# Patient Record
Sex: MALE | Race: OTHER | HISPANIC OR LATINO | Employment: OTHER | URBAN - METROPOLITAN AREA
[De-identification: names, ages, dates, MRNs, and addresses within clinical notes are randomized per-mention and may not be internally consistent; named-entity substitution may affect disease eponyms.]

---

## 2020-05-23 ENCOUNTER — HOSPITAL ENCOUNTER (INPATIENT)
Facility: MEDICAL CENTER | Age: 70
LOS: 6 days | DRG: 189 | End: 2020-05-29
Attending: EMERGENCY MEDICINE | Admitting: HOSPITALIST
Payer: MEDICARE

## 2020-05-23 ENCOUNTER — APPOINTMENT (OUTPATIENT)
Dept: CARDIOLOGY | Facility: MEDICAL CENTER | Age: 70
DRG: 189 | End: 2020-05-23
Attending: HOSPITALIST
Payer: MEDICARE

## 2020-05-23 ENCOUNTER — APPOINTMENT (OUTPATIENT)
Dept: RADIOLOGY | Facility: MEDICAL CENTER | Age: 70
DRG: 189 | End: 2020-05-23
Attending: EMERGENCY MEDICINE
Payer: MEDICARE

## 2020-05-23 DIAGNOSIS — I50.31 ACUTE DIASTOLIC HEART FAILURE (HCC): ICD-10-CM

## 2020-05-23 DIAGNOSIS — J96.01 ACUTE RESPIRATORY FAILURE WITH HYPOXIA (HCC): ICD-10-CM

## 2020-05-23 DIAGNOSIS — R06.00 DYSPNEA, UNSPECIFIED TYPE: ICD-10-CM

## 2020-05-23 DIAGNOSIS — R09.02 HYPOXIA: ICD-10-CM

## 2020-05-23 DIAGNOSIS — I50.9 NEW ONSET OF CONGESTIVE HEART FAILURE (HCC): ICD-10-CM

## 2020-05-23 PROBLEM — R93.89 ABNORMAL CHEST X-RAY: Status: ACTIVE | Noted: 2020-05-23

## 2020-05-23 PROBLEM — R77.8 ELEVATED TOTAL PROTEIN: Status: ACTIVE | Noted: 2020-05-23

## 2020-05-23 PROBLEM — D69.6 THROMBOCYTOPENIA (HCC): Status: ACTIVE | Noted: 2020-05-23

## 2020-05-23 PROBLEM — N17.9 ACUTE RENAL INJURY (HCC): Status: ACTIVE | Noted: 2020-05-23

## 2020-05-23 LAB
ALBUMIN SERPL BCP-MCNC: 3.8 G/DL (ref 3.2–4.9)
ALBUMIN/GLOB SERPL: 0.8 G/DL
ALP SERPL-CCNC: 77 U/L (ref 30–99)
ALT SERPL-CCNC: 29 U/L (ref 2–50)
ANION GAP SERPL CALC-SCNC: 16 MMOL/L (ref 7–16)
AST SERPL-CCNC: 30 U/L (ref 12–45)
BASOPHILS # BLD AUTO: 1.3 % (ref 0–1.8)
BASOPHILS # BLD: 0.12 K/UL (ref 0–0.12)
BILIRUB SERPL-MCNC: 0.7 MG/DL (ref 0.1–1.5)
BUN SERPL-MCNC: 28 MG/DL (ref 8–22)
CALCIUM SERPL-MCNC: 9.1 MG/DL (ref 8.5–10.5)
CHLORIDE SERPL-SCNC: 99 MMOL/L (ref 96–112)
CO2 SERPL-SCNC: 21 MMOL/L (ref 20–33)
COVID ORDER STATUS COVID19: NORMAL
CREAT SERPL-MCNC: 1.43 MG/DL (ref 0.5–1.4)
D DIMER PPP IA.FEU-MCNC: 1.94 UG/ML (FEU) (ref 0–0.5)
EKG IMPRESSION: NORMAL
EOSINOPHIL # BLD AUTO: 0.31 K/UL (ref 0–0.51)
EOSINOPHIL NFR BLD: 3.3 % (ref 0–6.9)
ERYTHROCYTE [DISTWIDTH] IN BLOOD BY AUTOMATED COUNT: 53 FL (ref 35.9–50)
EST. AVERAGE GLUCOSE BLD GHB EST-MCNC: 183 MG/DL
GLOBULIN SER CALC-MCNC: 4.5 G/DL (ref 1.9–3.5)
GLUCOSE BLD-MCNC: 155 MG/DL (ref 65–99)
GLUCOSE BLD-MCNC: 217 MG/DL (ref 65–99)
GLUCOSE SERPL-MCNC: 240 MG/DL (ref 65–99)
HBA1C MFR BLD: 8 % (ref 0–5.6)
HCT VFR BLD AUTO: 45.8 % (ref 42–52)
HGB BLD-MCNC: 14.3 G/DL (ref 14–18)
IMM GRANULOCYTES # BLD AUTO: 0.13 K/UL (ref 0–0.11)
IMM GRANULOCYTES NFR BLD AUTO: 1.4 % (ref 0–0.9)
LIPASE SERPL-CCNC: 33 U/L (ref 11–82)
LV EJECT FRACT MOD 2C 99903: 59.08
LV EJECT FRACT MOD 4C 99902: 60.8
LV EJECT FRACT MOD BP 99901: 60.32
LYMPHOCYTES # BLD AUTO: 2.33 K/UL (ref 1–4.8)
LYMPHOCYTES NFR BLD: 24.5 % (ref 22–41)
MAGNESIUM SERPL-MCNC: 1.9 MG/DL (ref 1.5–2.5)
MCH RBC QN AUTO: 31.6 PG (ref 27–33)
MCHC RBC AUTO-ENTMCNC: 31.2 G/DL (ref 33.7–35.3)
MCV RBC AUTO: 101.1 FL (ref 81.4–97.8)
MONOCYTES # BLD AUTO: 0.82 K/UL (ref 0–0.85)
MONOCYTES NFR BLD AUTO: 8.6 % (ref 0–13.4)
NEUTROPHILS # BLD AUTO: 5.79 K/UL (ref 1.82–7.42)
NEUTROPHILS NFR BLD: 60.9 % (ref 44–72)
NRBC # BLD AUTO: 0 K/UL
NRBC BLD-RTO: 0 /100 WBC
NT-PROBNP SERPL IA-MCNC: 3427 PG/ML (ref 0–125)
PLATELET # BLD AUTO: 141 K/UL (ref 164–446)
PMV BLD AUTO: 13.1 FL (ref 9–12.9)
POTASSIUM SERPL-SCNC: 4.9 MMOL/L (ref 3.6–5.5)
PROT SERPL-MCNC: 8.3 G/DL (ref 6–8.2)
RBC # BLD AUTO: 4.53 M/UL (ref 4.7–6.1)
SARS-COV-2 RNA RESP QL NAA+PROBE: NOTDETECTED
SODIUM SERPL-SCNC: 136 MMOL/L (ref 135–145)
SPECIMEN SOURCE: NORMAL
TROPONIN T SERPL-MCNC: 42 NG/L (ref 6–19)
TROPONIN T SERPL-MCNC: 64 NG/L (ref 6–19)
TSH SERPL DL<=0.005 MIU/L-ACNC: 1.36 UIU/ML (ref 0.38–5.33)
WBC # BLD AUTO: 9.5 K/UL (ref 4.8–10.8)

## 2020-05-23 PROCEDURE — 85379 FIBRIN DEGRADATION QUANT: CPT

## 2020-05-23 PROCEDURE — 83036 HEMOGLOBIN GLYCOSYLATED A1C: CPT

## 2020-05-23 PROCEDURE — 84484 ASSAY OF TROPONIN QUANT: CPT

## 2020-05-23 PROCEDURE — 700102 HCHG RX REV CODE 250 W/ 637 OVERRIDE(OP): Performed by: EMERGENCY MEDICINE

## 2020-05-23 PROCEDURE — 99223 1ST HOSP IP/OBS HIGH 75: CPT | Mod: AI | Performed by: HOSPITALIST

## 2020-05-23 PROCEDURE — 36415 COLL VENOUS BLD VENIPUNCTURE: CPT

## 2020-05-23 PROCEDURE — 93306 TTE W/DOPPLER COMPLETE: CPT | Mod: 26 | Performed by: INTERNAL MEDICINE

## 2020-05-23 PROCEDURE — 93005 ELECTROCARDIOGRAM TRACING: CPT | Performed by: EMERGENCY MEDICINE

## 2020-05-23 PROCEDURE — 700102 HCHG RX REV CODE 250 W/ 637 OVERRIDE(OP): Performed by: HOSPITALIST

## 2020-05-23 PROCEDURE — 84443 ASSAY THYROID STIM HORMONE: CPT

## 2020-05-23 PROCEDURE — 83880 ASSAY OF NATRIURETIC PEPTIDE: CPT

## 2020-05-23 PROCEDURE — 85025 COMPLETE CBC W/AUTO DIFF WBC: CPT

## 2020-05-23 PROCEDURE — A9270 NON-COVERED ITEM OR SERVICE: HCPCS | Performed by: EMERGENCY MEDICINE

## 2020-05-23 PROCEDURE — A9270 NON-COVERED ITEM OR SERVICE: HCPCS | Performed by: HOSPITALIST

## 2020-05-23 PROCEDURE — 83735 ASSAY OF MAGNESIUM: CPT

## 2020-05-23 PROCEDURE — 83690 ASSAY OF LIPASE: CPT

## 2020-05-23 PROCEDURE — 80053 COMPREHEN METABOLIC PANEL: CPT

## 2020-05-23 PROCEDURE — 770020 HCHG ROOM/CARE - TELE (206)

## 2020-05-23 PROCEDURE — 71045 X-RAY EXAM CHEST 1 VIEW: CPT

## 2020-05-23 PROCEDURE — 93005 ELECTROCARDIOGRAM TRACING: CPT

## 2020-05-23 PROCEDURE — 700111 HCHG RX REV CODE 636 W/ 250 OVERRIDE (IP): Performed by: HOSPITALIST

## 2020-05-23 PROCEDURE — 84155 ASSAY OF PROTEIN SERUM: CPT

## 2020-05-23 PROCEDURE — 700111 HCHG RX REV CODE 636 W/ 250 OVERRIDE (IP): Performed by: EMERGENCY MEDICINE

## 2020-05-23 PROCEDURE — 94760 N-INVAS EAR/PLS OXIMETRY 1: CPT

## 2020-05-23 PROCEDURE — 96374 THER/PROPH/DIAG INJ IV PUSH: CPT

## 2020-05-23 PROCEDURE — 84165 PROTEIN E-PHORESIS SERUM: CPT

## 2020-05-23 PROCEDURE — C9803 HOPD COVID-19 SPEC COLLECT: HCPCS | Performed by: EMERGENCY MEDICINE

## 2020-05-23 PROCEDURE — U0004 COV-19 TEST NON-CDC HGH THRU: HCPCS

## 2020-05-23 PROCEDURE — 99285 EMERGENCY DEPT VISIT HI MDM: CPT

## 2020-05-23 PROCEDURE — 93306 TTE W/DOPPLER COMPLETE: CPT

## 2020-05-23 PROCEDURE — 82962 GLUCOSE BLOOD TEST: CPT

## 2020-05-23 RX ORDER — ACETAMINOPHEN 325 MG/1
650 TABLET ORAL EVERY 6 HOURS PRN
Status: DISCONTINUED | OUTPATIENT
Start: 2020-05-23 | End: 2020-05-29 | Stop reason: HOSPADM

## 2020-05-23 RX ORDER — FUROSEMIDE 10 MG/ML
20 INJECTION INTRAMUSCULAR; INTRAVENOUS ONCE
Status: COMPLETED | OUTPATIENT
Start: 2020-05-23 | End: 2020-05-23

## 2020-05-23 RX ORDER — LISINOPRIL 5 MG/1
5 TABLET ORAL
Status: DISCONTINUED | OUTPATIENT
Start: 2020-05-23 | End: 2020-05-24

## 2020-05-23 RX ORDER — FUROSEMIDE 10 MG/ML
40 INJECTION INTRAMUSCULAR; INTRAVENOUS
Status: DISCONTINUED | OUTPATIENT
Start: 2020-05-23 | End: 2020-05-23

## 2020-05-23 RX ORDER — POLYETHYLENE GLYCOL 3350 17 G/17G
1 POWDER, FOR SOLUTION ORAL
Status: DISCONTINUED | OUTPATIENT
Start: 2020-05-23 | End: 2020-05-29 | Stop reason: HOSPADM

## 2020-05-23 RX ORDER — HEPARIN SODIUM 5000 [USP'U]/ML
5000 INJECTION, SOLUTION INTRAVENOUS; SUBCUTANEOUS EVERY 8 HOURS
Status: DISCONTINUED | OUTPATIENT
Start: 2020-05-23 | End: 2020-05-26

## 2020-05-23 RX ORDER — FUROSEMIDE 10 MG/ML
40 INJECTION INTRAMUSCULAR; INTRAVENOUS
Status: DISCONTINUED | OUTPATIENT
Start: 2020-05-23 | End: 2020-05-24

## 2020-05-23 RX ORDER — INSULIN GLARGINE 100 [IU]/ML
20 INJECTION, SOLUTION SUBCUTANEOUS EVERY MORNING
Status: ON HOLD | COMMUNITY
End: 2020-05-29

## 2020-05-23 RX ORDER — AMOXICILLIN 250 MG
2 CAPSULE ORAL 2 TIMES DAILY
Status: DISCONTINUED | OUTPATIENT
Start: 2020-05-23 | End: 2020-05-29 | Stop reason: HOSPADM

## 2020-05-23 RX ORDER — ISOSORBIDE DINITRATE 10 MG/1
10 TABLET ORAL EVERY 8 HOURS
Status: DISCONTINUED | OUTPATIENT
Start: 2020-05-23 | End: 2020-05-24

## 2020-05-23 RX ORDER — CARVEDILOL 6.25 MG/1
6.25 TABLET ORAL 2 TIMES DAILY WITH MEALS
Status: DISCONTINUED | OUTPATIENT
Start: 2020-05-23 | End: 2020-05-24

## 2020-05-23 RX ORDER — POTASSIUM CHLORIDE 20 MEQ/1
20 TABLET, EXTENDED RELEASE ORAL 2 TIMES DAILY
Status: DISCONTINUED | OUTPATIENT
Start: 2020-05-23 | End: 2020-05-24

## 2020-05-23 RX ORDER — ENALAPRILAT 1.25 MG/ML
1.25 INJECTION INTRAVENOUS EVERY 6 HOURS PRN
Status: DISCONTINUED | OUTPATIENT
Start: 2020-05-23 | End: 2020-05-29 | Stop reason: HOSPADM

## 2020-05-23 RX ORDER — ONDANSETRON 2 MG/ML
4 INJECTION INTRAMUSCULAR; INTRAVENOUS EVERY 4 HOURS PRN
Status: DISCONTINUED | OUTPATIENT
Start: 2020-05-23 | End: 2020-05-29 | Stop reason: HOSPADM

## 2020-05-23 RX ORDER — BISACODYL 10 MG
10 SUPPOSITORY, RECTAL RECTAL
Status: DISCONTINUED | OUTPATIENT
Start: 2020-05-23 | End: 2020-05-29 | Stop reason: HOSPADM

## 2020-05-23 RX ORDER — ONDANSETRON 4 MG/1
4 TABLET, ORALLY DISINTEGRATING ORAL EVERY 4 HOURS PRN
Status: DISCONTINUED | OUTPATIENT
Start: 2020-05-23 | End: 2020-05-29 | Stop reason: HOSPADM

## 2020-05-23 RX ADMIN — FUROSEMIDE 20 MG: 10 INJECTION, SOLUTION INTRAMUSCULAR; INTRAVENOUS at 12:12

## 2020-05-23 RX ADMIN — CARVEDILOL 6.25 MG: 6.25 TABLET, FILM COATED ORAL at 17:37

## 2020-05-23 RX ADMIN — INSULIN HUMAN 10 UNITS: 100 INJECTION, SUSPENSION SUBCUTANEOUS at 18:45

## 2020-05-23 RX ADMIN — POTASSIUM CHLORIDE 20 MEQ: 1500 TABLET, EXTENDED RELEASE ORAL at 17:37

## 2020-05-23 RX ADMIN — ISOSORBIDE DINITRATE 10 MG: 10 TABLET ORAL at 20:49

## 2020-05-23 RX ADMIN — HEPARIN SODIUM 5000 UNITS: 5000 INJECTION, SOLUTION INTRAVENOUS; SUBCUTANEOUS at 20:49

## 2020-05-23 RX ADMIN — NITROGLYCERIN 0.5 INCH: 20 OINTMENT TOPICAL at 12:10

## 2020-05-23 RX ADMIN — LISINOPRIL 5 MG: 5 TABLET ORAL at 15:52

## 2020-05-23 RX ADMIN — ISOSORBIDE DINITRATE 10 MG: 10 TABLET ORAL at 14:14

## 2020-05-23 RX ADMIN — FUROSEMIDE 40 MG: 10 INJECTION, SOLUTION INTRAMUSCULAR; INTRAVENOUS at 15:52

## 2020-05-23 RX ADMIN — INSULIN HUMAN 6 UNITS: 100 INJECTION, SOLUTION PARENTERAL at 19:12

## 2020-05-23 RX ADMIN — INSULIN HUMAN 3 UNITS: 100 INJECTION, SOLUTION PARENTERAL at 20:51

## 2020-05-23 RX ADMIN — HEPARIN SODIUM 5000 UNITS: 5000 INJECTION, SOLUTION INTRAVENOUS; SUBCUTANEOUS at 14:14

## 2020-05-23 ASSESSMENT — COGNITIVE AND FUNCTIONAL STATUS - GENERAL
SUGGESTED CMS G CODE MODIFIER MOBILITY: CH
SUGGESTED CMS G CODE MODIFIER DAILY ACTIVITY: CH
MOBILITY SCORE: 24
DAILY ACTIVITIY SCORE: 24

## 2020-05-23 ASSESSMENT — ENCOUNTER SYMPTOMS
MEMORY LOSS: 0
GASTROINTESTINAL NEGATIVE: 1
EYES NEGATIVE: 1
SHORTNESS OF BREATH: 1
NERVOUS/ANXIOUS: 0
WEAKNESS: 1
SORE THROAT: 0
NECK PAIN: 0
CONSTIPATION: 0
HEADACHES: 1
SINUS PAIN: 0
FEVER: 0
MUSCULOSKELETAL NEGATIVE: 1
DEPRESSION: 0
MYALGIAS: 0
BLOOD IN STOOL: 0
HEARTBURN: 0
DIAPHORESIS: 0
BLURRED VISION: 0
COUGH: 0
INSOMNIA: 0
CHILLS: 0
DIARRHEA: 0
PALPITATIONS: 0
WHEEZING: 0
NAUSEA: 0
HEMOPTYSIS: 0
LOSS OF CONSCIOUSNESS: 0
DOUBLE VISION: 0
PSYCHIATRIC NEGATIVE: 1
BRUISES/BLEEDS EASILY: 0
VOMITING: 0
ABDOMINAL PAIN: 0
FOCAL WEAKNESS: 0
DIZZINESS: 1
SEIZURES: 0

## 2020-05-23 ASSESSMENT — LIFESTYLE VARIABLES
HAVE YOU EVER FELT YOU SHOULD CUT DOWN ON YOUR DRINKING: NO
DO YOU DRINK ALCOHOL: NO
EVER HAD A DRINK FIRST THING IN THE MORNING TO STEADY YOUR NERVES TO GET RID OF A HANGOVER: NO
DOES PATIENT WANT TO STOP DRINKING: NO
DOES PATIENT WANT TO STOP DRINKING: NO
TOTAL SCORE: 0
HAVE PEOPLE ANNOYED YOU BY CRITICIZING YOUR DRINKING: NO
ALCOHOL_USE: NO
TOTAL SCORE: 0
TOTAL SCORE: 0
EVER_SMOKED: YES
EVER_SMOKED: NEVER
SUBSTANCE_ABUSE: 0
HOW MANY TIMES IN THE PAST YEAR HAVE YOU HAD 5 OR MORE DRINKS IN A DAY: 0
CONSUMPTION TOTAL: NEGATIVE
EVER FELT BAD OR GUILTY ABOUT YOUR DRINKING: NO
AVERAGE NUMBER OF DAYS PER WEEK YOU HAVE A DRINK CONTAINING ALCOHOL: 0
ON A TYPICAL DAY WHEN YOU DRINK ALCOHOL HOW MANY DRINKS DO YOU HAVE: 0

## 2020-05-23 ASSESSMENT — COPD QUESTIONNAIRES
DO YOU EVER COUGH UP ANY MUCUS OR PHLEGM?: NO/ONLY WITH OCCASIONAL COLDS OR INFECTIONS
COPD SCREENING SCORE: 4
HAVE YOU SMOKED AT LEAST 100 CIGARETTES IN YOUR ENTIRE LIFE: NO/DON'T KNOW
DURING THE PAST 4 WEEKS HOW MUCH DID YOU FEEL SHORT OF BREATH: SOME OF THE TIME

## 2020-05-23 ASSESSMENT — PATIENT HEALTH QUESTIONNAIRE - PHQ9
2. FEELING DOWN, DEPRESSED, IRRITABLE, OR HOPELESS: NOT AT ALL
SUM OF ALL RESPONSES TO PHQ9 QUESTIONS 1 AND 2: 0
1. LITTLE INTEREST OR PLEASURE IN DOING THINGS: NOT AT ALL

## 2020-05-23 ASSESSMENT — FIBROSIS 4 INDEX
FIB4 SCORE: 2.77
FIB4 SCORE: 2.77

## 2020-05-23 NOTE — ASSESSMENT & PLAN NOTE
COVID x1-.  CT chest without contrast obtained which is consistent with interstitial lung disease along with reticulonodular densities

## 2020-05-23 NOTE — ASSESSMENT & PLAN NOTE
Likely secondary to COPD exacerbation versus diastolic heart failure versus interstitial lung disease   ---> We will start the patient on RT protocol, breathing treatment, continue prednisone 40 mg p.o. daily.   ---> We will obtain daily INRs, restriction to 1.5 L, cardiac diet  --> We will continue prednisone for total of 5 days

## 2020-05-23 NOTE — ASSESSMENT & PLAN NOTE
Hemoglobin A1c 8, will continue insulin sliding scale, hypoglycemia protocol mycotoxin AC at bedtime.  ---> Upon discharge patient will continue metformin and Januvia

## 2020-05-23 NOTE — ED TRIAGE NOTES
"..  Chief Complaint   Patient presents with   • Shortness of Breath     c/o of SOB and dizziness x's 8 days. No cough.    pt Saturation on room air 87%      ..BP (!) 175/83   Pulse 98   Resp 16   Ht 1.6 m (5' 3\")   Wt 81.6 kg (180 lb)   SpO2 99%   "

## 2020-05-23 NOTE — ASSESSMENT & PLAN NOTE
Patient is noted to have grade 1 diastolic heart failure.  EF 55%.  Patient is noted to have mildly elevated RSVP.  ---> We will continue cardiac diet, ROXY's, daily weight, fluid restriction to 1.5 L  ----> Provide Lasix 20 mg p.o.  daily

## 2020-05-23 NOTE — ED PROVIDER NOTES
"ED Provider Note    CHIEF COMPLAINT  Chief Complaint   Patient presents with   • Shortness of Breath     c/o of SOB and dizziness x's 8 days. No cough.        HPI  Chase Harris is a 69 y.o. male who presents to the emergency department through triage for shortness of breath.  Patient describes 2 to 3 days of shortness of breath, denies any history of similar symptoms previously.  Patient states he has had mild dizziness, lightheaded but this resolves with rest.  He states shortness of breath is worse when he bends over.  Also with intermittent chest pain, low substernal.  No back pain or abdominal pain.  No syncope.  Denies any cough, congestion, sore throat.  Denies sputum production.  Denies fever or chills.  Denies recent travel, sick contacts or known coronavirus exposure.  Denies peripheral edema.    REVIEW OF SYSTEMS  See HPI for further details. All other systems are negative.    PAST MEDICAL HISTORY   Insulin-dependent diabetes    SOCIAL HISTORY  Social History     Tobacco Use   • Smoking status: Not on file   Substance and Sexual Activity   • Alcohol use: Not on file   • Drug use: Not on file   • Sexual activity: Not on file       SURGICAL HISTORY  patient denies any surgical history    CURRENT MEDICATIONS  Home Medications    **Home medications have not yet been reviewed for this encounter**     Medication list    ALLERGIES  No Known Allergies    PHYSICAL EXAM  VITAL SIGNS: BP (!) 171/74   Pulse 66   Temp 36.1 °C (97 °F) (Oral)   Resp (!) 24   Ht 1.6 m (5' 3\")   Wt 81.6 kg (180 lb)   SpO2 99%   BMI 31.89 kg/m²   Pulse ox interpretation: I interpret this pulse ox as low but improved with supplemental oxygen  Constitutional: Alert in no apparent distress.  HENT: Normocephalic, atraumatic. Bilateral external ears normal, Nose normal. Moist mucous membranes.    Eyes: Pupils are equal and reactive, Conjunctiva normal.   Neck: Normal range of motion, Supple.  No JVD.  Lymphatic: No lymphadenopathy " noted.   Cardiovascular: Regular rate and rhythm, no murmurs. Distal pulses intact.  No peripheral edema.  Thorax & Lungs: Few scattered bibasilar crackles.  No wheezing/rales/ronchi. No increased work of breathing, clipped speech or retractions.   Abdomen: Soft, non-distended, non-tender to palpation. No palpable or pulsatile masses. No peritoneal signs.   Skin: Warm, Dry, No erythema, No rash.   Musculoskeletal: Good range of motion in all major joints.   Neurologic: Alert x4.  Speech clear and cohesive.  Moves 4 extremity spontaneously.  Psychiatric: Affect normal, Judgment normal, Mood normal.       DIAGNOSTIC STUDIES / PROCEDURES    LABS  Results for orders placed or performed during the hospital encounter of 05/23/20   CBC WITH DIFFERENTIAL   Result Value Ref Range    WBC 9.5 4.8 - 10.8 K/uL    RBC 4.53 (L) 4.70 - 6.10 M/uL    Hemoglobin 14.3 14.0 - 18.0 g/dL    Hematocrit 45.8 42.0 - 52.0 %    .1 (H) 81.4 - 97.8 fL    MCH 31.6 27.0 - 33.0 pg    MCHC 31.2 (L) 33.7 - 35.3 g/dL    RDW 53.0 (H) 35.9 - 50.0 fL    Platelet Count 141 (L) 164 - 446 K/uL    MPV 13.1 (H) 9.0 - 12.9 fL    Neutrophils-Polys 60.90 44.00 - 72.00 %    Lymphocytes 24.50 22.00 - 41.00 %    Monocytes 8.60 0.00 - 13.40 %    Eosinophils 3.30 0.00 - 6.90 %    Basophils 1.30 0.00 - 1.80 %    Immature Granulocytes 1.40 (H) 0.00 - 0.90 %    Nucleated RBC 0.00 /100 WBC    Neutrophils (Absolute) 5.79 1.82 - 7.42 K/uL    Lymphs (Absolute) 2.33 1.00 - 4.80 K/uL    Monos (Absolute) 0.82 0.00 - 0.85 K/uL    Eos (Absolute) 0.31 0.00 - 0.51 K/uL    Baso (Absolute) 0.12 0.00 - 0.12 K/uL    Immature Granulocytes (abs) 0.13 (H) 0.00 - 0.11 K/uL    NRBC (Absolute) 0.00 K/uL   COMP METABOLIC PANEL   Result Value Ref Range    Sodium 136 135 - 145 mmol/L    Potassium 4.9 3.6 - 5.5 mmol/L    Chloride 99 96 - 112 mmol/L    Co2 21 20 - 33 mmol/L    Anion Gap 16.0 7.0 - 16.0    Glucose 240 (H) 65 - 99 mg/dL    Bun 28 (H) 8 - 22 mg/dL    Creatinine 1.43 (H) 0.50  - 1.40 mg/dL    Calcium 9.1 8.5 - 10.5 mg/dL    AST(SGOT) 30 12 - 45 U/L    ALT(SGPT) 29 2 - 50 U/L    Alkaline Phosphatase 77 30 - 99 U/L    Total Bilirubin 0.7 0.1 - 1.5 mg/dL    Albumin 3.8 3.2 - 4.9 g/dL    Total Protein 8.3 (H) 6.0 - 8.2 g/dL    Globulin 4.5 (H) 1.9 - 3.5 g/dL    A-G Ratio 0.8 g/dL   proBrain Natriuretic Peptide, NT   Result Value Ref Range    NT-proBNP 3427 (H) 0 - 125 pg/mL   Troponin STAT   Result Value Ref Range    Troponin T 42 (H) 6 - 19 ng/L   D-Dimer (only helpful in low pre-test probability wells critieria. Do not order if patient ruled out by PERC criteria. See Weblinks at top of Labs section)   Result Value Ref Range    D-Dimer Screen 1.94 (H) 0.00 - 0.50 ug/mL (FEU)   Lipase   Result Value Ref Range    Lipase 33 11 - 82 U/L   COVID/SARS CoV-2    Specimen: Nasopharyngeal; Respirate   Result Value Ref Range    COVID Order Status Received    ESTIMATED GFR   Result Value Ref Range    GFR If  59 (A) >60 mL/min/1.73 m 2    GFR If Non African American 49 (A) >60 mL/min/1.73 m 2   SARS-CoV-2, PCR (In-House)   Result Value Ref Range    SARS-CoV-2 Source NP Swab    EKG   Result Value Ref Range    Report       [facility]    Test Date:  2020  Pt Name:    BREANNE BOOTH                Department: ER  MRN:        [id number]                      Room:       [facility]  Gender:     Male                         Technician: [id number]  :        1950                   Requested By:ER TRIAGE PROTOCOL  Order #:    [id number]                    Reading MD: TYLER RANDOLPH, DO    Measurements  Intervals                                Axis  Rate:       69                           P:          69  NM:         244                          QRS:        47  QRSD:       84                           T:          -37  QT:         412  QTc:        442    Interpretive Statements  SINUS RHYTHM  FIRST DEGREE AV BLOCK  ABNORMAL T, CONSIDER ISCHEMIA, INFERIOR  "LEADS  BASELINE WANDER IN LEAD(S) V2  No previous ECG available for comparison  Electronically Signed On 5- 9:41:41 PDT by TYLER RANDOLPH DO       RADIOLOGY  DX-CHEST-PORTABLE (1 VIEW)   Final Result      Bilateral interstitial opacities may represent an atypical viral pneumonia such as Covid 19. Underlying chronic fibrotic changes not excluded.             COURSE & MEDICAL DECISION MAKING  Nursing notes and vital signs were reviewed. (See chart for details)  The patients records were reviewed, history was obtained from the patient;     ED evaluation for dyspnea and hypoxia most suggestive of a new onset CHF.  No leukocytosis, left shift despite mild bandemia.  Chest x-ray does suggest \"bilateral interstitial opacities which may represent an atypical viral pneumonia\" although after my interpretation this could also be interstitial edema.  Patient remains afebrile and without cough or congestion.  COVID testing pending.  No clinical evidence for sepsis.  Nitroglycerin paste has been added to his anterior chest wall, small dose of IV Lasix has been added as well.  He is hemodynamically stable without fever, tachycardia, hypotension.  Patient is somewhat hypertensive.  Patient was hypoxic on arrival and with room air trial while here in the emergency department, but rest comfortably with supplemental oxygen and appropriate saturations.  With that creatinine 1.43, no baseline for comparison.  No other electrolyte derangement.  Troponin is 42, BNP 3427.  EKG with nonspecific T wave inversions although not contiguous, no other ischemia.  He denies chest pain at this time.  He will be hospitalized for further cardiac evaluation, echocardiogram, troponin trending and diuresis.  Is aware the findings and agreeable to the disposition plan.    10:52 AM Dr. Lentz is aware of the patient agreeable to consultation.    FINAL IMPRESSION  (R06.00) Dyspnea, unspecified type  (R09.02) Hypoxia  (I50.9) New onset of " congestive heart failure (HCC)      Electronically signed by: Sharonda Nuñez D.O., 5/23/2020 9:39 AM      This dictation was created using voice recognition software. The accuracy of the dictation is limited to the abilities of the software. I expect there may be some errors of grammar and possibly content. The nursing notes were reviewed and certain aspects of this information were incorporated into this note.

## 2020-05-23 NOTE — PROGRESS NOTES
68yo withhx of DM presenting with  3-4 days of dyspnea. Hypoxia 87% on room air   BNP 3427 Scr 1.4 trop 42   CXR bilat interstitial infiltrates  Covid19 pending     Dr Hernández will admit

## 2020-05-23 NOTE — PROGRESS NOTES
2 RN Skin Check    2 RN skin check complete.   Devices in place: SCDs and Nasal Cannula.  Skin assessed under devices: yes.  Confirmed pressure ulcers found on: left plantar foot diabetic ulcer.  New potential pressure ulcers noted on n/a. Wound consult placed N/A.  The following interventions in place Pillows.

## 2020-05-23 NOTE — ED NOTES
Med Rec Updated and Complete per Pt's family over the phone  Allergies Reviewed  No PO ABX last 14 days.    Family states all of Pt's medications come from [facility].

## 2020-05-23 NOTE — H&P
Hospital Medicine History & Physical Note    Date of Service  5/23/2020    Primary Care Physician  No primary care provider on file.    Code Status  Full Code    Chief Complaint  Chief Complaint   Patient presents with   • Shortness of Breath     c/o of SOB and dizziness x's 8 days. No cough.        History of Presenting Illness  69 y.o. male who presented 5/23/2020 with sudden onset of shortness of breath and dizziness this morning.  The patient also reported some chest pain.  All the information was obtained through the use of an .  He says today it got worse but he has been having shortness of breath and dizziness for the past 8 days.  He does not have a cough.  The patient says that he was established with lung disease in [facility] and he gave him some small white pills that made him feel better but he ran out of those pills about 2 months ago.  Family was not able to really give us an explanation of his lung condition but sounds like the patient was established with pulmonary fibrosis.  The patient's shortness of breath at this point does seem to be exacerbated by congestive heart failure as his BNP level is elevated his troponin is elevated and at this point the patient will be evaluate an echocardiogram and placed on diuretics.  We will also optimize his medication management with beta-blockers, aspirin, ACE inhibitor's.  The patient does have diabetes which has been very poorly controlled.  This certainly could be giving him heart disease and thus at this point the blood diabetes will need to be optimized.  I am going to start him at this point on diabetic management.  He will be placed on insulin, diabetic diet, Accu-Cheks with sliding scale coverage and a diabetic educator will be asked to see him.  Once his heart failure is under control diabetes under control we may be requesting pulmonary input to establish possible etiology of his pulmonary disease.  The patient right now does have acute  renal injury will monitor this especially with the use of Lasix.  He is mildly thrombocytopenic but he is not bleeding and at this point platelet counts will be monitored.  Since he does have an abnormal chest x-ray will evaluate him for the COVID-19 infection.    Review of Systems  Review of Systems   Constitutional: Positive for malaise/fatigue. Negative for chills, diaphoresis and fever.   HENT: Negative.  Negative for ear discharge, hearing loss, nosebleeds, sinus pain and sore throat.    Eyes: Negative.  Negative for blurred vision and double vision.   Respiratory: Positive for shortness of breath. Negative for cough, hemoptysis and wheezing.    Cardiovascular: Positive for chest pain. Negative for palpitations and leg swelling.   Gastrointestinal: Negative.  Negative for abdominal pain, blood in stool, constipation, diarrhea, heartburn, nausea and vomiting.   Genitourinary: Negative.  Negative for dysuria, frequency, hematuria and urgency.   Musculoskeletal: Negative.  Negative for joint pain, myalgias and neck pain.   Skin: Negative.  Negative for itching and rash.   Neurological: Positive for dizziness, weakness and headaches. Negative for focal weakness, seizures and loss of consciousness.   Endo/Heme/Allergies: Negative.  Does not bruise/bleed easily.   Psychiatric/Behavioral: Negative.  Negative for depression, memory loss, substance abuse and suicidal ideas. The patient is not nervous/anxious and does not have insomnia.    All other systems reviewed and are negative.      Past Medical History  History of pulmonary fibrosis possibly    Surgical History  Cholecystectomy    Family History  Both mother and father had diabetes mellitus type 2    Social History  Patient is a lifetime non-smoker.  In the past he used to drink beer but he has not had any beer in many years  Does not use any recreational drugs  He is full CODE STATUS does not have an advanced directive    Allergies  No Known  Allergies    Medications  None       Physical Exam  Temp:  [36.1 °C (97 °F)] 36.1 °C (97 °F)  Pulse:  [66-98] 66  Resp:  [16-24] 21  BP: (171-195)/(74-83) 195/80  SpO2:  [94 %-100 %] 94 %    Physical Exam  Vitals signs and nursing note reviewed. Exam conducted with a chaperone present.   Constitutional:       General: He is not in acute distress.     Appearance: Normal appearance. He is well-developed and normal weight. He is not ill-appearing, toxic-appearing or diaphoretic.   HENT:      Head: Normocephalic and atraumatic.      Right Ear: External ear normal.      Left Ear: External ear normal.      Nose: Nose normal.      Mouth/Throat:      Mouth: Mucous membranes are moist.      Pharynx: Oropharynx is clear. No oropharyngeal exudate or posterior oropharyngeal erythema.   Eyes:      General:         Right eye: No discharge.         Left eye: No discharge.      Extraocular Movements: Extraocular movements intact.      Conjunctiva/sclera: Conjunctivae normal.      Pupils: Pupils are equal, round, and reactive to light.   Neck:      Musculoskeletal: Normal range of motion and neck supple.      Thyroid: No thyromegaly.      Vascular: No JVD.   Cardiovascular:      Rate and Rhythm: Normal rate and regular rhythm.      Pulses: Normal pulses.      Heart sounds: Normal heart sounds.   Pulmonary:      Effort: Accessory muscle usage and prolonged expiration present.      Breath sounds: Decreased air movement present. Examination of the right-upper field reveals decreased breath sounds. Examination of the left-upper field reveals decreased breath sounds. Examination of the right-middle field reveals decreased breath sounds. Examination of the left-middle field reveals decreased breath sounds. Examination of the right-lower field reveals decreased breath sounds and rales. Examination of the left-lower field reveals decreased breath sounds and rales. Decreased breath sounds and rales present.   Chest:      Chest wall: No  tenderness.   Abdominal:      General: Abdomen is flat. Bowel sounds are normal. There is no distension.      Palpations: Abdomen is soft. There is no mass.      Tenderness: There is no abdominal tenderness. There is no guarding or rebound.   Musculoskeletal: Normal range of motion.         General: No tenderness.      Right lower leg: No edema.      Left lower leg: No edema.   Lymphadenopathy:      Cervical: No cervical adenopathy.   Skin:     General: Skin is warm and dry.      Capillary Refill: Capillary refill takes 2 to 3 seconds.      Coloration: Skin is not jaundiced or pale.      Findings: No bruising, lesion or rash.   Neurological:      General: No focal deficit present.      Mental Status: He is alert and oriented to person, place, and time. Mental status is at baseline.      Cranial Nerves: No cranial nerve deficit.      Deep Tendon Reflexes: Reflexes are normal and symmetric.   Psychiatric:         Mood and Affect: Mood normal.         Behavior: Behavior normal.         Thought Content: Thought content normal.         Judgment: Judgment normal.         Laboratory:  Recent Labs     05/23/20  0918   WBC 9.5   RBC 4.53*   HEMOGLOBIN 14.3   HEMATOCRIT 45.8   .1*   MCH 31.6   MCHC 31.2*   RDW 53.0*   PLATELETCT 141*   MPV 13.1*     Recent Labs     05/23/20  0918   SODIUM 136   POTASSIUM 4.9   CHLORIDE 99   CO2 21   GLUCOSE 240*   BUN 28*   CREATININE 1.43*   CALCIUM 9.1     Recent Labs     05/23/20  0918   ALTSGPT 29   ASTSGOT 30   ALKPHOSPHAT 77   TBILIRUBIN 0.7   LIPASE 33   GLUCOSE 240*         Recent Labs     05/23/20  0918   NTPROBNP 3427*         Recent Labs     05/23/20  0918   TROPONINT 42*       Imaging:  DX-CHEST-PORTABLE (1 VIEW)   Final Result      Bilateral interstitial opacities may represent an atypical viral pneumonia such as Covid 19. Underlying chronic fibrotic changes not excluded.         EC-ECHOCARDIOGRAM COMPLETE W/O CONT    (Results Pending)         Assessment/Plan: Teddy Cope is  0 contact    * Acute respiratory failure with hypoxia (HCC)  Assessment & Plan  Patient will be placed on oxygen support  Oxygen will be titrated to keep oxygen saturations above 90%  RT protocol  Nebulizers as needed  Will need eventually pulmonary consultation to establish lung disease that he has underlying everything.    Diabetes mellitus type II, uncontrolled (HCC)  Assessment & Plan  -accus with sliding scale coverage, initiate NPH 10 units twice daily  -diabetic diet to start  -diabetic education  -follow glycohemoglobin levels long term, check hemoglobin A1c level  -monitor for hypoglycemic episodes and adjust control if he should get low    Acute heart failure (HCC)  Assessment & Plan  Patient at this point has elevated BNP and troponin level  Patient will be value with an echocardiogram  Patient at this point does have diffuse pulmonary edema  Start Lasix 40 mg IV every 12 hours  Monitor intake and output carefully  Start aspirin and beta-blocker as well as ACE inhibitor  Cardiac monitoring  Serial troponins      Elevated total protein  Assessment & Plan  At this point since his total protein is elevated I am going to obtain a serum protein electrophoresis.    Acute renal injury ([facility])  Assessment & Plan  Patient has mild renal injury this may be from fluid overload versus dehydration.  We will at this point avoid nephrotoxic medications much as possible.  The patient does need to be diuresed and thus we will monitor renal functions very carefully.  Renal ultrasound will also be requested.  If necessary renal biopsy will be requested    Thrombocytopenia ([facility])- (present on admission)  Assessment & Plan  Thrombocytopenia at this point is around 140.  Continue to monitor platelet counts.  Currently is not bleeding and he has no need for platelet transfusion.    Abnormal chest x-ray  Assessment & Plan  Patient's chest x-ray at this point shows possible fluid overload versus viral pneumonitis.  We will rule him  out for COVID-19.

## 2020-05-23 NOTE — PROGRESS NOTES
Pt admitted into 83-1, transported from ED to T8 with RN via zoll. AOx4 Thai speaking, VSS in SR HR 98, 2L NC, no complaints or needs at this time. Pt brought to 834- with clothes and cell phone. Pt understands little English, used  services for medical information.

## 2020-05-24 LAB
ANION GAP SERPL CALC-SCNC: 13 MMOL/L (ref 7–16)
BASOPHILS # BLD AUTO: 1.2 % (ref 0–1.8)
BASOPHILS # BLD: 0.14 K/UL (ref 0–0.12)
BUN SERPL-MCNC: 35 MG/DL (ref 8–22)
CALCIUM SERPL-MCNC: 9.5 MG/DL (ref 8.5–10.5)
CHLORIDE SERPL-SCNC: 96 MMOL/L (ref 96–112)
CHOLEST SERPL-MCNC: 117 MG/DL (ref 100–199)
CO2 SERPL-SCNC: 28 MMOL/L (ref 20–33)
CREAT SERPL-MCNC: 1.51 MG/DL (ref 0.5–1.4)
EOSINOPHIL # BLD AUTO: 0.44 K/UL (ref 0–0.51)
EOSINOPHIL NFR BLD: 3.8 % (ref 0–6.9)
ERYTHROCYTE [DISTWIDTH] IN BLOOD BY AUTOMATED COUNT: 50.5 FL (ref 35.9–50)
GLUCOSE BLD-MCNC: 203 MG/DL (ref 65–99)
GLUCOSE BLD-MCNC: 219 MG/DL (ref 65–99)
GLUCOSE BLD-MCNC: 322 MG/DL (ref 65–99)
GLUCOSE BLD-MCNC: 93 MG/DL (ref 65–99)
GLUCOSE SERPL-MCNC: 84 MG/DL (ref 65–99)
HCT VFR BLD AUTO: 42.5 % (ref 42–52)
HDLC SERPL-MCNC: 29 MG/DL
HGB BLD-MCNC: 13.7 G/DL (ref 14–18)
IMM GRANULOCYTES # BLD AUTO: 0.16 K/UL (ref 0–0.11)
IMM GRANULOCYTES NFR BLD AUTO: 1.4 % (ref 0–0.9)
LDLC SERPL CALC-MCNC: 68 MG/DL
LYMPHOCYTES # BLD AUTO: 2.61 K/UL (ref 1–4.8)
LYMPHOCYTES NFR BLD: 22.4 % (ref 22–41)
MAGNESIUM SERPL-MCNC: 2.1 MG/DL (ref 1.5–2.5)
MCH RBC QN AUTO: 31.7 PG (ref 27–33)
MCHC RBC AUTO-ENTMCNC: 32.2 G/DL (ref 33.7–35.3)
MCV RBC AUTO: 98.4 FL (ref 81.4–97.8)
MONOCYTES # BLD AUTO: 1.03 K/UL (ref 0–0.85)
MONOCYTES NFR BLD AUTO: 8.8 % (ref 0–13.4)
NEUTROPHILS # BLD AUTO: 7.29 K/UL (ref 1.82–7.42)
NEUTROPHILS NFR BLD: 62.4 % (ref 44–72)
NRBC # BLD AUTO: 0 K/UL
NRBC BLD-RTO: 0 /100 WBC
NT-PROBNP SERPL IA-MCNC: 2191 PG/ML (ref 0–125)
PLATELET # BLD AUTO: 150 K/UL (ref 164–446)
PMV BLD AUTO: 12 FL (ref 9–12.9)
POTASSIUM SERPL-SCNC: 4.4 MMOL/L (ref 3.6–5.5)
RBC # BLD AUTO: 4.32 M/UL (ref 4.7–6.1)
SODIUM SERPL-SCNC: 137 MMOL/L (ref 135–145)
TRIGL SERPL-MCNC: 101 MG/DL (ref 0–149)
TROPONIN T SERPL-MCNC: 60 NG/L (ref 6–19)
WBC # BLD AUTO: 11.7 K/UL (ref 4.8–10.8)

## 2020-05-24 PROCEDURE — 770020 HCHG ROOM/CARE - TELE (206)

## 2020-05-24 PROCEDURE — 99232 SBSQ HOSP IP/OBS MODERATE 35: CPT | Performed by: FAMILY MEDICINE

## 2020-05-24 PROCEDURE — 85025 COMPLETE CBC W/AUTO DIFF WBC: CPT

## 2020-05-24 PROCEDURE — 80061 LIPID PANEL: CPT

## 2020-05-24 PROCEDURE — 82962 GLUCOSE BLOOD TEST: CPT | Mod: 91

## 2020-05-24 PROCEDURE — 84484 ASSAY OF TROPONIN QUANT: CPT

## 2020-05-24 PROCEDURE — 700111 HCHG RX REV CODE 636 W/ 250 OVERRIDE (IP): Performed by: HOSPITALIST

## 2020-05-24 PROCEDURE — 700111 HCHG RX REV CODE 636 W/ 250 OVERRIDE (IP): Performed by: FAMILY MEDICINE

## 2020-05-24 PROCEDURE — 80048 BASIC METABOLIC PNL TOTAL CA: CPT

## 2020-05-24 PROCEDURE — 700102 HCHG RX REV CODE 250 W/ 637 OVERRIDE(OP): Performed by: HOSPITALIST

## 2020-05-24 PROCEDURE — 97161 PT EVAL LOW COMPLEX 20 MIN: CPT

## 2020-05-24 PROCEDURE — 94760 N-INVAS EAR/PLS OXIMETRY 1: CPT

## 2020-05-24 PROCEDURE — 36415 COLL VENOUS BLD VENIPUNCTURE: CPT

## 2020-05-24 PROCEDURE — 97166 OT EVAL MOD COMPLEX 45 MIN: CPT

## 2020-05-24 PROCEDURE — 83880 ASSAY OF NATRIURETIC PEPTIDE: CPT

## 2020-05-24 PROCEDURE — 83735 ASSAY OF MAGNESIUM: CPT

## 2020-05-24 PROCEDURE — A9270 NON-COVERED ITEM OR SERVICE: HCPCS | Performed by: HOSPITALIST

## 2020-05-24 RX ORDER — PREDNISONE 20 MG/1
40 TABLET ORAL DAILY
Status: DISCONTINUED | OUTPATIENT
Start: 2020-05-24 | End: 2020-05-29 | Stop reason: HOSPADM

## 2020-05-24 RX ORDER — DEXTROSE MONOHYDRATE 25 G/50ML
50 INJECTION, SOLUTION INTRAVENOUS
Status: DISCONTINUED | OUTPATIENT
Start: 2020-05-24 | End: 2020-05-29 | Stop reason: HOSPADM

## 2020-05-24 RX ADMIN — HEPARIN SODIUM 5000 UNITS: 5000 INJECTION, SOLUTION INTRAVENOUS; SUBCUTANEOUS at 21:17

## 2020-05-24 RX ADMIN — ISOSORBIDE DINITRATE 10 MG: 10 TABLET ORAL at 04:39

## 2020-05-24 RX ADMIN — INSULIN HUMAN 6 UNITS: 100 INJECTION, SOLUTION PARENTERAL at 11:42

## 2020-05-24 RX ADMIN — INSULIN HUMAN 12 UNITS: 100 INJECTION, SOLUTION PARENTERAL at 21:13

## 2020-05-24 RX ADMIN — INSULIN HUMAN 10 UNITS: 100 INJECTION, SUSPENSION SUBCUTANEOUS at 09:17

## 2020-05-24 RX ADMIN — INSULIN HUMAN 6 UNITS: 100 INJECTION, SOLUTION PARENTERAL at 16:59

## 2020-05-24 RX ADMIN — FUROSEMIDE 40 MG: 10 INJECTION, SOLUTION INTRAMUSCULAR; INTRAVENOUS at 04:39

## 2020-05-24 RX ADMIN — PREDNISONE 40 MG: 20 TABLET ORAL at 10:52

## 2020-05-24 RX ADMIN — HEPARIN SODIUM 5000 UNITS: 5000 INJECTION, SOLUTION INTRAVENOUS; SUBCUTANEOUS at 14:32

## 2020-05-24 RX ADMIN — HEPARIN SODIUM 5000 UNITS: 5000 INJECTION, SOLUTION INTRAVENOUS; SUBCUTANEOUS at 04:39

## 2020-05-24 RX ADMIN — POTASSIUM CHLORIDE 20 MEQ: 1500 TABLET, EXTENDED RELEASE ORAL at 04:39

## 2020-05-24 RX ADMIN — ACETAMINOPHEN 650 MG: 325 TABLET, FILM COATED ORAL at 21:17

## 2020-05-24 RX ADMIN — INSULIN HUMAN 10 UNITS: 100 INJECTION, SUSPENSION SUBCUTANEOUS at 16:59

## 2020-05-24 RX ADMIN — LISINOPRIL 5 MG: 5 TABLET ORAL at 04:39

## 2020-05-24 ASSESSMENT — GAIT ASSESSMENTS
DEVIATION: NO DEVIATION
GAIT LEVEL OF ASSIST: SUPERVISED
DISTANCE (FEET): 500

## 2020-05-24 ASSESSMENT — ENCOUNTER SYMPTOMS
CLAUDICATION: 0
PALPITATIONS: 0
DIAPHORESIS: 0
FEVER: 0
COUGH: 1
TINGLING: 0
CHILLS: 0
NAUSEA: 0
HEARTBURN: 0
BLURRED VISION: 0
NECK PAIN: 0
HEADACHES: 0
VOMITING: 0
PHOTOPHOBIA: 0
BACK PAIN: 0
DOUBLE VISION: 0
MYALGIAS: 0
DIZZINESS: 0
SHORTNESS OF BREATH: 1

## 2020-05-24 ASSESSMENT — COGNITIVE AND FUNCTIONAL STATUS - GENERAL
HELP NEEDED FOR BATHING: A LITTLE
DRESSING REGULAR LOWER BODY CLOTHING: A LITTLE
SUGGESTED CMS G CODE MODIFIER DAILY ACTIVITY: CJ
TOILETING: A LITTLE
CLIMB 3 TO 5 STEPS WITH RAILING: A LITTLE
DAILY ACTIVITIY SCORE: 20
DRESSING REGULAR UPPER BODY CLOTHING: A LITTLE
SUGGESTED CMS G CODE MODIFIER MOBILITY: CI
MOBILITY SCORE: 23

## 2020-05-24 ASSESSMENT — ACTIVITIES OF DAILY LIVING (ADL): TOILETING: INDEPENDENT

## 2020-05-24 ASSESSMENT — FIBROSIS 4 INDEX
FIB4 SCORE: 2.6
FIB4 SCORE: 2.6

## 2020-05-24 NOTE — PROGRESS NOTES
Monitor summary: .24/.12/.40  Rhythm: SB-SR 46-71 w/ first degree block and BBB    Ectopy:  R B-PAC

## 2020-05-24 NOTE — PROGRESS NOTES
[facility] Medicine Daily Progress Note    Date of Service  5/24/2020    Chief Complaint  69 y.o. male admitted 5/23/2020 with shortness of breath    Hospital Course    69 y.o. male who presented 5/23/2020 with sudden onset of shortness of breath and dizziness this morning.  The patient also reported some chest pain.  All the information was obtained through the use of an .  He says today it got worse but he has been having shortness of breath and dizziness for the past 8 days.  He does not have a cough.  The patient says that he was established with lung disease in [facility] and he gave him some small white pills that made him feel better but he ran out of those pills about 2 months ago.  Family was not able to really give us an explanation of his lung condition but sounds like the patient was established with pulmonary fibrosis.  The patient's shortness of breath at this point does seem to be exacerbated by congestive heart failure as his BNP level is elevated his troponin is elevated and at this point the patient will be evaluate an echocardiogram and placed on diuretics.  We will also optimize his medication management with beta-blockers, aspirin, ACE inhibitor's.  The patient does have diabetes which has been very poorly controlled.  This certainly could be giving him heart disease and thus at this point the blood diabetes will need to be optimized.  I am going to start him at this point on diabetic management.  He will be placed on insulin, diabetic diet, Accu-Cheks with sliding scale coverage and a diabetic educator will be asked to see him.  Once his heart failure is under control diabetes under control we may be requesting pulmonary input to establish possible etiology of his pulmonary disease.  The patient right now does have acute renal injury will monitor this especially with the use of Lasix.  He is mildly thrombocytopenic but he is not bleeding and at this point platelet counts will be  monitored.  Since he does have an abnormal chest x-ray will evaluate him for the COVID-19 infection.    Interval Problem Update  none    Consultants/Specialty  none    Code Status  full    Disposition  pending    Review of Systems  Review of Systems   Constitutional: Negative for chills, diaphoresis and fever.   HENT: Negative for ear discharge, ear pain and tinnitus.    Eyes: Negative for blurred vision, double vision and photophobia.   Respiratory: Positive for cough and shortness of breath.    Cardiovascular: Negative for chest pain, palpitations and claudication.   Gastrointestinal: Negative for heartburn, nausea and vomiting.   Genitourinary: Negative for dysuria, frequency and urgency.   Musculoskeletal: Negative for back pain, myalgias and neck pain.   Skin: Negative for itching and rash.   Neurological: Negative for dizziness, tingling and headaches.        Physical Exam  Temp:  [36.6 °C (97.9 °F)-37.2 °C (98.9 °F)] 36.8 °C (98.3 °F)  Pulse:  [53-72] 55  Resp:  [12-24] 16  BP: ()/(47-83) 124/62  SpO2:  [87 %-99 %] 97 %    Physical Exam  Constitutional:       General: He is not in acute distress.     Appearance: He is not toxic-appearing.   HENT:      Head: Normocephalic and atraumatic.      Nose: Nose normal.      Mouth/Throat:      Mouth: Mucous membranes are dry.   Eyes:      Extraocular Movements: Extraocular movements intact.      Pupils: Pupils are equal, round, and reactive to light.   Neck:      Musculoskeletal: Normal range of motion and neck supple.   Cardiovascular:      Rate and Rhythm: Normal rate and regular rhythm.      Pulses: Normal pulses.      Heart sounds: Normal heart sounds.   Pulmonary:      Breath sounds: No stridor. Rhonchi present.   Abdominal:      General: Abdomen is flat.      Palpations: Abdomen is soft.   Musculoskeletal: Normal range of motion.   Skin:     General: Skin is warm and dry.   Neurological:      General: No focal deficit present.      Mental Status: He is  alert and oriented to person, place, and time.         Fluids    Intake/Output Summary (Last 24 hours) at 5/24/2020 0936  Last data filed at 5/24/2020 0020  Gross per 24 hour   Intake 120 ml   Output --   Net 120 ml       Laboratory  Recent Labs     05/23/20 0918 05/24/20 0317   WBC 9.5 11.7*   RBC 4.53* 4.32*   HEMOGLOBIN 14.3 13.7*   HEMATOCRIT 45.8 42.5   .1* 98.4*   MCH 31.6 31.7   MCHC 31.2* 32.2*   RDW 53.0* 50.5*   PLATELETCT 141* 150*   MPV 13.1* 12.0     Recent Labs     05/23/20 0918 05/24/20 0317   SODIUM 136 137   POTASSIUM 4.9 4.4   CHLORIDE 99 96   CO2 21 28   GLUCOSE 240* 84   BUN 28* 35*   CREATININE 1.43* 1.51*   CALCIUM 9.1 9.5             Recent Labs     05/24/20 0317   TRIGLYCERIDE 101   HDL 29*   LDL 68       Imaging       Assessment/Plan  * Acute respiratory failure with hypoxia (HCC)  Assessment & Plan  Patient will be placed on oxygen support  Oxygen will be titrated to keep oxygen saturations above 90%  RT protocol  Nebulizers as needed  Appears to have pulmonary htn  COVID 19  Is negative  probable vital brohnchitis      Diabetes mellitus type II, uncontrolled (HCC)  Assessment & Plan  -accus with sliding scale coverage, initiate NPH 10 units twice daily  -diabetic diet t  -diabetic education  -Hema1c 8  accu check results are noted    Acute heart failure (HCC)  Assessment & Plan  Appears to have pulmonary htn  Possibly has chf and is mild  Not in acute chfexacerbation at this time  Cardiac echo showed:Left ventricular ejection fraction is visually estimated to be 55%.   Grade I diastolic dysfunction.  The right ventricle was normal in size and function.  Estimated right ventricular systolic pressure  is 40 mmHg, consistent   with mild pulmonary hypertension.  Estimated right atrial pressure is 3 mmHg.  Normal pericardium without effusion.     No prior study is available for comparison.       Elevated total protein  Assessment & Plan  At this point since his total protein is  elevated I am going to obtain a serum protein electrophoresis.    Acute renal injury (HCC)  Assessment & Plan  dced lasix iv   Will monitor      Thrombocytopenia (HCC)- (present on admission)  Assessment & Plan  Very mild   Has improved    Abnormal chest x-ray  Assessment & Plan  Patient's chest x-ray at this point shows possible fluid overload versus viral pneumonitis.  We will rule him out for COVID-19.       VTE prophylaxis: heparin

## 2020-05-24 NOTE — PROGRESS NOTES
Monitor Summary: SR 66 - 69, KY -.24, QRS -.08, QT -.38 with (B) PAC and 2nd degree type II AV per strip from the monitor room.

## 2020-05-24 NOTE — THERAPY
Occupational Therapy   Initial Evaluation     Patient Name: Chase Harris  Age:  69 y.o., Sex:  male  Medical Record #: [id number]  Today's Date: 5/24/2020     Precautions  Comments: Malagasy speaking, little english    Subjective    Agreeable to get out of bed, use restroom. Tolerates all activity without pain, [id number]     Objective       05/24/20 1315   Total Time Spent   Total Time Spent (Mins) 42   Charge Group   OT Evaluation OT Evaluation Mod   Initial Contact Note    Initial Contact Note Order Received and Verified, Evaluation Only - Patient Does Not Require Further Acute Occupational Therapy at this Time.  However, May Benefit from Post Acute Therapy for Higher Level Functional Deficits.   Prior Living Situation   Prior Services None   Housing / Facility 1 Story House   Steps Into Home 0   Steps In Home 0   Elevator No   Bathroom Set up Bathtub / Shower Combination   Equipment Owned None   Lives with - Patient's Self Care Capacity Adult Children  (son)   Prior Level of ADL Function   Self Feeding Independent   Grooming / Hygiene Independent   Bathing Independent   Dressing Independent   Toileting Independent   Prior Level of IADL Function   Medication Management Independent   Laundry Requires Assist   Kitchen Mobility Independent   Finances Independent   Home Management Requires Assist   Shopping Requires Assist   Prior Level Of Mobility Independent Without Device in Home   Driving / Transportation Relatives / Others Provide Transportation   Occupation (Pre-Hospital Vocational) Retired Due To Age   Precautions   Comments Malagasy speaking, little english   Vitals   Pulse Oximetry 93 %   O2 (LPM) 1   O2 Delivery Device Silicone Nasal Cannula   Pain 0 - 10 Group   Therapist Pain Assessment Post Activity Pain Same as Prior to Activity;Nurse Notified;0   Cognition    Cognition / Consciousness WDL   Level of Consciousness Alert   Passive ROM Upper Body   Passive ROM Upper Body WDL   Active ROM Upper Body   Active ROM  Upper Body  WDL   Dominant Hand Right   Strength Upper Body   Upper Body Strength  WDL   Sensation Upper Body   Upper Extremity Sensation  WDL   Upper Body Muscle Tone   Upper Body Muscle Tone  WDL   Coordination Upper Body   Coordination WDL   Balance Assessment   Sitting Balance (Static) Good   Sitting Balance (Dynamic) Good   Standing Balance (Static) Fair +   Standing Balance (Dynamic) Fair +   Weight Shift Sitting Good   Weight Shift Standing Fair   Bed Mobility    Supine to Sit Supervised   Sit to Supine Supervised   Scooting Supervised   Rolling Supervised   ADL Assessment   Eating Modified Independent   Grooming Modified Independent;Standing   Bathing   (not tested)   Upper Body Dressing Supervision   Lower Body Dressing Supervision   Toileting Supervision   How much help from another person does the patient currently need...   Putting on and taking off regular lower body clothing? 3   Bathing (including washing, rinsing, and drying)? 3   Toileting, which includes using a toilet, bedpan, or urinal? 3   Putting on and taking off regular upper body clothing? 3   Taking care of personal grooming such as brushing teeth? 4   Eating meals? 4   6 Clicks Daily Activity Score 20   Functional Mobility   Sit to Stand Supervised   Bed, Chair, Wheelchair Transfer Supervised   Toilet Transfers Supervised   Transfer Method Stand Pivot   Visual Perception   Visual Perception  WDL   Education Group   Education Provided Role of Occupational Therapist   Role of Occupational Therapist Patient Response Patient;Acceptance;Explanation;Demonstration;Verbal Demonstration   Anticipated Discharge Equipment   DC Equipment None   Interdisciplinary Plan of Care Collaboration   IDT Collaboration with  Nursing   Patient Position at End of Therapy In Bed;Bed Alarm On;Call Light within Reach;Tray Table within Reach;Phone within Reach   Collaboration Comments RN aware of tolerance, participation, functional level. RN ok'd out of bed activity  for camelia.   Session Information   Date / Session Number  5/24,1/1   Priority 2       Assessment  Patient is 69 y.o. male with a diagnosis of CHF.  Additional factors influencing patient status / progress: family support in the community.      Plan    Recommend Occupational Therapy for Evaluation only for the following treatments:  NA.    Discharge recommendations:  Anticipate that the patient will have no further occupational therapy needs after discharge from the hospital.

## 2020-05-24 NOTE — PROGRESS NOTES
Assumed care of pt, bedside report received from DORA Alonso. Call light within reach. Bed alarm on. Addressed POC with pt, no additional questions at this time.

## 2020-05-24 NOTE — THERAPY
"Missed Therapy     Patient Name: Chase Harris  Age:  69 y.o., Sex:  male  Medical Record #: [id number]  Today's Date: 5/24/2020    PT consult received. Chart reviewed. Pt with active bedrest orders beginning 5/23 at 1130 \"until specified.\" Will complete PT evaluation as appropriate when pt able to participate in OOB activity and bedrest order discontinued.    "

## 2020-05-24 NOTE — RESPIRATORY CARE
Oxygen Rounds      Patient found on    O2 L/m:  __2_______    Oxygen device:  __nc______   Spo2: _____94____%      Respiratory device skin site inspection completed.

## 2020-05-24 NOTE — CARE PLAN
Problem: Communication  Goal: The ability to communicate needs accurately and effectively will improve  Outcome: PROGRESSING AS EXPECTED  Note:  used for communication. Pt primarily Serbian speaking.      Problem: Safety  Goal: Will remain free from injury  Outcome: PROGRESSING AS EXPECTED  Note: Pt alert and oriented. Utilizes call light appropriately. Fall precautions in place.   Goal: Will remain free from falls  Outcome: PROGRESSING AS EXPECTED  Note: Pt alert and oriented. Utilizes call light appropriately. Fall precautions in place.      Problem: Venous Thromboembolism (VTW)/Deep Vein Thrombosis (DVT) Prevention:  Goal: Patient will participate in Venous Thrombosis (VTE)/Deep Vein Thrombosis (DVT)Prevention Measures  Outcome: PROGRESSING AS EXPECTED  Flowsheets (Taken 5/24/2020 0921)  Mechanical Prophylaxis: SCDs, Sequential Compression Device  SCDs, Sequential Compression Device: On     Problem: Bowel/Gastric:  Goal: Normal bowel function is maintained or improved  Outcome: PROGRESSING AS EXPECTED  Flowsheets (Taken 5/24/2020 0938)  Last BM: 05/24/20  Number of Times Stooled: 1     Problem: Respiratory:  Goal: Respiratory status will improve  Outcome: PROGRESSING SLOWER THAN EXPECTED  Note: Pt remains on 2L NC, pt RA baseline

## 2020-05-24 NOTE — PROGRESS NOTES
Notified Dr. Hernández re: 2nd degree AVB shown on monitor strip. Pt is asymptomatic, AOx4, desats occasionally on 3L NC from 70s-96%. No orders given.

## 2020-05-24 NOTE — CARE PLAN
Problem: Communication  Goal: The ability to communicate needs accurately and effectively will improve  Outcome: PROGRESSING AS EXPECTED  Intervention: [place] patient and significant other/support system to call light to alert staff of needs  Flowsheets (Taken 5/23/2020 2117)  Oriented to:: All of the Following : Location of Bathroom, Visiting Policy, Unit Routine, Call Light and Bedside Controls, Bedside Rail Policy, Smoking Policy, Rights and Responsibilities, Bedside Report, and Patient Education Notebook     Problem: Safety  Goal: Will remain free from falls  Outcome: PROGRESSING AS EXPECTED  Intervention: Implement fall precautions  Flowsheets (Taken 5/23/2020 2117)  Environmental Precautions:   Treaded Slipper Socks on Patient   Personal Belongings, Wastebasket, Call Bell etc. in Easy Reach   Report Given to Other Health Care Providers Regarding Fall Risk   Bed in Low Position   Transferred to Stronger Side   Mobility Assessed & Appropriate Sign Placed   Communication Sign for Patients & Families  Chair/Bed Strip Alarm: Yes - Alarm On

## 2020-05-25 LAB
ALBUMIN SERPL BCP-MCNC: 3.7 G/DL (ref 3.2–4.9)
ALBUMIN/GLOB SERPL: 0.8 G/DL
ALP SERPL-CCNC: 74 U/L (ref 30–99)
ALT SERPL-CCNC: 24 U/L (ref 2–50)
ANION GAP SERPL CALC-SCNC: 13 MMOL/L (ref 7–16)
AST SERPL-CCNC: 19 U/L (ref 12–45)
BASOPHILS # BLD AUTO: 0.5 % (ref 0–1.8)
BASOPHILS # BLD: 0.06 K/UL (ref 0–0.12)
BILIRUB SERPL-MCNC: 0.6 MG/DL (ref 0.1–1.5)
BUN SERPL-MCNC: 43 MG/DL (ref 8–22)
CALCIUM SERPL-MCNC: 9.9 MG/DL (ref 8.5–10.5)
CHLORIDE SERPL-SCNC: 97 MMOL/L (ref 96–112)
CO2 SERPL-SCNC: 26 MMOL/L (ref 20–33)
CREAT SERPL-MCNC: 1.38 MG/DL (ref 0.5–1.4)
EOSINOPHIL # BLD AUTO: 0.01 K/UL (ref 0–0.51)
EOSINOPHIL NFR BLD: 0.1 % (ref 0–6.9)
ERYTHROCYTE [DISTWIDTH] IN BLOOD BY AUTOMATED COUNT: 49.1 FL (ref 35.9–50)
GLOBULIN SER CALC-MCNC: 4.7 G/DL (ref 1.9–3.5)
GLUCOSE BLD-MCNC: 122 MG/DL (ref 65–99)
GLUCOSE BLD-MCNC: 280 MG/DL (ref 65–99)
GLUCOSE BLD-MCNC: 300 MG/DL (ref 65–99)
GLUCOSE BLD-MCNC: 311 MG/DL (ref 65–99)
GLUCOSE SERPL-MCNC: 113 MG/DL (ref 65–99)
HCT VFR BLD AUTO: 47.6 % (ref 42–52)
HGB BLD-MCNC: 15.3 G/DL (ref 14–18)
IMM GRANULOCYTES # BLD AUTO: 0.15 K/UL (ref 0–0.11)
IMM GRANULOCYTES NFR BLD AUTO: 1.2 % (ref 0–0.9)
LYMPHOCYTES # BLD AUTO: 2.33 K/UL (ref 1–4.8)
LYMPHOCYTES NFR BLD: 19.2 % (ref 22–41)
MCH RBC QN AUTO: 31.4 PG (ref 27–33)
MCHC RBC AUTO-ENTMCNC: 32.1 G/DL (ref 33.7–35.3)
MCV RBC AUTO: 97.5 FL (ref 81.4–97.8)
MONOCYTES # BLD AUTO: 0.95 K/UL (ref 0–0.85)
MONOCYTES NFR BLD AUTO: 7.8 % (ref 0–13.4)
NEUTROPHILS # BLD AUTO: 8.63 K/UL (ref 1.82–7.42)
NEUTROPHILS NFR BLD: 71.2 % (ref 44–72)
NRBC # BLD AUTO: 0 K/UL
NRBC BLD-RTO: 0 /100 WBC
PLATELET # BLD AUTO: 187 K/UL (ref 164–446)
PMV BLD AUTO: 12.4 FL (ref 9–12.9)
POTASSIUM SERPL-SCNC: 4.9 MMOL/L (ref 3.6–5.5)
PROT SERPL-MCNC: 8.4 G/DL (ref 6–8.2)
RBC # BLD AUTO: 4.88 M/UL (ref 4.7–6.1)
SODIUM SERPL-SCNC: 136 MMOL/L (ref 135–145)
WBC # BLD AUTO: 12.1 K/UL (ref 4.8–10.8)

## 2020-05-25 PROCEDURE — 85025 COMPLETE CBC W/AUTO DIFF WBC: CPT

## 2020-05-25 PROCEDURE — A9270 NON-COVERED ITEM OR SERVICE: HCPCS | Performed by: HOSPITALIST

## 2020-05-25 PROCEDURE — 36415 COLL VENOUS BLD VENIPUNCTURE: CPT

## 2020-05-25 PROCEDURE — 94760 N-INVAS EAR/PLS OXIMETRY 1: CPT

## 2020-05-25 PROCEDURE — A9270 NON-COVERED ITEM OR SERVICE: HCPCS | Performed by: FAMILY MEDICINE

## 2020-05-25 PROCEDURE — 700111 HCHG RX REV CODE 636 W/ 250 OVERRIDE (IP): Performed by: HOSPITALIST

## 2020-05-25 PROCEDURE — 700102 HCHG RX REV CODE 250 W/ 637 OVERRIDE(OP): Performed by: HOSPITALIST

## 2020-05-25 PROCEDURE — 770020 HCHG ROOM/CARE - TELE (206)

## 2020-05-25 PROCEDURE — 700111 HCHG RX REV CODE 636 W/ 250 OVERRIDE (IP): Performed by: FAMILY MEDICINE

## 2020-05-25 PROCEDURE — 82962 GLUCOSE BLOOD TEST: CPT | Mod: 91

## 2020-05-25 PROCEDURE — 80053 COMPREHEN METABOLIC PANEL: CPT

## 2020-05-25 PROCEDURE — 700102 HCHG RX REV CODE 250 W/ 637 OVERRIDE(OP): Performed by: FAMILY MEDICINE

## 2020-05-25 PROCEDURE — 99239 HOSP IP/OBS DSCHRG MGMT >30: CPT | Performed by: FAMILY MEDICINE

## 2020-05-25 RX ORDER — ALBUTEROL SULFATE 90 UG/1
2 AEROSOL, METERED RESPIRATORY (INHALATION) EVERY 6 HOURS PRN
Qty: 8.5 G | Refills: 0 | Status: SHIPPED | OUTPATIENT
Start: 2020-05-25 | End: 2021-01-01

## 2020-05-25 RX ORDER — LISINOPRIL 5 MG/1
7.5 TABLET ORAL
Status: DISCONTINUED | OUTPATIENT
Start: 2020-05-25 | End: 2020-05-26

## 2020-05-25 RX ORDER — PREDNISONE 10 MG/1
10 TABLET ORAL DAILY
Qty: 15 TAB | Refills: 0 | Status: SHIPPED | OUTPATIENT
Start: 2020-05-25 | End: 2020-05-29

## 2020-05-25 RX ORDER — LISINOPRIL 2.5 MG/1
7.5 TABLET ORAL DAILY
Qty: 30 TAB | Refills: 0 | Status: SHIPPED | OUTPATIENT
Start: 2020-05-25 | End: 2020-05-29

## 2020-05-25 RX ADMIN — HEPARIN SODIUM 5000 UNITS: 5000 INJECTION, SOLUTION INTRAVENOUS; SUBCUTANEOUS at 05:59

## 2020-05-25 RX ADMIN — HEPARIN SODIUM 5000 UNITS: 5000 INJECTION, SOLUTION INTRAVENOUS; SUBCUTANEOUS at 21:15

## 2020-05-25 RX ADMIN — INSULIN HUMAN 10 UNITS: 100 INJECTION, SUSPENSION SUBCUTANEOUS at 17:33

## 2020-05-25 RX ADMIN — INSULIN HUMAN 12 UNITS: 100 INJECTION, SOLUTION PARENTERAL at 17:32

## 2020-05-25 RX ADMIN — INSULIN HUMAN 12 UNITS: 100 INJECTION, SOLUTION PARENTERAL at 20:48

## 2020-05-25 RX ADMIN — ENALAPRILAT 1.25 MG: 1.25 INJECTION INTRAVENOUS at 16:03

## 2020-05-25 RX ADMIN — HEPARIN SODIUM 5000 UNITS: 5000 INJECTION, SOLUTION INTRAVENOUS; SUBCUTANEOUS at 14:27

## 2020-05-25 RX ADMIN — INSULIN HUMAN 10 UNITS: 100 INJECTION, SUSPENSION SUBCUTANEOUS at 08:09

## 2020-05-25 RX ADMIN — INSULIN HUMAN 9 UNITS: 100 INJECTION, SOLUTION PARENTERAL at 11:43

## 2020-05-25 RX ADMIN — LISINOPRIL 7.5 MG: 5 TABLET ORAL at 14:28

## 2020-05-25 RX ADMIN — DOCUSATE SODIUM 50 MG AND SENNOSIDES 8.6 MG 2 TABLET: 8.6; 5 TABLET, FILM COATED ORAL at 17:37

## 2020-05-25 RX ADMIN — PREDNISONE 40 MG: 20 TABLET ORAL at 05:59

## 2020-05-25 ASSESSMENT — FIBROSIS 4 INDEX: FIB4 SCORE: 1.45

## 2020-05-25 NOTE — PROGRESS NOTES
Monitor summary: NSR to SB with 1st degree AV block (intermittent). AL .2 to .24, QRS .08, QT .4. Occasional PAC and PVC

## 2020-05-25 NOTE — DISCHARGE SUMMARY
Discharge Summary    CHIEF COMPLAINT ON ADMISSION  Chief Complaint   Patient presents with   • Shortness of Breath     c/o of SOB and dizziness x's 8 days. No cough.        Reason for Admission  SOB     Admission Date  5/23/2020    CODE STATUS  Full Code    HPI & HOSPITAL COURSE  This is a 69 y.o. male here with  sudden onset of shortness of breath and dizziness this morning.  The patient also reported some chest pain.  All the information was obtained through the use of an .  He says today it got worse but he has been having shortness of breath and dizziness for the past 8 days.  He does not have a cough.  The patient says that he was established with lung disease in [facility] and he gave him some small white pills that made him feel better but he ran out of those pills about 2 months ago.  Family was not able to really give us an explanation of his lung condition but sounds like the patient was established with pulmonary fibrosis.  The patient's shortness of breath at this point does seem to be exacerbated by congestive heart failure as his BNP level is elevated his troponin is elevated and at this point the patient will be evaluate an echocardiogram and placed on diuretics.  We will also optimize his medication management with beta-blockers, aspirin, ACE inhibitor's.  The patient does have diabetes which has been very poorly controlled.  This certainly could be giving him heart disease and thus at this point the blood diabetes will need to be optimized.  I am going to start him at this point on diabetic management.  He will be placed on insulin, diabetic diet, Accu-Cheks with sliding scale coverage and a diabetic educator will be asked to see him.  Once his heart failure is under control diabetes under control we may be requesting pulmonary input to establish possible etiology of his pulmonary disease.  The patient right now does have acute renal injury will monitor this especially with the use of  Lasix.  He is mildly thrombocytopenic but he is not bleeding and at this point platelet counts will be monitored.  Since he does have an abnormal chest x-ray will evaluate him for the COVID-19 infection.   he was admitted and his COVID is negative and initially there was a ?of chf .  HOWEVER his cardiac echo showed:Left ventricular ejection fraction is visually estimated to be 55%.   Grade I diastolic dysfunction.  The right ventricle was normal in size and function.  Estimated right ventricular systolic pressure  is 40 mmHg, consistent   with mild pulmonary hypertension.  Estimated right atrial pressure is 3 mmHg.  Normal pericardium without effusion..    He has pulmonary htn and less liklely he has chf.  He feels better and would be discharged home.  He was noted  To  Have reactive airway dz and was started on prednisone.      Acute respiratory failure with hypoxia (HCC)  Assessment & Plan  2nd to reactive airway dz  Has a history of smoking  Prednisone taper for 6 days  Albuterol prn        Diabetes mellitus type II, uncontrolled ([facility])  Assessment & Plan  Continue with home meds     Acute heart failure (formerly [facility])  Assessment & Plan  Appears to have pulmonary htn  les likely he has chf  Cardiac echo showed:Left ventricular ejection fraction is visually estimated to be 55%.   Grade I diastolic dysfunction.  The right ventricle was normal in size and function.  Estimated right ventricular systolic pressure  is 40 mmHg, consistent   with mild pulmonary hypertension.  Estimated right atrial pressure is 3 mmHg.  Normal pericardium without effusion.     No prior study is available for comparison.            Acute renal injury (formerly [facility])  Assessment & Plan  dced lasix iv   Has improved        Thrombocytopenia (formerly [facility])- (present on admission)  Assessment & Plan  Very mild   Has improved     Abnormal chest x-ray  Assessment & Plan  covid is negative  He feels much better  F/u with pcp next week    htn  Not controlled  Started  lisinopril 7.5mg daily      Therefore, he is discharged in good and stable condition to home with close outpatient follow-up.    The patient met 2-midnight criteria for an inpatient stay at the time of discharge.    Discharge Date  5/25/20    FOLLOW UP ITEMS POST DISCHARGE  pcp next week    DISCHARGE DIAGNOSES  Principal Problem:    Acute respiratory failure with hypoxia (HCC) POA: Unknown  Active Problems:    Acute heart failure (HCC) POA: Unknown    Diabetes mellitus type II, uncontrolled (HCC) POA: Unknown    Acute renal injury (HCC) POA: Unknown    Elevated total protein POA: Unknown    Abnormal chest x-ray POA: Unknown    Thrombocytopenia (HCC) POA: Yes  Resolved Problems:    * No resolved hospital problems. *      FOLLOW UP  No future appointments.  No follow-up provider specified.    MEDICATIONS ON DISCHARGE     Medication List      START taking these medications      Instructions   albuterol 108 (90 Base) MCG/ACT Aers inhalation aerosol   Inhale 2 Puffs by mouth every 6 hours as needed for Shortness of Breath.  Dose:  2 Puff     aspirin EC 81 MG Tbec  Commonly known as:  ECOTRIN   Take 1 Tab by mouth every day.  Dose:  81 mg     lisinopril 2.5 MG Tabs  Commonly known as:  PRINIVIL   Take 3 Tabs by mouth every day.  Dose:  7.5 mg     predniSONE 10 MG Tabs  Commonly known as:  DELTASONE   Take 1 Tab by mouth every day for 6 days. 30 mg daily 1st 2days  20mg daily 2nd 2days  10mg daily 3rd 2days  Dose:  10 mg        CONTINUE taking these medications      Instructions   insulin glargine 100 UNIT/ML Soln  Commonly known as:  LANTUS   Inject 20 Units as instructed every morning.  Dose:  20 Units     metFORMIN 500 MG Tabs  Commonly known as:  GLUCOPHAGE   Take 500 mg by mouth every evening.  Dose:  500 mg        STOP taking these medications    NON SPECIFIED            Allergies  No Known Allergies    DIET  Orders Placed This Encounter   Procedures   • Diet Order 2 Gram Sodium, Cardiac, Diabetic     Standing  Status:   Standing     Number of Occurrences:   1     Order Specific Question:   Diet:     Answer:   2 Gram Sodium [7]     Order Specific Question:   Diet:     Answer:   Cardiac [6]     Order Specific Question:   Diet:     Answer:   Diabetic [3]       ACTIVITY  As tolerated.  Weight bearing as tolerated    CONSULTATIONS  none    PROCEDURES  none    LABORATORY  Lab Results   Component Value Date    SODIUM 136 05/25/2020    POTASSIUM 4.9 05/25/2020    CHLORIDE 97 05/25/2020    CO2 26 05/25/2020    GLUCOSE 113 (H) 05/25/2020    BUN 43 (H) 05/25/2020    CREATININE 1.38 05/25/2020        Lab Results   Component Value Date    WBC 12.1 (H) 05/25/2020    HEMOGLOBIN 15.3 05/25/2020    HEMATOCRIT 47.6 05/25/2020    PLATELETCT 187 05/25/2020        Total time of the discharge process exceeds 35 minutes.

## 2020-05-25 NOTE — PROGRESS NOTES
Assumed care at 1900, bedside report received from Opal JOHN. Pt is DINESH, 1st Degree on the monitor. Initial assessment completed, orders reviewed. Call light within reach, bed alarm is in use, and hourly rounding in place. POC addressed with patient, no additional questions at this time.

## 2020-05-25 NOTE — RESPIRATORY CARE
Oxygen Rounds      Patient found on    O2 L/m:  ____2_____    Oxygen device:  ___nc_____   Spo2: ____98_____%      Patient titrated to   O2 L/m: __1______   Oxygen device: __nc_______   Spo2: _____96____%   Respiratory device skin site inspection completed.

## 2020-05-25 NOTE — THERAPY
Physical Therapy   Initial Evaluation     Patient Name: Chase Harris  Age:  69 y.o., Sex:  male  Medical Record #: [id number]  Today's Date: 5/24/2020     iPad  #[id number] used throughout session.     Assessment  Patient is 69 y.o. male admitted with CHF and acute respiratory failure. Pt appears at his functional baseline and demonstrated all mobility without assist. No s/s of fatigue or SOB. Pt reports no concerns regarding return home. Recommend continued ambulation with nursing as able.     Plan  Recommend Physical Therapy for Evaluation only.  Discharge recommendations: Anticipate that the patient will have no further physical therapy needs after discharge from the hospital.       05/24/20 1622   Prior Living Situation   Prior Services None   Housing / Facility 1 Story House   Steps Into Home 0   Steps In Home 0   Bathroom Set up Bathtub / Shower Combination   Equipment Owned None   Lives with - Patient's Self Care Capacity Adult Children  (reports lives with nephew)   Prior Level of Functional Mobility   Bed Mobility Independent   Transfer Status Independent   Ambulation Independent   Distance Ambulation (Feet)   (community distances)   Assistive Devices Used None   Stairs Independent   Gait Analysis   Gait Level Of Assist Supervised   Assistive Device None   Distance (Feet) 500   Deviation No deviation   Weight Bearing Status no restrictions   Comments no LOB. pt pushing O2 tank on his own.    Bed Mobility    Supine to Sit Supervised   Sit to Supine Supervised   Scooting Supervised   Rolling Supervised   Functional Mobility   Sit to Stand Supervised   Anticipated Discharge Equipment   DC Equipment None

## 2020-05-25 NOTE — CARE PLAN
Problem: Communication  Goal: The ability to communicate needs accurately and effectively will improve  Outcome: PROGRESSING AS EXPECTED  Note: Pt is able to communicate needs appropriately. Call light within reach.       Problem: Safety  Goal: Will remain free from injury  Outcome: PROGRESSING AS EXPECTED  Note: Fall precautions in place. Bed in lowest position. Non-skid socks in place. Personal possessions within reach. Mobility sign on door. Call light within reach. Pt educated regarding fall prevention and states understanding.

## 2020-05-25 NOTE — CARE PLAN
Problem: Communication  Goal: The ability to communicate needs accurately and effectively will improve  Outcome: PROGRESSING AS EXPECTED     Problem: Safety  Goal: Will remain free from injury  Outcome: PROGRESSING AS EXPECTED     Problem: Knowledge Deficit  Goal: Knowledge of disease process/condition, treatment plan, diagnostic tests, and medications will improve  Outcome: PROGRESSING AS EXPECTED     Problem: Discharge Barriers/Planning  Goal: Patient's continuum of care needs will be met  Outcome: PROGRESSING AS EXPECTED

## 2020-05-25 NOTE — PROGRESS NOTES
Walked into pt room pt has oxygen off SPO2 was 82%, placed on 3 liters of oxygen, oxygen levels improved.

## 2020-05-25 NOTE — PROGRESS NOTES
Pt sitting up in bed. Alert and oriented x 4. Pt remains on 2L NC. Telemetry monitor in place. Pt denies needs. No distress noted. Will continue to monitor.

## 2020-05-26 ENCOUNTER — PATIENT OUTREACH (OUTPATIENT)
Dept: HEALTH INFORMATION MANAGEMENT | Facility: OTHER | Age: 70
End: 2020-05-26

## 2020-05-26 ENCOUNTER — APPOINTMENT (OUTPATIENT)
Dept: RADIOLOGY | Facility: MEDICAL CENTER | Age: 70
DRG: 189 | End: 2020-05-26
Attending: HOSPITALIST
Payer: MEDICARE

## 2020-05-26 PROBLEM — R79.89 ELEVATED TROPONIN: Status: ACTIVE | Noted: 2020-05-26

## 2020-05-26 LAB
ALBUMIN SERPL ELPH-MCNC: 3.62 G/DL (ref 3.75–5.01)
ALPHA1 GLOB SERPL ELPH-MCNC: 0.26 G/DL (ref 0.19–0.46)
ALPHA2 GLOB SERPL ELPH-MCNC: 0.84 G/DL (ref 0.48–1.05)
B-GLOBULIN SERPL ELPH-MCNC: 1.3 G/DL (ref 0.48–1.1)
GAMMA GLOB SERPL ELPH-MCNC: 1.58 G/DL (ref 0.62–1.51)
GLUCOSE BLD-MCNC: 136 MG/DL (ref 65–99)
GLUCOSE BLD-MCNC: 191 MG/DL (ref 65–99)
GLUCOSE BLD-MCNC: 283 MG/DL (ref 65–99)
GLUCOSE BLD-MCNC: 286 MG/DL (ref 65–99)
INTERPRETATION SERPL IFE-IMP: ABNORMAL
MONOCLON BAND OBS SERPL: ABNORMAL
PATHOLOGY STUDY: ABNORMAL
PROCALCITONIN SERPL-MCNC: <0.05 NG/ML
PROT SERPL-MCNC: 7.6 G/DL (ref 6.3–8.2)
TROPONIN T SERPL-MCNC: 55 NG/L (ref 6–19)

## 2020-05-26 PROCEDURE — 84484 ASSAY OF TROPONIN QUANT: CPT

## 2020-05-26 PROCEDURE — 700111 HCHG RX REV CODE 636 W/ 250 OVERRIDE (IP): Performed by: FAMILY MEDICINE

## 2020-05-26 PROCEDURE — 700111 HCHG RX REV CODE 636 W/ 250 OVERRIDE (IP): Performed by: HOSPITALIST

## 2020-05-26 PROCEDURE — 94760 N-INVAS EAR/PLS OXIMETRY 1: CPT

## 2020-05-26 PROCEDURE — 82962 GLUCOSE BLOOD TEST: CPT | Mod: 91

## 2020-05-26 PROCEDURE — 71250 CT THORAX DX C-: CPT

## 2020-05-26 PROCEDURE — 99232 SBSQ HOSP IP/OBS MODERATE 35: CPT | Performed by: HOSPITALIST

## 2020-05-26 PROCEDURE — 84145 PROCALCITONIN (PCT): CPT

## 2020-05-26 PROCEDURE — A9270 NON-COVERED ITEM OR SERVICE: HCPCS | Performed by: HOSPITALIST

## 2020-05-26 PROCEDURE — 36415 COLL VENOUS BLD VENIPUNCTURE: CPT

## 2020-05-26 PROCEDURE — 770020 HCHG ROOM/CARE - TELE (206)

## 2020-05-26 PROCEDURE — 93005 ELECTROCARDIOGRAM TRACING: CPT | Performed by: HOSPITALIST

## 2020-05-26 PROCEDURE — 700102 HCHG RX REV CODE 250 W/ 637 OVERRIDE(OP): Performed by: HOSPITALIST

## 2020-05-26 RX ADMIN — INSULIN HUMAN 9 UNITS: 100 INJECTION, SOLUTION PARENTERAL at 11:35

## 2020-05-26 RX ADMIN — INSULIN HUMAN 10 UNITS: 100 INJECTION, SUSPENSION SUBCUTANEOUS at 08:07

## 2020-05-26 RX ADMIN — INSULIN HUMAN 10 UNITS: 100 INJECTION, SUSPENSION SUBCUTANEOUS at 17:57

## 2020-05-26 RX ADMIN — DOCUSATE SODIUM 50 MG AND SENNOSIDES 8.6 MG 2 TABLET: 8.6; 5 TABLET, FILM COATED ORAL at 17:57

## 2020-05-26 RX ADMIN — DOCUSATE SODIUM 50 MG AND SENNOSIDES 8.6 MG 2 TABLET: 8.6; 5 TABLET, FILM COATED ORAL at 04:38

## 2020-05-26 RX ADMIN — PREDNISONE 40 MG: 20 TABLET ORAL at 04:38

## 2020-05-26 RX ADMIN — INSULIN HUMAN 3 UNITS: 100 INJECTION, SOLUTION PARENTERAL at 08:06

## 2020-05-26 RX ADMIN — ENOXAPARIN SODIUM 40 MG: 100 INJECTION SUBCUTANEOUS at 13:53

## 2020-05-26 RX ADMIN — INSULIN HUMAN 3 UNITS: 100 INJECTION, SOLUTION PARENTERAL at 20:31

## 2020-05-26 RX ADMIN — HEPARIN SODIUM 5000 UNITS: 5000 INJECTION, SOLUTION INTRAVENOUS; SUBCUTANEOUS at 04:38

## 2020-05-26 RX ADMIN — INSULIN HUMAN 9 UNITS: 100 INJECTION, SOLUTION PARENTERAL at 17:58

## 2020-05-26 ASSESSMENT — ENCOUNTER SYMPTOMS
HEADACHES: 0
PALPITATIONS: 0
HEARTBURN: 0
BLURRED VISION: 0
CHILLS: 0
NECK PAIN: 0
WHEEZING: 1
SHORTNESS OF BREATH: 1
FEVER: 0
DIZZINESS: 0
NAUSEA: 0

## 2020-05-26 ASSESSMENT — FIBROSIS 4 INDEX: FIB4 SCORE: 1.45

## 2020-05-26 NOTE — DISCHARGE PLANNING
Care Transition Team Assessment    The information provided for this assessment was provided by pt and chart review. Pt confirmed the information on facesheet was accurate. LSW able to update address and pt's emergency contacts on facesheet.   Pt lives in a one story house with his son, Marty Harris. Pt's main support system is Marty. Pt receives monthly SSI in the amount of $800. Prior to admit pt independent with ADL's/IADL's. Pt has never had home O2. Pt unsure if his insurance covers medications. Pt's son, Marty reported that pt has received diabetic medical equipment through [facility]. Pt uses the [facility] pharmacy downtown. Pt informed LSW that he has always gone to [facility] to get his medications. Pt reports to be scared of the amount of money his medications will cost here in the [place].     LSW received tc from Bozena with scheduling and was informed that pt was provided information for [facility] to establish appointment for PCP. Bozena also requested LSW to f/u with pt and see if pt is okay with a cardiology appointment being setup. Pt confirmed with LSW that it was okay to schedule an appointment for cardiology. LSW informed Bozena.           Information Source  Orientation : Oriented x 4  Information Given By: Patient  Informant's Name: Chase Harris  Who is responsible for making decisions for patient? : Patient    Elopement Risk  Legal Hold: No  Ambulatory or Self Mobile in Wheelchair: Yes  Disoriented: No  Psychiatric Symptoms: None  History of Wandering: No  Elopement this Admit: No  Vocalizing Wanting to Leave: No  Displays Behaviors, Body Language Wanting to Leave: No-Not at Risk for Elopement  Elopement Risk: Not at Risk for Elopement  Wanderguard On: Unavailable  Personal Belongings: Hospital Clothing Only    Interdisciplinary Discharge Planning  Lives with - Patient's Self Care Capacity: Adult Children(reports lives with nephew)  Patient or legal guardian wants to  designate a caregiver (see row info): No  Housing / Facility: [address]  Prior Services: None    Discharge Preparedness  What is your plan after discharge?: Uncertain - pending medical team collaboration  What are your discharge supports?: Child  Prior Functional Level: Independent with Activities of Daily Living, Independent with Medication Management  Difficulity with ADLs: None  Difficulity with IADLs: None    Functional Assesment  Prior Functional Level: Independent with Activities of Daily Living, Independent with Medication Management    Finances  Financial Barriers to Discharge: No  Prescription Coverage: Yes    Vision / Hearing Impairment  Right Eye Vision: Impaired, Wears Glasses  Left Eye Vision: Impaired, Wears Glasses  Hearing Impairment : No    Domestic Abuse  Have you ever been the victim of abuse or violence?: No  Physical Abuse or Sexual Abuse: No  Verbal Abuse or Emotional Abuse: No  Possible Abuse Reported to:: Not Applicable    Discharge Risks or Barriers  Discharge risks or barriers?: No PCP  Patient risk factors: Vulnerable adult    Anticipated Discharge Information  Anticipated discharge disposition: Discharge needs currently unknown

## 2020-05-26 NOTE — CARE PLAN
Problem: Communication  Goal: The ability to communicate needs accurately and effectively will improve  Outcome: PROGRESSING AS EXPECTED  Patient communicates appropriately     Problem: Safety  Goal: Will remain free from injury  Outcome: PROGRESSING AS EXPECTED  Goal: Will remain free from falls  Outcome: PROGRESSING AS EXPECTED   Patient has remained free from fall/injury. Patient calls for assistance appropriately.

## 2020-05-26 NOTE — PROGRESS NOTES
Assumed care at 1900, bedside report received from Puneet JOHN. Pt is SR on the monitor. Initial assessment completed, orders reviewed. Call light within reach, and hourly rounding in place. Fall precaution education provided with , patient verbalizes understanding. POC addressed with patient, no additional questions at this time.

## 2020-05-26 NOTE — PROGRESS NOTES
CNA notified this RN of BP of 97/45 with MAP of 62 at 0015. This RN retook patient's blood pressure at 0050, which was 96/45 with a MAP of 62. Pt asymptomatic. Dr Harkins paged and notified of drop in BP. MD states that it is most likely due to new BP medications. Orders received to hold blood pressure medications in the morning and to not administer anymore vasotec. Will continue to monitor.

## 2020-05-26 NOTE — PROGRESS NOTES
Notified by monitor room that this patient heart rate was sustaining upper 40s. After ambulation, patient heart rate increased to upper 50s, lower 60s. Patient asymptomatic. Dr Harkins notified. No new orders.

## 2020-05-26 NOTE — RESPIRATORY CARE
COPD EDUCATION by COPD CLINICAL EDUCATOR  5/26/2020 at 7:52 AM by Zonia Catalan, RRT     Patient reviewed by COPD education team. Patient does not have a history or diagnosis of COPD and is a non-smoker, therefore does not qualify for the COPD program.

## 2020-05-26 NOTE — PROGRESS NOTES
Received report from night staff. Assumed pt care. Patient denied any pain. AOX4. POC discussed. Tele monitor in place. Call light within reach, bed in lowest position, and personal items accessible.

## 2020-05-26 NOTE — RESPIRATORY CARE
Oxygen Rounds      Patient found on    O2 L/m:  __1_______    Oxygen device:  _nc_______   Spo2: _____96____%      Respiratory device skin site inspection completed.

## 2020-05-26 NOTE — PROGRESS NOTES
Hospital Medicine Daily Progress Note    Date of Service  5/26/2020    Chief Complaint  69 y.o. male admitted 5/23/2020 with   Chief Complaint   Patient presents with   • Shortness of Breath     c/o of SOB and dizziness x's 8 days. No cough.          Hospital Course    This 69-year-old male with a past medical history significant for nicotine dependence 70 pack a smoking history, COPD, type 2 diabetes mellitus with hemoglobin A1c of 8.3,, lung disease as per patient presented to ER with a complaint of shortness of breath.      Patient reports that he has been shortness of breath and lightheadedness for the last 8 days.  [place]  During the stay in the hospital he continues to monitor in medical telemetry started with him continue to monitor no obvious arrhythmias noted.  He is noted to have grade 1 diastolic heart failure, EF 55%.          Interval Problem Update  No acute events overnight.  Patient is noted to be requiring 1 L of oxygen.  But when he walks/moves around he desaturates and requires 4 L of oxygen.    ----Currently his vital signs otherwise stable      Consultants/Specialty  None    Code Status  full    Disposition  Home once medically cleared    Review of Systems  Review of Systems   Constitutional: Negative for chills and fever.   HENT: Negative for hearing loss.    Eyes: Negative for blurred vision.   Respiratory: Positive for shortness of breath and wheezing.    Cardiovascular: Negative for chest pain and palpitations.   Gastrointestinal: Negative for heartburn and nausea.   Musculoskeletal: Negative for neck pain.   Neurological: Negative for dizziness and headaches.        Physical Exam  Temp:  [36.1 °C (97 °F)-36.9 °C (98.5 °F)] 36.6 °C (97.8 °F)  Pulse:  [53-68] 66  Resp:  [16-18] 16  BP: ()/(45-77) 118/72  SpO2:  [94 %-97 %] 96 %    Physical Exam  Nursing note reviewed.   Constitutional:       Appearance: Normal appearance.   HENT:      Head:  Normocephalic.   Eyes:      Extraocular Movements: Extraocular movements intact.   Cardiovascular:      Rate and Rhythm: Normal rate and regular rhythm.   Pulmonary:      Effort: Pulmonary effort is normal.      Comments: Decreased breath sound at the bases  Abdominal:      General: Abdomen is flat.      Palpations: Abdomen is soft.   Neurological:      General: No focal deficit present.      Mental Status: He is alert.   Psychiatric:         Mood and Affect: Mood normal.         Fluids    Intake/Output Summary (Last 24 hours) at 5/26/2020 1558  Last data filed at 5/26/2020 0900  Gross per 24 hour   Intake 960 ml   Output 1225 ml   Net -265 ml       Laboratory  Recent Labs     05/24/20 0317 05/25/20 0317   WBC 11.7* 12.1*   RBC 4.32* 4.88   HEMOGLOBIN 13.7* 15.3   HEMATOCRIT 42.5 47.6   MCV 98.4* 97.5   MCH 31.7 31.4   MCHC 32.2* 32.1*   RDW 50.5* 49.1   PLATELETCT 150* 187   MPV 12.0 12.4     Recent Labs     05/24/20 0317 05/25/20 0317   SODIUM 137 136   POTASSIUM 4.4 4.9   CHLORIDE 96 97   CO2 28 26   GLUCOSE 84 113*   BUN 35* 43*   CREATININE 1.51* 1.38   CALCIUM 9.5 9.9             Recent Labs     05/24/20 0317   TRIGLYCERIDE 101   HDL 29*   LDL 68       Imaging  EC-ECHOCARDIOGRAM COMPLETE W/O CONT   Final Result      DX-CHEST-PORTABLE (1 VIEW)   Final Result      Bilateral interstitial opacities may represent an atypical viral pneumonia such as Covid 19. Underlying chronic fibrotic changes not excluded.              Assessment/Plan  * Acute respiratory failure with hypoxia (HCC)  Assessment & Plan  Likely secondary to COPD exacerbation versus diastolic heart failure versus lung disease as per patient   ---> We will start the patient on RT protocol, breathing treatment, continue prednisone 40 mg p.o. daily.      Diabetes mellitus type II, uncontrolled (HCC)  Assessment & Plan  -accus with sliding scale coverage, initiate NPH 10 units twice daily  -diabetic diet t  -diabetic education  -Hema1c 8  accu check  results are noted    Acute heart failure (HCC)  Assessment & Plan  Patient is noted to have grade 1 diastolic heart failure.  EF 55%.  Patient is noted to have mildly elevated RSVP.  ---> We will continue cardiac diet, ROXY's, daily weight, fluid restriction to 1.5 L  ----> Provide Lasix 20 mg p.o. twice daily      Elevated total protein  Assessment & Plan  Pending serum electrophoresis    Acute renal injury (HCC)  Assessment & Plan  Stop IV Lasix and switch to oral Lasix      Elevated troponin  Assessment & Plan  Patient presented here with shortness of breath, elevated troponin.  ----We will> trend troponin, n.p.o. at midnight, will consider obtaining Lexiscan    Thrombocytopenia (HCC)- (present on admission)  Assessment & Plan  Very mild   Has improved    Abnormal chest x-ray  Assessment & Plan  Patient's chest x-ray at this point shows possible fluid overload versus viral pneumonitis.  We will rule him out for COVID-19.       VTE prophylaxis: lovenox

## 2020-05-26 NOTE — PROGRESS NOTES
Monitor Summary     SB-SR 57-74 (down to 44 and back up)   1st degree heart block   Rare blocked PAC   .24/.08/.40

## 2020-05-27 ENCOUNTER — PATIENT OUTREACH (OUTPATIENT)
Dept: HEALTH INFORMATION MANAGEMENT | Facility: OTHER | Age: 70
End: 2020-05-27

## 2020-05-27 ENCOUNTER — APPOINTMENT (OUTPATIENT)
Dept: RADIOLOGY | Facility: MEDICAL CENTER | Age: 70
DRG: 189 | End: 2020-05-27
Attending: HOSPITALIST
Payer: MEDICARE

## 2020-05-27 PROBLEM — J84.9 INTERSTITIAL LUNG DISEASE (HCC): Status: ACTIVE | Noted: 2020-05-27

## 2020-05-27 LAB
EKG IMPRESSION: NORMAL
GLUCOSE BLD-MCNC: 172 MG/DL (ref 65–99)
GLUCOSE BLD-MCNC: 276 MG/DL (ref 65–99)
GLUCOSE BLD-MCNC: 281 MG/DL (ref 65–99)
GLUCOSE BLD-MCNC: 299 MG/DL (ref 65–99)
TROPONIN T SERPL-MCNC: 69 NG/L (ref 6–19)

## 2020-05-27 PROCEDURE — A9502 TC99M TETROFOSMIN: HCPCS

## 2020-05-27 PROCEDURE — 700111 HCHG RX REV CODE 636 W/ 250 OVERRIDE (IP): Performed by: HOSPITALIST

## 2020-05-27 PROCEDURE — 99233 SBSQ HOSP IP/OBS HIGH 50: CPT | Performed by: HOSPITALIST

## 2020-05-27 PROCEDURE — 770020 HCHG ROOM/CARE - TELE (206)

## 2020-05-27 PROCEDURE — 700111 HCHG RX REV CODE 636 W/ 250 OVERRIDE (IP): Performed by: FAMILY MEDICINE

## 2020-05-27 PROCEDURE — 700111 HCHG RX REV CODE 636 W/ 250 OVERRIDE (IP)

## 2020-05-27 PROCEDURE — 82962 GLUCOSE BLOOD TEST: CPT

## 2020-05-27 PROCEDURE — 51798 US URINE CAPACITY MEASURE: CPT

## 2020-05-27 PROCEDURE — 84484 ASSAY OF TROPONIN QUANT: CPT

## 2020-05-27 PROCEDURE — 36415 COLL VENOUS BLD VENIPUNCTURE: CPT

## 2020-05-27 PROCEDURE — 93010 ELECTROCARDIOGRAM REPORT: CPT | Performed by: INTERNAL MEDICINE

## 2020-05-27 RX ORDER — REGADENOSON 0.08 MG/ML
INJECTION, SOLUTION INTRAVENOUS
Status: COMPLETED
Start: 2020-05-27 | End: 2020-05-27

## 2020-05-27 RX ADMIN — INSULIN HUMAN 9 UNITS: 100 INJECTION, SOLUTION PARENTERAL at 12:02

## 2020-05-27 RX ADMIN — PREDNISONE 40 MG: 20 TABLET ORAL at 05:23

## 2020-05-27 RX ADMIN — ENOXAPARIN SODIUM 40 MG: 100 INJECTION SUBCUTANEOUS at 05:23

## 2020-05-27 RX ADMIN — INSULIN HUMAN 10 UNITS: 100 INJECTION, SUSPENSION SUBCUTANEOUS at 17:51

## 2020-05-27 RX ADMIN — REGADENOSON 0.4 MG: 0.08 INJECTION, SOLUTION INTRAVENOUS at 10:05

## 2020-05-27 RX ADMIN — INSULIN HUMAN 9 UNITS: 100 INJECTION, SOLUTION PARENTERAL at 21:10

## 2020-05-27 RX ADMIN — INSULIN HUMAN 9 UNITS: 100 INJECTION, SOLUTION PARENTERAL at 17:52

## 2020-05-27 SDOH — ECONOMIC STABILITY: FOOD INSECURITY: WITHIN THE PAST 12 MONTHS, THE FOOD YOU BOUGHT JUST DIDN'T LAST AND YOU DIDN'T HAVE MONEY TO GET MORE.: OFTEN TRUE

## 2020-05-27 SDOH — ECONOMIC STABILITY: FOOD INSECURITY: WITHIN THE PAST 12 MONTHS, YOU WORRIED THAT YOUR FOOD WOULD RUN OUT BEFORE YOU GOT MONEY TO BUY MORE.: OFTEN TRUE

## 2020-05-27 SDOH — ECONOMIC STABILITY: INCOME INSECURITY: HOW HARD IS IT FOR YOU TO PAY FOR THE VERY BASICS LIKE FOOD, HOUSING, MEDICAL CARE, AND HEATING?: HARD

## 2020-05-27 SDOH — ECONOMIC STABILITY: TRANSPORTATION INSECURITY
IN THE PAST 12 MONTHS, HAS LACK OF TRANSPORTATION KEPT YOU FROM MEETINGS, WORK, OR FROM GETTING THINGS NEEDED FOR DAILY LIVING?: NO

## 2020-05-27 SDOH — ECONOMIC STABILITY: TRANSPORTATION INSECURITY
IN THE PAST 12 MONTHS, HAS THE LACK OF TRANSPORTATION KEPT YOU FROM MEDICAL APPOINTMENTS OR FROM GETTING MEDICATIONS?: NO

## 2020-05-27 ASSESSMENT — ENCOUNTER SYMPTOMS
VOMITING: 0
ABDOMINAL PAIN: 0
CHILLS: 0
NECK PAIN: 0
HEADACHES: 0
DOUBLE VISION: 0
HEARTBURN: 0
BLURRED VISION: 0
WHEEZING: 1
DEPRESSION: 0
SHORTNESS OF BREATH: 1
FEVER: 0
DIZZINESS: 0
ORTHOPNEA: 0
NAUSEA: 0

## 2020-05-27 ASSESSMENT — FIBROSIS 4 INDEX: FIB4 SCORE: 1.45

## 2020-05-27 NOTE — PROGRESS NOTES
Assumed care of pt at 0700.  Pt is [place] speaking only.  iPad video  utilized for communication.  This morning's assessment was completed with Perm, ID #[id number].  Pt alert and oriented x4.  Denies any pain or discomfort.  Denies any SOB but states he does get RODRIGUEZ.  He is currently on 2.5L 02, but per night RN, pt de-sats, requiring 4L with ambulation.  Although pt steady on feet, he was educated to call RN when he wants to get oob for this reason.  Pt NPO for NM stress test this morning which I was notified will be completed at 0915.  Bed low and locked, alarm set and call bell within reach.  Hourly rounding continues.

## 2020-05-27 NOTE — PROCEDURES
Patient presents to NM suite for cardiac stress test with MPI. Nursing goals identified: knowledge deficit, potential for anxiety r/t stress test, potential for compromised cardiac output. Care plan includes educating patient, reassurance, access to Aminophylline and access to ACLS cart/team. Labs and ECG reviewed. No caffeine and NPO confirmed. Resting images attained and patient prepped for pharmacological stress study. Lexiscan given while patient ambulated on TM x 2 min. Reported &/or observed symptoms include: pt denied symptoms.  Caffeinated beverage provided. Symptoms resolved.

## 2020-05-27 NOTE — PROGRESS NOTES
Hospital Medicine Daily Progress Note    Date of Service  5/27/2020    Chief Complaint  69 y.o. male admitted 5/23/2020 with   Chief Complaint   Patient presents with   • Shortness of Breath     c/o of SOB and dizziness x's 8 days. No cough.          Hospital Course    This 69-year-old male with a past medical history significant for nicotine dependence 70 pack a smoking history, COPD, type 2 diabetes mellitus with hemoglobin A1c of 8.3,, lung disease as per patient presented to ER with a complaint of shortness of breath.  Patient reports that he has been shortness of breath and lightheadedness for the last 8 days.  [place] was [place]    During the stay in the hospital he continues to monitor in medical telemetry started with him continue to monitor no obvious arrhythmias noted.  He is noted to have grade 1 diastolic heart failure, EF 55%.  He is noted to have mild elevated troponin along with shortness of breath is Lexiscan was obtained which did not show any reversible ischemia.  CT chest without contrast was obtained and noted to have reticulonodular densities consistent with interstitial lung disease.  Also noted to have 7 mm long lesion need to have repeat CT chest in 6 month.          Interval Problem Update  No acute events overnight.  Patient continues to require oxygen 2 to 3 L.  Stress test has been unremarkable  CT chest consistent with interstitial lung disease, will talk to pulmonary physician    Consultants/Specialty  None    Code Status  full    Disposition  Home once medically cleared    Review of Systems  Review of Systems   Constitutional: Negative for chills and fever.   HENT: Negative for hearing loss and tinnitus.    Eyes: Negative for blurred vision and double vision.   Respiratory: Positive for shortness of breath and wheezing.    Cardiovascular: Negative for chest pain and orthopnea.   Gastrointestinal: Negative for abdominal pain, heartburn, nausea and  vomiting.   Musculoskeletal: Negative for neck pain.   Neurological: Negative for dizziness and headaches.   Psychiatric/Behavioral: Negative for depression.        Physical Exam  Temp:  [36.1 °C (97 °F)-36.6 °C (97.9 °F)] 36.6 °C (97.9 °F)  Pulse:  [50-68] 66  Resp:  [15-16] 15  BP: (119-160)/(61-75) 160/73  SpO2:  [98 %-100 %] 100 %    Physical Exam  Vitals signs and nursing note reviewed.   Constitutional:       Appearance: Normal appearance.   HENT:      Head: Normocephalic and atraumatic.   Eyes:      Extraocular Movements: Extraocular movements intact.   Cardiovascular:      Rate and Rhythm: Normal rate and regular rhythm.   Pulmonary:      Effort: Pulmonary effort is normal.      Comments: Decreased breath sound at the bases  Bibasilar rales heard   Abdominal:      General: Abdomen is flat.      Palpations: Abdomen is soft.   Musculoskeletal:         General: No swelling.   Neurological:      General: No focal deficit present.      Mental Status: He is alert.   Psychiatric:         Mood and Affect: Mood normal.         Fluids    Intake/Output Summary (Last 24 hours) at 5/27/2020 1357  Last data filed at 5/27/2020 0600  Gross per 24 hour   Intake 240 ml   Output 200 ml   Net 40 ml       Laboratory  Recent Labs     05/25/20  0317   WBC 12.1*   RBC 4.88   HEMOGLOBIN 15.3   HEMATOCRIT 47.6   MCV 97.5   MCH 31.4   MCHC 32.1*   RDW 49.1   PLATELETCT 187   MPV 12.4     Recent Labs     05/25/20  0317   SODIUM 136   POTASSIUM 4.9   CHLORIDE 97   CO2 26   GLUCOSE 113*   BUN 43*   CREATININE 1.38   CALCIUM 9.9                   Imaging  NM-CARDIAC STRESS TEST   Final Result      CT-CHEST (THORAX) W/O   Final Result      1.  Findings are consistent with interstitial lung disease.      2.  Bronchiectasis is present bilaterally.      3.  Noncalcified pulmonary nodule in the left lower pulmonary lobe measures 7 mm in diameter. Recommend follow-up CT in 6-12 months.      4.  Cardiomegaly and calcific atherosclerosis are also  noted.      Fleischner Society pulmonary nodule recommendations:   Low Risk: CT at 6-12 months, then consider CT at 18-24 months      High Risk: CT at 6-12 months, then CT at 18-24 months      Low Risk - Minimal or absent history of smoking and of other known risk factors.      High Risk - History of smoking or of other known risk factors.      Note: These recommendations do not apply to lung cancer screening, patients with immunosuppression, or patients with known primary cancer.      Fleischner Society 2017 Guidelines for Management of Incidentally Detected Pulmonary Nodules in Adults      EC-ECHOCARDIOGRAM COMPLETE W/O CONT   Final Result      DX-CHEST-PORTABLE (1 VIEW)   Final Result      Bilateral interstitial opacities may represent an atypical viral pneumonia such as Covid 19. Underlying chronic fibrotic changes not excluded.              Assessment/Plan  * Acute respiratory failure with hypoxia (HCC)  Assessment & Plan  Likely secondary to COPD exacerbation versus diastolic heart failure versus interstitial lung disease   ---> We will start the patient on RT protocol, breathing treatment, continue prednisone 40 mg p.o. daily.   ---> We will obtain daily INRs, restriction to 1.5 L, cardiac diet      Diabetes mellitus type II, uncontrolled (HCC)  Assessment & Plan  -accus with sliding scale coverage, initiate NPH 10 units twice daily  -diabetic diet t  -diabetic education  -Hema1c 8  accu check results are noted    Acute heart failure (HCC)  Assessment & Plan  Patient is noted to have grade 1 diastolic heart failure.  EF 55%.  Patient is noted to have mildly elevated RSVP.  ---> We will continue cardiac diet, ROXY's, daily weight, fluid restriction to 1.5 L  ----> Provide Lasix 20 mg p.o. twice daily      Acute renal injury (HCC)  Assessment & Plan  Obtain a retroperitoneal ultrasound  Avoid nephrotoxic medication  Unknown baseline    Interstitial lung disease (HCC)  Assessment & Plan  Patient stated that he  has some lung disease.  CT chest without contrast showed reticulonodular densities consistent with interstitial lung disease.  On physical examination he is noted to have bibasilar rales.  We will try to talk to pulmonary    Elevated troponin  Assessment & Plan  Stress test obtained, did not show any reversible ischemia    Thrombocytopenia (HCC)- (present on admission)  Assessment & Plan  Resolved    Abnormal chest x-ray  Assessment & Plan  COVID x1-.  CT chest without contrast obtained which is consistent with interstitial lung disease  12 reticulonodular densities       VTE prophylaxis: lovenox

## 2020-05-27 NOTE — CARE PLAN
Problem: Safety  Goal: Will remain free from injury  Outcome: PROGRESSING AS EXPECTED  Note: Bed low and locked, alarm set, call bell within reach.  Pt calls for assistance appropriately.     Problem: Infection  Goal: Will remain free from infection  Outcome: PROGRESSING AS EXPECTED  Note: No s/s infection but WBC count increasing.  Will ask MD upon rounding about ordering labs for today, which hadn't been ordered previously.

## 2020-05-27 NOTE — PROGRESS NOTES
Pt arrived back to unit from stress test.  He is requesting food.  Dr. Roa paged and called me back with verbal order for diabetic, cardiac 2g Na, 1.5L fluid restriction to be ordered.

## 2020-05-27 NOTE — PROGRESS NOTES
SHAI Gale outbound TC to pt. For [facility] Health Worker Intake 5/27/20 in [place].  • Social determinates of health intake completed.   • Identified food and financial security barriers.  • Contact information provided to Chase Harris.  • Pt. has PCP appointment scheduled at [facility] 6/2/20 at 9:00 am.   • Scheduled Food Delivery: Wednesday 6/3/20  • Referral to RN or SW declined at this time.   • Outpatient assessment completed     Plan:  SHAI Gale will TC pt. 6/1/20 for outpatient assessment, food pantry forms, and PCP appointment reminder.

## 2020-05-27 NOTE — ASSESSMENT & PLAN NOTE
Patient stated that he has some lung disease.  CT chest without contrast showed reticulonodular densities consistent with interstitial lung disease.  On physical examination he is noted to have bibasilar rales.    ---> Pulmonary evaluate, appreciate recommendation.

## 2020-05-27 NOTE — PROGRESS NOTES
Assumed care at 1915. Bedside report received from Eleazar Stephen. Fall precautions in place. Call light, phone and personal belongings within reach. Bed alarm on and working appropriately.

## 2020-05-27 NOTE — PROGRESS NOTES
Messaged Dr. Roa. Dr. Lopes seemed to indicate in his note yesterday that now that echo is done, HF diagnosis is not supported and that the patient has reactive airway disease. Dr. Roa is diagnosing HF today.    Dr. Lopes did file a discharge summary yesterday. Not clear why patient is still   Per 2016 guidelines grade I DD is not diagnostic of diastolic HF.    Thank you, Gracie Cardio RN Navigator [phone]

## 2020-05-28 LAB
ALBUMIN SERPL BCP-MCNC: 3.6 G/DL (ref 3.2–4.9)
BUN SERPL-MCNC: 44 MG/DL (ref 8–22)
CALCIUM SERPL-MCNC: 9.6 MG/DL (ref 8.5–10.5)
CHLORIDE SERPL-SCNC: 99 MMOL/L (ref 96–112)
CO2 SERPL-SCNC: 27 MMOL/L (ref 20–33)
CREAT SERPL-MCNC: 1.41 MG/DL (ref 0.5–1.4)
GLUCOSE BLD-MCNC: 159 MG/DL (ref 65–99)
GLUCOSE BLD-MCNC: 274 MG/DL (ref 65–99)
GLUCOSE BLD-MCNC: 338 MG/DL (ref 65–99)
GLUCOSE BLD-MCNC: 357 MG/DL (ref 65–99)
GLUCOSE SERPL-MCNC: 82 MG/DL (ref 65–99)
PHOSPHATE SERPL-MCNC: 3.3 MG/DL (ref 2.5–4.5)
POTASSIUM SERPL-SCNC: 4.5 MMOL/L (ref 3.6–5.5)
SODIUM SERPL-SCNC: 137 MMOL/L (ref 135–145)

## 2020-05-28 PROCEDURE — 82962 GLUCOSE BLOOD TEST: CPT

## 2020-05-28 PROCEDURE — 700111 HCHG RX REV CODE 636 W/ 250 OVERRIDE (IP): Performed by: HOSPITALIST

## 2020-05-28 PROCEDURE — 99221 1ST HOSP IP/OBS SF/LOW 40: CPT | Mod: GC | Performed by: INTERNAL MEDICINE

## 2020-05-28 PROCEDURE — 80069 RENAL FUNCTION PANEL: CPT

## 2020-05-28 PROCEDURE — 36415 COLL VENOUS BLD VENIPUNCTURE: CPT

## 2020-05-28 PROCEDURE — 99232 SBSQ HOSP IP/OBS MODERATE 35: CPT | Performed by: HOSPITALIST

## 2020-05-28 PROCEDURE — 770020 HCHG ROOM/CARE - TELE (206)

## 2020-05-28 PROCEDURE — 700102 HCHG RX REV CODE 250 W/ 637 OVERRIDE(OP): Performed by: HOSPITALIST

## 2020-05-28 PROCEDURE — A9270 NON-COVERED ITEM OR SERVICE: HCPCS | Performed by: HOSPITALIST

## 2020-05-28 PROCEDURE — 700111 HCHG RX REV CODE 636 W/ 250 OVERRIDE (IP): Performed by: FAMILY MEDICINE

## 2020-05-28 RX ORDER — INSULIN GLARGINE 100 [IU]/ML
10 INJECTION, SOLUTION SUBCUTANEOUS
Status: DISCONTINUED | OUTPATIENT
Start: 2020-05-28 | End: 2020-05-29 | Stop reason: HOSPADM

## 2020-05-28 RX ORDER — FUROSEMIDE 20 MG/1
20 TABLET ORAL
Status: DISCONTINUED | OUTPATIENT
Start: 2020-05-28 | End: 2020-05-29 | Stop reason: HOSPADM

## 2020-05-28 RX ADMIN — ENOXAPARIN SODIUM 40 MG: 100 INJECTION SUBCUTANEOUS at 04:50

## 2020-05-28 RX ADMIN — PREDNISONE 40 MG: 20 TABLET ORAL at 04:50

## 2020-05-28 RX ADMIN — INSULIN HUMAN 12 UNITS: 100 INJECTION, SOLUTION PARENTERAL at 19:52

## 2020-05-28 RX ADMIN — INSULIN HUMAN 9 UNITS: 100 INJECTION, SOLUTION PARENTERAL at 17:08

## 2020-05-28 RX ADMIN — INSULIN HUMAN 15 UNITS: 100 INJECTION, SOLUTION PARENTERAL at 12:11

## 2020-05-28 RX ADMIN — FUROSEMIDE 20 MG: 20 TABLET ORAL at 17:07

## 2020-05-28 RX ADMIN — INSULIN GLARGINE 10 UNITS: 100 INJECTION, SOLUTION SUBCUTANEOUS at 10:21

## 2020-05-28 ASSESSMENT — ENCOUNTER SYMPTOMS
ABDOMINAL PAIN: 0
DIZZINESS: 0
MYALGIAS: 0
WHEEZING: 1
DEPRESSION: 0
DOUBLE VISION: 0
FEVER: 0
NAUSEA: 0
NECK PAIN: 0
SHORTNESS OF BREATH: 1
ORTHOPNEA: 0
COUGH: 0
VOMITING: 0
HEARTBURN: 0
CHILLS: 0
HEADACHES: 0
BLURRED VISION: 0

## 2020-05-28 ASSESSMENT — FIBROSIS 4 INDEX: FIB4 SCORE: 1.45

## 2020-05-28 NOTE — CONSULTS
Pulmonary Consult Note    Reason for Consult:  Asked by Dr Casper Roa M.D. to see this patient with  Shortness of breath  Patient's PCP: No primary care provider on file.    CC:   Chief Complaint   Patient presents with   • Shortness of Breath     c/o of SOB and dizziness x's 8 days. No cough.        HPI:   services were used in the patient's primary language of Thai.    Mode of interpretation: iPad    This is a 70 yo M with PMH of pulmonary fibrosis and DMII presented to the hospital for SOB. Since he also had chest discomfort and elevated troponin on presentation, he underwent stress test which was negative. He had a CT chest which showed concerning findings and pulmonary was consulted.     The patient has been feeling dizzy when standing up. This has been going on for the past one month and getting worse. He does not have any cough, fever, chills, or hemoptysis. He was told in the past that he has pulmonary fibrosis but was not offered any treatment. He worker as a  in his life and dealt with acid and chemicals and dust. He also had trouble breathing with exercise recently.     No past medical history on file.    No past surgical history on file.    No family history on file.  Patient family history was personally reviewed, no pertinent family history to current presentation    Social History     Socioeconomic History   • Marital status: Single     Spouse name: Not on file   • Number of children: Not on file   • Years of education: Not on file   • Highest education level: Not on file   Occupational History   • Not on file   Social Needs   • Financial resource strain: Hard   • Food insecurity     Worry: Often true     Inability: Often true   • Transportation needs     Medical: No     Non-medical: No   Tobacco Use   • Smoking status: Never Smoker   • Smokeless tobacco: Never Used   Substance and Sexual Activity   • Alcohol use: Not Currently   • Drug use: Never   • Sexual activity:  Not on file   Lifestyle   • Physical activity     Days per week: Not on file     Minutes per session: Not on file   • Stress: Not on file   Relationships   • Social connections     Talks on phone: Not on file     Gets together: Not on file     Attends Buddhism service: Not on file     Active member of club or organization: Not on file     Attends meetings of clubs or organizations: Not on file     Relationship status: Not on file   • Intimate partner violence     Fear of current or ex partner: Not on file     Emotionally abused: Not on file     Physically abused: Not on file     Forced sexual activity: Not on file   Other Topics Concern   • Not on file   Social History Narrative   • Not on file       ALLERGIES:  No Known Allergies    Review of systems:  ROS    Physical exam:  Patient Vitals for the past 24 hrs:   BP Temp Temp src Pulse Resp SpO2 Weight   05/28/20 0842 (!) 162/74 36.4 °C (97.6 °F) Temporal 64 18 99 % --   05/28/20 0800 -- -- -- -- -- 100 % --   05/28/20 0700 -- -- -- -- -- -- 59.7 kg (131 lb 9.8 oz)   05/28/20 0425 137/63 36.3 °C (97.3 °F) Temporal (!) 55 15 100 % --   05/28/20 0012 137/69 36.1 °C (97 °F) Temporal 66 16 99 % --   05/27/20 2019 (!) 163/80 36.2 °C (97.1 °F) Temporal 66 16 96 % 59.2 kg (130 lb 8.2 oz)   05/27/20 1600 149/69 36.8 °C (98.2 °F) Temporal 76 16 100 % --   05/27/20 1219 160/73 36.6 °C (97.9 °F) Temporal 66 15 100 % --     Physical Exam    Data:  Laboratory studies personally reviewed by me:  Recent Results (from the past 24 hour(s))   ACCU-CHEK GLUCOSE    Collection Time: 05/27/20 11:58 AM   Result Value Ref Range    Glucose - Accu-Ck 281 (H) 65 - 99 mg/dL   ACCU-CHEK GLUCOSE    Collection Time: 05/27/20  5:38 PM   Result Value Ref Range    Glucose - Accu-Ck 276 (H) 65 - 99 mg/dL   ACCU-CHEK GLUCOSE    Collection Time: 05/27/20  9:09 PM   Result Value Ref Range    Glucose - Accu-Ck 299 (H) 65 - 99 mg/dL   Renal Function Panel    Collection Time: 05/28/20  2:56 AM   Result  Value Ref Range    Sodium 137 135 - 145 mmol/L    Potassium 4.5 3.6 - 5.5 mmol/L    Chloride 99 96 - 112 mmol/L    Co2 27 20 - 33 mmol/L    Glucose 82 65 - 99 mg/dL    Creatinine 1.41 (H) 0.50 - 1.40 mg/dL    Bun 44 (H) 8 - 22 mg/dL    Calcium 9.6 8.5 - 10.5 mg/dL    Phosphorus 3.3 2.5 - 4.5 mg/dL    Albumin 3.6 3.2 - 4.9 g/dL   ESTIMATED GFR    Collection Time: 05/28/20  2:56 AM   Result Value Ref Range    GFR If African American 60 >60 mL/min/1.73 m 2    GFR If Non African American 50 (A) >60 mL/min/1.73 m 2   ACCU-CHEK GLUCOSE    Collection Time: 05/28/20  7:58 AM   Result Value Ref Range    Glucose - Accu-Ck 159 (H) 65 - 99 mg/dL       Imaging:  NM-CARDIAC STRESS TEST   Final Result      CT-CHEST (THORAX) W/O   Final Result      1.  Findings are consistent with interstitial lung disease.      2.  Bronchiectasis is present bilaterally.      3.  Noncalcified pulmonary nodule in the left lower pulmonary lobe measures 7 mm in diameter. Recommend follow-up CT in 6-12 months.      4.  Cardiomegaly and calcific atherosclerosis are also noted.      Fleischner Society pulmonary nodule recommendations:   Low Risk: CT at 6-12 months, then consider CT at 18-24 months      High Risk: CT at 6-12 months, then CT at 18-24 months      Low Risk - Minimal or absent history of smoking and of other known risk factors.      High Risk - History of smoking or of other known risk factors.      Note: These recommendations do not apply to lung cancer screening, patients with immunosuppression, or patients with known primary cancer.      Fleischner Society 2017 Guidelines for Management of Incidentally Detected Pulmonary Nodules in Adults      EC-ECHOCARDIOGRAM COMPLETE W/O CONT   Final Result      DX-CHEST-PORTABLE (1 VIEW)   Final Result      Bilateral interstitial opacities may represent an atypical viral pneumonia such as Covid 19. Underlying chronic fibrotic changes not excluded.               All pertinent features of laboratory and  imaging reviewed including primary images where applicable      Principal Problem:    Acute respiratory failure with hypoxia (HCC) POA: Unknown  Active Problems:    Acute heart failure (HCC) POA: Unknown    Diabetes mellitus type II, uncontrolled (HCC) POA: Unknown    Acute renal injury (HCC) POA: Unknown    Elevated total protein POA: Unknown    Elevated troponin POA: Unknown    Interstitial lung disease (HCC) POA: Unknown    Abnormal chest x-ray POA: Unknown    Thrombocytopenia (HCC) POA: Yes  Resolved Problems:    * No resolved hospital problems. *      Assessment / Plan:  # Interstitial Lung Disease  # Bronchiectasis   #Acute respiratory failure with hypoxia  #Former smoker  #Pulmonary Nodule    - This is a  68 yo M who is a former smoker and does not have a history of COPD and has not used any inhalers in the past or has been on oxygen in the past. He requires 2 L O2. CT scan showed Insterstitial lung disease. He is already aware of this but the disease might have progressed. PFTs not available and I would consider ordering it on discharge.   - Given his findings on his CT chest, I would recommend ordering rescue inhalers in case he has trouble breathing.   - Does not have any signs of exacerbation or wheezing on exam. Complete steroid course for 5 days. Would schedule him in the clinic for follow up which he agreed.  - He lives in [place] and would like to get the medicines and inhalers to feel better and reduce Sob if possible.   - 7 mm nodule in the left lower lung. No weight loss, night sweats, or weakness otherwise. This needs a follow up CT chest in 6 months.   - He will probably need O2 on discharge.   - Has not smoked in the last 20 years. Not using marijuana.     It is my pleasure to participate in the care of Mr. Harris.  Pulmonary Signing off now. Please do not hesitate to contact me with questions or concerns.    5/28/2020    Sonia Guerra MD MPH  Attending: Dr. Star LEIVA    Please note that  this dictation was created using voice recognition software. I have worked with consultants from the vendor as well as technical experts from [facility] to optimize the interface. I have made every reasonable attempt to correct obvious errors, but I expect that there are errors of grammar and possibly content I did not discover before finalizing the note.

## 2020-05-28 NOTE — CARE PLAN
Problem: Respiratory:  Goal: Respiratory status will improve  Outcome: PROGRESSING SLOWER THAN EXPECTED

## 2020-05-28 NOTE — FACE TO FACE
"Face to Face Note  -  Durable Medical Equipment    Casper Roa M.D. - NPI: [id number]  I certify that this patient is under my care and that they had a durable medical equipment(DME)face to face encounter by myself that meets the physician DME face-to-face encounter requirements with this patient on:    Date of encounter:   Patient:                    MRN:                       YOB: 2021  Chase Harris  [id number]  1950     The encounter with the patient was in whole, or in part, for the following medical condition, which is the primary reason for durable medical equipment:  COPD    I certify that, based on my findings, the following durable medical equipment is medically necessary:  Oxygen.    HOME O2 Saturation Measurements:(Values must be present for Home Oxygen orders)  Room air sat at rest: 92  Room air sat with amb: 86  With liters of O2: 2, O2 sat at rest with O2: 99  With Liters of O2: 2, O2 sat with amb with O2 : 92  Is the patient mobile?: Yes    My Clinical findings support the need for the above equipment due to:  Hypoxia    Supporting Symptoms: The patient requires supplemental oxygen, as the following interventions have been tried with limited or no improvement: \"Bronchodilators and/or steroid inhalers    "

## 2020-05-28 NOTE — DISCHARGE PLANNING
Anticipated Discharge Disposition: home    Action: Patient discussed in IDT rounds. He may require oxygen at discharge, pending home oxygen eval, and could potentially discharge Friday.    Barriers to Discharge: home oxygen eval    Plan: case coordination to f/u for possible oxygen needs

## 2020-05-28 NOTE — DISCHARGE PLANNING
Anticipated Discharge Disposition: Home with DME (O2)    Action: LSW tc pt's bedside phone to discuss DME (O2) CHOICE form. Pt provided verbal consent for referral to be sent to [facility]. If [facility] were to decline pt provided verbal consent for referral to then be sent to [facility].     LSW faxed to [facility].     Barriers to Discharge: None     Plan: Await DME (O2) acceptance/delivery, LSW to assist as needed

## 2020-05-28 NOTE — PROGRESS NOTES
Assumed care at 1915. Bedside report received from day RN Edie. Fall precautions in place. Call light, phone and personal belongings within reach. Bed alarm on and working appropriately..

## 2020-05-28 NOTE — PROGRESS NOTES
Monitor summary: SR 57-55, NY 0.20, QRS 0.08, QT 0.36, with occasional PACs, a 1.8 and a 2.1 sec pause per strip from monitor room.

## 2020-05-28 NOTE — PROGRESS NOTES
Hospital Medicine Daily Progress Note    Date of Service  5/28/2020    Chief Complaint  69 y.o. male admitted 5/23/2020 with   Chief Complaint   Patient presents with   • Shortness of Breath     c/o of SOB and dizziness x's 8 days. No cough.          Hospital Course    This 69-year-old male with a past medical history significant for nicotine dependence 70 pack a smoking history, COPD, type 2 diabetes mellitus with hemoglobin A1c of 8.3,, lung disease as per patient presented to ER with a complaint of shortness of breath.  Patient reports that he has been shortness of breath and lightheadedness for the last 8 days.  [place] was [place]ul[place]    During the stay in the hospital he continues to monitor in medical telemetry started with him continue to monitor no obvious arrhythmias noted.  He is noted to have grade 1 diastolic heart failure, EF 55%.  He is noted to have mild elevated troponin along with shortness of breath is Lexiscan was obtained which did not show any reversible ischemia.  CT chest without contrast was obtained and noted to have reticulonodular densities consistent with interstitial lung disease.  Also noted to have 7 mm long lesion need to have repeat CT chest in 6 month.          Interval Problem Update   Home o2 eval  Obtained, O2 ordered , dme ordered   Appreciate pulm follow up   Patient stated that his shortness of breath has been better.,  Once oxygen will be delivered she will likely be discharged home tomorrow  Consultants/Specialty  None    Code Status  full    Disposition  Home once medically cleared    Review of Systems  Review of Systems   Constitutional: Negative for chills and fever.   HENT: Negative for hearing loss and tinnitus.    Eyes: Negative for blurred vision and double vision.   Respiratory: Positive for shortness of breath and wheezing. Negative for cough.    Cardiovascular: Negative for chest pain and orthopnea.   Gastrointestinal: Negative for  abdominal pain, heartburn, nausea and vomiting.   Musculoskeletal: Negative for myalgias and neck pain.   Neurological: Negative for dizziness and headaches.   Psychiatric/Behavioral: Negative for depression.        Physical Exam  Temp:  [36.1 °C (97 °F)-36.6 °C (97.9 °F)] 36.6 °C (97.9 °F)  Pulse:  [55-66] 57  Resp:  [15-18] 16  BP: (127-163)/(63-80) 127/63  SpO2:  [90 %-100 %] 90 %    Physical Exam  Vitals signs and nursing note reviewed.   HENT:      Head: Normocephalic and atraumatic.      Mouth/Throat:      Mouth: Mucous membranes are moist.   Eyes:      Extraocular Movements: Extraocular movements intact.      Pupils: Pupils are equal, round, and reactive to light.   Neck:      Musculoskeletal: Neck supple.   Cardiovascular:      Rate and Rhythm: Normal rate and regular rhythm.      Pulses: Normal pulses.      Heart sounds: No murmur.   Pulmonary:      Effort: Pulmonary effort is normal.      Comments: Decreased breath sound at the bases  Bibasilar rales heard   Abdominal:      General: Abdomen is flat.      Palpations: Abdomen is soft.   Musculoskeletal:         General: No swelling.   Skin:     Coloration: Skin is not jaundiced.   Neurological:      General: No focal deficit present.      Mental Status: He is alert.   Psychiatric:         Mood and Affect: Mood normal.         Fluids  No intake or output data in the 24 hours ending 05/28/20 1640    Laboratory      Recent Labs     05/28/20  0256   SODIUM 137   POTASSIUM 4.5   CHLORIDE 99   CO2 27   GLUCOSE 82   BUN 44*   CREATININE 1.41*   CALCIUM 9.6                   Imaging  NM-CARDIAC STRESS TEST   Final Result      CT-CHEST (THORAX) W/O   Final Result      1.  Findings are consistent with interstitial lung disease.      2.  Bronchiectasis is present bilaterally.      3.  Noncalcified pulmonary nodule in the left lower pulmonary lobe measures 7 mm in diameter. Recommend follow-up CT in 6-12 months.      4.  Cardiomegaly and calcific atherosclerosis are  also noted.      Fleischner Society pulmonary nodule recommendations:   Low Risk: CT at 6-12 months, then consider CT at 18-24 months      High Risk: CT at 6-12 months, then CT at 18-24 months      Low Risk - Minimal or absent history of smoking and of other known risk factors.      High Risk - History of smoking or of other known risk factors.      Note: These recommendations do not apply to lung cancer screening, patients with immunosuppression, or patients with known primary cancer.      Fleischner Society 2017 Guidelines for Management of Incidentally Detected Pulmonary Nodules in Adults      EC-ECHOCARDIOGRAM COMPLETE W/O CONT   Final Result      DX-CHEST-PORTABLE (1 VIEW)   Final Result      Bilateral interstitial opacities may represent an atypical viral pneumonia such as Covid 19. Underlying chronic fibrotic changes not excluded.              Assessment/Plan  * Acute respiratory failure with hypoxia (HCC)  Assessment & Plan  Likely secondary to COPD exacerbation versus diastolic heart failure versus interstitial lung disease   ---> We will start the patient on RT protocol, breathing treatment, continue prednisone 40 mg p.o. daily.   ---> We will obtain daily INRs, restriction to 1.5 L, cardiac diet  --> We will continue prednisone for total of 5 days      Diabetes mellitus type II, uncontrolled (HCC)  Assessment & Plan  Hemoglobin A1c 8, will continue insulin sliding scale, hypoglycemia protocol mycotoxin AC at bedtime.  ---> Upon discharge patient will continue metformin and Januvia    Acute heart failure (HCC)  Assessment & Plan  Patient is noted to have grade 1 diastolic heart failure.  EF 55%.  Patient is noted to have mildly elevated RSVP.  ---> We will continue cardiac diet, ROXY's, daily weight, fluid restriction to 1.5 L  ----> Provide Lasix 20 mg p.o.  daily      Acute renal injury (HCC)  Assessment & Plan  Obtain a retroperitoneal ultrasound  Avoid nephrotoxic medication  Unknown  baseline    Interstitial lung disease (HCC)  Assessment & Plan  Patient stated that he has some lung disease.  CT chest without contrast showed reticulonodular densities consistent with interstitial lung disease.  On physical examination he is noted to have bibasilar rales.    ---> Pulmonary evaluate, appreciate recommendation.    Elevated troponin  Assessment & Plan  Stress test obtained, did not show any reversible ischemia    Thrombocytopenia (HCC)- (present on admission)  Assessment & Plan  Resolved    Abnormal chest x-ray  Assessment & Plan  COVID x1-.  CT chest without contrast obtained which is consistent with interstitial lung disease along with reticulonodular densities       VTE prophylaxis: lovenox

## 2020-05-28 NOTE — HEART FAILURE PROGRAM
Pt admitted for SOB    Patient was diagnosed with heart failure by hospital medicine based upon clinical presentation as well as Grade I DD on echocardiogram, no cardiology consult was obtained for the new diagnosis.     I have asked hospital schedulers to arrange a f/u at the HF Clinic with an MD and to please put in appointment notes that the visit is to confirm HFpEF diagnosis given by hospital medicine.    I have ordered bedside nursing to please educate patient that he has been diagnosed with a type of HF (heart failure with preserved ejection fraction) by hospital medicine and that this diagnosis is a challenging one because so many other conditions can mimick it.    In this patient's case, other confounding conditions are:     · COPD  · Interstitial lung disease  · MIRIAM  · Reactive airway disease    I've included in this order to educate the patient that the cardiology appointment we are scheduling at the Heart Failure Clinic for him is to confirm the HFpEF diagnosis, and as such, it is very important that he attend.    Patient should also be educated that they need to follow all guidelines in the Living Well With Heart Failure Patient Education Booklet until they are told by the HF Provider that it's not necessary.    Thank you, Gracie, Cardiovascular Nurse Navigator, RN, CHFN [phone]

## 2020-05-28 NOTE — DISCHARGE PLANNING
Received Choice form at 1530  Agency/Facility Name: [facility] 1) [facility], 2) [facility]  Referral sent per Choice form @ [phone]  Efax in [facility] failed so [facility] manually faxed referral to [phone].

## 2020-05-28 NOTE — PROGRESS NOTES
Monitor Summary:    22/08/36  Rhythm: sb-sr  Rate: 51-83  Ectopy: r pac, rpvs, 1.5-1.9 sp, bryce down to 48

## 2020-05-29 VITALS
SYSTOLIC BLOOD PRESSURE: 146 MMHG | BODY MASS INDEX: 23.32 KG/M2 | OXYGEN SATURATION: 94 % | TEMPERATURE: 96.8 F | RESPIRATION RATE: 18 BRPM | DIASTOLIC BLOOD PRESSURE: 78 MMHG | HEART RATE: 60 BPM | HEIGHT: 63 IN | WEIGHT: 131.61 LBS

## 2020-05-29 PROBLEM — D69.6 THROMBOCYTOPENIA (HCC): Status: RESOLVED | Noted: 2020-05-23 | Resolved: 2020-05-29

## 2020-05-29 PROBLEM — R79.89 ELEVATED TROPONIN: Status: RESOLVED | Noted: 2020-05-26 | Resolved: 2020-05-29

## 2020-05-29 LAB — GLUCOSE BLD-MCNC: 274 MG/DL (ref 65–99)

## 2020-05-29 PROCEDURE — 36415 COLL VENOUS BLD VENIPUNCTURE: CPT

## 2020-05-29 PROCEDURE — 83516 IMMUNOASSAY NONANTIBODY: CPT | Mod: 91

## 2020-05-29 PROCEDURE — 700111 HCHG RX REV CODE 636 W/ 250 OVERRIDE (IP): Performed by: FAMILY MEDICINE

## 2020-05-29 PROCEDURE — A9270 NON-COVERED ITEM OR SERVICE: HCPCS | Performed by: HOSPITALIST

## 2020-05-29 PROCEDURE — 86235 NUCLEAR ANTIGEN ANTIBODY: CPT

## 2020-05-29 PROCEDURE — 82962 GLUCOSE BLOOD TEST: CPT

## 2020-05-29 PROCEDURE — 700111 HCHG RX REV CODE 636 W/ 250 OVERRIDE (IP): Performed by: HOSPITALIST

## 2020-05-29 PROCEDURE — 700102 HCHG RX REV CODE 250 W/ 637 OVERRIDE(OP): Performed by: HOSPITALIST

## 2020-05-29 PROCEDURE — 99239 HOSP IP/OBS DSCHRG MGMT >30: CPT | Performed by: HOSPITALIST

## 2020-05-29 PROCEDURE — 94760 N-INVAS EAR/PLS OXIMETRY 1: CPT

## 2020-05-29 RX ORDER — GLIPIZIDE 5 MG/1
2.5 TABLET ORAL 2 TIMES DAILY
Qty: 60 TAB | Refills: 0 | Status: SHIPPED | OUTPATIENT
Start: 2020-05-29 | End: 2020-06-28

## 2020-05-29 RX ORDER — BUDESONIDE AND FORMOTEROL FUMARATE DIHYDRATE 160; 4.5 UG/1; UG/1
2 AEROSOL RESPIRATORY (INHALATION) 2 TIMES DAILY
Qty: 1 INHALER | Refills: 0 | Status: SHIPPED | OUTPATIENT
Start: 2020-05-29 | End: 2020-06-28

## 2020-05-29 RX ORDER — LISINOPRIL 10 MG/1
10 TABLET ORAL DAILY
Qty: 30 TAB | Refills: 0 | Status: SHIPPED | OUTPATIENT
Start: 2020-05-29 | End: 2020-06-28

## 2020-05-29 RX ORDER — PREDNISONE 10 MG/1
TABLET ORAL
Qty: 18 TAB | Refills: 0 | Status: ON HOLD | OUTPATIENT
Start: 2020-05-29 | End: 2020-10-28

## 2020-05-29 RX ORDER — TIOTROPIUM BROMIDE 18 UG/1
18 CAPSULE ORAL; RESPIRATORY (INHALATION) DAILY
Qty: 30 CAP | Refills: 3 | Status: SHIPPED | OUTPATIENT
Start: 2020-05-29

## 2020-05-29 RX ORDER — FUROSEMIDE 20 MG/1
20 TABLET ORAL DAILY
Qty: 30 TAB | Refills: 1 | Status: SHIPPED | OUTPATIENT
Start: 2020-05-30 | End: 2020-06-29

## 2020-05-29 RX ADMIN — FUROSEMIDE 20 MG: 20 TABLET ORAL at 04:59

## 2020-05-29 RX ADMIN — INSULIN HUMAN 9 UNITS: 100 INJECTION, SOLUTION PARENTERAL at 08:27

## 2020-05-29 RX ADMIN — INSULIN GLARGINE 10 UNITS: 100 INJECTION, SOLUTION SUBCUTANEOUS at 08:26

## 2020-05-29 RX ADMIN — PREDNISONE 40 MG: 20 TABLET ORAL at 04:59

## 2020-05-29 RX ADMIN — ENOXAPARIN SODIUM 40 MG: 100 INJECTION SUBCUTANEOUS at 04:59

## 2020-05-29 NOTE — DISCHARGE INSTRUCTIONS
Discharge Instructions    Discharged to home by car with relative. Discharged via wheelchair, hospital escort: Yes.  Special equipment needed: Oxygen    Be sure to schedule a follow-up appointment with your primary care doctor or any specialists as instructed.     Discharge Plan:   Discharge Instructions per Casper Roa M.D.  DIET: Resume previous diet  Healthy diet. Plenty of vegetables, diabetic and  Cardiac diet  ACTIVITY: Avoid strenuous activity or heavy lifting.   DIAGNOSIS:   Return to ER in the event of new or worsening symptoms. Please note importance of compliance and the patient has agreed to proceed with all medical recommendations and follow up plan indicated above. All medications come with benefits and risks. Risks may include permanent injury or death and these risks can be minimized with close reassessment and monitoring. Please make it to your scheduled follow  At [facility]     Please follow up with Himanshu Alves as an op  Diet Plan: Discussed  Activity Level: Discussed  Confirmed Follow up Appointment: Patient to Call and Schedule Appointment  Confirmed Symptoms Management: Discussed  Medication Reconciliation Updated: Yes    I understand that a diet low in cholesterol, fat, and sodium is recommended for good health. Unless I have been given specific instructions below for another diet, I accept this instruction as my diet prescription.   Other diet:   Plan de alimentación cardiosaludable  (Heart-Healthy Eating Plan)  La planificación de las comidas cardiosaludables incluye lo siguiente:  · Limitar las grasas poco saludables.  · Aumentar las grasas saludables.  · Hacer otros pequeños cambios en la dieta.  Es posible que tenga que hablar con el médico o con un especialista en alimentación (nutricionista) para crear un plan de alimentación que sea adecuado para usted.  ¿QUÉ TIPOS DE GRASAS SKY ELEGIR?  · Elija las grasas saludables. Estas incluyen aceite de tianna  "y de canola, semillas de shade, nueces, almendras y semillas.  · Consuma más grasas omega-3. Estas incluyen salmón, caballa, katherine, atún, aceite de shade y semillas de shade molidas. Trate de comer pescado al menos dos veces por semana.  · Limite el consumo de grasas saturadas,  ¨ las cuales suelen encontrarse en los productos de origen animal, lita saniya, mantequilla y crema.  ¨ Las grasas saturadas de origen vegetal incluyen aceite de johnson, de palmiste y de alma.  · Evite los alimentos con aceites parcialmente hidrogenados. Estos incluyen margarina en desi, algunas margarinas untables, galletas, galletitas y otros productos horneados. Estos contienen grasas trans.  ¿QUÉ PAUTAS GENERALES SKY SEGUIR?  · Tanvi atentamente las etiquetas de los alimentos. Identifique los que contienen grasas trans o altas cantidades de grasas saturadas.  · Llene la mitad del plato con verduras y ensaladas de hojas verdes. Coma 4 o 5 porciones de verduras por día. Kandy porción de verduras equivale a lo siguiente:  ¨ 1 taza de verduras de hoja crudas.  ¨ ½ taza de verduras en trozos crudas o cocidas.  ¨ ½ taza de jugo de verduras.  · Llene un cuarto del plato con cereales integrales. Busque la palabra \"integral\" en el primer lugar de la lista de ingredientes.  · Llene un cuarto del plato con alimentos con proteínas magras.  · Coma 4 o 5 porciones de frutas por día. Kandy porción de fruta equivale a lo siguiente:  ¨ Kandy fruta mediana entera.  ¨ ¼ taza de fruta disecada.  ¨ ½ taza de fruta fresca, congelada o enlatada.  ¨ ½ taza de jugo 100 % de fruta.  · Consuma más alimentos con fibra soluble, los cuales incluyen manzanas, brócoli, zanahorias, frijoles, guisantes y cebada. Trate de consumir de 20 a 30 g de fibra por día.  · Coma más comidas caseras. Coma menos comida de restaurantes, bufés y comida rápida.  · Limite o evite el alcohol.  · Limite los alimentos con alto contenido de almidón y azúcar.  · Evite las comidas fritas.  · Evite " freír los alimentos. En cambio, trate de cocinarlos en el horno, en la plancha o en la ashley, o hervirlos. También puede reducir las grasas de la siguiente forma:  ¨ Quite la piel de las aves.  ¨ Quite todas las grasas visibles de las louis.  ¨ Espume la grasa de los guisos, las sopas y las salsas antes de servirlos.  ¨ Cocine al vapor las verduras en agua o caldo.  · Baje de peso si es necesario.  · Coma 4 o 5 porciones de cam secos, legumbres y semillas por semana:  ¨ Shane porción de frijoles o legumbres secos equivale a ½ taza después de parrish cocción.  ¨ Shane porción de cam secos equivale a 1½ onzas.  ¨ Shane porción de semillas equivale a ½ onza o shane cucharada.  · Shar vez deba llevar un registro de la cantidad de sal o sodio que ingiere, especialmente si tiene la presión hipertensión arterial. Hable con el médico o el nutricionista para obtener más información.  ¿QUÉ ALIMENTOS PUEDO COMER?  Cereales   Panes, incluido el pan francés, castro, ritu, de oscar, de pasas de uva, de mendoza, de lissette e lilibeth. Tortillas que no están fritas ni elaboradas con manteca de cerdo ni grasas trans. Panecillos bajos en grasas, incluidos los panes para perros calientes y hamburguesas, y los bollitos tipo inglés. Galletas. Muffins. Waffles. Panqueques. Palomitas de maíz con bajo contenido calórico. Cereales integrales. Pan sin levadura. Tostada [place]. Pretzels. Palitos de pan. Galletas. Colaciones bajas en grasas. Galletitas bajas en grasas, entre ellas, las que tienen forma de ostra, las saladas, el pan ácimo, las galletas Gerald, las que tienen forma de animales y las de mendoza. Arroz y pastas, incluido el arroz integral y las pastas elaboradas con cereales integrales.  Verduras   Todas las verduras.  Frutas   Todas las frutas, paul limite el alma.  Louis y otras greene de proteínas   Carne de res, ternera, cerdo y neil magras sin grasa. Po y pavo sin piel. Todos los pescados y mariscos. Gulshan max, berto,  faisán y vilma. Claras de huevo o sustitutos del huevo bajos en colesterol. Porotos, guisantes, lentejas secos y tofu. Semillas y la mayoría de los cam secos.  Lácteos   Quesos descremados y semidescremados, entre ellos, ricota, queso en hebras y mozzarella. Leche descremada o al 1 % que sea líquida, en polvo o evaporada. Bhavin de leche elaborado con leche semidescremada. Yogur descremado o bajo en grasas.  Bebidas   [place] mineral. Bebidas gaseosas dietéticas.  Dulces y postres   Sorbetes y helados de fruta. Miel, gerson, mermelada, jalea y almíbares. Merengues y gelatinas. Caramelos de azúcar cristin, lita caramelos duros, caramelos de goma, pastillas de goma, mentas, malvaviscos y pequeñas cantidades de chocolate amargo. Torta leyla.  Coma todos los dulces y postres con moderación.  Grasas y aceites   Margarinas no hidrogenadas (sin grasas trans). Aceites vegetales, incluido el de soja, sésamo, girasol, wynn, maní, cártamo, maíz, canola y semillas de algodón. Aliños para ensalada o mayonesa elaborados con aceite vegetal. Limite las grasas y los aceites agregados que usa para cocinar, hornear, preparar ensaladas y las cremas untables.  Otros   Knierim en polvo. Té o café. Todos los aliños y condimentos.  Los artículos mencionados arriba pueden no ser shane lista completa de las bebidas o los alimentos recomendados. Comuníquese con el nutricionista para conocer más opciones.   ¿QUÉ ALIMENTOS NO SE RECOMIENDAN?  Cereales   Panes elaborados con grasas saturadas o trans, aceites o leche entera. Croissants. Panecillos de mantequilla. Panes de queso. Panecillos dulces. Rosquillas. Palomitas de maíz con mantequilla. Fideos chow mein. Galletitas con alto contenido de grasas, lita las que contienen queso o mantequilla.  Louis y otras greene de proteínas   Louis grasas, lita perros calientes, costillas de res, salchichas, puntas de costillar, panceta, asado de costillar o chuletón, y leona. Fiambres altos en grasas, lita  perez y mortadela. Caviar. Gulshan y ganso domésticos. Vísceras, lita riñones, hígado, mollejas, y corazón.  Lácteos   Crema, crema agria, queso crema y queso cottage con crema. Quesos elaborados con leche entera, incluido el queso merced (isaiah), Wendy Sigala, brie, balta, americano, havarti, suizo, cheddar, camembert y muenster. Leche entera o al 2 % que sea líquida, evaporada o condensada. Bhavin de leche entero. Salsa de crema o queso miguelito en grasas. Yogur elaborado con leche entera.  Bebidas   Refrescos regulares y jugos con agregado de azúcar.  Dulces y postres   Glaseados. Pudin. Galletas. Tortas que no eliazar la torta leyla. Caramelos que contengan chocolate con leche o chocolate castro, grasa hidrogenada, mantequilla, alma o ingredientes desconocidos. Almíbares con mantequilla. Helados o bebidas elaboradas con helado con alto contenido de grasas.  Grasas y aceites   Salsas que contengan grasa, grasa de carne o materia grasa. [place] de cacao, aceites hidrogenados, aceite de johnson, aceite de alma, aceite de palmiste. A menudo, estos se encuentran en los productos horneados, los caramelos, las comidas fritas, las cremas no lácteas y las coberturas batidas. Grasas y materias grasas sólidas, incluida la grasa del tocino, el cerdo ugo, la manteca de cerdo y la mantequilla. Sustitutos de crema no láctea, lita cremas para café y sustitutos de crema agria. Aliños para ensaladas elaborados con aceites desconocidos, queso o crema agria.  Los artículos mencionados arriba pueden no ser shane lista completa de las bebidas y los alimentos que se deben evitar. Comuníquese con el nutricionista para obtener más información.   Esta información no tiene lita fin reemplazar el consejo del médico. Asegúrese de hacerle al médico cualquier pregunta que tenga.  Document Released: 06/18/2013 Document Revised: 01/08/2016 Document Reviewed: 06/11/2015  Elsevier Interactive Patient Education © 2017 Elsevier Inc.      Special Instructions:    HF Patient Discharge Instructions  · Monitor your weight daily, and maintain a weight chart, to track your weight changes.   · Activity as tolerated, unless your Doctor has ordered otherwise. Other activity order: Avoid strenuous activity or heavy lifting..  · Follow a low fat, low cholesterol, low salt diet unless instructed otherwise by your Doctor. Read the labels on the back of food products and track your intake of fat, cholesterol and salt.   · Fluid Restriction No. If a Fluid Restriction has been ordered by your Doctor, measure fluids with a measuring cup to ensure that you are not exceeding the restriction.   · No smoking.  · Oxygen Yes. If your Doctor has ordered that you wear Oxygen at home, it is important to wear it as ordered.  · Did you receive an explanation from staff on the importance of taking each of your medications and why it is necessary to keep taking them unless your doctor says to stop? Yes  · Were all of your questions answered about how to manage your heart failure and what to do if you have increased signs and symptoms after you go home? Yes  · Do you feel like your heart failure care team involved you in the care treatment plan and allowed you to make decisions regarding your care while in the hospital and addressed any discharge needs you might have? Yes    See the educational handout provided at discharge for more information on monitoring your daily weight, activity and diet. This also explains more about Heart Failure, symptoms of a flare-up and some of the tests that you have undergone.     Warning Signs of a Flare-Up include:  · Swelling in the ankles or lower legs.  · Shortness of breath, while at rest, or while doing normal activities.   · Shortness of breath at night when in bed, or coughing in bed.   · Requiring more pillows to sleep at night, or needing to sit up at night to sleep.  · Feeling weak, dizzy or fatigued.     When to call your Doctor:  · Call [facility]  Hotline seven days a week from 8:00 a.m. to 8:00 p.m. for medical questions [phone].  · Call your Primary Care Physician or Cardiologist if:   1. You experience any pain radiating to your jaw or neck.  2. You have any difficulty breathing.  3. You experience weight gain of 3 lbs in a day or 5 lbs in a week.   4. You feel any palpitations or irregular heartbeats.  5. You become dizzy or lose consciousness.   If you have had an angiogram or had a pacemaker or AICD placed, and experience:  1. Bleeding, drainage or swelling at the surgical / puncture site.  2. Fever greater than 100.0 F  3. Shock from internal defibrillator.  4. Cool and / or numb extremities.    PLEASE CALL [facility] [place] (FOR OXYGEN) -[phone]      Insuficiencia cardíaca  (Heart Failure)  Insuficiencia cardíaca significa que el corazón tiene problemas para bombear la joaquin. [place] dificulta el buen funcionamiento del organismo. La insuficiencia cardíaca es shane enfermedad de larga duración (crónica). Es importante que se cuide mucho y que siga el plan de tratamiento que le indique el médico.  CUIDADOS EN EL HOGAR  · Sangaree los medicamentos para el corazón terra lita se los prescribió el médico.  ¨ No deje de tristen medicamentos excepto que parrish médico lo indique  ¨ No se saltee ninguna dosis del medicamento.  ¨ Provéase de los medicamentos antes de que se acaben.  ¨ Sangaree medicamentos sólo lita lo indique parrish médico o farmacéutico.  · Permanezca activo si el médico lo indica. Las personas ancianas y los que tengan insuficiencia cardíaca grave deben hablar con el médico acerca de la actividad física.  · Consuma alimentos saludables para el corazón. Elija alimentos que no contengan grasas trans y eliazar bajas en grasas saturadas, colesterol y sal (sodio). [place] incluye frutas frescas o congeladas y vegetales, pescado, saniya magras, productos lácteos sin grasa o bajos en grasa, granos enteros y alimentos ricos en fibra. Las lentejas, arvejas y frijoles  (legumbres) son también buenas opciones.  · Limitar el consumo de sal según lo aconsejado por parrish médico.  · Cocine en forma saludable. Prepare los alimentos asados, a la ashley, al horno, hervidos, al vapor o salteados.  · Limite los líquidos según lo aconsejado por parrish médico.  · Controle parrish peso todas las mañanas. Hágalo después de hacer pis (orinar) y antes de tristen el desayuno. Anote parrish peso para llevarlo a la consulta con el médico.  · Tómese la presión arterial y anótela, si parrish médico se lo indica.  · Pregunte al médico lita controlarse el pulso. Controle parrish pulso según las indicaciones.  · Baje de peso si el médico se lo indica.  · Deje de fumar o mascar tabaco. No use goma de mascar o parches para dejar de fumar sin la aprobación de parrish médico.  · Programe y concurra a las citas con el médico según lo indicado.  · Las mujeres no embarazadas no deben tristen más de 1 bebida al día. Los hombres no deben tristen más de 2 bebidas al día. Hable con parrish médico acerca de parrish consumo de alcohol.  · No consuma drogas.  · Manténgase al día con las vacunas (immunizaciones).  · Controle radha enfermedades según lo indicado por parrish médico.  · Aprenda a manejar el estrés.  · Descanse cuando se sienta cansado.  · Si hace mucho calor en el exterior:  ¨ Evite las actividades intensas.  ¨ Utilice aire acondicionado o ventiladores, o póngase en un lugar más fresco.  ¨ Evite la cafeína y el alcohol.  ¨ Use ropa holgada, ligera y de colores diane.  · Si hace mucho frío en el exterior:  ¨ Evite las actividades intensas.  ¨ Vístase con ropa en capas.  ¨ Use mitones o guantes, un sombrero y shane bufanda cuando salga.  ¨ Evite el alcohol.  · Aprenda todo sobre la insuficiencia cardíaca y obtenga apoyo si lo necesita.  · Obtenga ayuda para mantener o mejorar parrish calidad de ranjit y parrish capacidad para cuidarse a sí mismo, si lo necesita.  SOLICITE AYUDA SI:  · Aumenta de peso rápidamente.  · Le falta el aire más que lo habitual.  · No puede hacer  radha actividades habituales.  · Se cansa con facilidad.  · Tose más de lo normal, especialmente al realizar actividad física.  · Observa que se le hinchan o le aumenta la hinchazón (inflamación) en áreas lita las orestes, los pies, los tobillos o el vientre (abdomen).  · Le lisa dormir debido a que le resulta difícil respirar.  · Siente que el corazón palpita rápido (palpitaciones).  · Siente mareos o vahídos al pararse.  SOLICITE AYUDA DE INMEDIATO SI:  · Tiene dificultad para respirar.  · Hay un cambio en parrish estado mental, lita estar menos alerta o no poder concentrarse.  · Siente dolor u opresión en el pecho.  · Se desmaya.  ASEGÚRESE DE QUE:  · Comprende estas instrucciones.  · Controlará parrish enfermedad.  · Solicitará ayuda de inmediato si no mejora o si empeora.  Esta información no tiene lita fin reemplazar el consejo del médico. Asegúrese de hacerle al médico cualquier pregunta que tenga.  Document Released: 03/16/2010 Document Revised: 01/08/2016 Document Reviewed: 02/03/2014  Elsevier Interactive Patient Education © 2017 [employer]    · Is patient discharged on Warfarin / Coumadin?   No     Depression / Suicide Risk    As you are discharged from this [facility] facility, it is important to learn how to keep safe from harming yourself.    Recognize the warning signs:  · Abrupt changes in personality, positive or negative- including increase in energy   · Giving away possessions  · Change in eating patterns- significant weight changes-  positive or negative  · Change in sleeping patterns- unable to sleep or sleeping all the time   · Unwillingness or inability to communicate  · Depression  · Unusual sadness, discouragement and loneliness  · Talk of wanting to die  · Neglect of personal appearance   · Rebelliousness- reckless behavior  · Withdrawal from people/activities they love  · Confusion- inability to concentrate     If you or a loved one observes any of these behaviors or has concerns about  self-harm, here's what you can do:  · Talk about it- your feelings and reasons for harming yourself  · Remove any means that you might use to hurt yourself (examples: pills, rope, extension cords, firearm)  · Get professional help from the community (Mental Health, Substance Abuse, psychological counseling)  · Do not be alone:Call your Safe Contact- someone whom you trust who will be there for you.  · Call your local CRISIS HOTLINE 891-0913 or 475-402-8658  · Call your local Children's Mobile Crisis Response Team [place] (633) 383-4410 or www.WillCall  · Call the toll free National Suicide Prevention Hotlines   · National Suicide Prevention Lifeline 141-930-DKID (1620)  · National Hope Line Network 800-SUICIDE (019-4269)

## 2020-05-29 NOTE — RESPIRATORY CARE
Oxygen Rounds      Patient found on    O2 L/m:  3  Oxygen device:  Nasal Cannula  Spo2: 98%      Respiratory device skin site inspection completed.

## 2020-05-29 NOTE — PROGRESS NOTES
Discharge orders received.  IV discontinued.  Instructions, prescriptions and education given to patient using  Prem ([id number]).  Follow up appointments discussed.  Patient verbalized understanding of dc instructions and prescriptions.  Patient signed discharge instructions.  Patient verbalized he had all belongings with him.  Patient escorted to [facility] with his oxygen tank.  Discussed discharge plan with Patient's son.  Received verbal understanding.  Assisted patient into vehicle.  Wished patient a pleasant day.

## 2020-05-29 NOTE — DISCHARGE PLANNING
Agency/Facility Name: [facility]  Spoke To: Dimitris   Outcome: Per Dimitris referral is accepted. Patient can get a tank from the hospital. [facility]

## 2020-05-29 NOTE — CARE PLAN
Problem: Communication  Goal: The ability to communicate needs accurately and effectively will improve  Outcome: MET  Intervention: [place] patient and significant other/support system to call light to alert staff of needs  Note:  in [place] provided via iPad.     Problem: Infection  Goal: Will remain free from infection  Outcome: MET  Intervention: Assess signs and symptoms of infection  Note: No s/s of infection at this time.  Afebrile.  No complaints of chills.     Problem: Discharge Barriers/Planning  Goal: Patient's continuum of care needs will be met  Outcome: MET  Intervention: Assess potential discharge barriers on admission and throughout hospital stay  Note: Patient to be discharged home after receiving oxygen tank from [facility].  Patient to call [facility] to obtain additional tanks.  Patient to  his medications from pharmacy..

## 2020-05-29 NOTE — PROGRESS NOTES
Assumed care at 1850. Bedside report received from day RN Verenice. Fall precautions in place. Call light, phone and personal belongings within reach. Bed alarm on and working appropriately.

## 2020-05-29 NOTE — PROGRESS NOTES
Patient saturating at 100% on 3L of continuous oxygen via nasal cannula.  Oxygen decreased to 2L continuous.  Now saturating at 94%.

## 2020-05-29 NOTE — DISCHARGE PLANNING
Anticipated Discharge Disposition: Home with Oxygen provided through [facility]    [facility] to [facility] pt's room, per [facility]. DORA BOUCHER called [facility] Res[facility] tech and verified delivery to pt's room 15-20 min. RN CM requested RT to provide oxygen education to the pt, [facility] verbalized she would order RT education. DORA BOUCHER spoke with pt's son, Marty, to inform him the pt will be discharged after O2 delivery and education. DORA BOUCHER emphasized the importance of calling [facility] when the pt arrives home to order more O2; phone number provided to Marty. DORA BOUCHER spoke with the BS RN and requested she give the pt [facility]'s phone number and inform the pt he needs to call [facility] when he arrives home today. RN CM informed BS RN, RT will provide oxygen education to pt  before discharge.     Barriers to Discharge: O2 delivery and education    Plan: Home with O2 and help from son.

## 2020-05-29 NOTE — DISCHARGE SUMMARY
Discharge Summary    CHIEF COMPLAINT ON ADMISSION  Chief Complaint   Patient presents with   • Shortness of Breath     c/o of SOB and dizziness x's 8 days. No cough.        Reason for Admission  SOB     Admission Date  5/23/2020    CODE STATUS  Full Code    HPI & HOSPITAL COURSE  This 69-year-old male with a past medical history significant for nicotine dependence 70 pack a smoking history, COPD, type 2 diabetes mellitus with hemoglobin A1c of 8.3,, lung disease as per patient presented to ER with a complaint of shortness of breath.  Patient reports that he has been shortness of breath and lightheadedness for the last 8 days.  [place]     During the stay in the hospital he continues to monitor in medical telemetry started with him continue to monitor no obvious arrhythmias noted.  He is noted to have grade 1 diastolic heart failure, EF 55%.    He is noted to have mild elevated troponin along with shortness of breath is Lexiscan was obtained which did not show any reversible ischemia.  CT chest without contrast was obtained and noted to have reticulonodular densities consistent with interstitial lung disease.  Also noted to have 7 mm long lesion need to have repeat CT chest in 6 month.  Follow-up with pulmonary physician as an outpatient.    He does have type 2 diabetes mellitus with hemoglobin A1c of 8, patient will be discharged on glipizide 2.5 mg p.o. twice daily along with Januvia 25 mg p.o. daily.  Patient is advised to follow-up with primary care physician at [facility] in regards to the management of diabetes.    He does have CKD stage III, need to follow-up with primary care physician as an outpatient.  Patient recommended to avoid nephrotoxic agent including NSAID.  He is also noted to have thrombocytopenia which resolved at the time of discharge.    Patient has a long time history of smoking, he is being treated for COPD exacerbation.  Patient will  be discharged on prednisone 30 mg p.o. daily for 3 days followed by 20 mg daily for 3 days followed by 10 mg p.o. daily for 2 days patient will continue Spiriva along with Symbicort along with prn albuterol.      Therefore, he is discharged in fair and stable condition to home with close outpatient follow-up.    The patient met 2-midnight criteria for an inpatient stay at the time of discharge.    Discharge Date  5/29/2020    FOLLOW UP ITEMS POST DISCHARGE  [facility]  Pulmonary physician    DISCHARGE DIAGNOSES  Principal Problem:    Acute respiratory failure with hypoxia (HCC) POA: Unknown  Active Problems:    Acute heart failure (HCC) POA: Unknown    Diabetes mellitus type II, uncontrolled (HCC) POA: Unknown    Acute renal injury (HCC) POA: Unknown    Elevated total protein POA: Unknown    Interstitial lung disease (HCC) POA: Unknown    Abnormal chest x-ray POA: Unknown  Resolved Problems:    Elevated troponin POA: Unknown    Thrombocytopenia (HCC) POA: Yes      FOLLOW UP  Future Appointments   Date Time Provider Department Center   6/3/2020  8:45 AM Celina Hopper M.D. [facility] None   6/24/2020  8:10 AM Chyna Campuzano M.D. [facility] None     [address] [id number] [phone]  Go on 6/2/2020  Please call ASAP to establish with a Primary Care Physician. If you are unable to call please walk in at 9:00 am to be seen as a walk in appointment . Thank you     Roselyn Graves M.D.  [address] [place] [address]  [facility] [id number] [phone]    In 1 month      [facility]  [address] [facility] [address]  [facility] [address]  [phone]    PLEASE CALL FOR OXYGEN      MEDICATIONS ON DISCHARGE     Medication List      START taking these medications      Instructions   albuterol 108 (90 Base) MCG/ACT Aers inhalation aerosol   Inhale 2 Puffs by mouth every 6 hours as needed for Shortness of Breath.  Dose:  2 Puff     budesonide-formoterol 160-4.5 MCG/ACT  Aero  Commonly known as:  Symbicort   Inhale 2 Puffs by mouth 2 Times a Day for 30 days.  Dose:  2 Puff     furosemide 20 MG Tabs  Start taking on:  May 30, 2020  Commonly known as:  LASIX   Take 1 Tab by mouth every day for 30 days.  Dose:  20 mg     glipiZIDE 5 MG Tabs  Commonly known as:  GLUCOTROL   Take 0.5 Tabs by mouth 2 times a day for 30 days.  Dose:  2.5 mg     lisinopril 10 MG Tabs  Commonly known as:  PRINIVIL   Take 1 Tab by mouth every day for 30 days.  Dose:  10 mg     predniSONE 10 MG Tabs  Commonly known as:  DELTASONE   30 mg po QDx 43days, then 20 mg po QD x 3 days , then 10 mg po QD for 3 days     SITagliptin 25 MG Tabs  Commonly known as:  Januvia   Take 1 Tab by mouth every day.  Dose:  25 mg     Spiriva HandiHaler 18 MCG Caps  Generic drug:  tiotropium   Inhale 1 Cap by mouth every day.  Dose:  18 mcg        STOP taking these medications    insulin glargine 100 UNIT/ML Soln  Commonly known as:  LANTUS     metFORMIN 500 MG Tabs  Commonly known as:  GLUCOPHAGE     NON SPECIFIED            Allergies  No Known Allergies    DIET  Orders Placed This Encounter   Procedures   • Diet Order Cardiac, Diabetic, 2 Gram Sodium     Standing Status:   Standing     Number of Occurrences:   1     Order Specific Question:   Diet:     Answer:   Cardiac [6]     Order Specific Question:   Diet:     Answer:   Diabetic [3]     Order Specific Question:   Diet:     Answer:   2 Gram Sodium [7]     Order Specific Question:   Consistency/Fluid modifications:     Answer:   1500 ml Fluid Restriction [9]       ACTIVITY  As tolerated.  Weight bearing as tolerated    CONSULTATIONS  pulm    PROCEDURES  none    LABORATORY  Lab Results   Component Value Date    SODIUM 137 05/28/2020    POTASSIUM 4.5 05/28/2020    CHLORIDE 99 05/28/2020    CO2 27 05/28/2020    GLUCOSE 82 05/28/2020    BUN 44 (H) 05/28/2020    CREATININE 1.41 (H) 05/28/2020        Lab Results   Component Value Date    WBC 12.1 (H) 05/25/2020    HEMOGLOBIN 15.3  05/25/2020    HEMATOCRIT 47.6 05/25/2020    PLATELETCT 187 05/25/2020        Total time of the discharge process exceeds 35 minutes.    I went to the bedside, evaluated the patient, examined patient.  With extensive data.  This time patient will be discharged home.  Home oxygen is provided by case management.  Patient follow-up with Dr. john for pulmonary.  He needs [facility] per primary care

## 2020-05-29 NOTE — RESPIRATORY CARE
OXYGEN EDUCATION by RESPIRATORY THERAPIST  5/29/2020 at 9:46 AM by Zonia Catalan, RRT      Patient seen for oxygen education.  used. Discussed how to turn the tank on and adjust liter flow, safety considerations, and importance of using oxygen as prescribed. Pt verbalized understanding and demonstrated how to properly turn tank on and adjust liter flow. No further questions at this time.

## 2020-05-31 LAB
CENTROMERE IGG TITR SER IF: 0 AU/ML (ref 0–40)
ENA JO1 AB TITR SER: 2 AU/ML (ref 0–40)
ENA SCL70 IGG SER QL: 4 AU/ML (ref 0–40)
ENA SM IGG SER-ACNC: 0 AU/ML (ref 0–40)
ENA SS-B IGG SER IA-ACNC: 0 AU/ML (ref 0–40)
RIBOSOMAL P AB SER-ACNC: 4 AU/ML (ref 0–40)
SSA52 R0ENA AB IGG Q0420: 2 AU/ML (ref 0–40)
SSA60 R0ENA AB IGG Q0419: 0 AU/ML (ref 0–40)
U1 SNRNP IGG SER QL: 3 AU/ML (ref 0–40)

## 2020-06-01 ENCOUNTER — PATIENT OUTREACH (OUTPATIENT)
Dept: HEALTH INFORMATION MANAGEMENT | Facility: OTHER | Age: 70
End: 2020-06-01

## 2020-06-02 ENCOUNTER — TELEPHONE (OUTPATIENT)
Dept: CARDIOLOGY | Facility: MEDICAL CENTER | Age: 70
End: 2020-06-02

## 2020-06-02 NOTE — PROGRESS NOTES
SHAI Gale outbound TC to pt. 6/2/20.  SHAI Gale completed outpatient assessment and food pantry forms.  Pt. States he went to [facility] this morning and that he made his appointment for 6/3/20 at 8:45am.   Pt reports no other needs or concerns at this time. CHW encouraged pt. To TC CCM if things change.    Outcome:  Pt. Will d/c pt. Due to all goals being met.

## 2020-06-03 ENCOUNTER — OFFICE VISIT (OUTPATIENT)
Dept: CARDIOLOGY | Facility: MEDICAL CENTER | Age: 70
End: 2020-06-03
Payer: MEDICARE

## 2020-06-03 VITALS
RESPIRATION RATE: 16 BRPM | HEIGHT: 60 IN | OXYGEN SATURATION: 97 % | HEART RATE: 72 BPM | WEIGHT: 130 LBS | BODY MASS INDEX: 25.52 KG/M2 | DIASTOLIC BLOOD PRESSURE: 70 MMHG | SYSTOLIC BLOOD PRESSURE: 120 MMHG

## 2020-06-03 DIAGNOSIS — E11.65 TYPE 2 DIABETES MELLITUS WITH HYPERGLYCEMIA, WITHOUT LONG-TERM CURRENT USE OF INSULIN (HCC): ICD-10-CM

## 2020-06-03 DIAGNOSIS — R06.09 DYSPNEA ON EXERTION: ICD-10-CM

## 2020-06-03 DIAGNOSIS — J44.9 CHRONIC OBSTRUCTIVE PULMONARY DISEASE, UNSPECIFIED COPD TYPE (HCC): ICD-10-CM

## 2020-06-03 DIAGNOSIS — I10 HTN (HYPERTENSION), MALIGNANT: ICD-10-CM

## 2020-06-03 PROCEDURE — 99204 OFFICE O/P NEW MOD 45 MIN: CPT | Performed by: INTERNAL MEDICINE

## 2020-06-03 ASSESSMENT — MINNESOTA LIVING WITH HEART FAILURE QUESTIONNAIRE (MLHF)
LOSS OF SELF CONTROL IN YOUR LIFE: 0
DIFFICULTY WITH RECREATIONAL PASTIMES, SPORTS, HOBBIES: 3
DIFFICULTY WORKING TO EARN A LIVING: 0
MAKING YOU WORRY: 1
MAKING YOU FEEL DEPRESSED: 0
WORKING AROUND THE HOUSE OR YARD DIFFICULT: 2
EATING LESS FOODS YOU LIKE: 0
DIFFICULTY TO CONCENTRATE OR REMEMBERING THINGS: 2
DIFFICULTY WITH SEXUAL ACTIVITIES: 0
DIFFICULTY SOCIALIZING WITH FAMILY OR FRIENDS: 0
FEELING LIKE A BURDEN TO FAMILY AND FRIENDS: 0
GIVING YOU SIDE EFFECTS FROM TREATMENTS: 0
SWELLING IN ANKLES OR LEGS: 0
WALKING ABOUT OR CLIMBING STAIRS DIFFICULT: 4
HAVING TO SIT OR LIE DOWN DURING THE DAY: 0
COSTING YOU MONEY FOR MEDICAL CARE: 0
TOTAL_SCORE: 22
TIRED, FATIGUED OR LOW ON ENERGY: 4
DIFFICULTY GOING AWAY FROM HOME: 0
MAKING YOU SHORT OF BREATH: 2
MAKING YOU STAY IN A HOSPITAL: 1
DIFFICULTY SLEEPING WELL AT NIGHT: 3

## 2020-06-03 ASSESSMENT — ENCOUNTER SYMPTOMS
BRUISES/BLEEDS EASILY: 0
DOUBLE VISION: 0
DIAPHORESIS: 0
FALLS: 0
BLURRED VISION: 0
ABDOMINAL PAIN: 0
COUGH: 0
SENSORY CHANGE: 0
SHORTNESS OF BREATH: 1
HEADACHES: 0
MEMORY LOSS: 0
DEPRESSION: 0
DIZZINESS: 0
MYALGIAS: 0
FEVER: 0
PALPITATIONS: 0

## 2020-06-03 ASSESSMENT — FIBROSIS 4 INDEX: FIB4 SCORE: 1.45

## 2020-06-03 NOTE — PROGRESS NOTES
Chief Complaint   Patient presents with   • Congestive Heart Failure       Subjective:   Chase Harris is a 69 y.o. male who presents today for cardiac care after recent hospitalization for COPD exacerbation. Of note, there was no clinical evidence of HF exacerbation. He is a smoker. I have independently interpreted and reviewed echocardiogram's actual images with patient which showed normal left ventricular systolic function. No wall motion abnormality. No evidence of severe pulmonary hypertension. No significant valvular disease. Nuclear stress test was negative for coronary ischemia.    Patient is feeling better these days. Does get winded upon walking up inclines or for distance. No symptoms at rest or with daily living activities.    CT showed interitsial lungs disease.    History reviewed. No pertinent past medical history.  History reviewed. No pertinent surgical history.  History reviewed. No pertinent family history.  Social History     Socioeconomic History   • Marital status: Single     Spouse name: Not on file   • Number of children: Not on file   • Years of education: Not on file   • Highest education level: Not on file   Occupational History   • Not on file   Social Needs   • Financial resource strain: Hard   • Food insecurity     Worry: Often true     Inability: Often true   • Transportation needs     Medical: No     Non-medical: No   Tobacco Use   • Smoking status: Never Smoker   • Smokeless tobacco: Never Used   Substance and Sexual Activity   • Alcohol use: Not Currently   • Drug use: Never   • Sexual activity: Not on file   Lifestyle   • Physical activity     Days per week: Not on file     Minutes per session: Not on file   • Stress: Not on file   Relationships   • Social connections     Talks on phone: Not on file     Gets together: Not on file     Attends [employer] service: Not on file     Active member of club or organization: Not on file     Attends meetings of clubs or organizations: Not  on file     Relationship status: Not on file   • Intimate partner violence     Fear of current or ex partner: Not on file     Emotionally abused: Not on file     Physically abused: Not on file     Forced sexual activity: Not on file   Other Topics Concern   • Not on file   Social History Narrative   • Not on file     No Known Allergies  Outpatient Encounter Medications as of 6/3/2020   Medication Sig Dispense Refill   • metFORMIN (GLUCOPHAGE) 500 MG Tab TK 2 TS PO BID WITH FOOD     • furosemide (LASIX) 20 MG Tab Take 1 Tab by mouth every day for 30 days. 30 Tab 1   • SITagliptin (JANUVIA) 25 MG Tab Take 1 Tab by mouth every day. 30 Tab 3   • lisinopril (PRINIVIL) 10 MG Tab Take 1 Tab by mouth every day for 30 days. 30 Tab 0   • budesonide-formoterol (SYMBICORT) 160-4.5 MCG/ACT Aerosol Inhale 2 Puffs by mouth 2 Times a Day for 30 days. 1 Inhaler 0   • tiotropium (SPIRIVA HANDIHALER) 18 MCG Cap Inhale 1 Cap by mouth every day. 30 Cap 3   • predniSONE (DELTASONE) 10 MG Tab 30 mg po QDx 43days, then 20 mg po QD x 3 days , then 10 mg po QD for 3 days 18 Tab 0   • glipiZIDE (GLUCOTROL) 5 MG Tab Take 0.5 Tabs by mouth 2 times a day for 30 days. 60 Tab 0   • albuterol 108 (90 Base) MCG/ACT Aero Soln inhalation aerosol Inhale 2 Puffs by mouth every 6 hours as needed for Shortness of Breath. 8.5 g 0     No facility-administered encounter medications on file as of 6/3/2020.      Review of Systems   Constitutional: Negative for diaphoresis and fever.   HENT: Negative for nosebleeds.    Eyes: Negative for blurred vision and double vision.   Respiratory: Positive for shortness of breath. Negative for cough.    Cardiovascular: Negative for chest pain and palpitations.   Gastrointestinal: Negative for abdominal pain.   Genitourinary: Negative for dysuria and frequency.   Musculoskeletal: Negative for falls and myalgias.   Skin: Negative for rash.   Neurological: Negative for dizziness, sensory change and headaches.    Endo/Heme/Allergies: Does not bruise/bleed easily.   Psychiatric/Behavioral: Negative for depression and memory loss.        Objective:   /70 (BP Location: Right arm, Patient Position: Sitting, BP Cuff Size: Adult)   Pulse 72   Resp 16   Ht 1.524 m (5')   Wt 59 kg (130 lb)   SpO2 97%   BMI 25.39 kg/m²     Physical Exam   Constitutional: He is oriented to person, place, and time. No distress.   HENT:   Head: Normocephalic and atraumatic.   Right Ear: External ear normal.   Left Ear: External ear normal.   Eyes: Right eye exhibits no discharge. Left eye exhibits no discharge.   Neck: No JVD present. No thyromegaly present.   Cardiovascular: Normal rate, regular rhythm, normal heart sounds and intact distal pulses. Exam reveals no gallop and no friction rub.   No murmur heard.  Pulmonary/Chest: Breath sounds normal. No respiratory distress.   Distant breath sounds due to copd     Abdominal: Bowel sounds are normal. He exhibits no distension. There is no abdominal tenderness.   Musculoskeletal:         General: No tenderness or edema.   Neurological: He is alert and oriented to person, place, and time. No cranial nerve deficit.   Skin: Skin is warm and dry. He is not diaphoretic.   Psychiatric: He has a normal mood and affect. His behavior is normal.   Nursing note and vitals reviewed.      Assessment:     1. HTN (hypertension), malignant     2. Chronic obstructive pulmonary disease, unspecified COPD type (HCC)  REFERRAL TO PULMONOLOGY    PULMONARY FUNCTION TESTS -Test requested: Complete Pulmonary Function Test   3. Type 2 diabetes mellitus with hyperglycemia, without long-term current use of insulin (HCC)     4. Dyspnea on exertion  REFERRAL TO PULMONOLOGY    PULMONARY FUNCTION TESTS -Test requested: Complete Pulmonary Function Test       Medical Decision Making:  Today's Assessment / Status / Plan:   Today, based on physical examination findings, patient is euvolemic. No JVD, lungs are clear to  auscultation, no pitting edema in bilateral lower extremities, no ascites.    Dry weight is 130 lbs.    No active cardiac issues at this time. Main issues are pulmonary related based on CT and clinical evidence.    Blood pressure is well controlled. Continue Lisinopril 10 mg po daily.    Optimize HTN and DM with PMD.    No further cardiac work up at this time.    Will refer to pulmonary service with PFT for further evaluation.

## 2020-06-05 ENCOUNTER — HOSPITAL ENCOUNTER (OUTPATIENT)
Dept: RADIOLOGY | Facility: MEDICAL CENTER | Age: 70
End: 2020-06-05
Payer: MEDICARE

## 2020-06-24 ENCOUNTER — OFFICE VISIT (OUTPATIENT)
Dept: PULMONOLOGY | Facility: HOSPICE | Age: 70
End: 2020-06-24
Payer: MEDICARE

## 2020-06-24 VITALS
OXYGEN SATURATION: 98 % | HEART RATE: 70 BPM | WEIGHT: 131.25 LBS | HEIGHT: 62 IN | BODY MASS INDEX: 24.15 KG/M2 | DIASTOLIC BLOOD PRESSURE: 62 MMHG | SYSTOLIC BLOOD PRESSURE: 102 MMHG

## 2020-06-24 DIAGNOSIS — Z23 NEED FOR VACCINATION: ICD-10-CM

## 2020-06-24 DIAGNOSIS — E11.9 TYPE 2 DIABETES MELLITUS WITHOUT COMPLICATION, WITH LONG-TERM CURRENT USE OF INSULIN (HCC): ICD-10-CM

## 2020-06-24 DIAGNOSIS — J96.11 CHRONIC RESPIRATORY FAILURE WITH HYPOXIA (HCC): ICD-10-CM

## 2020-06-24 DIAGNOSIS — J84.9 ILD (INTERSTITIAL LUNG DISEASE) (HCC): ICD-10-CM

## 2020-06-24 DIAGNOSIS — Z79.4 TYPE 2 DIABETES MELLITUS WITHOUT COMPLICATION, WITH LONG-TERM CURRENT USE OF INSULIN (HCC): ICD-10-CM

## 2020-06-24 DIAGNOSIS — R91.8 OTHER NONSPECIFIC ABNORMAL FINDING OF LUNG FIELD: ICD-10-CM

## 2020-06-24 DIAGNOSIS — Z87.891 HISTORY OF TOBACCO USE: ICD-10-CM

## 2020-06-24 DIAGNOSIS — I50.31 ACUTE DIASTOLIC HEART FAILURE (HCC): ICD-10-CM

## 2020-06-24 PROCEDURE — 99204 OFFICE O/P NEW MOD 45 MIN: CPT | Performed by: INTERNAL MEDICINE

## 2020-06-24 PROCEDURE — 94761 N-INVAS EAR/PLS OXIMETRY MLT: CPT | Performed by: INTERNAL MEDICINE

## 2020-06-24 RX ORDER — LANCETS 30 GAUGE
EACH MISCELLANEOUS
Qty: 100 EACH | Refills: 3 | Status: SHIPPED | OUTPATIENT
Start: 2020-06-24 | End: 2020-06-29

## 2020-06-24 ASSESSMENT — ENCOUNTER SYMPTOMS
EYE REDNESS: 0
FEVER: 0
ABDOMINAL PAIN: 0
VOMITING: 0
BLURRED VISION: 0
SHORTNESS OF BREATH: 0
SPUTUM PRODUCTION: 0
BACK PAIN: 0
COUGH: 0
DIZZINESS: 0
MYALGIAS: 0
HEADACHES: 0
WEAKNESS: 0
EYE DISCHARGE: 0
DEPRESSION: 0
SORE THROAT: 0
TREMORS: 0
ORTHOPNEA: 0
NAUSEA: 0
PALPITATIONS: 0
PHOTOPHOBIA: 0
CHILLS: 0
WEIGHT LOSS: 0
DIAPHORESIS: 0
FALLS: 0
SINUS PAIN: 0
WHEEZING: 0
CLAUDICATION: 0
HEARTBURN: 0
SPEECH CHANGE: 0
HEMOPTYSIS: 0
PND: 0
STRIDOR: 0
NECK PAIN: 0
DIARRHEA: 0
DOUBLE VISION: 0
FOCAL WEAKNESS: 0
EYE PAIN: 0
CONSTIPATION: 0

## 2020-06-24 ASSESSMENT — FIBROSIS 4 INDEX: FIB4 SCORE: 1.45

## 2020-06-24 NOTE — PROGRESS NOTES
Chief Complaint   Patient presents with   • Hospital Follow-up     [facility] ED 05/23/20-05/29/20. Pulm. consult 05/28   • Results     CT-Chest 05/26, ECHO 05/23, CXR 05/23, Lab 05/2020       HPI: This patient is a 69 y.o. male presenting for evaluation of interstitial lung disease.  The patient's past medical history significant for type 2 diabetes previously requiring insulin therapy, hypertension currently well controlled, peptic ulcer disease status post severe GI bleed roughly 1 year ago in [place], nephrolithiasis.  The patient is a former tobacco user with roughly 40-pack-year history and quit 25 years ago.  He has no known allergies.  Family history is negative for any heart or lung disease.  He does have family members with type 2 diabetes.  The patient is retired from work as a "[employer],".  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  He did not always wear face protection.  No pets at home.  He is from [place] and has been living in [place] for the past year but does travel back to [place] where he stays for 3 to 4 months at a time.  No exposure to asbestos.  No history of working on a farm.  No pet birds.  More recently the patient presented to [facility] emergency department with dizziness and per notes shortness of breath although patient and son deny that the patient was feeling short of breath.  Patient more complained of not feeling right and son noted that he was pale.  He was hypertensive on arrival with systolic blood pressure 181, pulse of 66.  He had elevated NT proBNP as well as troponin and echocardiogram done showed grade 1 diastolic dysfunction.  He underwent stress test and CT angiogram.  CT angiogram showed bilateral interstitial lung disease with traction bronchiectasis, peripheral fibrosis and no associated infiltrate.  He did have a 7 mm nodule.  No infiltrate or pulmonary embolism.  He was treated for acute COPD  exacerbation has been seen by cardiology in follow-up he does not feel the patient has significant diastolic dysfunction.  He was also given supplemental oxygen with activity and plan to follow-up with us.  Today the patient denies chronic pulmonary symptoms including shortness of breath, cough or wheezing.  No fevers, chills, night sweats, weight loss.  Connective tissue disease screening panel was sent during his hospital stay and is negative.  He does have a CT chest from [place] done in 2019 that shows evidence of fibrotic lung disease involving bilateral upper and lower lobes but favoring the lower lobes again with traction bronchiectasis, peripheral fibrosis and no significant groundglass opacities.  Per son the patient was not given any specific pulmonary diagnosis in [place] but was started on inhaled therapies for COPD.    History reviewed. No pertinent past medical history.    Social History     Socioeconomic History   • Marital status: Single     Spouse name: Not on file   • Number of children: Not on file   • Years of education: Not on file   • Highest education level: Not on file   Occupational History   • Not on file   Social Needs   • Financial resource strain: Hard   • Food insecurity     Worry: Often true     Inability: Often true   • Transportation needs     Medical: No     Non-medical: No   Tobacco Use   • Smoking status: Former Smoker     Packs/day: 2.00     Years: 40.00     Pack years: 80.00     Types: Cigarettes     Last attempt to quit:      Years since quittin.4   • Smokeless tobacco: Never Used   Substance and Sexual Activity   • Alcohol use: Not Currently   • Drug use: Never   • Sexual activity: Not on file   Lifestyle   • Physical activity     Days per week: Not on file     Minutes per session: Not on file   • Stress: Not on file   Relationships   • Social connections     Talks on phone: Not on file     Gets together: Not on file     Attends [employer] service: Not on file      Active member of club or organization: Not on file     Attends meetings of clubs or organizations: Not on file     Relationship status: Not on file   • Intimate partner violence     Fear of current or ex partner: Not on file     Emotionally abused: Not on file     Physically abused: Not on file     Forced sexual activity: Not on file   Other Topics Concern   • Not on file   Social History Narrative   • Not on file       History reviewed. No pertinent family history.    Current Outpatient Medications on File Prior to Visit   Medication Sig Dispense Refill   • metFORMIN (GLUCOPHAGE) 500 MG Tab TK 2 TS PO BID WITH FOOD     • furosemide (LASIX) 20 MG Tab Take 1 Tab by mouth every day for 30 days. 30 Tab 1   • SITagliptin (JANUVIA) 25 MG Tab Take 1 Tab by mouth every day. 30 Tab 3   • budesonide-formoterol (SYMBICORT) 160-4.5 MCG/ACT Aerosol Inhale 2 Puffs by mouth 2 Times a Day for 30 days. 1 Inhaler 0   • tiotropium (SPIRIVA HANDIHALER) 18 MCG Cap Inhale 1 Cap by mouth every day. 30 Cap 3   • glipiZIDE (GLUCOTROL) 5 MG Tab Take 0.5 Tabs by mouth 2 times a day for 30 days. 60 Tab 0   • lisinopril (PRINIVIL) 10 MG Tab Take 1 Tab by mouth every day for 30 days. (Patient not taking: Reported on 6/24/2020) 30 Tab 0   • predniSONE (DELTASONE) 10 MG Tab 30 mg po QDx 43days, then 20 mg po QD x 3 days , then 10 mg po QD for 3 days (Patient not taking: Reported on 6/24/2020) 18 Tab 0   • albuterol 108 (90 Base) MCG/ACT Aero Soln inhalation aerosol Inhale 2 Puffs by mouth every 6 hours as needed for Shortness of Breath. (Patient not taking: Reported on 6/24/2020) 8.5 g 0     No current facility-administered medications on file prior to visit.        Allergies: Patient has no known allergies.    ROS:   Review of Systems   Constitutional: Negative for chills, diaphoresis, fever, malaise/fatigue and weight loss.   HENT: Negative for congestion, ear discharge, ear pain, hearing loss, nosebleeds, sinus pain, sore throat and tinnitus.   "  Eyes: Negative for blurred vision, double vision, photophobia, pain, discharge and redness.   Respiratory: Negative for cough, hemoptysis, sputum production, shortness of breath, wheezing and stridor.    Cardiovascular: Negative for chest pain, palpitations, orthopnea, claudication, leg swelling and PND.   Gastrointestinal: Negative for abdominal pain, constipation, diarrhea, heartburn, nausea and vomiting.   Genitourinary: Negative for dysuria and urgency.   Musculoskeletal: Negative for back pain, falls, joint pain, myalgias and neck pain.   Skin: Negative for itching and rash.   Neurological: Negative for dizziness, tremors, speech change, focal weakness, weakness and headaches.   Endo/Heme/Allergies: Negative for environmental allergies.   Psychiatric/Behavioral: Negative for depression.       /62 (BP Location: Left arm, Patient Position: Sitting, BP Cuff Size: Adult)   Pulse 70   Ht 1.575 m (5' 2\")   Wt 59.5 kg (131 lb 4 oz)   SpO2 98%     Physical Exam:  Physical Exam  Constitutional:       General: He is not in acute distress.     Appearance: Normal appearance. He is normal weight.   HENT:      Head: Normocephalic and atraumatic.      Right Ear: External ear normal.      Left Ear: External ear normal.      Nose: Nose normal. No congestion.      Mouth/Throat:      Mouth: Mucous membranes are moist.      Pharynx: Oropharynx is clear. No oropharyngeal exudate.   Eyes:      General: No scleral icterus.     Extraocular Movements: Extraocular movements intact.      Conjunctiva/sclera: Conjunctivae normal.      Pupils: Pupils are equal, round, and reactive to light.   Neck:      Musculoskeletal: Normal range of motion and neck supple.   Cardiovascular:      Rate and Rhythm: Normal rate and regular rhythm.      Heart sounds: Normal heart sounds. No murmur. No gallop.    Pulmonary:      Effort: Pulmonary effort is normal. No respiratory distress.      Breath sounds: No wheezing.      Comments: Dry " inspiratory crackles b/l up 2/3 posterior lung fields  Abdominal:      General: There is no distension.      Palpations: Abdomen is soft.   Musculoskeletal: Normal range of motion.      Right lower leg: No edema.      Left lower leg: No edema.   Skin:     General: Skin is warm and dry.      Findings: No rash.   Neurological:      Mental Status: He is alert and oriented to person, place, and time.      Cranial Nerves: No cranial nerve deficit.   Psychiatric:         Mood and Affect: Mood normal.         Behavior: Behavior normal.         PFTs as reviewed by me personally:  none    Imaging as reviewed by me personally: as per hPI    Assessment:  1. ILD (interstitial lung disease) ([facility])  Multiple Oximetry    PULMONARY FUNCTION TESTS -Test requested: Complete Pulmonary Function Test    Exercise Test for Bronchospasm / 6-Minute Walk    CT-CHEST, HIGH RESOLUTION LUNG   2. Other nonspecific abnormal finding of lung field   PULMONARY FUNCTION TESTS -Test requested: Complete Pulmonary Function Test    Exercise Test for Bronchospasm / 6-Minute Walk   3. Need for vaccination  Pneumococcal Conjugate Vaccine 13-Valent   4. Acute diastolic heart failure ([facility])     5. Type 2 diabetes mellitus without complication, with long-term current use of insulin ([facility])  REFERRAL TO FAMILY PRACTICE   6. History of tobacco use     7. Chronic respiratory failure with hypoxia ([facility])         Plan:  1.  This is likely chronic based on fibrosis.  Findings are concerning for IPF although I cannot rule out exposure related lung disease.  Given his age autoimmune or connective tissue disease is less likely.  I have no idea if his disease is progressive or stable.  We will obtain 6-minute walk test as well as pulmonary function testing and if a candidate for pulmonary rehab we can refer.  Depending on stability versus progression we will consider anti-fibrotic therapy.  2.  Dual diagnosis to obtain pulmonary function test and 6-minute walk for  baseline.  3.  We did not have Prevnar 13 in clinic today but patient does need to be updated on vaccines.  I have also referred him for primary care.  4.  Patient did have clinical acute diastolic heart failure likely due to hypertension that was not well controlled.  I cannot rule out some contribution from chronic hypoxic respiratory failure.  No evidence for decompensation on exam today.  Recommend blood pressure control and we will treat hypoxia.  5.  Patient was taken off insulin during his hospital stay and is unable to monitor his CBGs at home.  I did refill supplies and refer him to PCP.  6.  Patient is tobacco free.  He is being treated for presumed COPD and may have an element of this.  We will continue Symbicort, Spiriva and short acting bronchodilators and obtain PFTs.  Pending those results and clinical course, we can adjust medications as necessary.  7. Pt did desaturate on multi-ox in clinic today.  Start supplemental oxygen at 2 L/min 24 hours/day.  Encouraged compliance with this.  Pending PFTs will refer to pulmonary rehab.  Return in about 10 weeks (around 9/2/2020) for pfts, 6 mwt, hrct.

## 2020-06-28 DIAGNOSIS — E11.9 TYPE 2 DIABETES MELLITUS WITHOUT COMPLICATION, WITH LONG-TERM CURRENT USE OF INSULIN (HCC): ICD-10-CM

## 2020-06-28 DIAGNOSIS — Z79.4 TYPE 2 DIABETES MELLITUS WITHOUT COMPLICATION, WITH LONG-TERM CURRENT USE OF INSULIN (HCC): ICD-10-CM

## 2020-06-29 RX ORDER — LANCETS 33 GAUGE
EACH MISCELLANEOUS
Qty: 180 EACH | Refills: 3 | Status: SHIPPED | OUTPATIENT
Start: 2020-06-29

## 2020-06-29 NOTE — TELEPHONE ENCOUNTER
Name from pharmacy: ONE TOUCH DELICA PLUS 33G LANCETS           Will file in chart as: Lancets (ONETOUCH DELICA PLUS [id number]) Misc          Sig: USE TO TEST TWICE DAILY     Disp:  180 Each    Refills:  Not specified     Start: 6/28/2020     Class: Normal     Non-formulary     To pharmacy: **Patient requests 90 days supply**     Last ordered: 5 days ago by Chyna Campuzano M.D.  Last refill: 6/28/2020     Caller Name: Chase Esquivel                 Call Back Number: [phone] (home)         Patient approves a detailed voicemail message: N\A    Have we ever prescribed this med? Yes.  If yes, what date? 6/24/220    Last OV: 6/24/20 Dr. Campuzano    Next OV: 9/2/2020 Dr. Campuzano     DX:     Medications:  Requested Prescriptions     Pending Prescriptions Disp Refills   • Lancets (ONETOUCH DELICA PLUS [id number]) Misc [Pharmacy Med Name: ONE TOUCH DELICA PLUS 33G LANCETS] 180 Each      Sig: USE TO TEST TWICE DAILY

## 2020-10-26 ENCOUNTER — HOSPITAL ENCOUNTER (OUTPATIENT)
Facility: MEDICAL CENTER | Age: 70
End: 2020-10-28
Attending: EMERGENCY MEDICINE | Admitting: HOSPITALIST
Payer: MEDICARE

## 2020-10-26 ENCOUNTER — APPOINTMENT (OUTPATIENT)
Dept: RADIOLOGY | Facility: MEDICAL CENTER | Age: 70
End: 2020-10-26
Attending: EMERGENCY MEDICINE
Payer: MEDICARE

## 2020-10-26 DIAGNOSIS — R51.9 NONINTRACTABLE HEADACHE, UNSPECIFIED CHRONICITY PATTERN, UNSPECIFIED HEADACHE TYPE: ICD-10-CM

## 2020-10-26 DIAGNOSIS — E11.65 UNCONTROLLED TYPE 2 DIABETES MELLITUS WITH HYPERGLYCEMIA (HCC): ICD-10-CM

## 2020-10-26 DIAGNOSIS — R06.02 SHORTNESS OF BREATH: ICD-10-CM

## 2020-10-26 DIAGNOSIS — R42 DIZZINESS: ICD-10-CM

## 2020-10-26 DIAGNOSIS — R09.02 HYPOXIA: ICD-10-CM

## 2020-10-26 PROBLEM — J96.11 CHRONIC RESPIRATORY FAILURE WITH HYPOXIA (HCC): Status: ACTIVE | Noted: 2020-10-26

## 2020-10-26 PROBLEM — N18.30 STAGE 3 CHRONIC KIDNEY DISEASE: Status: ACTIVE | Noted: 2020-10-26

## 2020-10-26 LAB
ALBUMIN SERPL BCP-MCNC: 3.9 G/DL (ref 3.2–4.9)
ALBUMIN/GLOB SERPL: 1 G/DL
ALP SERPL-CCNC: 65 U/L (ref 30–99)
ALT SERPL-CCNC: 36 U/L (ref 2–50)
ANION GAP SERPL CALC-SCNC: 10 MMOL/L (ref 7–16)
AST SERPL-CCNC: 20 U/L (ref 12–45)
BASOPHILS # BLD AUTO: 0.8 % (ref 0–1.8)
BASOPHILS # BLD: 0.09 K/UL (ref 0–0.12)
BILIRUB SERPL-MCNC: 0.4 MG/DL (ref 0.1–1.5)
BUN SERPL-MCNC: 25 MG/DL (ref 8–22)
CALCIUM SERPL-MCNC: 9 MG/DL (ref 8.5–10.5)
CHLORIDE SERPL-SCNC: 102 MMOL/L (ref 96–112)
CO2 SERPL-SCNC: 25 MMOL/L (ref 20–33)
CORTIS SERPL-MCNC: 14 UG/DL (ref 0–23)
CREAT SERPL-MCNC: 1.23 MG/DL (ref 0.5–1.4)
EKG IMPRESSION: NORMAL
EOSINOPHIL # BLD AUTO: 0.2 K/UL (ref 0–0.51)
EOSINOPHIL NFR BLD: 1.7 % (ref 0–6.9)
ERYTHROCYTE [DISTWIDTH] IN BLOOD BY AUTOMATED COUNT: 49 FL (ref 35.9–50)
ERYTHROCYTE [SEDIMENTATION RATE] IN BLOOD BY WESTERGREN METHOD: 52 MM/HOUR (ref 0–20)
GLOBULIN SER CALC-MCNC: 4 G/DL (ref 1.9–3.5)
GLUCOSE BLD-MCNC: 124 MG/DL (ref 65–99)
GLUCOSE BLD-MCNC: 163 MG/DL (ref 65–99)
GLUCOSE SERPL-MCNC: 130 MG/DL (ref 65–99)
HCT VFR BLD AUTO: 38 % (ref 42–52)
HGB BLD-MCNC: 12.1 G/DL (ref 14–18)
IMM GRANULOCYTES # BLD AUTO: 0.11 K/UL (ref 0–0.11)
IMM GRANULOCYTES NFR BLD AUTO: 0.9 % (ref 0–0.9)
LYMPHOCYTES # BLD AUTO: 2.38 K/UL (ref 1–4.8)
LYMPHOCYTES NFR BLD: 19.9 % (ref 22–41)
MCH RBC QN AUTO: 31.2 PG (ref 27–33)
MCHC RBC AUTO-ENTMCNC: 31.8 G/DL (ref 33.7–35.3)
MCV RBC AUTO: 97.9 FL (ref 81.4–97.8)
MONOCYTES # BLD AUTO: 1 K/UL (ref 0–0.85)
MONOCYTES NFR BLD AUTO: 8.3 % (ref 0–13.4)
NEUTROPHILS # BLD AUTO: 8.2 K/UL (ref 1.82–7.42)
NEUTROPHILS NFR BLD: 68.4 % (ref 44–72)
NRBC # BLD AUTO: 0 K/UL
NRBC BLD-RTO: 0 /100 WBC
PLATELET # BLD AUTO: 161 K/UL (ref 164–446)
PMV BLD AUTO: 12.7 FL (ref 9–12.9)
POTASSIUM SERPL-SCNC: 4.8 MMOL/L (ref 3.6–5.5)
PROT SERPL-MCNC: 7.9 G/DL (ref 6–8.2)
RBC # BLD AUTO: 3.88 M/UL (ref 4.7–6.1)
SODIUM SERPL-SCNC: 137 MMOL/L (ref 135–145)
TROPONIN T SERPL-MCNC: 39 NG/L (ref 6–19)
WBC # BLD AUTO: 12 K/UL (ref 4.8–10.8)

## 2020-10-26 PROCEDURE — 96372 THER/PROPH/DIAG INJ SC/IM: CPT

## 2020-10-26 PROCEDURE — G0378 HOSPITAL OBSERVATION PER HR: HCPCS

## 2020-10-26 PROCEDURE — 99220 PR INITIAL OBSERVATION CARE,LEVL III: CPT | Performed by: HOSPITALIST

## 2020-10-26 PROCEDURE — 71045 X-RAY EXAM CHEST 1 VIEW: CPT

## 2020-10-26 PROCEDURE — 93005 ELECTROCARDIOGRAM TRACING: CPT | Performed by: EMERGENCY MEDICINE

## 2020-10-26 PROCEDURE — 99285 EMERGENCY DEPT VISIT HI MDM: CPT

## 2020-10-26 PROCEDURE — 85025 COMPLETE CBC W/AUTO DIFF WBC: CPT

## 2020-10-26 PROCEDURE — A9270 NON-COVERED ITEM OR SERVICE: HCPCS | Performed by: HOSPITALIST

## 2020-10-26 PROCEDURE — 80053 COMPREHEN METABOLIC PANEL: CPT

## 2020-10-26 PROCEDURE — 700111 HCHG RX REV CODE 636 W/ 250 OVERRIDE (IP): Performed by: HOSPITALIST

## 2020-10-26 PROCEDURE — 70450 CT HEAD/BRAIN W/O DYE: CPT

## 2020-10-26 PROCEDURE — 82533 TOTAL CORTISOL: CPT

## 2020-10-26 PROCEDURE — 700102 HCHG RX REV CODE 250 W/ 637 OVERRIDE(OP): Performed by: HOSPITALIST

## 2020-10-26 PROCEDURE — 82962 GLUCOSE BLOOD TEST: CPT

## 2020-10-26 PROCEDURE — 84484 ASSAY OF TROPONIN QUANT: CPT

## 2020-10-26 PROCEDURE — C9803 HOPD COVID-19 SPEC COLLECT: HCPCS | Performed by: HOSPITALIST

## 2020-10-26 PROCEDURE — 85652 RBC SED RATE AUTOMATED: CPT

## 2020-10-26 PROCEDURE — 93005 ELECTROCARDIOGRAM TRACING: CPT

## 2020-10-26 RX ORDER — ONDANSETRON 4 MG/1
4 TABLET, ORALLY DISINTEGRATING ORAL EVERY 4 HOURS PRN
Status: DISCONTINUED | OUTPATIENT
Start: 2020-10-26 | End: 2020-10-28 | Stop reason: HOSPADM

## 2020-10-26 RX ORDER — DEXTROSE MONOHYDRATE 25 G/50ML
50 INJECTION, SOLUTION INTRAVENOUS
Status: DISCONTINUED | OUTPATIENT
Start: 2020-10-26 | End: 2020-10-28 | Stop reason: HOSPADM

## 2020-10-26 RX ORDER — ONDANSETRON 2 MG/ML
4 INJECTION INTRAMUSCULAR; INTRAVENOUS EVERY 4 HOURS PRN
Status: DISCONTINUED | OUTPATIENT
Start: 2020-10-26 | End: 2020-10-28 | Stop reason: HOSPADM

## 2020-10-26 RX ORDER — GLIPIZIDE 5 MG/1
2.5 TABLET ORAL 2 TIMES DAILY
Status: DISCONTINUED | OUTPATIENT
Start: 2020-10-27 | End: 2020-10-28 | Stop reason: HOSPADM

## 2020-10-26 RX ORDER — POLYETHYLENE GLYCOL 3350 17 G/17G
1 POWDER, FOR SOLUTION ORAL
Status: DISCONTINUED | OUTPATIENT
Start: 2020-10-26 | End: 2020-10-28 | Stop reason: HOSPADM

## 2020-10-26 RX ORDER — GLIPIZIDE 5 MG/1
2.5 TABLET ORAL EVERY MORNING
COMMUNITY
End: 2021-01-01

## 2020-10-26 RX ORDER — ACETAMINOPHEN 325 MG/1
650 TABLET ORAL EVERY 6 HOURS PRN
Status: DISCONTINUED | OUTPATIENT
Start: 2020-10-26 | End: 2020-10-28 | Stop reason: HOSPADM

## 2020-10-26 RX ORDER — BISACODYL 10 MG
10 SUPPOSITORY, RECTAL RECTAL
Status: DISCONTINUED | OUTPATIENT
Start: 2020-10-26 | End: 2020-10-28 | Stop reason: HOSPADM

## 2020-10-26 RX ORDER — AMOXICILLIN 250 MG
2 CAPSULE ORAL 2 TIMES DAILY
Status: DISCONTINUED | OUTPATIENT
Start: 2020-10-26 | End: 2020-10-28 | Stop reason: HOSPADM

## 2020-10-26 RX ADMIN — ENOXAPARIN SODIUM 40 MG: 40 INJECTION SUBCUTANEOUS at 13:41

## 2020-10-26 RX ADMIN — INSULIN HUMAN 2 UNITS: 100 INJECTION, SOLUTION PARENTERAL at 20:32

## 2020-10-26 RX ADMIN — DOCUSATE SODIUM 50 MG AND SENNOSIDES 8.6 MG 2 TABLET: 8.6; 5 TABLET, FILM COATED ORAL at 18:07

## 2020-10-26 SDOH — HEALTH STABILITY: MENTAL HEALTH: HOW OFTEN DO YOU HAVE A DRINK CONTAINING ALCOHOL?: MONTHLY OR LESS

## 2020-10-26 ASSESSMENT — LIFESTYLE VARIABLES
TOTAL SCORE: 0
AVERAGE NUMBER OF DAYS PER WEEK YOU HAVE A DRINK CONTAINING ALCOHOL: 0
HAVE PEOPLE ANNOYED YOU BY CRITICIZING YOUR DRINKING: NO
ON A TYPICAL DAY WHEN YOU DRINK ALCOHOL HOW MANY DRINKS DO YOU HAVE: 0
TOTAL SCORE: 0
HOW MANY TIMES IN THE PAST YEAR HAVE YOU HAD 5 OR MORE DRINKS IN A DAY: 0
HAVE YOU EVER FELT YOU SHOULD CUT DOWN ON YOUR DRINKING: NO
EVER FELT BAD OR GUILTY ABOUT YOUR DRINKING: NO
CONSUMPTION TOTAL: NEGATIVE
ALCOHOL_USE: NO
TOTAL SCORE: 0
EVER HAD A DRINK FIRST THING IN THE MORNING TO STEADY YOUR NERVES TO GET RID OF A HANGOVER: NO

## 2020-10-26 ASSESSMENT — COGNITIVE AND FUNCTIONAL STATUS - GENERAL
SUGGESTED CMS G CODE MODIFIER DAILY ACTIVITY: CH
MOBILITY SCORE: 24
SUGGESTED CMS G CODE MODIFIER MOBILITY: CH
DAILY ACTIVITIY SCORE: 24

## 2020-10-26 ASSESSMENT — PATIENT HEALTH QUESTIONNAIRE - PHQ9
2. FEELING DOWN, DEPRESSED, IRRITABLE, OR HOPELESS: NOT AT ALL
2. FEELING DOWN, DEPRESSED, IRRITABLE, OR HOPELESS: NOT AT ALL
1. LITTLE INTEREST OR PLEASURE IN DOING THINGS: NOT AT ALL
1. LITTLE INTEREST OR PLEASURE IN DOING THINGS: NOT AT ALL
SUM OF ALL RESPONSES TO PHQ9 QUESTIONS 1 AND 2: 0
SUM OF ALL RESPONSES TO PHQ9 QUESTIONS 1 AND 2: 0

## 2020-10-26 ASSESSMENT — ENCOUNTER SYMPTOMS
DOUBLE VISION: 0
SHORTNESS OF BREATH: 1
BLURRED VISION: 0
LOSS OF CONSCIOUSNESS: 0
CHILLS: 0
VOMITING: 0
FEVER: 0
COUGH: 1
NAUSEA: 0
ABDOMINAL PAIN: 0
BACK PAIN: 0
DIARRHEA: 0
DIZZINESS: 1
HEADACHES: 1
SORE THROAT: 0
PALPITATIONS: 0

## 2020-10-26 ASSESSMENT — FIBROSIS 4 INDEX
FIB4 SCORE: 1.47
FIB4 SCORE: 1.47

## 2020-10-26 NOTE — PROGRESS NOTES
Assumed day shift care at 1330  Patient a+o x 4, Jamaican speaking  Denies pain  Monitored on telemetry, vss, hr in the 70's  No needs at this time, wctm    Fall precautions/hourly rounding maintained, call light within reach and functioning, all items within reach.  Patient encouraged to call for assistance, poc reviewed with patient, ?'s/concerns answered.

## 2020-10-26 NOTE — ASSESSMENT & PLAN NOTE
Patient is chronically dependent on 2 L of home oxygen therapy.  He would appear to be at his baseline, he does not complain of any increasing shortness of breath.

## 2020-10-26 NOTE — ED NOTES
Med Rec complete per Pt and Pt's family at bedside  Allergies Reviewed  No ABX in the last 14 days

## 2020-10-26 NOTE — ED TRIAGE NOTES
Chief Complaint   Patient presents with   • Shortness of Breath     x3 days   • Dizziness     x3 days   • Headache     x3 days     Pt ambulatory to triage with son for above complaint. Pt's son states that pt wears 2.5L O2 via Nasal Cannula at home 24/7 d/t pulmonary fibrosis.     Pt is alert/oriented and follows commands. Pt speaking in full sentences and responds appropriately to questions. No acute distress noted in triage and respirations are even and unlabored.     Pt placed in lobby and educated on triage process. Pt and son encouraged to alert staff for any changes in condition.

## 2020-10-26 NOTE — ASSESSMENT & PLAN NOTE
The patient is maintained on glipizide 2.5, Metformin 1000 twice daily, and Januvia 25 mg.  His last A1c in our system was 8, this was 5 months ago.  He was outpatient regimen and placed on sliding scale

## 2020-10-26 NOTE — PROGRESS NOTES
[facility] TRI[facility]ICER ER REPORT  Consult/Admission requested by: Dr Mistry  Chief complaint: dizziness  Pertinent history/ER Course: Mr. Harris has a hx of ILD on oxygen followed by pulmonology. He presents with dizziness and in the ER his oxygen level on his home oxygen is in the mid 80's.   Code Status: full until discussed with Dr. Jaffe  Patient meets admission criterion: Yes..  Recommendations given or work up & consultations requested per triage officer: none  Consultants involved and pertinent input from consultants: none  Admission status: To be reviewed by case management and admitting physician.   Admission order placed: To be placed by admitting physician.   Floor requested: med  [facility] hospitalist: Alannah

## 2020-10-26 NOTE — ED PROVIDER NOTES
CHIEF COMPLAINT  Chief Complaint   Patient presents with            • Dizziness     x3 days   • Headache     x3 days       HPI  Chase Esquivel is a 70 y.o. male  who presents with a complaints dizziness and headache. Patient states dizziness and headache started 3 days ago ( Saturday).  They came on gradually.  No thunderclap headache but not the worst headache of his life.  He describes dizziness as spinning sensation, intermittent and lasted for 10 minutes associated with gait instability ,but  no associated nausea, vomiting, blurring of vision.  Nuys any focal numbness tingling or weakness.  Denies any lack of coordination patient also reports frontal headache. Patient denies any fever, chills, palpitation, shortness of breath, change in bowel or urinary habits.     History was obtained with a .    REVIEW OF SYSTEMS  See HPI for further details. All other systems are negative.    PAST MEDICAL HISTORY  Past Medical History:   Diagnosis Date   • Diabetes (HCC)    • Pulmonary fibrosis (HCC)     wears 2.5L O2 at home        FAMILY HISTORY  History reviewed. No pertinent family history.    SOCIAL HISTORY  Social History     Socioeconomic History   • Marital status: Single     Spouse name: Not on file   • Number of children: Not on file   • Years of education: Not on file   • Highest education level: Not on file   Occupational History   • Not on file   Social Needs   • Financial resource strain: Hard   • Food insecurity     Worry: Often true     Inability: Often true   • Transportation needs     Medical: No     Non-medical: No   Tobacco Use   • Smoking status: Former Smoker     Packs/day: 2.00     Years: 40.00     Pack years: 80.00     Types: Cigarettes     Quit date:      Years since quittin.8   • Smokeless tobacco: Never Used   Substance and Sexual Activity   • Alcohol use: Yes     Frequency: Monthly or less   • Drug use: Never   • Sexual activity: Not on file   Lifestyle   •  "Physical activity     Days per week: Not on file     Minutes per session: Not on file   • Stress: Not on file   Relationships   • Social connections     Talks on phone: Not on file     Gets together: Not on file     Attends Zoroastrian service: Not on file     Active member of club or organization: Not on file     Attends meetings of clubs or organizations: Not on file     Relationship status: Not on file   • Intimate partner violence     Fear of current or ex partner: Not on file     Emotionally abused: Not on file     Physically abused: Not on file     Forced sexual activity: Not on file   Other Topics Concern   • Not on file   Social History Narrative   • Not on file       SURGICAL HISTORY  Past Surgical History:   Procedure Laterality Date   • OTHER      Kidney stones   • OTHER ABDOMINAL SURGERY      \"from an ulcer that burst\"       CURRENT MEDICATIONS  Home Medications     Reviewed by Marii Amezquita (Pharmacy Tech) on 10/26/20 at 1125  Med List Status: Complete   Medication Last Dose Status   albuterol 108 (90 Base) MCG/ACT Aero Soln inhalation aerosol 10/25/2020 Active   Blood Glucose Meter Kit  Active   Blood Glucose Test Strips  Active   glipiZIDE (GLUCOTROL) 5 MG Tab 10/26/2020 Active   Lancets (ONETOUCH DELICA PLUS [id number]) Misc  Active   metFORMIN (GLUCOPHAGE) 500 MG Tab 10/26/2020 Active   predniSONE (DELTASONE) 10 MG Tab Not Taking Active   SITagliptin (JANUVIA) 25 MG Tab 10/26/2020 Active   tiotropium (SPIRIVA HANDIHALER) 18 MCG Cap 10/26/2020 Active                ALLERGIES  No Known Allergies    PHYSICAL EXAM  VITAL SIGNS: /58   Pulse 71   Temp 35.8 °C (96.5 °F) (Temporal)   Resp 18   Ht 1.575 m (5' 2\")   Wt 59.9 kg (132 lb 0.9 oz)   SpO2 95%   BMI 24.15 kg/m²    Constitutional: Well developed, Well nourished, No acute distress, Non-toxic appearance.   HENT:left temporal area tenderness, Normocephalic, Atraumatic, Bilateral external ears normal, Oropharynx moist, No oral exudates, " Nose normal.   Eyes: PERRL, EOMI, Conjunctiva normal, No discharge.   Neck: Normal range of motion, No tenderness,   Cardiovascular: Normal heart rate, Normal rhythm, No murmurs, No rubs, No gallops.   Thorax & Lungs: Normal breath sounds, No respiratory distress  Abdomen: Bowel sounds normal, Soft, No tenderness,   Skin: Warm, Dry, No erythema, No rash.   Back: No tenderness, No CVA tenderness.   Extremities: calus formation with central corn in the sole of left foot with no ulcers.Intact distal pulses, No edema, No tenderness, No cyanosis, No clubbing.   Musculoskeletal: Good range of motion in all major joints. No tenderness to palpation or major deformities noted.   Neurologic: Alert, Normal motor function, Normal sensory function, No focal deficits noted. Normal coordination and gait        Results for orders placed or performed during the hospital encounter of 10/26/20   CBC WITH DIFFERENTIAL   Result Value Ref Range    WBC 12.0 (H) 4.8 - 10.8 K/uL    RBC 3.88 (L) 4.70 - 6.10 M/uL    Hemoglobin 12.1 (L) 14.0 - 18.0 g/dL    Hematocrit 38.0 (L) 42.0 - 52.0 %    MCV 97.9 (H) 81.4 - 97.8 fL    MCH 31.2 27.0 - 33.0 pg    MCHC 31.8 (L) 33.7 - 35.3 g/dL    RDW 49.0 35.9 - 50.0 fL    Platelet Count 161 (L) 164 - 446 K/uL    MPV 12.7 9.0 - 12.9 fL    Neutrophils-Polys 68.40 44.00 - 72.00 %    Lymphocytes 19.90 (L) 22.00 - 41.00 %    Monocytes 8.30 0.00 - 13.40 %    Eosinophils 1.70 0.00 - 6.90 %    Basophils 0.80 0.00 - 1.80 %    Immature Granulocytes 0.90 0.00 - 0.90 %    Nucleated RBC 0.00 /100 WBC    Neutrophils (Absolute) 8.20 (H) 1.82 - 7.42 K/uL    Lymphs (Absolute) 2.38 1.00 - 4.80 K/uL    Monos (Absolute) 1.00 (H) 0.00 - 0.85 K/uL    Eos (Absolute) 0.20 0.00 - 0.51 K/uL    Baso (Absolute) 0.09 0.00 - 0.12 K/uL    Immature Granulocytes (abs) 0.11 0.00 - 0.11 K/uL    NRBC (Absolute) 0.00 K/uL   COMP METABOLIC PANEL   Result Value Ref Range    Sodium 137 135 - 145 mmol/L    Potassium 4.8 3.6 - 5.5 mmol/L     Chloride 102 96 - 112 mmol/L    Co2 25 20 - 33 mmol/L    Anion Gap 10.0 7.0 - 16.0    Glucose 130 (H) 65 - 99 mg/dL    Bun 25 (H) 8 - 22 mg/dL    Creatinine 1.23 0.50 - 1.40 mg/dL    Calcium 9.0 8.5 - 10.5 mg/dL    AST(SGOT) 20 12 - 45 U/L    ALT(SGPT) 36 2 - 50 U/L    Alkaline Phosphatase 65 30 - 99 U/L    Total Bilirubin 0.4 0.1 - 1.5 mg/dL    Albumin 3.9 3.2 - 4.9 g/dL    Total Protein 7.9 6.0 - 8.2 g/dL    Globulin 4.0 (H) 1.9 - 3.5 g/dL    A-G Ratio 1.0 g/dL   TROPONIN   Result Value Ref Range    Troponin T 39 (H) 6 - 19 ng/L   Sed Rate   Result Value Ref Range    Sed Rate Westergren 52 (H) 0 - 20 mm/hour   ESTIMATED GFR   Result Value Ref Range    GFR If African American >60 >60 mL/min/1.73 m 2    GFR If Non African American 58 (A) >60 mL/min/1.73 m 2   CORTISOL   Result Value Ref Range    Cortisol 14.0 0.0 - 23.0 ug/dL   EKG (NOW)   Result Value Ref Range    Report       [facility] Emergency Dept.    Test Date:  2020-10-26  Pt Name:    BREANNE MCKINNEY            Department: ER  MRN:        [id number]                      Room:       [facility]  Gender:     Male                         Technician: [id number]  :        1950                   Requested By:ER TRIAGE PROTOCOL  Order #:    [id number]                    Reading MD: YANELIS WHEAT AMD    Measurements  Intervals                                Axis  Rate:       66                           P:          51  CT:         226                          QRS:        18  QRSD:       86                           T:          -20  QT:         410  QTc:        430    Interpretive Statements  Sinus rhythm  Prolonged CT interval  Consider left ventricular hypertrophy  Nonspecific T abnormalities, inferior leads  Compared to ECG 2020 16:51:14  Sinus bradycardia no longer present  Possible ischemia no longer present  T-wave abnormality still present  Electronically Signed On 10- 15 :33:07 PDT by YANELIS WHEAT AMD           RADIOLOGY/PROCEDURES  CT-HEAD W/O   Final Result      1.  Cerebral atrophy.      2.  White matter lucencies most consistent with small vessel ischemic change versus demyelination or gliosis.      3.  Otherwise, Head CT without contrast with no acute findings. No evidence of acute cerebral infarction, hemorrhage or mass lesion.      DX-CHEST-PORTABLE (1 VIEW)   Final Result      Diffuse interstitial prominence consistent with patient's history of interstitial lung disease.      MR-BRAIN-W/O    (Results Pending)         COURSE & MEDICAL DECISION MAKING  Pertinent Labs & Imaging studies reviewed. (See chart for details)    Patient presents with multiple complaints.  His first complaint is headache and dizziness.  Is been present for some time.  He has dizziness which is described as lightheadedness versus exertional.  Differential diagnosis includes temporal arteritis, brain mass, chronic cephalgia, migraine headache, brain hemorrhage, or possibly hypoxia associated with exertion and chronic lung disease.  He is worked up with a head CT which is unremarkable.  His ESR is elevated but not great which I think he has temporal arteritis.  He does have some temporal tenderness.    The patient is mildly short of breath.  He is chronically on oxygen this is not a significant component of his complaint.  His dyspnea on exertion is worse than baseline however.    At this point I do not think he is having ACS or cardiac etiology.  He has a recent hospitalization of the fairly extensive cardiac work-up including an echocardiogram and stress test which have just minimal disease.  He does have an elevated troponin.    At this point the etiology of symptoms are unclear.  He is markedly hypoxemic and dyspneic with ambulation while using his home oxygen.    This desaturates to the mid 80s.  He is quite symptomatic.  Because of his hypoxemia with exertion I think he requires maximization and improvement of his outpatient therapy  for his chronic lung disease.  I discussed case with the hospitalist and he will be hospitalized for further work-up and treatment.  Care is transferred at that time.          FINAL IMPRESSION  1. Dizziness     2. Shortness of breath     3. Hypoxia     4. Nonintractable headache, unspecified chronicity pattern, unspecified headache type         2.   3.         Electronically signed by: Diamond Rogers M.D., 10/26/2020 9:45 AM

## 2020-10-26 NOTE — H&P
Hospital Medicine History & Physical Note    Date of Service  10/26/2020    Primary Care Physician  Pcp Pt States None    Consultants      Code Status  Full Code    Chief Complaint  Chief Complaint   Patient presents with   • Shortness of Breath     x3 days   • Dizziness     x3 days   • Headache     x3 days       History of Presenting Illness  70 y.o. male who presented 10/26/2020 with history of interstitial lung disease, type 2 diabetes.  Patient has chronic respiratory failure as well and is dependent on 2 L of home oxygen.  He presents today for evaluation of dizziness.  He reports that beginning 3 days ago, he has had episodes where he feels like his head is spinning.  The symptoms tend to come on when he is standing, or if he is straining trying to lift something up.  He has not had any episodes that he recalls sitting down, or lying down.  He does note however that when he turns his head vigorously side to side that seems to make it feel dizzy again.  He has not passed out, nor does he have any complaint of chest pain unusual back neck jaw or shoulder pain.  There is been no fever or chills.  Patient has a little bit of a chronic cough and is chronically short of breath however he states these things are unchanged from his baseline.  There is been a mild headache which also began Saturday.  This is intermittent and has been present when the dizziness was not.  He denies any changes in his hearing, no ear pain no double vision or blurry vision.  No other neurologic complaints.  The son who lives with his dad is at the bedside.  He confirms that his dad has trouble with his balance and having to hold onto the wall or to his son to be able to walk safely on occasion, is on other occasions has been fine.  Here in the ED the patient has had a basic lab work-up and CT of the head these have been unenlightening.    Review of Systems  Review of Systems   Constitutional: Negative for chills and fever.   HENT: Negative  for nosebleeds and sore throat.    Eyes: Negative for blurred vision and double vision.   Respiratory: Positive for cough and shortness of breath.    Cardiovascular: Negative for chest pain, palpitations and leg swelling.   Gastrointestinal: Negative for abdominal pain, diarrhea, nausea and vomiting.   Genitourinary: Negative for dysuria and urgency.   Musculoskeletal: Negative for back pain.   Skin: Negative for rash.   Neurological: Positive for dizziness and headaches. Negative for loss of consciousness.       Past Medical History   has a past medical history of Diabetes ([facility]) and Pulmonary fibrosis (HCC).    Surgical History   has a past surgical history that includes other abdominal surgery and other.     Family History  Coronary artery disease    Social History   reports that he quit smoking about 25 years ago. His smoking use included cigarettes. He has a 80.00 pack-year smoking history. He has never used smokeless tobacco. He reports current alcohol use. He reports that he does not use drugs.    Allergies  No Known Allergies    Medications  Prior to Admission Medications   Prescriptions Last Dose Informant Patient Reported? Taking?   Blood Glucose Meter Kit  Family Member No No   Sig: Test blood sugar as recommended by provider.blood glucose monitoring kit. BID   Blood Glucose Test Strips  Family Member No No   Sig: One test strip BID   Lancets (ONETOUCH DELICA PLUS [id number]) Misc  Family Member No No   Sig: USE TO TEST TWICE DAILY   SITagliptin (JANUVIA) 25 MG Tab 10/26/2020 at AM Family Member No No   Sig: Take 1 Tab by mouth every day.   albuterol 108 (90 Base) MCG/ACT Aero Soln inhalation aerosol 10/25/2020 at PM Family Member No No   Sig: Inhale 2 Puffs by mouth every 6 hours as needed for Shortness of Breath.   glipiZIDE (GLUCOTROL) 5 MG Tab 10/26/2020 at AM Family Member Yes Yes   Sig: Take 2.5 mg by mouth 2 times a day. 0.5 tablets = 2.5 mg   metFORMIN (GLUCOPHAGE) 500 MG Tab 10/26/2020 at AM Family  Member Yes No   Sig: Take 1,000 mg by mouth 2 Times a Day.   predniSONE (DELTASONE) 10 MG Tab Not Taking at Unknown time Family Member No No   Si mg po QDx 43days, then 20 mg po QD x 3 days , then 10 mg po QD for 3 days   Patient not taking: Reported on 10/26/2020   tiotropium (SPIRIVA HANDIHALER) 18 MCG Cap 10/26/2020 at AM Family Member No No   Sig: Inhale 1 Cap by mouth every day.      Facility-Administered Medications: None       Physical Exam  Temp:  [35.8 °C (96.5 °F)] 35.8 °C (96.5 °F)  Pulse:  [71] 71  Resp:  [18] 18  BP: (115)/(58) 115/58  SpO2:  [95 %] 95 %    Physical Exam  Vitals signs reviewed.   Constitutional:       General: He is not in acute distress.     Appearance: He is well-developed. He is not diaphoretic.   HENT:      Head: Normocephalic and atraumatic.      Right Ear: Tympanic membrane, ear canal and external ear normal. There is no impacted cerumen.      Left Ear: Tympanic membrane, ear canal and external ear normal. There is no impacted cerumen.   Eyes:      Conjunctiva/sclera: Conjunctivae normal.   Neck:      Vascular: No JVD.   Cardiovascular:      Rate and Rhythm: Normal rate.      Heart sounds: Murmur present. No gallop.    Pulmonary:      Effort: Pulmonary effort is normal. No respiratory distress.      Breath sounds: No stridor. Rales present. No wheezing.   Abdominal:      Palpations: Abdomen is soft.      Tenderness: There is no abdominal tenderness. There is no guarding or rebound.   Musculoskeletal:         General: No tenderness.      Right lower leg: No edema.      Left lower leg: No edema.   Skin:     General: Skin is warm and dry.      Findings: No rash.   Neurological:      General: No focal deficit present.      Mental Status: He is oriented to person, place, and time. Mental status is at baseline.      Comments: Cranial nerves II through XII are intact speech is clear content logical.  No evidence of expressive or receptive deficits.  Strength is 4+ to 5 -/5 but  symmetric throughout.  Patient denies any strength deficits.  His sensation is intact.   Psychiatric:         Mood and Affect: Mood normal.         Thought Content: Thought content normal.         Laboratory:  Recent Labs     10/26/20  0916   WBC 12.0*   RBC 3.88*   HEMOGLOBIN 12.1*   HEMATOCRIT 38.0*   MCV 97.9*   MCH 31.2   MCHC 31.8*   RDW 49.0   PLATELETCT 161*   MPV 12.7     Recent Labs     10/26/20  0916   SODIUM 137   POTASSIUM 4.8   CHLORIDE 102   CO2 25   GLUCOSE 130*   BUN 25*   CREATININE 1.23   CALCIUM 9.0     Recent Labs     10/26/20  0916   ALTSGPT 36   ASTSGOT 20   ALKPHOSPHAT 65   TBILIRUBIN 0.4   GLUCOSE 130*         No results for input(s): NTPROBNP in the last 72 hours.      Recent Labs     10/26/20  0916   TROPONINT 39*       Imaging:  CT-HEAD W/O   Final Result      1.  Cerebral atrophy.      2.  White matter lucencies most consistent with small vessel ischemic change versus demyelination or gliosis.      3.  Otherwise, Head CT without contrast with no acute findings. No evidence of acute cerebral infarction, hemorrhage or mass lesion.      DX-CHEST-PORTABLE (1 VIEW)   Final Result      Diffuse interstitial prominence consistent with patient's history of interstitial lung disease.      MR-BRAIN-W/O    (Results Pending)         Assessment/Plan:  I anticipate this patient is appropriate for observation status at this time.    Diabetes mellitus type II, uncontrolled (HCC)- (present on admission)  Assessment & Plan  The patient is maintained on glipizide 2.5, Metformin 1000 twice daily, and Januvia 25 mg.  His last A1c in our system was 8, this was 5 months ago.  He was outpatient regimen and placed on sliding scale    Vertigo  Assessment & Plan  Patient appears to describe vertiginous symptoms rather than near syncope with a sensation of his head spinning.  These episodes are intermittent and for the most part unpredictable although they do appear to occur more often when he is standing, and also  when he is straining to pick something up.    We will admit to monitored bed and complete syncopal work-up as well as MRI of the brain to rule out central source of his vertigo  Given the intermittent nature of his symptoms, I would hold off at this time on things like meclizine although that certainly could be a consideration  We will also check orthostatic BPs as well as a cortisol.  Patient had an echo in May, I do not think we need to repeat it.    Chronic respiratory failure with hypoxia (HCC)  Assessment & Plan  Patient is chronically dependent on 2 L of home oxygen therapy.  He would appear to be at his baseline, he does not complain of any increasing shortness of breath.    Stage 3 chronic kidney disease  Assessment & Plan  Patient's GFR chronically low is around 50, he appears to be at his baseline.  Renally dose medications as appropriate    Interstitial lung disease (HCC)- (present on admission)  Assessment & Plan  Followed by Dr. Campuzano as an outpatient.  Placed on O2 and RT protocols  Continue outpatient regimen

## 2020-10-26 NOTE — ASSESSMENT & PLAN NOTE
Elisabet related to peripheral vertigo  MRI brain ordered to rule out central vertigo  Started on meclizine  For precaution  PT OT for Epley's maneuver

## 2020-10-26 NOTE — ASSESSMENT & PLAN NOTE
Followed by Dr. Campuzano as an outpatient.  Placed on O2 and RT protocols  Continue outpatient regimen

## 2020-10-26 NOTE — ASSESSMENT & PLAN NOTE
Patient's GFR chronically low is around 50, he appears to be at his baseline.  Renally dose medications as appropriate

## 2020-10-27 ENCOUNTER — PATIENT OUTREACH (OUTPATIENT)
Dept: HEALTH INFORMATION MANAGEMENT | Facility: OTHER | Age: 70
End: 2020-10-27

## 2020-10-27 ENCOUNTER — APPOINTMENT (OUTPATIENT)
Dept: RADIOLOGY | Facility: MEDICAL CENTER | Age: 70
End: 2020-10-27
Attending: HOSPITALIST
Payer: MEDICARE

## 2020-10-27 LAB
GLUCOSE BLD-MCNC: 114 MG/DL (ref 65–99)
GLUCOSE BLD-MCNC: 129 MG/DL (ref 65–99)
GLUCOSE BLD-MCNC: 93 MG/DL (ref 65–99)
GLUCOSE BLD-MCNC: 95 MG/DL (ref 65–99)

## 2020-10-27 PROCEDURE — 70551 MRI BRAIN STEM W/O DYE: CPT

## 2020-10-27 PROCEDURE — 96372 THER/PROPH/DIAG INJ SC/IM: CPT

## 2020-10-27 PROCEDURE — 700111 HCHG RX REV CODE 636 W/ 250 OVERRIDE (IP): Performed by: HOSPITALIST

## 2020-10-27 PROCEDURE — G0378 HOSPITAL OBSERVATION PER HR: HCPCS

## 2020-10-27 PROCEDURE — A9270 NON-COVERED ITEM OR SERVICE: HCPCS | Performed by: HOSPITALIST

## 2020-10-27 PROCEDURE — 700102 HCHG RX REV CODE 250 W/ 637 OVERRIDE(OP): Performed by: HOSPITALIST

## 2020-10-27 PROCEDURE — 82962 GLUCOSE BLOOD TEST: CPT | Mod: 91

## 2020-10-27 PROCEDURE — 99226 PR SUBSEQUENT OBSERVATION CARE,LEVEL III: CPT | Performed by: INTERNAL MEDICINE

## 2020-10-27 RX ORDER — MECLIZINE HYDROCHLORIDE 25 MG/1
25 TABLET ORAL 3 TIMES DAILY PRN
Status: DISCONTINUED | OUTPATIENT
Start: 2020-10-27 | End: 2020-10-28 | Stop reason: HOSPADM

## 2020-10-27 RX ADMIN — ACETAMINOPHEN 650 MG: 325 TABLET, FILM COATED ORAL at 00:25

## 2020-10-27 RX ADMIN — GLIPIZIDE 2.5 MG: 5 TABLET ORAL at 17:09

## 2020-10-27 RX ADMIN — SITAGLIPTIN 25 MG: 25 TABLET, FILM COATED ORAL at 05:50

## 2020-10-27 RX ADMIN — GLIPIZIDE 2.5 MG: 5 TABLET ORAL at 05:48

## 2020-10-27 RX ADMIN — METFORMIN HYDROCHLORIDE 1000 MG: 500 TABLET ORAL at 17:08

## 2020-10-27 RX ADMIN — METFORMIN HYDROCHLORIDE 1000 MG: 500 TABLET ORAL at 05:47

## 2020-10-27 RX ADMIN — ENOXAPARIN SODIUM 40 MG: 40 INJECTION SUBCUTANEOUS at 06:00

## 2020-10-27 ASSESSMENT — ENCOUNTER SYMPTOMS
DIARRHEA: 0
HEADACHES: 0
SHORTNESS OF BREATH: 0
BLURRED VISION: 0
CHILLS: 0
NECK PAIN: 0
EYE PAIN: 0
SEIZURES: 0
EYE REDNESS: 0
VOMITING: 0
WEIGHT LOSS: 0
BACK PAIN: 0
NAUSEA: 0
ORTHOPNEA: 0
MYALGIAS: 0
HEARTBURN: 0
FOCAL WEAKNESS: 0
SPUTUM PRODUCTION: 0
EYE DISCHARGE: 0
DIZZINESS: 1
COUGH: 0
FEVER: 0
ABDOMINAL PAIN: 0
PALPITATIONS: 0
STRIDOR: 0

## 2020-10-27 NOTE — RESPIRATORY CARE
COPD EDUCATION by COPD CLINICAL EDUCATOR  10/27/2020 at 6:54 AM by Selam Holt, SHAQUILLE     Patient reviewed by COPD education team. Patient does not have a history or diagnosis of COPD and is a non-smoker.  Therefore does not qualify for the COPD program.

## 2020-10-27 NOTE — PROGRESS NOTES
2 RN Skin Check:     Completed with DORA Clemente  2 RN skin check complete.   Devices in place: Nasal Cannula.  Skin assessed under devices: yes.  Confirmed pressure ulcers found on: NA.  New potential pressure ulcers noted on NA. Wound consult placed N/A.  The following interventions in place: Pillows for support    Blanchable redness behind BL ears.

## 2020-10-27 NOTE — PROGRESS NOTES
Assumed care at 0700. Bedside report received from Opal. Patient's chart and MAR reviewed. Pt denies pain at this time. Pt is A & O 4. Patient was updated on plan of care for the day. Questions answered and concerns addressed.  Pt denies any additional needs at this time. White board updated. Call light, phone and personal belongings within reach.

## 2020-10-27 NOTE — PROGRESS NOTES
[facility] Medicine Daily Progress Note    Date of Service  10/27/2020    Chief Complaint  70 y.o. male admitted 10/26/2020 with shortness of breath, dizziness and headache    Hospital Course    70 y.o. male who presented 10/26/2020 with history of interstitial lung disease, type 2 diabetes.  Patient has chronic respiratory failure as well and is dependent on 2 L of home oxygen.  Admitted for above      Interval Problem Update  No acute issue overnight.  Is currently pending    Consultants/Specialty  None    Code Status  Full Code    Disposition  Remain on the floor    Review of Systems  Review of Systems   Constitutional: Negative for chills, fever and weight loss.   HENT: Negative for congestion and nosebleeds.    Eyes: Negative for blurred vision, pain, discharge and redness.   Respiratory: Negative for cough, sputum production, shortness of breath and stridor.    Cardiovascular: Negative for chest pain, palpitations and orthopnea.   Gastrointestinal: Negative for abdominal pain, diarrhea, heartburn, nausea and vomiting.   Genitourinary: Negative for dysuria, frequency and urgency.   Musculoskeletal: Negative for back pain, myalgias and neck pain.   Skin: Negative for itching and rash.   Neurological: Positive for dizziness. Negative for focal weakness, seizures and headaches.        Physical Exam  Temp:  [36.1 °C (97 °F)-37.8 °C (100.1 °F)] 36.2 °C (97.2 °F)  Pulse:  [61-86] 86  Resp:  [13-33] 20  BP: ()/(54-80) 98/54  SpO2:  [92 %-100 %] 93 %    Physical Exam  Vitals signs reviewed.   Constitutional:       General: He is not in acute distress.     Appearance: Normal appearance.   HENT:      Head: Normocephalic and atraumatic.      Nose: No congestion or rhinorrhea.   Eyes:      Extraocular Movements: Extraocular movements intact.      Pupils: Pupils are equal, round, and reactive to light.   Neck:      Musculoskeletal: Normal range of motion and neck supple.   Cardiovascular:      Rate and Rhythm: Normal  rate and regular rhythm.      Pulses: Normal pulses.   Pulmonary:      Effort: Pulmonary effort is normal. No respiratory distress.      Breath sounds: Normal breath sounds.   Abdominal:      General: Bowel sounds are normal. There is no distension.      Palpations: Abdomen is soft.      Tenderness: There is no abdominal tenderness.   Musculoskeletal:         General: No swelling or tenderness.   Skin:     General: Skin is warm.      Findings: No erythema.   Neurological:      General: No focal deficit present.      Mental Status: He is alert.         Fluids    Intake/Output Summary (Last 24 hours) at 10/27/2020 0933  Last data filed at 10/27/2020 0800  Gross per 24 hour   Intake 360 ml   Output 0 ml   Net 360 ml       Laboratory  Recent Labs     10/26/20  0916   WBC 12.0*   RBC 3.88*   HEMOGLOBIN 12.1*   HEMATOCRIT 38.0*   MCV 97.9*   MCH 31.2   MCHC 31.8*   RDW 49.0   PLATELETCT 161*   MPV 12.7     Recent Labs     10/26/20  0916   SODIUM 137   POTASSIUM 4.8   CHLORIDE 102   CO2 25   GLUCOSE 130*   BUN 25*   CREATININE 1.23   CALCIUM 9.0                   Imaging  CT-HEAD W/O   Final Result      1.  Cerebral atrophy.      2.  White matter lucencies most consistent with small vessel ischemic change versus demyelination or gliosis.      3.  Otherwise, Head CT without contrast with no acute findings. No evidence of acute cerebral infarction, hemorrhage or mass lesion.      DX-CHEST-PORTABLE (1 VIEW)   Final Result      Diffuse interstitial prominence consistent with patient's history of interstitial lung disease.      MR-BRAIN-W/O    (Results Pending)        Assessment/Plan  Diabetes mellitus type II, uncontrolled (HCC)- (present on admission)  Assessment & Plan  The patient is maintained on glipizide 2.5, Metformin 1000 twice daily, and Januvia 25 mg.  His last A1c in our system was 8, this was 5 months ago.  He was outpatient regimen and placed on sliding scale    Vertigo  Assessment & Plan  [place] related to  peripheral vertigo  MRI brain ordered to rule out central vertigo  Started on meclizine  For precaution  PT OT for Epley's maneuver      Chronic respiratory failure with hypoxia (HCC)  Assessment & Plan  Patient is chronically dependent on 2 L of home oxygen therapy.  He would appear to be at his baseline, he does not complain of any increasing shortness of breath.    Stage 3 chronic kidney disease  Assessment & Plan  Patient's GFR chronically low is around 50, he appears to be at his baseline.  Renally dose medications as appropriate    Interstitial lung disease (HCC)- (present on admission)  Assessment & Plan  Followed by Dr. Campuzano as an outpatient.  Placed on O2 and RT protocols  Continue outpatient regimen       VTE prophylaxis: lovenox

## 2020-10-27 NOTE — PROGRESS NOTES
Community Health Worker Intake    • Social determinates of health intake completed.   • Identified barriers to: financial insecurity.  • Contact information provided to [facility] Esquivel   • Has PCP appointment scheduled for: Tuesday, November 10 at 7:30 am   • Scheduled Food Delivery or pick-up: Once discharged.  • Accepted Meds-To-Beds.   • Outpatient assessment completed.    CHW Darwin spoke with pt at bedside to reintroduce CCM services. Pt accepted. He indicated not having a PCP but per chart, he has been to [facility] earlier this year. He did accept that this CHW schedule hospital f/u. CHW reached out to [facility] and apparently pt's PCP is Rashmi Cedeno, chart updated. Per [facility] , pt has an appt on 11/10 already for a physical that will be change to a hospital f/u. For transportation to medical appts, pt's son takes him. He mentioned having some financial insecurity and may need food assistance after d/c. Pt lives with his son and other family members who are also a part of his support system.     Plan: Give appt reminder, f/u with pt after d/c to identify any additional needs and make food bag if necessary.

## 2020-10-28 ENCOUNTER — HOSPITAL ENCOUNTER (OUTPATIENT)
Dept: RADIOLOGY | Facility: MEDICAL CENTER | Age: 70
End: 2020-10-28
Attending: NURSE PRACTITIONER | Admitting: HOSPITALIST
Payer: MEDICARE

## 2020-10-28 VITALS
HEART RATE: 82 BPM | HEIGHT: 62 IN | RESPIRATION RATE: 16 BRPM | OXYGEN SATURATION: 94 % | BODY MASS INDEX: 23.85 KG/M2 | SYSTOLIC BLOOD PRESSURE: 116 MMHG | TEMPERATURE: 98.3 F | WEIGHT: 129.63 LBS | DIASTOLIC BLOOD PRESSURE: 52 MMHG

## 2020-10-28 LAB
ALBUMIN SERPL BCP-MCNC: 3.6 G/DL (ref 3.2–4.9)
ALBUMIN/GLOB SERPL: 0.9 G/DL
ALP SERPL-CCNC: 62 U/L (ref 30–99)
ALT SERPL-CCNC: 33 U/L (ref 2–50)
ANION GAP SERPL CALC-SCNC: 10 MMOL/L (ref 7–16)
AST SERPL-CCNC: 20 U/L (ref 12–45)
BASOPHILS # BLD AUTO: 1.2 % (ref 0–1.8)
BASOPHILS # BLD: 0.11 K/UL (ref 0–0.12)
BILIRUB SERPL-MCNC: 0.4 MG/DL (ref 0.1–1.5)
BUN SERPL-MCNC: 23 MG/DL (ref 8–22)
CALCIUM SERPL-MCNC: 9 MG/DL (ref 8.5–10.5)
CHLORIDE SERPL-SCNC: 101 MMOL/L (ref 96–112)
CO2 SERPL-SCNC: 25 MMOL/L (ref 20–33)
CREAT SERPL-MCNC: 1.19 MG/DL (ref 0.5–1.4)
EOSINOPHIL # BLD AUTO: 0.45 K/UL (ref 0–0.51)
EOSINOPHIL NFR BLD: 4.7 % (ref 0–6.9)
ERYTHROCYTE [DISTWIDTH] IN BLOOD BY AUTOMATED COUNT: 47.4 FL (ref 35.9–50)
GLOBULIN SER CALC-MCNC: 4.1 G/DL (ref 1.9–3.5)
GLUCOSE BLD-MCNC: 75 MG/DL (ref 65–99)
GLUCOSE SERPL-MCNC: 122 MG/DL (ref 65–99)
HCT VFR BLD AUTO: 41.5 % (ref 42–52)
HGB BLD-MCNC: 13.2 G/DL (ref 14–18)
IMM GRANULOCYTES # BLD AUTO: 0.11 K/UL (ref 0–0.11)
IMM GRANULOCYTES NFR BLD AUTO: 1.2 % (ref 0–0.9)
LYMPHOCYTES # BLD AUTO: 2.76 K/UL (ref 1–4.8)
LYMPHOCYTES NFR BLD: 29 % (ref 22–41)
MCH RBC QN AUTO: 31.1 PG (ref 27–33)
MCHC RBC AUTO-ENTMCNC: 31.8 G/DL (ref 33.7–35.3)
MCV RBC AUTO: 97.6 FL (ref 81.4–97.8)
MONOCYTES # BLD AUTO: 0.87 K/UL (ref 0–0.85)
MONOCYTES NFR BLD AUTO: 9.1 % (ref 0–13.4)
NEUTROPHILS # BLD AUTO: 5.23 K/UL (ref 1.82–7.42)
NEUTROPHILS NFR BLD: 54.8 % (ref 44–72)
NRBC # BLD AUTO: 0 K/UL
NRBC BLD-RTO: 0 /100 WBC
PLATELET # BLD AUTO: 167 K/UL (ref 164–446)
PMV BLD AUTO: 12.2 FL (ref 9–12.9)
POTASSIUM SERPL-SCNC: 4.3 MMOL/L (ref 3.6–5.5)
PROT SERPL-MCNC: 7.7 G/DL (ref 6–8.2)
RBC # BLD AUTO: 4.25 M/UL (ref 4.7–6.1)
SODIUM SERPL-SCNC: 136 MMOL/L (ref 135–145)
WBC # BLD AUTO: 9.5 K/UL (ref 4.8–10.8)

## 2020-10-28 PROCEDURE — 99203 OFFICE O/P NEW LOW 30 MIN: CPT | Performed by: NURSE PRACTITIONER

## 2020-10-28 PROCEDURE — 99217 PR OBSERVATION CARE DISCHARGE: CPT | Performed by: INTERNAL MEDICINE

## 2020-10-28 PROCEDURE — 97597 DBRDMT OPN WND 1ST 20 CM/<: CPT

## 2020-10-28 PROCEDURE — 700111 HCHG RX REV CODE 636 W/ 250 OVERRIDE (IP): Performed by: HOSPITALIST

## 2020-10-28 PROCEDURE — 700102 HCHG RX REV CODE 250 W/ 637 OVERRIDE(OP): Performed by: HOSPITALIST

## 2020-10-28 PROCEDURE — 80053 COMPREHEN METABOLIC PANEL: CPT

## 2020-10-28 PROCEDURE — G0378 HOSPITAL OBSERVATION PER HR: HCPCS

## 2020-10-28 PROCEDURE — 85025 COMPLETE CBC W/AUTO DIFF WBC: CPT

## 2020-10-28 PROCEDURE — 82962 GLUCOSE BLOOD TEST: CPT

## 2020-10-28 PROCEDURE — A9270 NON-COVERED ITEM OR SERVICE: HCPCS | Performed by: HOSPITALIST

## 2020-10-28 PROCEDURE — 94640 AIRWAY INHALATION TREATMENT: CPT

## 2020-10-28 PROCEDURE — 96372 THER/PROPH/DIAG INJ SC/IM: CPT | Mod: XU

## 2020-10-28 PROCEDURE — 36415 COLL VENOUS BLD VENIPUNCTURE: CPT

## 2020-10-28 RX ORDER — MECLIZINE HYDROCHLORIDE 25 MG/1
25 TABLET ORAL 3 TIMES DAILY PRN
Qty: 30 TAB | Refills: 0 | Status: ON HOLD | OUTPATIENT
Start: 2020-10-28 | End: 2020-11-07

## 2020-10-28 RX ADMIN — GLIPIZIDE 2.5 MG: 5 TABLET ORAL at 05:12

## 2020-10-28 RX ADMIN — DOCUSATE SODIUM 50 MG AND SENNOSIDES 8.6 MG 2 TABLET: 8.6; 5 TABLET, FILM COATED ORAL at 05:04

## 2020-10-28 RX ADMIN — ENOXAPARIN SODIUM 40 MG: 40 INJECTION SUBCUTANEOUS at 05:04

## 2020-10-28 RX ADMIN — METFORMIN HYDROCHLORIDE 1000 MG: 500 TABLET ORAL at 05:04

## 2020-10-28 NOTE — PROGRESS NOTES
Pt back to room from MRI with transport, pt care assumed, assessment completed. Pt is A&O4, pain 0. Updated on POC, questions answered. Bed in lowest, locked position, treaded socks on, call light and belongings within reach. Fall precautions in place.

## 2020-10-28 NOTE — DISCHARGE INSTRUCTIONS
Discharge Instructions    Discharged to home by car with relative. Discharged via wheelchair, hospital escort: Yes.  Special equipment needed: Not Applicable    Be sure to schedule a follow-up appointment with your primary care doctor or any specialists as instructed.     Discharge Plan:   Diet Plan: Discussed  Activity Level: Discussed  Confirmed Follow up Appointment: Appointment Scheduled(pcp visit and hf md visit scheduled)  Confirmed Symptoms Management: Discussed  Medication Reconciliation Updated: Yes  Influenza Vaccine Indication: Patient Refuses    I understand that a diet low in cholesterol, fat, and sodium is recommended for good health. Unless I have been given specific instructions below for another diet, I accept this instruction as my diet prescription.   Other diet: cardiac, diabetic diet    Special Instructions: None    · Is patient discharged on Warfarin / Coumadin?   No     Depression / Suicide Risk    As you are discharged from this [facility] facility, it is important to learn how to keep safe from harming yourself.    Recognize the warning signs:  · Abrupt changes in personality, positive or negative- including increase in energy   · Giving away possessions  · Change in eating patterns- significant weight changes-  positive or negative  · Change in sleeping patterns- unable to sleep or sleeping all the time   · Unwillingness or inability to communicate  · Depression  · Unusual sadness, discouragement and loneliness  · Talk of wanting to die  · Neglect of personal appearance   · Rebelliousness- reckless behavior  · Withdrawal from people/activities they love  · Confusion- inability to concentrate     If you or a loved one observes any of these behaviors or has concerns about self-harm, here's what you can do:  · Talk about it- your feelings and reasons for harming yourself  · Remove any means that you might use to hurt yourself (examples: pills, rope, extension cords, firearm)  · Get  professional help from the community (Mental Health, Substance Abuse, psychological counseling)  · Do not be alone:Call your Safe Contact- someone whom you trust who will be there for you.  · Call your local CRISIS HOTLINE 962-6343 or 592-910-1231  · Call your local Children's Mobile Crisis Response Team [place] (960) 220-1739 or www.[employer]  · Call the toll free National Suicide Prevention Hotlines   · National Suicide Prevention Lifeline 984-082-BSEV (1987)  · tritrue Hope Line Network 800-SUICIDE (182-2291)      Vertigo  Vertigo is the feeling that you or the things around you are moving when they are not. This feeling can come and go at any time. Vertigo often goes away on its own. This condition can be dangerous if it happens when you are doing activities like driving or working with machines.  Your doctor will do tests to find the cause of your vertigo. These tests will also help your doctor decide on the best treatment for you.  Follow these instructions at home:  Eating and drinking         · Drink enough fluid to keep your pee (urine) pale yellow.  · Do not drink alcohol.  Activity  · Return to your normal activities as told by your doctor. Ask your doctor what activities are safe for you.  · In the morning, first sit up on the side of the bed. When you feel okay, stand slowly while you hold onto something until you know that your balance is fine.  · Move slowly. Avoid sudden body or head movements or certain positions, as told by your doctor.  · Use a cane if you have trouble standing or walking.  · Sit down right away if you feel dizzy.  · Avoid doing any tasks or activities that can cause danger to you or others if you get dizzy.  · Avoid bending down if you feel dizzy. Place items in your home so that they are easy for you to reach without leaning over.  · Do not drive or use heavy machinery if you feel dizzy.  General instructions  · Take over-the-counter and prescription medicines only  as told by your doctor.  · Keep all follow-up visits as told by your doctor. This is important.  Contact a doctor if:  · Your medicine does not help your vertigo.  · You have a fever.  · Your problems get worse or you have new symptoms.  · Your family or friends see changes in your behavior.  · The feeling of being sick to your stomach gets worse.  · Your vomiting gets worse.  · You lose feeling (have numbness) in part of your body.  · You feel prickling and tingling in a part of your body.  Get help right away if:  · You have trouble moving or talking.  · You are always dizzy.  · You pass out (faint).  · You get very bad headaches.  · You feel weak in your hands, arms, or legs.  · You have changes in your hearing.  · You have changes in how you see (vision).  · You get a stiff neck.  · Bright light starts to bother you.  Summary  · Vertigo is the feeling that you or the things around you are moving when they are not.  · Your doctor will do tests to find the cause of your vertigo.  · You may be told to avoid some tasks, positions, or movements.  · Contact a doctor if your medicine is not helping, or if you have a fever, new symptoms, or a change in behavior.  · Get help right away if you get very bad headaches, or if you have changes in how you speak, hear, or see.  This information is not intended to replace advice given to you by your health care provider. Make sure you discuss any questions you have with your health care provider.  Document Released: 09/26/2009 Document Revised: 11/11/2019 Document Reviewed: 11/11/2019  Elsevier Patient Education © 2020 Elsevier Inc.  Vértigo  Vertigo  El vértigo es la sensación de que usted o las cosas que lo rodean se mueven cuando en realidad eso no sucede. Esta sensación puede aparecer y desaparecer en cualquier momento. El vértigo suele desaparecer solo. Esta afección puede ser peligrosa si ocurre cuando está realizando actividades lita conducir o trabajar con máquinas.  El  médico le hará pruebas para encontrar la causa del vértigo. Estas pruebas también ayudarán al médico a decidir cuál es el mejor tratamiento para usted.  Siga estas indicaciones en parrish casa:  Comida y bebida         · Gricelda suficiente líquido para mantener el pis (la orina) de color amarillo pálido.  · No gricelda alcohol.  Actividad  · Retome heide actividades habituales lita se lo haya indicado el médico. Pregúntele al médico qué actividades son seguras para usted.  · Por la mañana, siéntese devaughn a un lado de la cama. Cuando se sienta dominique, póngase lentamente de pie mientras se sostiene de algo, hasta que sepa que ha logrado el equilibrio.  · Muévase lentamente. Evite determinadas posiciones o hacer movimientos repentinos del cuerpo o de la cuauhtemoc, lita se lo haya indicado el médico.  · Use un bastón si tiene dificultad para ponerse de pie o caminar.  · Si se siente mareado, siéntese de inmediato.  · Evite realizar tareas o actividades que puedan causarle peligro a usted o a otras personas si se marea.  · Evite agacharse si se siente mareado. En parrish casa, coloque los objetos de modo que le resulte fácil alcanzarlos sin agacharse.  · No conduzca vehículos ni opere maquinaria pesada si se siente mareado.  Indicaciones generales  · Fowler los medicamentos de venta diane y los recetados solamente lita se lo haya indicado el médico.  · Concurra a todas las visitas de control lita se lo haya indicado el médico. Cook es importante.  Comuníquese con un médico si:  · Los medicamentos no le alivian el vértigo.  · Tiene fiebre.  · Los problemas empeoran o le aparecen síntomas nuevos.  · Heide familiares o amigos observan cambios en parrish comportamiento.  · La sensación de malestar estomacal empeora.  · Los vómitos empeoran.  · Pierde la sensibilidad (tiene entumecimiento) en alguna parte del cuerpo.  · Siente pinchazos u hormigueo en alguna parte del cuerpo.  Solicite ayuda inmediatamente si:  · Tiene dificultad para moverse o para  caminar.  · Esta mareado todo el tiempo.  · Pierde el conocimiento (se desmaya).  · Tiene claudia de cuauhtemoc muy intensos.  · Siente debilidad en las orestes, los brazos o las piernas.  · Tiene cambios en la audición.  · Tiene cambios en la forma de troy (visión).  · Tiene rigidez en el nick.  · La zuhair brillante empieza a molestarlo.  Resumen  · El vértigo es la sensación de que usted o las cosas que lo rodean se mueven cuando en realidad eso no sucede.  · El médico le hará pruebas para encontrar la causa del vértigo.  · Shar vez le indiquen que evite algunas tareas, posiciones o movimientos.  · Comuníquese con un médico si los medicamentos no lo ayudan o si tiene fiebre, síntomas nuevos o un cambio en el comportamiento.  · Pida ayuda de inmediato si tiene claudia de cuauhtemoc muy intensos o si tiene cambios en la manera de hablar, oír o troy.  Esta información no tiene lita fin reemplazar el consejo del médico. Asegúrese de hacerle al médico cualquier pregunta que tenga.  Document Released: 01/20/2012 Document Revised: 01/06/2020 Document Reviewed: 01/06/2020  Elsevier Patient Education © 2020 [employer]  Wound Check  If you have a wound, it may take some time to heal. Eventually, a scar will form. The scar will also fade with time. It is important to take care of your wound while it is healing. This helps to protect your wound from infection.  How should I take care of my wound at home?  · Some wounds are allowed to close on their own or are repaired at a later date. There are many different ways to close and cover a wound, including stitches (sutures), skin glue, and adhesive strips. Follow your health care provider's instructions about:  ¨ Wound care.  ¨ Bandage (dressing) changes and removal.  ¨ Wound closure removal.  · Take medicines only as directed by your health care provider.  · Keep all follow-up visits as directed by your health care provider. This is important.  · Do not take baths, swim, or use a hot tub  until your health care provider approves. You may shower as directed by your health care provider.  · Keep your wound clean and dry.  What affects scar formation?  Scars affect each person differently. How your body scars depends on:  · The location and size of your wound.  · Traits that you inherited from your parents (genetic predisposition).  · How you take care of your wound. Irritation and inflammation increase the amount of scar formation.  · Sun exposure. This can darken a scar.  When should I call or see my health care provider?  Call or see your health care provider if:  · You have redness, swelling, or pain at your wound site.  · You have fluid, blood, or pus coming from your wound.  · You have muscle aches, chills, or a general ill feeling.  · You notice a bad smell coming from the wound.  · Your wound separates after the sutures, staples, or skin adhesive strips have been removed.  · You have persistent nausea or vomiting.  · You have a fever.  · You are dizzy.  When should I call 911 or go to the emergency room?  Call 911 or go to the emergency room if:  · You faint.  · You have difficulty breathing.  This information is not intended to replace advice given to you by your health care provider. Make sure you discuss any questions you have with your health care provider.  Document Released: 09/23/2005 Document Revised: 05/31/2017 Document Reviewed: 09/29/2015  [employer] Interactive Patient Education © 2017 [employer]  Cuidado de las heridas, en adultos  Wound Care, Adult  El cuidado correcto de las heridas puede ayudar a evitar el dolor y a prevenir las infecciones y formación de cicatrices. Además, puede ayudar a que la cicatrización sea más rápida.  Cómo cuidar la herida  Cuidados de la herida         · Siga las instrucciones del médico acerca del cuidado de la herida. Asegúrese de hacer lo siguiente:  ? Lávese las orestes con agua y jabón antes de cambiar la venda (vendaje). Use desinfectante para  orestes si no dispone de agua y jabón.  ? Cambie el vendaje lita se lo haya indicado el médico.  ? No retire los puntos (suturas), la goma para cerrar la piel o las tiras adhesivas. Es posible que estos cierres cutáneos deban quedar puestos en la piel olga 2 semanas o más tiempo. Si los bordes de las tiras adhesivas empiezan a despegarse y enroscarse, puede recortar los que estén sueltos. No retire las tiras adhesivas por completo a menos que el médico se lo indique.  · Controle la lashon de la herida todos los días para detectar signos de infección. Esté atento a los siguientes signos:  ? Enrojecimiento, hinchazón o dolor.  ? Líquido o joaquin.  ? Calor.  ? Pus o mal olor.  · Pregúntele al médico si debe limpiar la herida con agua y jabón suave. Hacer esto puede incluir lo siguiente:  ? Usar shane toalla limpia para secar la herida dando palmaditas después de limpiarla. No se frote ni restriegue la herida.  ? Aplicar shane crema o un ungüento. Hágalo únicamente lita se lo haya indicado el médico.  ? Cubrir la incisión con un vendaje limpio.  · Pregúntele al médico cuándo puede dejar la herida al descubierto.  · Mantenga el vendaje seco hasta que el médico le diga que se lo puede quitar. No tome omar de inmersión, nade, use el jacuzzi ni atilio ninguna actividad en la que sumerja la herida en agua hasta que el médico lo autorice. Pregúntele al médico si puede ducharse. Shar vez solo le permitan darse omar de esponja.  Medicamentos    · Si le recetaron un antibiótico, shane crema o un ungüento con antibiótico, tómelo o aplíqueselo lita se lo haya indicado el médico. No deje de tristen o de usar el antibiótico, aunque la afección mejore.  · [place] los medicamentos de venta diane y los recetados solamente lita se lo haya indicado el médico. Si le recetaron un analgésico, tómelo al menos 30 minutos antes de realizar el cuidado de la herida, lita se lo haya indicado el médico.  Instrucciones generales  · Reanude radha actividades  normales según lo indicado por el médico. Pregúntele al médico qué actividades son seguras.  · No se rasque ni se toque la herida.  · No consuma ningún producto que contenga nicotina o tabaco, lita cigarrillos y cigarrillos electrónicos. El consumo de esos productos puede demorar la cicatrización de la herida. Si necesita ayuda para dejar de fumar, consulte al médico.  · Concurra a todas las visitas de seguimiento lita se lo haya indicado el médico. Declo es importante.  · Siga shane dieta que incluya proteínas, vitaminas A y C y otros alimentos ricos en nutrientes que ayudarán a que la herida cicatrice.  ? Los alimentos ricos en proteínas incluyen carne, productos lácteos, frijoles y nueces, entre otras greene de proteína.  ? Los alimentos ricos en vitamina A incluyen zanahorias y verduras de hoja consuelo oscuro.  ? Los alimentos ricos en vitamina C incluyen cítricos, tomates y otras frutas y verduras.  ? Los alimentos ricos en nutrientes tienen proteínas, carbohidratos, grasas, vitaminas o minerales. Consuma shane variedad de alimentos saludables, que incluya frutas, verduras y cereales integrales.  Comuníquese con un médico si:  · Le aplicaron la antitetánica y tiene hinchazón, dolor intenso, enrojecimiento o hemorragia en el sitio de la inyección.  · El dolor no se juan con los medicamentos.  · Tiene enrojecimiento, hinchazón o dolor alrededor de la herida.  · Observa líquido o joaquin que sale de la herida.  · La herida se siente caliente al tacto.  · Tiene pus o percibe mal olor que emana de la herida.  · Tiene fiebre o siente escalofríos.  · Siente náuseas o vomita.  · Tiene mareos.  Solicite ayuda de inmediato si:  · Tiene shane línea haleigh que sale de la herida.  · Los bordes de la herida se abren y se separan.  · La herida sangra y el sangrado no se detiene con shane presión suave.  · Tiene shane erupción cutánea.  · Se desmaya.  · Tiene dificultad para respirar.  Resumen  · Siempre lávese las orestes con agua y jabón  antes de cambiar la venda (vendaje).  · Para ayudar con la cicatrización, coma alimentos ricos en proteínas, vitaminas A y C y demás nutrientes.  · Controle la herida todos los días para detectar signos de infección. Comuníquese con el médico si sospecha que la herida está infectada.  Esta información no tiene lita fin reemplazar el consejo del médico. Asegúrese de hacerle al médico cualquier pregunta que tenga.  Document Released: 08/16/2012 Document Revised: 04/01/2019 Document Reviewed: 07/04/2017  [employer] Patient Education © 2020 [employer]  Discharge Instructions    Discharged to home by car with relative. Discharged via wheelchair, hospital escort: Yes.  Special equipment needed: Oxygen    Be sure to schedule a follow-up appointment with your primary care doctor or any specialists as instructed.     Discharge Plan:   Diet Plan: Discussed  Activity Level: Discussed  Confirmed Follow up Appointment: Appointment Scheduled(pcp visit and hf md visit scheduled)  Confirmed Symptoms Management: Discussed  Medication Reconciliation Updated: Yes  Influenza Vaccine Indication: Patient Refuses    I understand that a diet low in cholesterol, fat, and sodium is recommended for good health. Unless I have been given specific instructions below for another diet, I accept this instruction as my diet prescription.   Other diet: diabetic diet      Special Instructions: None    · Is patient discharged on Warfarin / Coumadin?   No     Depression / Suicide Risk    [facility], it is important to learn how to keep safe from harming yourself.    Recognize the warning signs:  · Abrupt changes in personality, positive or negative- including increase in energy   · Giving away possessions  · Change in eating patterns- significant weight changes-  positive or negative  · Change in sleeping patterns- unable to sleep or sleeping all the time   · Unwillingness or inability to  communicate  · Depression  · Unusual sadness, discouragement and loneliness  · Talk of wanting to die  · Neglect of personal appearance   · Rebelliousness- reckless behavior  · Withdrawal from people/activities they love  · Confusion- inability to concentrate     If you or a loved one observes any of these behaviors or has concerns about self-harm, here's what you can do:  · Talk about it- your feelings and reasons for harming yourself  · Remove any means that you might use to hurt yourself (examples: pills, rope, extension cords, firearm)  · Get professional help from the community (Mental Health, Substance Abuse, psychological counseling)  · Do not be alone:Call your Safe Contact- someone whom you trust who will be there for you.  · Call your local CRISIS HOTLINE 668-8832 or 699-474-3308  · Call your local Children's Mobile Crisis Response Team [place] (329) 756-6666 or www.[employer]  · Call the toll free National Suicide Prevention Hotlines   · National Suicide Prevention Lifeline 364-819-BBWX (7840)  · National Hope Line Network 800-SUICIDE (499-7752)

## 2020-10-28 NOTE — WOUND TEAM
[facility]  Inpatient Services  Initial Wound and Skin Care Evaluation    Admission Date: 10/26/2020     Last order of IP CONSULT TO WOUND CARE was found on 10/26/2020 from Hospital Encounter on 10/26/2020       HPI, PMH, SH: Reviewed    Unit where seen by Wound Team: [id number]/01     WOUND CONSULT/FOLLOW UP RELATED TO:  L plantar foot    Self Report / Pain Level: denies    OBJECTIVE:  Pt on pressure redistribution mattress with waffle overlay. Pt able to turn self. O2 cannula in place with protective gray foam. This RN is a certified .    WOUND TYPE, LOCATION, CHARACTERISTICS (Pressure Injuries: location, stage, POA or date identified)       Wound 10/26/20 Diabetic Ulcer Foot Plantar Left (Active)       10/28/20 0900   Site Assessment Yellow;Intact;Dry    Periwound Assessment Callused    Margins Attached edges    Closure None    Drainage Amount None    Treatments CSWD - Conservative Sharp Wound Debridement    Wound Cleansing Approved Wound Cleanser    Periwound Protectant Skin Protectant Wipes to Periwound    Dressing Cleansing/Solutions Not Applicable    Dressing Options Hydrocolloid Thin    Dressing Changed New    Dressing Status Intact    Dressing Change/Treatment Frequency Every 72 hrs, and As Needed    NEXT Dressing Change/Treatment Date 10/31/20    NEXT Weekly Photo (Inpatient Only) 11/04/20    Non-staged Wound Description Not applicable    Wound Length (cm) 1 cm    Wound Width (cm) 2 cm    Wound Depth (cm) 0 cm    Wound Surface Area (cm^2) 2 cm^2    Wound Volume (cm^3) 0 cm^3    Post-Procedure Length (cm) 1.5 cm    Post-Procedure Width (cm) 2.5 cm    Post-Procedure Depth (cm) 0 cm    Post-Procedure Surface Area (cm^2) 3.75 cm^2    Post-Procedure Volume (cm^3) 0 cm^3    Tunneling (cm) 0 cm    Shape irregular    Wound Odor None    Pulses 2+;DP;PT;Doppler    Exposed Structures None       Vascular:    MARY:   No results found.      Lab Values:    Lab Results   Component Value Date/Time     WBC 9.5 10/28/2020 05:47 AM    RBC 4.25 (L) 10/28/2020 05:47 AM    HEMOGLOBIN 13.2 (L) 10/28/2020 05:47 AM    HEMATOCRIT 41.5 (L) 10/28/2020 05:47 AM    SEDRATEWES 52 (H) 10/26/2020 09:16 AM    HBA1C 8.0 (H) 05/23/2020 03:06 PM          Culture:   NA,   Culture Results show:  No results found for this or any previous visit (from the past 720 hour(s)).      INTERVENTIONS BY WOUND TEAM:  Callus and non viable tissue debrided away using scalpel forceps and scissors.  <20 cm2 debrided. No bleeding noted. Wound cleansed with with cleanser and gauze, patted dry then hydrocolloid thin applied.    Interdisciplinary consultation: Patient, Bedside RN, Ron WHITE APN    EVALUATION: Pt presenting with left Charcot's foot and dry callus on plantar aspect. Pt reports previous wound, however no wound revealed upon debridement. Pt reports neuropathy and pain with ambulation. Currently has no diabetic footwear. LPS contacted an will write rx for diabetic shoes and inserts. Pt educated on daily assessment of feet. Pt expressed understanding of delayed wound healing related to DM. Educated on following with a podiatrist for nail trimming and/or debridement of callus. Wound team signing off. Nursing to reconsult as needed for new or worsening wounds.    Goals: Steady decrease in wound area and depth weekly.    NURSING PLAN OF CARE ORDERS (X):    Dressing changes: See Dressing Care orders: x  Skin care: See Skin Care orders:   Rectal tube care: See Rectal Tube Care orders:   Other orders:    RSKIN:   CURRENTLY IN PLACE (X), APPLIED THIS VISIT (A), ORDERED (O):   Q shift Darius:  x  Q shift pressure point assessments:  x  Pressure redistribution mattress     x       Low Airloss          Bariatric RAINA         Bariatric foam           Heel float boots     Heel Silicone dressing        Float Heels off Bed with Pillows               Barrier wipes         Barrier Cream         Barrier paste          Sacral silicone dressing         Silicone O2  tubing x       Anchorfast         Cannula fixation Device (Tender )          Gray Foam Ear protectors       x    Trach with Optifoam split foam                 Waffle cushion        Waffle Overlay         Rectal tube or BMS    Purwick/Condom Cath          Antifungal tx      Interdry          Reposition q 2 hours    X turns self    Up to chair        Ambulate      PT/OT        Dietician        Diabetes Education      PO  x   TF     TPN     NPO   # days   Other        WOUND TEAM PLAN OF CARE   Dressing changes by wound team:          Follow up 1-2 times weekly:               Follow up 3 times weekly:                NPWT change 3 times weekly:     Follow up as needed:     x  Other (explain):     Anticipated discharge plans:  LTACH:        SNF/Rehab:                  Home Care:           Outpatient Wound Center:            Self Care:   x

## 2020-10-28 NOTE — CONSULTS
LIMB PRESERVATION SERVICE CONSULT      REFERRED BY: Dr. Amato    DATE OF CONSULTATION: 10/28/2020    REASON FOR CONSULT: Left medial plantar midfoot callus     HISTORY OF PRESENT ILLNESS: Chase Esquivel is a 70 y.o.  with a past medical history that includes type 2 diabetes, peripheral neuropathy, interstitial lung disease wears 2 L of home oxygen admitted 10/26/2020 for Vertigo.   LPS has been consulted for left medial plantar midfoot callus.       utilized via iPad.  Name and Number: [id number].  Patient reports he had a callus to the left plantar midfoot for approximately 2 years after his arch of left foot collapsed.  He had tried a cream and oral medication that were both not effective over a year ago.  No longer using the cream or taking the unknown oral medication.  He reports that he did have an ulcer to the area at one point but callus formed and covered the wound.  Since then patient has not applied any ointments, creams, soaks, or dressings to the callus.  Patient presented to hospital with complaints of dizziness and was diagnosed with vertigo.  MRI was completed and was negative.  He was seen by wound care team and callus was debrided.  No ulceration was noted underneath.  Patient is not on antibiotics.  Has not had x-ray of foot.  Patient denies any fevers, chills, nausea, vomiting, dizziness, or cramping with ambulation or at rest to BLE.    Diagnosed with diabetes 20 years ago, and is currently managing with insulin and oral diabetic agents.  Checks blood sugars every third or fourth day and averages between 120-160.  Has had previous diabetes education.  Does have numbness to feet.  Checks feet routinely. Usually wears slippers inside the house and tennis shoes when outside the house.  Did have diabetic shoes and inserts from [employer] that were several years old.  Patient has since modified his own insert to take the pressure off of the left midfoot.  Has not had  "previous foot surgery.      Smoking:   reports that he quit smoking about 25 years ago. His smoking use included cigarettes. He has a 80.00 pack-year smoking history. He has never used smokeless tobacco.    Alcohol:   reports current alcohol use.  1 beer every 3 months.    Drug:   reports no history of drug use.      PAST MEDICAL HISTORY:   Past Medical History:   Diagnosis Date   • Diabetes (HCC)    • Pulmonary fibrosis (HCC)     wears 2.5L O2 at home 24/7        PAST SURGICAL HISTORY:   Past Surgical History:   Procedure Laterality Date   • OTHER      Kidney stones   • OTHER ABDOMINAL SURGERY      \"from an ulcer that burst\"       MEDICATIONS:   Current Facility-Administered Medications   Medication Dose   • meclizine (ANTIVERT) tablet 25 mg  25 mg   • glycopyrrolate (Seebri) 15.6 mcg inhalation capsule 1 Cap  1 Cap   • metFORMIN (GLUCOPHAGE) tablet 1,000 mg  1,000 mg   • glipiZIDE (GLUCOTROL) tablet 2.5 mg  2.5 mg   • senna-docusate (PERICOLACE or SENOKOT S) 8.6-50 MG per tablet 2 Tab  2 Tab    And   • polyethylene glycol/lytes (MIRALAX) PACKET 1 Packet  1 Packet    And   • magnesium hydroxide (MILK OF MAGNESIA) suspension 30 mL  30 mL    And   • bisacodyl (DULCOLAX) suppository 10 mg  10 mg   • Respiratory Therapy Consult     • enoxaparin (LOVENOX) inj 40 mg  40 mg   • acetaminophen (TYLENOL) tablet 650 mg  650 mg   • ondansetron (ZOFRAN) syringe/vial injection 4 mg  4 mg   • ondansetron (ZOFRAN ODT) dispertab 4 mg  4 mg   • insulin regular (HumuLIN R,NovoLIN R) injection  2-9 Units    And   • glucose 4 g chewable tablet 16 g  16 g    And   • dextrose 50% (D50W) injection 50 mL  50 mL       ALLERGIES:  No Known Allergies     FAMILY HISTORY: History reviewed. No pertinent family history.      REVIEW OF SYSTEMS:   Constitutional: Negative for chills, fever   Respiratory: Negative for cough and shortness of breath.    Cardiovascular:Negative for chest pain, and claudication.   Gastrointestinal: Negative for " "constipation, diarrhea, nausea and vomiting.   Lower extremities: Negative for swelling and redness  Neurological: positive for numbness to feet and lower legs  All other systems reviewed and are negative     RESULTS:     Recent Labs     10/26/20  0916 10/28/20  0547   WBC 12.0* 9.5   RBC 3.88* 4.25*   HEMOGLOBIN 12.1* 13.2*   HEMATOCRIT 38.0* 41.5*   MCV 97.9* 97.6   MCH 31.2 31.1   MCHC 31.8* 31.8*   RDW 49.0 47.4   PLATELETCT 161* 167   MPV 12.7 12.2     Recent Labs     10/26/20  0916 10/28/20  0547   SODIUM 137 136   POTASSIUM 4.8 4.3   CHLORIDE 102 101   CO2 25 25   GLUCOSE 130* 122*   BUN 25* 23*         ESR:     Results from last 7 days   Lab Units 10/26/20  0916   SED RATE WESTERGREN 1526 mm/hour 52*       CRP:       None this admission      COVID-19: None performed this admission    X-ray: None of foot in chart    MRI: N/A    Arterial studies: None in chart    A1c:  Lab Results   Component Value Date/Time    HBA1C 8.0 (H) 05/23/2020 03:06 PM            Microbiology:  Results     Procedure Component Value Units Date/Time    Routine (COVID/SARS COV-2 In-House PCR up to 24 hours) [[id number]]     Order Status: Completed Specimen: Respirate from Nasopharyngeal            PHYSICAL EXAMINATION:     VITAL SIGNS: /52   Pulse 82   Temp 36.8 °C (98.3 °F) (Temporal)   Resp 16   Ht 1.575 m (5' 2\")   Wt 58.8 kg (129 lb 10.1 oz)   SpO2 94%   BMI 23.71 kg/m²       General Appearance:  Well developed, well nourished, in no acute distress    Lower Extremity Assessment:    Edema:   No edema noted to feet or lower legs bilaterally    Pulses:  R foot: Faintly palpable DP.  Unable to palpate PT.  With Doppler brisk monophasic tones heard to PT and DP  L foot: Faintly palpable DP.  Unable to palpate PT.  With Doppler monophasic tone heard to DP, weak monophasic tone heard to PT    ROM dorsi/plantarflexion  Intact    Structural /mechanical changes:  Charcot midfoot collapse to left foot  Flexible hammertoes left foot " 2 through 5    Sensory Assessment  Toes insensate to light touch bilaterally however remainder foot patient is able to sense with light touch  Right foot sensed 7/10  Left foot sensed 7/10      Wound Assessment:      Left plantar medial midfoot:  Callus previously removed.  Skin intact underneath, flaky, dry.  Hydrocolloid was applied by wound care RN.  No erythema, no edema, no drainage from site.    Toenails are not overgrown, not mycotic  Apply dimethicone lotion to both feet avoiding dressing.              ASSESSMENT AND PLAN:   70-year-old male with diabetes, left Charcot foot, peripheral neuropathy.  Admitted for vertigo which has since resolved.  Callus to left plantar medial midfoot has been debrided by wound care.  Discussed with patient foot health and maintenance, and dressing changes.  Recommend patient protect feet in diabetic shoes and inserts to offload the Charcot foot to prevent further callus and/or ulceration.  Discussed with patient if Charcot foot worsens or patient develops an ulceration to seek medical care.  Patient with diminished pulses without ulcerations to feet or ischemic changes..  Patient denies any claudication pain to BLE.  Follow-up with primary care for arterial studies as patient is planning discharge today.      Wound care:   -Wound care orders placed for nursing by wound team  -Hydrocolloid to be changed every 72 hours.  Reinforced with Hypafix tape as needed.  Continue with hydrocolloid dressing until skin is soft and supple.    Imaging/Labs:  -COVID-19: Not completed this admission  -Arterial studies.  If not completed before patient discharges, patient to follow-up with obtaining arterial studies on outpatient basis.    Vascular status:   -Diminished pedal pulses.  With Doppler brisk monophasic on right foot, weak monophasic on the foot.    Surgery:   -No indications for surgery for foot at this time.    Antibiotics:   -No signs and symptoms of infection.  Not on  antibiotics.    Weight Bearing Status:   -Weight bearing as tolerated    Offloading:   None indicated at this time.  -Orthotic company: none. Rx given to patient for diabetic shoes and inserts through [employer]    PT/OT:   -Not indicated at this time      Diabetes Education:   Previously seen diabetes educator.  Not involved this admission, patient to discharge today.  Last A1c 8% on 5/23/2020    - Implications of loss of protective sensation (LOPS) discussed with patient- including increased risk for amputation.  Advised to check feet at least daily, moisturize feet, and to always wear protective foot wear.   -avoid trimming own nails. See podiatrist or certified foot and nail RN  -keep blood sugars <150 for improved wound healing        D/W: pt, RN, Dr. Amato      Discharge Plan:  Home    Follow up: PCP for follow-up for diabetes, diminished pulses, podiatrist for foot nail care, [employer] for shoes and inserts        Please note that this dictation was created using voice recognition software. I have  worked with technical experts from [facility]  [employer] to optimize the interface.  I have made every reasonable attempt to correct obvious errors, but there may be errors of grammar and possibly content that I did not discover before finalizing the note.

## 2020-10-28 NOTE — DISCHARGE SUMMARY
Discharge Summary    CHIEF COMPLAINT ON ADMISSION  Chief Complaint   Patient presents with   • Shortness of Breath     x3 days   • Dizziness     x3 days   • Headache     x3 days       Reason for Admission  SOB     Admission Date  10/26/2020    CODE STATUS  Full Code    HPI & HOSPITAL COURSE    70 y.o. male who presented 10/26/2020 with history of interstitial lung disease, type 2 diabetes dizziness.   Please refer to H&P for detail.  He described it as room spinning sensation and he was diagnosed with vertigo. Patient has chronic respiratory failure as well and is dependent on 2 L of home oxygen.    CT scan of the head was done and negative.  MRI was done to rule out central vertigo and it was negative.  His symptom has been improving.  Patient was started on meclizine as needed.  PT OT saw him before discharge.  I saw and examined the patient upon discharge.   Please call [phone] to schedule PCP appointment for patient.    Required specialty appointments include:         Therefore, he is discharged in fair and stable condition to home with close outpatient follow-up.        Discharge Date  10/28/20      FOLLOW UP ITEMS POST DISCHARGE      DISCHARGE DIAGNOSES  Active Problems:    Diabetes mellitus type II, uncontrolled (HCC) POA: Yes    Interstitial lung disease (HCC) POA: Yes    Stage 3 chronic kidney disease POA: Unknown    Chronic respiratory failure with hypoxia (HCC) POA: Unknown    Vertigo POA: Unknown  Resolved Problems:    * No resolved hospital problems. *      FOLLOW UP  Future Appointments   Date Time Provider Department Center   12/4/2020 10:00 AM [facility] None     HAZEL Kimble  [address]  [place] [id number] [phone]  Go on 11/4/2020  Please go to PCP appt on Wednesday, November 4 at 11:30 am with 11:15 am check-in time. Please wear a face covering.     Rashmi Cedeno P.A.-C.  [address] [place] [id number] [phone]    In 1  week        MEDICATIONS ON DISCHARGE     Medication List      START taking these medications      Instructions   meclizine 25 MG Tabs  Commonly known as: ANTIVERT   Take 1 Tab by mouth 3 times a day as needed for Dizziness or Vertigo.  Dose: 25 mg        CONTINUE taking these medications      Instructions   albuterol 108 (90 Base) MCG/ACT Aers inhalation aerosol   Inhale 2 Puffs by mouth every 6 hours as needed for Shortness of Breath.  Dose: 2 Puff     * Blood Glucose Meter Kit   Doctor's comments: Or per formulary preference.DMII  Test blood sugar as recommended by provider.blood glucose monitoring kit. BID     * Blood Glucose Test Strips   Doctor's comments: Or per formulary preference. DM II  One test strip BID     glipiZIDE 5 MG Tabs  Commonly known as: GLUCOTROL   Take 2.5 mg by mouth 2 times a day. 0.5 tablets = 2.5 mg  Dose: 2.5 mg     metFORMIN 500 MG Tabs  Commonly known as: GLUCOPHAGE   Take 1,000 mg by mouth 2 Times a Day.  Dose: 1,000 mg     OneTouch Delica Plus [id number] Misc   Doctor's comments: **Patient requests 90 days supply**  USE TO TEST TWICE DAILY     SITagliptin 25 MG Tabs  Commonly known as: Januvia   Take 1 Tab by mouth every day.  Dose: 25 mg     Spiriva HandiHaler 18 MCG Caps  Generic drug: tiotropium   Inhale 1 Cap by mouth every day.  Dose: 18 mcg         * This list has 2 medication(s) that are the same as other medications prescribed for you. Read the directions carefully, and ask your doctor or other care provider to review them with you.            STOP taking these medications    predniSONE 10 MG Tabs  Commonly known as: DELTASONE            Allergies  No Known Allergies    DIET  Orders Placed This Encounter   Procedures   • Diet Order Diabetic     Standing Status:   Standing     Number of Occurrences:   1     Order Specific Question:   Diet:     Answer:   Diabetic [3]       ACTIVITY  As tolerated.  Weight bearing as tolerated    CONSULTATIONS      PROCEDURES      LABORATORY  Lab  Results   Component Value Date    SODIUM 136 10/28/2020    POTASSIUM 4.3 10/28/2020    CHLORIDE 101 10/28/2020    CO2 25 10/28/2020    GLUCOSE 122 (H) 10/28/2020    BUN 23 (H) 10/28/2020    CREATININE 1.19 10/28/2020        Lab Results   Component Value Date    WBC 9.5 10/28/2020    HEMOGLOBIN 13.2 (L) 10/28/2020    HEMATOCRIT 41.5 (L) 10/28/2020    PLATELETCT 167 10/28/2020        Total time of the discharge process exceeds 39 minutes.

## 2020-10-28 NOTE — PROGRESS NOTES
Report received from DORA Alas. Pt out of room to MRI with transport. Pt off box. Monitor room notified.

## 2020-10-28 NOTE — PROGRESS NOTES
Talked with son, pt to discharge today. Waiting for son to come to do discharge teaching as requested.

## 2020-10-28 NOTE — PROGRESS NOTES
Received report from DORA Weaver. First assessment completed, fall precautions in place, cardiac monitor on. Patient resting comfortably in bed. Will continue to monitor.

## 2020-10-28 NOTE — CARE PLAN
Problem: Communication  Goal: The ability to communicate needs accurately and effectively will improve  Outcome: PROGRESSING SLOWER THAN EXPECTED  Note: Call light within reach. Educated pt to use call light as needed. Language Line for English-Faroese translations as needed. Reorient and re-educate as needed. Continue to monitor.       Problem: Knowledge Deficit  Goal: Knowledge of disease process/condition, treatment plan, diagnostic tests, and medications will improve  Outcome: PROGRESSING SLOWER THAN EXPECTED  Note: Discuss and review POC with patient/family. Re-educate as needed.         Problem: Knowledge Deficit  Goal: Knowledge of disease process/condition, treatment plan, diagnostic tests, and medications will improve  Outcome: PROGRESSING SLOWER THAN EXPECTED  Note: Discuss and review POC with patient/family. Re-educate as needed.

## 2020-10-28 NOTE — THERAPY
Physical Therapy Contact Note    PT consult for Epley's Maneuver received and acknowledged. Discussed with RN, patient is no longer experiencing dizziness. Will hold PT eval and monitor for symptoms and need for PT intervention.    Mame Mar, PT, DPT  [phone]

## 2020-11-03 ENCOUNTER — HOSPITAL ENCOUNTER (INPATIENT)
Facility: MEDICAL CENTER | Age: 70
LOS: 4 days | DRG: 286 | End: 2020-11-07
Attending: EMERGENCY MEDICINE | Admitting: HOSPITALIST
Payer: MEDICARE

## 2020-11-03 ENCOUNTER — APPOINTMENT (OUTPATIENT)
Dept: RADIOLOGY | Facility: MEDICAL CENTER | Age: 70
DRG: 286 | End: 2020-11-03
Attending: EMERGENCY MEDICINE
Payer: MEDICARE

## 2020-11-03 DIAGNOSIS — I25.10 CORONARY ARTERY DISEASE INVOLVING NATIVE CORONARY ARTERY OF NATIVE HEART WITHOUT ANGINA PECTORIS: ICD-10-CM

## 2020-11-03 DIAGNOSIS — R42 POSTURAL DIZZINESS WITH PRESYNCOPE: ICD-10-CM

## 2020-11-03 DIAGNOSIS — R79.89 ELEVATED TROPONIN: ICD-10-CM

## 2020-11-03 DIAGNOSIS — R00.1 BRADYCARDIA: ICD-10-CM

## 2020-11-03 DIAGNOSIS — R42 DIZZINESS: ICD-10-CM

## 2020-11-03 DIAGNOSIS — R55 POSTURAL DIZZINESS WITH PRESYNCOPE: ICD-10-CM

## 2020-11-03 DIAGNOSIS — J96.01 ACUTE RESPIRATORY FAILURE WITH HYPOXIA (HCC): ICD-10-CM

## 2020-11-03 DIAGNOSIS — J96.11 CHRONIC RESPIRATORY FAILURE WITH HYPOXIA (HCC): ICD-10-CM

## 2020-11-03 LAB
ALBUMIN SERPL BCP-MCNC: 3.6 G/DL (ref 3.2–4.9)
ALBUMIN/GLOB SERPL: 0.9 G/DL
ALP SERPL-CCNC: 62 U/L (ref 30–99)
ALT SERPL-CCNC: 41 U/L (ref 2–50)
ANION GAP SERPL CALC-SCNC: 9 MMOL/L (ref 7–16)
AST SERPL-CCNC: 20 U/L (ref 12–45)
BASOPHILS # BLD AUTO: 0.8 % (ref 0–1.8)
BASOPHILS # BLD: 0.07 K/UL (ref 0–0.12)
BILIRUB SERPL-MCNC: 0.5 MG/DL (ref 0.1–1.5)
BUN SERPL-MCNC: 31 MG/DL (ref 8–22)
CALCIUM SERPL-MCNC: 8.7 MG/DL (ref 8.5–10.5)
CHLORIDE SERPL-SCNC: 102 MMOL/L (ref 96–112)
CO2 SERPL-SCNC: 23 MMOL/L (ref 20–33)
COVID ORDER STATUS COVID19: NORMAL
CREAT SERPL-MCNC: 1.51 MG/DL (ref 0.5–1.4)
EKG IMPRESSION: NORMAL
EKG IMPRESSION: NORMAL
EOSINOPHIL # BLD AUTO: 0.11 K/UL (ref 0–0.51)
EOSINOPHIL NFR BLD: 1.2 % (ref 0–6.9)
ERYTHROCYTE [DISTWIDTH] IN BLOOD BY AUTOMATED COUNT: 51.2 FL (ref 35.9–50)
GLOBULIN SER CALC-MCNC: 3.9 G/DL (ref 1.9–3.5)
GLUCOSE BLD-MCNC: 151 MG/DL (ref 65–99)
GLUCOSE BLD-MCNC: 156 MG/DL (ref 65–99)
GLUCOSE BLD-MCNC: 225 MG/DL (ref 65–99)
GLUCOSE SERPL-MCNC: 150 MG/DL (ref 65–99)
HCT VFR BLD AUTO: 37.7 % (ref 42–52)
HGB BLD-MCNC: 11.9 G/DL (ref 14–18)
IMM GRANULOCYTES # BLD AUTO: 0.09 K/UL (ref 0–0.11)
IMM GRANULOCYTES NFR BLD AUTO: 1 % (ref 0–0.9)
LYMPHOCYTES # BLD AUTO: 1.56 K/UL (ref 1–4.8)
LYMPHOCYTES NFR BLD: 17.7 % (ref 22–41)
MAGNESIUM SERPL-MCNC: 2.2 MG/DL (ref 1.5–2.5)
MCH RBC QN AUTO: 31.4 PG (ref 27–33)
MCHC RBC AUTO-ENTMCNC: 31.6 G/DL (ref 33.7–35.3)
MCV RBC AUTO: 99.5 FL (ref 81.4–97.8)
MONOCYTES # BLD AUTO: 0.75 K/UL (ref 0–0.85)
MONOCYTES NFR BLD AUTO: 8.5 % (ref 0–13.4)
NEUTROPHILS # BLD AUTO: 6.24 K/UL (ref 1.82–7.42)
NEUTROPHILS NFR BLD: 70.8 % (ref 44–72)
NRBC # BLD AUTO: 0 K/UL
NRBC BLD-RTO: 0 /100 WBC
PLATELET # BLD AUTO: 127 K/UL (ref 164–446)
PMV BLD AUTO: 12.8 FL (ref 9–12.9)
POTASSIUM SERPL-SCNC: 4.6 MMOL/L (ref 3.6–5.5)
PROT SERPL-MCNC: 7.5 G/DL (ref 6–8.2)
RBC # BLD AUTO: 3.79 M/UL (ref 4.7–6.1)
SARS-COV-2 RNA RESP QL NAA+PROBE: NOTDETECTED
SODIUM SERPL-SCNC: 134 MMOL/L (ref 135–145)
SPECIMEN SOURCE: NORMAL
TROPONIN T SERPL-MCNC: 115 NG/L (ref 6–19)
TROPONIN T SERPL-MCNC: 118 NG/L (ref 6–19)
WBC # BLD AUTO: 8.8 K/UL (ref 4.8–10.8)

## 2020-11-03 PROCEDURE — 99285 EMERGENCY DEPT VISIT HI MDM: CPT

## 2020-11-03 PROCEDURE — 700102 HCHG RX REV CODE 250 W/ 637 OVERRIDE(OP): Performed by: STUDENT IN AN ORGANIZED HEALTH CARE EDUCATION/TRAINING PROGRAM

## 2020-11-03 PROCEDURE — 83735 ASSAY OF MAGNESIUM: CPT

## 2020-11-03 PROCEDURE — 82962 GLUCOSE BLOOD TEST: CPT | Mod: 91

## 2020-11-03 PROCEDURE — 700102 HCHG RX REV CODE 250 W/ 637 OVERRIDE(OP): Performed by: INTERNAL MEDICINE

## 2020-11-03 PROCEDURE — 700111 HCHG RX REV CODE 636 W/ 250 OVERRIDE (IP): Performed by: STUDENT IN AN ORGANIZED HEALTH CARE EDUCATION/TRAINING PROGRAM

## 2020-11-03 PROCEDURE — 700105 HCHG RX REV CODE 258: Performed by: STUDENT IN AN ORGANIZED HEALTH CARE EDUCATION/TRAINING PROGRAM

## 2020-11-03 PROCEDURE — 93005 ELECTROCARDIOGRAM TRACING: CPT

## 2020-11-03 PROCEDURE — 80053 COMPREHEN METABOLIC PANEL: CPT

## 2020-11-03 PROCEDURE — 85025 COMPLETE CBC W/AUTO DIFF WBC: CPT

## 2020-11-03 PROCEDURE — A9270 NON-COVERED ITEM OR SERVICE: HCPCS | Performed by: STUDENT IN AN ORGANIZED HEALTH CARE EDUCATION/TRAINING PROGRAM

## 2020-11-03 PROCEDURE — U0003 INFECTIOUS AGENT DETECTION BY NUCLEIC ACID (DNA OR RNA); SEVERE ACUTE RESPIRATORY SYNDROME CORONAVIRUS 2 (SARS-COV-2) (CORONAVIRUS DISEASE [COVID-19]), AMPLIFIED PROBE TECHNIQUE, MAKING USE OF HIGH THROUGHPUT TECHNOLOGIES AS DESCRIBED BY CMS-2020-01-R: HCPCS

## 2020-11-03 PROCEDURE — 71045 X-RAY EXAM CHEST 1 VIEW: CPT

## 2020-11-03 PROCEDURE — 36415 COLL VENOUS BLD VENIPUNCTURE: CPT

## 2020-11-03 PROCEDURE — 99223 1ST HOSP IP/OBS HIGH 75: CPT | Performed by: INTERNAL MEDICINE

## 2020-11-03 PROCEDURE — 84484 ASSAY OF TROPONIN QUANT: CPT | Mod: 91

## 2020-11-03 PROCEDURE — A9270 NON-COVERED ITEM OR SERVICE: HCPCS | Performed by: INTERNAL MEDICINE

## 2020-11-03 PROCEDURE — 93005 ELECTROCARDIOGRAM TRACING: CPT | Performed by: EMERGENCY MEDICINE

## 2020-11-03 PROCEDURE — 770020 HCHG ROOM/CARE - TELE (206)

## 2020-11-03 PROCEDURE — 700105 HCHG RX REV CODE 258: Performed by: HOSPITALIST

## 2020-11-03 PROCEDURE — C9803 HOPD COVID-19 SPEC COLLECT: HCPCS | Performed by: HOSPITALIST

## 2020-11-03 RX ORDER — ALBUTEROL SULFATE 90 UG/1
2 AEROSOL, METERED RESPIRATORY (INHALATION) EVERY 6 HOURS PRN
Status: DISCONTINUED | OUTPATIENT
Start: 2020-11-03 | End: 2020-11-07 | Stop reason: HOSPADM

## 2020-11-03 RX ORDER — ACETAMINOPHEN 325 MG/1
650 TABLET ORAL EVERY 6 HOURS PRN
Status: DISCONTINUED | OUTPATIENT
Start: 2020-11-03 | End: 2020-11-07 | Stop reason: HOSPADM

## 2020-11-03 RX ORDER — BISACODYL 10 MG
10 SUPPOSITORY, RECTAL RECTAL
Status: DISCONTINUED | OUTPATIENT
Start: 2020-11-03 | End: 2020-11-07 | Stop reason: HOSPADM

## 2020-11-03 RX ORDER — AMOXICILLIN 250 MG
2 CAPSULE ORAL 2 TIMES DAILY
Status: DISCONTINUED | OUTPATIENT
Start: 2020-11-04 | End: 2020-11-07 | Stop reason: HOSPADM

## 2020-11-03 RX ORDER — LABETALOL HYDROCHLORIDE 5 MG/ML
10 INJECTION, SOLUTION INTRAVENOUS EVERY 4 HOURS PRN
Status: DISCONTINUED | OUTPATIENT
Start: 2020-11-03 | End: 2020-11-07 | Stop reason: HOSPADM

## 2020-11-03 RX ORDER — DEXTROSE AND SODIUM CHLORIDE 5; .9 G/100ML; G/100ML
INJECTION, SOLUTION INTRAVENOUS CONTINUOUS
Status: DISCONTINUED | OUTPATIENT
Start: 2020-11-03 | End: 2020-11-04

## 2020-11-03 RX ORDER — SODIUM CHLORIDE 9 MG/ML
INJECTION, SOLUTION INTRAVENOUS CONTINUOUS
Status: DISCONTINUED | OUTPATIENT
Start: 2020-11-03 | End: 2020-11-03

## 2020-11-03 RX ORDER — POLYETHYLENE GLYCOL 3350 17 G/17G
1 POWDER, FOR SOLUTION ORAL
Status: DISCONTINUED | OUTPATIENT
Start: 2020-11-03 | End: 2020-11-07 | Stop reason: HOSPADM

## 2020-11-03 RX ORDER — DEXTROSE MONOHYDRATE 25 G/50ML
50 INJECTION, SOLUTION INTRAVENOUS
Status: DISCONTINUED | OUTPATIENT
Start: 2020-11-03 | End: 2020-11-07 | Stop reason: HOSPADM

## 2020-11-03 RX ORDER — HEPARIN SODIUM 5000 [USP'U]/ML
5000 INJECTION, SOLUTION INTRAVENOUS; SUBCUTANEOUS EVERY 8 HOURS
Status: DISCONTINUED | OUTPATIENT
Start: 2020-11-03 | End: 2020-11-07 | Stop reason: HOSPADM

## 2020-11-03 RX ORDER — INSULIN GLARGINE 100 [IU]/ML
10 INJECTION, SOLUTION SUBCUTANEOUS EVERY EVENING
Status: DISCONTINUED | OUTPATIENT
Start: 2020-11-03 | End: 2020-11-07 | Stop reason: HOSPADM

## 2020-11-03 RX ADMIN — SODIUM CHLORIDE: 9 INJECTION, SOLUTION INTRAVENOUS at 18:02

## 2020-11-03 RX ADMIN — ASPIRIN 81 MG: 81 TABLET, COATED ORAL at 18:03

## 2020-11-03 RX ADMIN — INSULIN HUMAN 1 UNITS: 100 INJECTION, SOLUTION PARENTERAL at 20:48

## 2020-11-03 RX ADMIN — INSULIN HUMAN 1 UNITS: 100 INJECTION, SOLUTION PARENTERAL at 17:00

## 2020-11-03 RX ADMIN — INSULIN GLARGINE 10 UNITS: 100 INJECTION, SOLUTION SUBCUTANEOUS at 20:47

## 2020-11-03 RX ADMIN — HEPARIN SODIUM 5000 UNITS: 5000 INJECTION, SOLUTION INTRAVENOUS; SUBCUTANEOUS at 20:05

## 2020-11-03 RX ADMIN — DEXTROSE AND SODIUM CHLORIDE: 5; 900 INJECTION, SOLUTION INTRAVENOUS at 19:12

## 2020-11-03 RX ADMIN — GLYCOPYRROLATE 1 CAPSULE: 15.6 CAPSULE RESPIRATORY (INHALATION) at 18:12

## 2020-11-03 ASSESSMENT — ENCOUNTER SYMPTOMS
BLOOD IN STOOL: 0
LOSS OF CONSCIOUSNESS: 1
NERVOUS/ANXIOUS: 0
HALLUCINATIONS: 0
SORE THROAT: 0
DIAPHORESIS: 0
WEAKNESS: 0
BLURRED VISION: 0
BACK PAIN: 0
BRUISES/BLEEDS EASILY: 0
WEIGHT LOSS: 0
DOUBLE VISION: 0
NECK PAIN: 0
SPUTUM PRODUCTION: 0
FEVER: 0
TINGLING: 0
EYE PAIN: 0
HEADACHES: 0
HEARTBURN: 0
FLANK PAIN: 0
HEMOPTYSIS: 0
FOCAL WEAKNESS: 0
CHILLS: 0
COUGH: 0
NAUSEA: 0
FALLS: 0
SENSORY CHANGE: 0
ABDOMINAL PAIN: 0
DIARRHEA: 0
VOMITING: 0
ORTHOPNEA: 0
POLYDIPSIA: 0
TREMORS: 0
PHOTOPHOBIA: 0
DEPRESSION: 0
DIZZINESS: 1
CONSTIPATION: 0
PALPITATIONS: 1
SINUS PAIN: 0
INSOMNIA: 0
MEMORY LOSS: 0
MYALGIAS: 0
CLAUDICATION: 0
SEIZURES: 0

## 2020-11-03 ASSESSMENT — COGNITIVE AND FUNCTIONAL STATUS - GENERAL
WALKING IN HOSPITAL ROOM: A LITTLE
MOBILITY SCORE: 20
CLIMB 3 TO 5 STEPS WITH RAILING: A LITTLE
DAILY ACTIVITIY SCORE: 23
SUGGESTED CMS G CODE MODIFIER MOBILITY: CJ
DRESSING REGULAR UPPER BODY CLOTHING: A LITTLE
STANDING UP FROM CHAIR USING ARMS: A LITTLE
MOVING TO AND FROM BED TO CHAIR: A LITTLE
SUGGESTED CMS G CODE MODIFIER DAILY ACTIVITY: CI

## 2020-11-03 ASSESSMENT — FIBROSIS 4 INDEX
FIB4 SCORE: 1.48
FIB4 SCORE: 1.75

## 2020-11-03 ASSESSMENT — PATIENT HEALTH QUESTIONNAIRE - PHQ9
SUM OF ALL RESPONSES TO PHQ9 QUESTIONS 1 AND 2: 0
2. FEELING DOWN, DEPRESSED, IRRITABLE, OR HOPELESS: NOT AT ALL
1. LITTLE INTEREST OR PLEASURE IN DOING THINGS: NOT AT ALL

## 2020-11-03 ASSESSMENT — LIFESTYLE VARIABLES
CONSUMPTION TOTAL: NEGATIVE
AVERAGE NUMBER OF DAYS PER WEEK YOU HAVE A DRINK CONTAINING ALCOHOL: 0
EVER FELT BAD OR GUILTY ABOUT YOUR DRINKING: NO
ON A TYPICAL DAY WHEN YOU DRINK ALCOHOL HOW MANY DRINKS DO YOU HAVE: 0
HOW MANY TIMES IN THE PAST YEAR HAVE YOU HAD 5 OR MORE DRINKS IN A DAY: 0
EVER HAD A DRINK FIRST THING IN THE MORNING TO STEADY YOUR NERVES TO GET RID OF A HANGOVER: NO
TOTAL SCORE: 0
TOTAL SCORE: 0
ALCOHOL_USE: NO
TOTAL SCORE: 0
HAVE YOU EVER FELT YOU SHOULD CUT DOWN ON YOUR DRINKING: NO
HAVE PEOPLE ANNOYED YOU BY CRITICIZING YOUR DRINKING: NO
DOES PATIENT WANT TO STOP DRINKING: NO
SUBSTANCE_ABUSE: 0

## 2020-11-03 NOTE — NON-PROVIDER
"History & Physical Note    Date of Admission: 11/3/2020  Admission Status: Inpatient  [facility] Team: [facility]  Attending: Dr. Harvey M.D  Senior Resident: Dr. Anjali M.D  Intern: Dr. Mj M.D  Contact Number: [phone]    History obtained from son. He was the .     Chief Complaint: Syncope    History of Present Illness (HPI): Mr. Harris is a 70 y.o. male who presented on 11/3/2020 to the ED after \"passing out\". Patient has a PMH of interstitial lung disease (uses 2L of home oxygen) and non insulin diabetes type 2. Patient glucose level ranges from 150-180. Patient was recently admitted on 10/26/2020 for dizziness. Cardiac imaging work up showed no change from previous imaging/ekg. Pt was discharged on 10/28 with a diagnosis of vertigo. Per son, patient current symptoms started on Friday. Everytime patient gets up, he becomes lightheaded and sometimes loses consciousness. He reports these episodes last for a few minutes then it resolves. He denied any vision changes or headache during this episode. No fever, chills or cough.    Review of Systems:   Review of Systems   Constitutional: Negative for chills, diaphoresis and fever.   HENT: Negative for ear pain, nosebleeds, sinus pain and sore throat.    Eyes: Negative for blurred vision, double vision, photophobia and pain.   Respiratory: Negative for cough and hemoptysis.    Cardiovascular: Positive for palpitations. Negative for chest pain.   Gastrointestinal: Negative for abdominal pain, constipation, diarrhea, heartburn, nausea and vomiting.   Musculoskeletal: Negative for back pain and joint pain.   Skin: Negative for rash.   Neurological: Positive for dizziness and loss of consciousness. Negative for sensory change, focal weakness, seizures and headaches.        Lightheaded before losing consciousness    Psychiatric/Behavioral: Negative for substance abuse.   All other systems reviewed and are negative.      Past Medical History:   Past " Medical History was reviewed with patient.  Patient has a past medical history of Diabetes and interstitial lung disease.    Past Surgical History: Past Surgical History was reviewed with patient.   has a past surgical history that includes other abdominal surgery and other.    Medications: Medications have been reviewed with patient.  Prior to Admission Medications   Prescriptions Last Dose Informant Patient Reported? Taking?   Blood Glucose Meter Kit unknown at Unknown time Family Member No No   Sig: Test blood sugar as recommended by provider.blood glucose monitoring kit. BID   Blood Glucose Test Strips unknown at Unknown time Family Member No No   Sig: One test strip BID   Lancets (ONETOUCH DELICA PLUS [id number]) Misc unknown at Unknown time Family Member No No   Sig: USE TO TEST TWICE DAILY   SITagliptin (JANUVIA) 25 MG Tab 11/3/2020 at 0800 Family Member No No   Sig: Take 1 Tab by mouth every day.   albuterol 108 (90 Base) MCG/ACT Aero Soln inhalation aerosol unknown at Unknown time Family Member No No   Sig: Inhale 2 Puffs by mouth every 6 hours as needed for Shortness of Breath.   glipiZIDE (GLUCOTROL) 5 MG Tab 11/3/2020 at 0800 Family Member Yes No   Sig: Take 2.5 mg by mouth every morning.   meclizine (ANTIVERT) 25 MG Tab Not Taking at Unknown time  No No   Sig: Take 1 Tab by mouth 3 times a day as needed for Dizziness or Vertigo.   Patient not taking: Reported on 11/3/2020   metFORMIN (GLUCOPHAGE) 500 MG Tab 11/3/2020 at 0800 Family Member Yes No   Sig: Take 500 mg by mouth 2 Times a Day.   tiotropium (SPIRIVA HANDIHALER) 18 MCG Cap 11/3/2020 at 0800 Family Member No No   Sig: Inhale 1 Cap by mouth every day.      Facility-Administered Medications: None   Lisinopril 10mg   PO daily started a month ago     Allergies: Allergies have been reviewed with patient.  No Known Allergies    Family History:   Patient report his dad has a hx of heart disorder, had to get a pacemaker.    Social History:   Tobacco:  Former smoker, smoked 102 pack-years. Started smoking at 5 and stopped at age 39.  Alcohol: Denied alcohol use   Recreational drugs (illegal and prescription):  Denied recreational drugs  Employment: Retired  Activity Level: Sedentary  Living situation:  Patient lives at home with his children  Recent travel:  N/A  Primary Care Provider: cleve Cedeno P.A.-C.  Other (stressors, spirituality, exposures):  N/A  Physical Exam:   Vitals:  Temp:  [36.2 °C (97.2 °F)] 36.2 °C (97.2 °F)  Pulse:  [51-70] 56  Resp:  [12-19] 19  BP: (128-181)/(50-84) 181/80  SpO2:  [91 %-100 %] 100 %    Physical Exam  Constitutional:       General: He is not in acute distress.     Appearance: Normal appearance. He is not ill-appearing.   HENT:      Head: Normocephalic.      Right Ear: External ear normal.      Left Ear: External ear normal.      Nose: Nose normal.      Mouth/Throat:      Mouth: Mucous membranes are dry.      Pharynx: No oropharyngeal exudate.   Eyes:      Pupils: Pupils are equal, round, and reactive to light.   Neck:      Musculoskeletal: Normal range of motion.   Cardiovascular:      Rate and Rhythm: Regular rhythm. Bradycardia present.      Pulses: Normal pulses.   Pulmonary:      Effort: Pulmonary effort is normal.      Comments: Mild bilateral crackles  Abdominal:      Tenderness: There is no abdominal tenderness.      Comments: R upper quadrant surgery scar from a GB surgery    Musculoskeletal: Normal range of motion.         General: No swelling or tenderness.      Comments: Healed left foot dorsal ulcer, non tender and not purulent   Skin:     General: Skin is warm and dry.      Coloration: Skin is not jaundiced.   Neurological:      Mental Status: He is alert and oriented to person, place, and time.      Cranial Nerves: No cranial nerve deficit.   Psychiatric:         Mood and Affect: Mood normal.         Behavior: Behavior normal.         Labs:   Results for EMMY BREANNE FAYE (MRN [id number]) as of 11/3/2020  15:40   Ref. Range 11/3/2020 11:37   WBC Latest Ref Range: 4.8 - 10.8 K/uL 8.8   RBC Latest Ref Range: 4.70 - 6.10 M/uL 3.79 (L)   Hemoglobin Latest Ref Range: 14.0 - 18.0 g/dL 11.9 (L)   Hematocrit Latest Ref Range: 42.0 - 52.0 % 37.7 (L)   MCV Latest Ref Range: 81.4 - 97.8 fL 99.5 (H)   MCH Latest Ref Range: 27.0 - 33.0 pg 31.4   MCHC Latest Ref Range: 33.7 - 35.3 g/dL 31.6 (L)   RDW Latest Ref Range: 35.9 - 50.0 fL 51.2 (H)   Platelet Count Latest Ref Range: 164 - 446 K/uL 127 (L)   MPV Latest Ref Range: 9.0 - 12.9 fL 12.8   Neutrophils-Polys Latest Ref Range: 44.00 - 72.00 % 70.80   Neutrophils (Absolute) Latest Ref Range: 1.82 - 7.42 K/uL 6.24   Lymphocytes Latest Ref Range: 22.00 - 41.00 % 17.70 (L)   Lymphs (Absolute) Latest Ref Range: 1.00 - 4.80 K/uL 1.56   Monocytes Latest Ref Range: 0.00 - 13.40 % 8.50   Monos (Absolute) Latest Ref Range: 0.00 - 0.85 K/uL 0.75   Eosinophils Latest Ref Range: 0.00 - 6.90 % 1.20   Eos (Absolute) Latest Ref Range: 0.00 - 0.51 K/uL 0.11   Basophils Latest Ref Range: 0.00 - 1.80 % 0.80   Baso (Absolute) Latest Ref Range: 0.00 - 0.12 K/uL 0.07   Immature Granulocytes Latest Ref Range: 0.00 - 0.90 % 1.00 (H)   Immature Granulocytes (abs) Latest Ref Range: 0.00 - 0.11 K/uL 0.09   Nucleated RBC Latest Units: /100 WBC 0.00   NRBC (Absolute) Latest Units: K/uL 0.00   Sodium Latest Ref Range: 135 - 145 mmol/L 134 (L)   Potassium Latest Ref Range: 3.6 - 5.5 mmol/L 4.6   Chloride Latest Ref Range: 96 - 112 mmol/L 102   Co2 Latest Ref Range: 20 - 33 mmol/L 23   Anion Gap Latest Ref Range: 7.0 - 16.0  9.0   Glucose Latest Ref Range: 65 - 99 mg/dL 150 (H)   Bun Latest Ref Range: 8 - 22 mg/dL 31 (H)   Creatinine Latest Ref Range: 0.50 - 1.40 mg/dL 1.51 (H)   GFR If  Latest Ref Range: >60 mL/min/1.73 m 2 55 (A)   GFR If Non  Latest Ref Range: >60 mL/min/1.73 m 2 46 (A)   Calcium Latest Ref Range: 8.5 - 10.5 mg/dL 8.7   AST(SGOT) Latest Ref Range: 12 - 45 U/L  20   ALT(SGPT) Latest Ref Range: 2 - 50 U/L 41   Alkaline Phosphatase Latest Ref Range: 30 - 99 U/L 62   Total Bilirubin Latest Ref Range: 0.1 - 1.5 mg/dL 0.5   Albumin Latest Ref Range: 3.2 - 4.9 g/dL 3.6   Total Protein Latest Ref Range: 6.0 - 8.2 g/dL 7.5   Globulin Latest Ref Range: 1.9 - 3.5 g/dL 3.9 (H)   A-G Ratio Latest Units: g/dL 0.9   Magnesium Latest Ref Range: 1.5 - 2.5 mg/dL 2.2   Troponin T Latest Ref Range: 6 - 19 ng/L 118 (H)       EKG:    Results for orders placed or performed during the hospital encounter of 20   EKG (NOW)   Result Value Ref Range    Report       [facility] Emergency Dept.    Test Date:  2020  Pt Name:    [facility]                Department: ER  MRN:        [id number]                      Room:  Gender:     Male                         Technician: [id number]  :        1950                   Requested By:ER TRIAGE PROTOCOL  Order #:    [id number]                    Melisa MD: JAMIA MONZON MD    Measurements  Intervals                                Axis  Rate:       60                           P:          50  NE:         243                          QRS:        9  QRSD:       103                          T:          -30  QT:         432  QTc:        432    Interpretive Statements  Sinus rhythm  Prolonged NE interval  Nonspecific T abnormalities, inferior leads  Compared to ECG 10/26/2020 08:45:50  No significant changes  Electronically Signed On 11-3-2020 11:06:18 PST by JAMIA MONZON MD     EKG (NOW)   Result Value Ref Range    Report       [facility] Emergency Dept.    Test Date:  2020  Pt Name:    [facility]                Department: ER  MRN:        [id number]                      Room:        15  Gender:     Male                         Technician: [id number]  :        1950                   Requested By:JAMIA MONZON  Order #:    [id number]                    Melisa MD: JAMIA MONZON  MD    Measurements  Intervals                                Axis  Rate:       54                           P:          44  NJ:         244                          QRS:        6  QRSD:       84                           T:          -37  QT:         452  QTc:        429    Interpretive Statements  SINUS BRADYCARDIA  FIRST DEGREE AV BLOCK  ABNORMAL T, CONSIDER ISCHEMIA, INFERIOR LEADS  Compared to ECG 11/03/2020 09:23:51  Possible ischemia now present  Sinus rhythm no longer present  T-wave abnormality still present  Electronically Signed On 11-3-2020 11:28:06 PST by JAMIA MONZON MD           Imaging:   DX-CHEST-PORTABLE (1 VIEW)   Final Result      Diffuse interstitial prominence compatible with patient's history of interstitial lung disease.      Hazy bibasilar opacities may represent atelectasis or pneumonitis.      Blunting of the costophrenic angles may be related to pleural thickening or trace pleural fluid.      Mild cardiomegaly.          Previous Data Review: reviewed     Assessment and Plan:  Mr. Stone is an 70 old with a PMH of diabetes, interstitial lung disease presented for a recent syncope episode. Patient EKG remarkable for sinus bradycardia which hasn't changed from his last EKG. Patient's last echo on 05/2020 showed an ejection fraction of 55% with grade I diastolic dysfunction. On lab, patient troponin is elevated from his baseline at 118. He denied any chest pain and his chest Xray is also similar to the previous one. Patient symptoms are not suggestive of a cardiogenic syncope. Per son, patient recently started Lisinopril 10 mg daily. Patient symptoms could be due to orthostatic hypotension. Patient symptoms could also be due to hypoglycemia. History of DM medication is unknown, patient is a poor historian.      Syncope  -Patient syncope episode etiology at this time are not clear. It could be due to hypotension or hypoglycemia.  -Patient EKG noted for sinus bradycardia, no indication of MI.  Pt troponin level of 118 could be due to acute renal injury.  Plan  -Continue telemetry  -Hold Lisinopril  - Consult cardio  -Continue Dextrose 50% 50mL IV 15 minutes PRN    Interstitial lung disease  -Hx of chronic respiratory disease  -Continue 2 L of home oxygen    Diabetes  -History of non insulin dependent DM  Plan  -Hold DM medication due to possible cause of hypoglycemia    Acute Kidney injury most likely due to prerenal   -Patient BUN has increased from his baseline of 22, currently at 31. Creatinine 1.51  -Patient GFR is 46  Plan:  - Monitor I's and out's  -Ordered urine sodium/creatine to calculate FENa    Sade Bernal PA-S2

## 2020-11-03 NOTE — ED PROVIDER NOTES
ED Provider Note    ER PROVIDER NOTE        CHIEF COMPLAINT  Chief Complaint   Patient presents with   • Syncope     pt on phone and passed out       Family  used throughout patient visit per patient request    HPI  Chase Harris is a 70 y.o. male who presents to the emergency department complaining of episodes of minimal responsiveness per family.  Over the last 24 hours, family notes 4 episodes where he seems to be dazed, not responsive although does not appear to pass out, these last for a few minutes and then resolved although does seem confused for a few minutes after this.  There has been no observable seizure activity, patient does not really remember the events.  Patient reports some mild headache, otherwise no other complaints.  No chest pain or palpitations pain or shortness of breath.  No fevers chills cough or congestion.  No focal weakness numbness or tingling    REVIEW OF SYSTEMS  Pertinent positives include episodic unresponsiveness. Pertinent negatives include no chest pain. See HPI for details. All other systems reviewed and are negative.    PAST MEDICAL HISTORY   has a past medical history of Diabetes (HCC) and Pulmonary fibrosis (HCC).    SURGICAL HISTORY   has a past surgical history that includes other abdominal surgery and other.    FAMILY HISTORY  History reviewed. No pertinent family history.    SOCIAL HISTORY  Social History     Socioeconomic History   • Marital status: Single     Spouse name: Not on file   • Number of children: Not on file   • Years of education: Not on file   • Highest education level: Not on file   Occupational History   • Not on file   Social Needs   • Financial resource strain: Hard   • Food insecurity     Worry: Often true     Inability: Often true   • Transportation needs     Medical: No     Non-medical: No   Tobacco Use   • Smoking status: Former Smoker     Packs/day: 2.00     Years: 40.00     Pack years: 80.00     Types: Cigarettes     Quit date: 1995  "    Years since quittin.8   • Smokeless tobacco: Never Used   Substance and Sexual Activity   • Alcohol use: Yes     Frequency: Monthly or less   • Drug use: Never   • Sexual activity: Not on file   Lifestyle   • Physical activity     Days per week: Not on file     Minutes per session: Not on file   • Stress: Not on file   Relationships   • Social connections     Talks on phone: Not on file     Gets together: Not on file     Attends [employer] service: Not on file     Active member of club or organization: Not on file     Attends meetings of clubs or organizations: Not on file     Relationship status: Not on file   • Intimate partner violence     Fear of current or ex partner: Not on file     Emotionally abused: Not on file     Physically abused: Not on file     Forced sexual activity: Not on file   Other Topics Concern   • Not on file   Social History Narrative   • Not on file      Social History     Substance and Sexual Activity   Drug Use Never       CURRENT MEDICATIONS  Home Medications     Reviewed by Marii Clark (Pharmacy Tech) on 20 at 1244  Med List Status: Complete   Medication Last Dose Status   albuterol 108 (90 Base) MCG/ACT Aero Soln inhalation aerosol unknown Active   Blood Glucose Meter Kit unknown Active   Blood Glucose Test Strips unknown Active   glipiZIDE (GLUCOTROL) 5 MG Tab 11/3/2020 Active   Lancets (ONETOUCH DELICA PLUS [id number]) Misc unknown Active   meclizine (ANTIVERT) 25 MG Tab Not Taking Active   metFORMIN (GLUCOPHAGE) 500 MG Tab 11/3/2020 Active   SITagliptin (JANUVIA) 25 MG Tab 11/3/2020 Active   tiotropium (SPIRIVA HANDIHALER) 18 MCG Cap 11/3/2020 Active                ALLERGIES  No Known Allergies    PHYSICAL EXAM  VITAL SIGNS: /67   Pulse (!) 55   Temp 36.2 °C (97.2 °F) (Temporal)   Resp 16   Ht 1.6 m (5' 3\")   Wt 61.2 kg (135 lb)   SpO2 97%   BMI 23.91 kg/m²   Pulse ox interpretation:  interpret this pulse ox as normal  on his home " oxygen    Constitutional: Alert in no apparent distress.  HENT: No signs of trauma, Bilateral external ears normal, Nose normal.   Eyes: Pupils are equal and reactive, Conjunctiva normal, Non-icteric.   Neck: Normal range of motion, No tenderness, Supple, No stridor.   Lymphatic: No lymphadenopathy noted.   Cardiovascular: Bradycardic rate, regular rhythm, no murmurs.   Thorax & Lungs: Normal breath sounds, No respiratory distress, No wheezing, No chest tenderness.   Abdomen: Bowel sounds normal, Soft, No tenderness, No masses, No pulsatile masses. No peritoneal signs.  Skin: Warm, Dry, No erythema, No rash.   Back: No bony tenderness, No CVA tenderness.   Extremities: Intact distal pulses, No edema, No tenderness, No cyanosis, Negative Bill's sign.  Musculoskeletal: Good range of motion in all major joints. No tenderness to palpation or major deformities noted.   Neurologic: Alert, cranial nerves intact, speech is appropriate or not slurred, upper extremities bilaterally exhibit no drift, no dysmetria, 5 out of 5 strength with bilateral bicep/tricep/, sensation intact to light touch throughout upper extremities. Lower extremities strength 5 out of 5 thigh extension/flexion/abduction/adduction, knee extension/flexion, dorsiflexion plantar flexion. No clonus.  2+ patella reflexes.  sensation intact to light touch.  No focal deficits noted.   Psychiatric: Affect normal, Judgment normal, Mood normal.       DIAGNOSTIC STUDIES / PROCEDURES    Results for orders placed or performed during the hospital encounter of 11/03/20   CBC WITH DIFFERENTIAL   Result Value Ref Range    WBC 8.8 4.8 - 10.8 K/uL    RBC 3.79 (L) 4.70 - 6.10 M/uL    Hemoglobin 11.9 (L) 14.0 - 18.0 g/dL    Hematocrit 37.7 (L) 42.0 - 52.0 %    MCV 99.5 (H) 81.4 - 97.8 fL    MCH 31.4 27.0 - 33.0 pg    MCHC 31.6 (L) 33.7 - 35.3 g/dL    RDW 51.2 (H) 35.9 - 50.0 fL    Platelet Count 127 (L) 164 - 446 K/uL    MPV 12.8 9.0 - 12.9 fL    Neutrophils-Polys  70.80 44.00 - 72.00 %    Lymphocytes 17.70 (L) 22.00 - 41.00 %    Monocytes 8.50 0.00 - 13.40 %    Eosinophils 1.20 0.00 - 6.90 %    Basophils 0.80 0.00 - 1.80 %    Immature Granulocytes 1.00 (H) 0.00 - 0.90 %    Nucleated RBC 0.00 /100 WBC    Neutrophils (Absolute) 6.24 1.82 - 7.42 K/uL    Lymphs (Absolute) 1.56 1.00 - 4.80 K/uL    Monos (Absolute) 0.75 0.00 - 0.85 K/uL    Eos (Absolute) 0.11 0.00 - 0.51 K/uL    Baso (Absolute) 0.07 0.00 - 0.12 K/uL    Immature Granulocytes (abs) 0.09 0.00 - 0.11 K/uL    NRBC (Absolute) 0.00 K/uL   COMP METABOLIC PANEL   Result Value Ref Range    Sodium 134 (L) 135 - 145 mmol/L    Potassium 4.6 3.6 - 5.5 mmol/L    Chloride 102 96 - 112 mmol/L    Co2 23 20 - 33 mmol/L    Anion Gap 9.0 7.0 - 16.0    Glucose 150 (H) 65 - 99 mg/dL    Bun 31 (H) 8 - 22 mg/dL    Creatinine 1.51 (H) 0.50 - 1.40 mg/dL    Calcium 8.7 8.5 - 10.5 mg/dL    AST(SGOT) 20 12 - 45 U/L    ALT(SGPT) 41 2 - 50 U/L    Alkaline Phosphatase 62 30 - 99 U/L    Total Bilirubin 0.5 0.1 - 1.5 mg/dL    Albumin 3.6 3.2 - 4.9 g/dL    Total Protein 7.5 6.0 - 8.2 g/dL    Globulin 3.9 (H) 1.9 - 3.5 g/dL    A-G Ratio 0.9 g/dL   TROPONIN   Result Value Ref Range    Troponin T 118 (H) 6 - 19 ng/L   ESTIMATED GFR   Result Value Ref Range    GFR If  55 (A) >60 mL/min/1.73 m 2    GFR If Non  46 (A) >60 mL/min/1.73 m 2   Routine (COVID/SARS COV-2 In-House PCR up to 24 hours)    Specimen: Nasopharyngeal; Respirate   Result Value Ref Range    COVID Order Status Received    ACCU-CHEK GLUCOSE   Result Value Ref Range    Glucose - Accu-Ck 225 (H) 65 - 99 mg/dL   EKG (NOW)   Result Value Ref Range    Report       [facility] Emergency Dept.    Test Date:  2020  Pt Name:    BREANNE BOOTH                Department: ER  MRN:        [id number]                      Room:  Gender:     Male                         Technician: [id number]  :        1950                   Requested By:ER TRIAGE  PROTOCOL  Order #:    [id number]                    Reading MD: JAMIA MONZON MD    Measurements  Intervals                                Axis  Rate:       60                           P:          50  TN:         243                          QRS:        9  QRSD:       103                          T:          -30  QT:         432  QTc:        432    Interpretive Statements  Sinus rhythm  Prolonged TN interval  Nonspecific T abnormalities, inferior leads  Compared to ECG 10/26/2020 08:45:50  No significant changes  Electronically Signed On 11-3-2020 11:06:18 PST by JAMIA MONZON MD     EKG (NOW)   Result Value Ref Range    Report       [facility] Emergency Dept.    Test Date:  2020  Pt Name:    BREANNE BOOTH                Department: ER  MRN:        [id number]                      Room:       BL 15  Gender:     Male                         Technician: [id number]  :        1950                   Requested By:JAMIA MONZON  Order #:    [id number]                    Reading MD: JAMIA MONZON MD    Measurements  Intervals                                Axis  Rate:       54                           P:          44  TN:         244                          QRS:        6  QRSD:       84                           T:          -37  QT:         452  QTc:        429    Interpretive Statements  SINUS BRADYCARDIA  FIRST DEGREE AV BLOCK  ABNORMAL T, CONSIDER ISCHEMIA, INFERIOR LEADS  Compared to ECG 2020 09:23:51  Possible ischemia now present  Sinus rhythm no longer present  T-wave abnormality still present  Electronically Signed On 11-3-2020 11:28:06 PST by JAMIA MONZON MD           RADIOLOGY  DX-CHEST-PORTABLE (1 VIEW)   Final Result      Diffuse interstitial prominence compatible with patient's history of interstitial lung disease.      Hazy bibasilar opacities may represent atelectasis or pneumonitis.      Blunting of the costophrenic angles may be related to pleural thickening or  trace pleural fluid.      Mild cardiomegaly.        The radiologist's interpretation of all radiological studies have been reviewed and images independently viewed by me.    COURSE & MEDICAL DECISION MAKING  Nursing notes, VS, PMSFHx reviewed in chart.    10:34 AM Patient seen and examined at bedside. Ordered for CBC, CMP, magnesium, troponin, ECG, x-ray to evaluate his symptoms.     In review of patient records he was recently admitted for vertigo with unremarkable neurologic work-up    Had a stress test and echocardiogram in May of this year, trace tricuspid regurg, grade 1 diastolic dysfunction    With patient's bradycardia noted on exam, repeat ECG was ordered, demonstrated sinus bradycardia, additionally tech noted that patient's heart rate had been in the low 40s    12:48 PM  Patient reevaluated, updated on all results, reports has had a few episodes of feeling dizzy but son reports no more episodes of minimal responsiveness    Discussed the case with hospitalist for admission.    Discussed case with cardiology, Dr. Steen who will evaluate the patient      This patient was cared for during the COVID-19 pandemic.  History and physical exam may be limited/truncated by the inherent challenges of PPE and the need to decrease staff exposure to novel coronavirus.  Some aspects of disease management may be different to protect staff and help slow the spread of disease. I verified that, if possible, the patient was wearing a mask and I was wearing appropriate PPE every time I encountered the patient.     Current [facility] COVID19 protocol followed      Decision Making:  This is a 70 y.o. male presented with dizziness and episodes of minimal responsiveness.  Patient has had fairly persistent sinus bradycardia as well as an episode of heart rate in the 40s.  Concern for symptomatic bradycardia driving his symptoms.  Additionally his troponin is 118 which is substantially elevated from where it was last week.  He has  no chest pain or findings of ischemia on his ECG.  Patient is admitted for further care with cardiology consultation as above.  Of note he did have admission for dizziness recently and had a neurologic work-up that was unrevealing    Patient is admitted in guarded condition    FINAL IMPRESSION  1. Bradycardia    2. Dizziness    3. Elevated troponin          The note accurately reflects work and decisions made by me.  Reinaldo Allen M.D.  11/3/2020  1:36 PM

## 2020-11-03 NOTE — ED TRIAGE NOTES
Chase Harris  70 y.o.  Chief Complaint   Patient presents with   • Syncope     pt on phone and passed out     Patient to triage from [facility] with above complaint.  Pt reports being off insulin for past couple of week as PCP took him off.  Pt  in triage.     Vitals:    11/03/20 0914   BP: 128/50   Pulse: 63   Resp: 12   Temp: 36.2 °C (97.2 °F)   SpO2: 91%       Triage process explained to patient, apologized for wait time, and returned to [facility].  Pt informed to notify staff of any change in condition. NAD at this time.

## 2020-11-03 NOTE — H&P
"History & Physical Note    Date of Admission: 11/3/2020  Admission Status: Inpatient  [facility] Team: [facility]  Attending: Donis Gabriel MD  Senior Resident: Dr. Alba  Intern: Dr. Barker  Contact Number: [phone]    Chief Complaint: Passing out     History of Present Illness (HPI):   Chase is a 70 y.o. male with a PMH of interstitial lung disease, type 2 diabetes (May 2020 A1C 8.0), grade 1 diastolic dysfunction May 2020 EF 55%), CKD stage III, and recent discharge 10/28 for vertigo who presented 11/3/2020 with passing out episodes. Patient only speaks Syriac, so his oldest son was there to interpret history. Patient recalls that since Friday whenever he stands up from a seated or laying position that he becomes light-headed and then feels like he is going to pass out. Patient then is able to sit down before losing consciousness. Patient states that his blood pressure is a \"little low\" at home, but is unable to quantity. Patient reports that he is compliant with all his medications and this blood sugars at home range from 150-160s, with the max at 180s. Patient does recall starting Lisinopril 10mg Daily one month ago, but current records do not indicate patient was taking this medication even from recent 10/28 d/c summary. Patient reports some palpitations. Patient stated he had a bm this morning and has only eaten a banana this morning as well. Patient denies vertigo, head trauma, fevers/chills, worsening SOB, chest pain, bleeding, changes in vision, changes in urine or stools, weakness, and new pain.     In the ED:   Glucose 225  Troponin 118, baselike 50-65s  Cr elevated at 1.51, baseline roughly 1.2  BP elevated up to 181/80 during interview, but was 128/50 on admission  Patient was tachycardic from 51-56 during ED course with EKG revealing Sinus bradycardia, but otherwise minimal change from previous.    Review of Systems:   Review of Systems   Constitutional: Negative for chills, diaphoresis, fever " and weight loss.   HENT: Negative for ear discharge, ear pain, hearing loss, nosebleeds and sore throat.    Eyes: Negative for blurred vision, double vision, photophobia and pain.   Respiratory: Negative for cough, hemoptysis and sputum production.    Cardiovascular: Positive for palpitations. Negative for chest pain, orthopnea, claudication and leg swelling.   Gastrointestinal: Negative for abdominal pain, blood in stool, constipation, diarrhea, heartburn, melena, nausea and vomiting.   Genitourinary: Negative for dysuria, flank pain, frequency, hematuria and urgency.   Musculoskeletal: Negative for back pain, falls, joint pain, myalgias and neck pain.   Skin: Negative for itching and rash.   Neurological: Positive for dizziness (Light-headedness and presyncope) and loss of consciousness (Shortly after light-headedness as a warning sign). Negative for tingling, tremors, sensory change, focal weakness, seizures, weakness and headaches.        No vertigo   Endo/Heme/Allergies: Negative for polydipsia. Does not bruise/bleed easily.   Psychiatric/Behavioral: Negative for depression, hallucinations, memory loss, substance abuse and suicidal ideas. The patient is not nervous/anxious and does not have insomnia.          Past Medical History:   Past Medical History was reviewed with patient.   has a past medical history of Diabetes (HCC) and Pulmonary fibrosis (HCC). He also has no past medical history of Cough, Painful breathing, Shortness of breath, Sputum production, or Wheezing.    Past Surgical History: Past Surgical History was reviewed with patient.   has a past surgical history that includes other abdominal surgery and other.    Medications: Medications have been reviewed with patient.  Prior to Admission Medications   Prescriptions Last Dose Informant Patient Reported? Taking?   Blood Glucose Meter Kit unknown at Unknown time Family Member No No   Sig: Test blood sugar as recommended by provider.blood glucose  monitoring kit. BID   Blood Glucose Test Strips unknown at Unknown time Family Member No No   Sig: One test strip BID   Lancets (ONETOUCH DELICA PLUS [id number]) Misc unknown at Unknown time Family Member No No   Sig: USE TO TEST TWICE DAILY   SITagliptin (JANUVIA) 25 MG Tab 11/3/2020 at 0800 Family Member No No   Sig: Take 1 Tab by mouth every day.   albuterol 108 (90 Base) MCG/ACT Aero Soln inhalation aerosol unknown at Unknown time Family Member No No   Sig: Inhale 2 Puffs by mouth every 6 hours as needed for Shortness of Breath.   glipiZIDE (GLUCOTROL) 5 MG Tab 11/3/2020 at 0800 Family Member Yes No   Sig: Take 2.5 mg by mouth every morning.   meclizine (ANTIVERT) 25 MG Tab Not Taking at Unknown time  No No   Sig: Take 1 Tab by mouth 3 times a day as needed for Dizziness or Vertigo.   Patient not taking: Reported on 11/3/2020   metFORMIN (GLUCOPHAGE) 500 MG Tab 11/3/2020 at 0800 Family Member Yes No   Sig: Take 500 mg by mouth 2 Times a Day.   tiotropium (SPIRIVA HANDIHALER) 18 MCG Cap 11/3/2020 at 0800 Family Member No No   Sig: Inhale 1 Cap by mouth every day.      Facility-Administered Medications: None        Allergies: Allergies have been reviewed with patient.  No Known Allergies    Family History: Dad had a pacemaker.  family history is not on file.     Social History:   Tobacco: 33 years of smoking 3ppd (4 y/o to 39), 120 pack years (likely less as patient did not remember when he started smoking 3 ppd)  Alcohol: None   Recreational drugs (illegal and prescription):  None  Employment: Retired  Activity Level: Independent IADL's and ADLs, walks without equipment or assistance  Living situation:  Lives with younger son  Recent travel:  None  Primary Care Provider: reviewed Rashmi Cedeno P.A.-C.  Other (stressors, spirituality, exposures):  None  Physical Exam:   Vitals:  Temp:  [36.2 °C (97.2 °F)] 36.2 °C (97.2 °F)  Pulse:  [51-70] 56  Resp:  [12-19] 19  BP: (128-181)/(50-84) 181/80  SpO2:  [91 %-100 %]  100 %    Physical Exam  Constitutional:       General: He is not in acute distress.     Appearance: Normal appearance. He is normal weight.   HENT:      Head: Normocephalic and atraumatic.      Nose: Nose normal. No congestion.      Mouth/Throat:      Mouth: Mucous membranes are dry.      Pharynx: Oropharynx is clear. No oropharyngeal exudate.   Eyes:      Extraocular Movements: Extraocular movements intact.      Conjunctiva/sclera: Conjunctivae normal.      Pupils: Pupils are equal, round, and reactive to light.   Neck:      Musculoskeletal: Normal range of motion and neck supple. No muscular tenderness.   Cardiovascular:      Rate and Rhythm: Regular rhythm. Bradycardia present.      Pulses: Normal pulses.      Heart sounds: Normal heart sounds. No murmur.   Pulmonary:      Effort: Pulmonary effort is normal. No respiratory distress.      Breath sounds: Wheezing (Minor expiratory wheezing in all lung fields) present. No rales.      Comments: B/L crackles in all lung fields.  Chest:      Chest wall: No tenderness.   Abdominal:      General: Abdomen is flat. Bowel sounds are normal. There is no distension.      Palpations: Abdomen is soft. There is no mass.      Tenderness: There is no abdominal tenderness. There is no guarding or rebound.      Hernia: No hernia is present.      Comments: RUQ scar from cholelithiasis removal   Musculoskeletal: Normal range of motion.         General: Signs of injury present. No swelling, tenderness or deformity.      Right lower leg: No edema.      Left lower leg: No edema.      Comments: Left foot healed ulcer (chronic for years, non tender and non purulent)   Skin:     General: Skin is warm and dry.      Capillary Refill: Capillary refill takes less than 2 seconds.      Coloration: Skin is not jaundiced or pale.      Findings: No bruising or erythema.   Neurological:      General: No focal deficit present.      Mental Status: He is alert and oriented to person, place, and time.  Mental status is at baseline.      Sensory: No sensory deficit.      Motor: No weakness.      Deep Tendon Reflexes: Reflexes normal.   Psychiatric:         Mood and Affect: Mood normal.         Behavior: Behavior normal.         Thought Content: Thought content normal.         Judgment: Judgment normal.         Labs:   Results for orders placed or performed during the hospital encounter of 11/03/20   CBC WITH DIFFERENTIAL   Result Value Ref Range    WBC 8.8 4.8 - 10.8 K/uL    RBC 3.79 (L) 4.70 - 6.10 M/uL    Hemoglobin 11.9 (L) 14.0 - 18.0 g/dL    Hematocrit 37.7 (L) 42.0 - 52.0 %    MCV 99.5 (H) 81.4 - 97.8 fL    MCH 31.4 27.0 - 33.0 pg    MCHC 31.6 (L) 33.7 - 35.3 g/dL    RDW 51.2 (H) 35.9 - 50.0 fL    Platelet Count 127 (L) 164 - 446 K/uL    MPV 12.8 9.0 - 12.9 fL    Neutrophils-Polys 70.80 44.00 - 72.00 %    Lymphocytes 17.70 (L) 22.00 - 41.00 %    Monocytes 8.50 0.00 - 13.40 %    Eosinophils 1.20 0.00 - 6.90 %    Basophils 0.80 0.00 - 1.80 %    Immature Granulocytes 1.00 (H) 0.00 - 0.90 %    Nucleated RBC 0.00 /100 WBC    Neutrophils (Absolute) 6.24 1.82 - 7.42 K/uL    Lymphs (Absolute) 1.56 1.00 - 4.80 K/uL    Monos (Absolute) 0.75 0.00 - 0.85 K/uL    Eos (Absolute) 0.11 0.00 - 0.51 K/uL    Baso (Absolute) 0.07 0.00 - 0.12 K/uL    Immature Granulocytes (abs) 0.09 0.00 - 0.11 K/uL    NRBC (Absolute) 0.00 K/uL   COMP METABOLIC PANEL   Result Value Ref Range    Sodium 134 (L) 135 - 145 mmol/L    Potassium 4.6 3.6 - 5.5 mmol/L    Chloride 102 96 - 112 mmol/L    Co2 23 20 - 33 mmol/L    Anion Gap 9.0 7.0 - 16.0    Glucose 150 (H) 65 - 99 mg/dL    Bun 31 (H) 8 - 22 mg/dL    Creatinine 1.51 (H) 0.50 - 1.40 mg/dL    Calcium 8.7 8.5 - 10.5 mg/dL    AST(SGOT) 20 12 - 45 U/L    ALT(SGPT) 41 2 - 50 U/L    Alkaline Phosphatase 62 30 - 99 U/L    Total Bilirubin 0.5 0.1 - 1.5 mg/dL    Albumin 3.6 3.2 - 4.9 g/dL    Total Protein 7.5 6.0 - 8.2 g/dL    Globulin 3.9 (H) 1.9 - 3.5 g/dL    A-G Ratio 0.9 g/dL   TROPONIN   Result  Value Ref Range    Troponin T 118 (H) 6 - 19 ng/L   ESTIMATED GFR   Result Value Ref Range    GFR If  55 (A) >60 mL/min/1.73 m 2    GFR If Non  46 (A) >60 mL/min/1.73 m 2   Routine (COVID/SARS COV-2 In-House PCR up to 24 hours)    Specimen: Nasopharyngeal; Respirate   Result Value Ref Range    COVID Order Status Received    Magnesium   Result Value Ref Range    Magnesium 2.2 1.5 - 2.5 mg/dL   ACCU-CHEK GLUCOSE   Result Value Ref Range    Glucose - Accu-Ck 225 (H) 65 - 99 mg/dL   EKG (NOW)   Result Value Ref Range    Report       [facility]    Test Date:  2020  Pt Name:    [facility]                Department: ER  MRN:        [id number]                      Room:  Gender:     Male                         Technician: [id number]  :        1950                   Requested By:ER TRIAGE PROTOCOL  Order #:    [id number]                    Reading MD: JAMIA MONZON MD    Measurements  Intervals                                Axis  Rate:       60                           P:          50  IN:         243                          QRS:        9  QRSD:       103                          T:          -30  QT:         432  QTc:        432    Interpretive Statements  Sinus rhythm  Prolonged IN interval  Nonspecific T abnormalities, inferior leads  Compared to ECG 10/26/2020 08:45:50  No significant changes  Electronically Signed On 11-3-2020 11:06:18 PST by JAMIA MONZON MD     EKG (NOW)   Result Value Ref Range    Report       [facility]    Test Date:  2020  Pt Name:    [facility]                Department: ER  MRN:        [id number]                      Room:        15  Gender:     Male                         Technician: [id number]  :        1950                   Requested By:JAMIA MONZON  Order #:    [id number]                    Reading MD: JAMIA MONZON MD    Measurements  Intervals            "                     Axis  Rate:       54                           P:          44  AK:         244                          QRS:        6  QRSD:       84                           T:          -37  QT:         452  QTc:        429    Interpretive Statements  SINUS BRADYCARDIA  FIRST DEGREE AV BLOCK  ABNORMAL T, CONSIDER ISCHEMIA, INFERIOR LEADS  Compared to ECG 11/03/2020 09:23:51  Possible ischemia now present  Sinus rhythm no longer present  T-wave abnormality still present  Electronically Signed On 11-3-2020 11:28:06 PST by JAMIA MONZON MD            EKG: Per my read, Sinus bradycardia with no CLEAR evidence of new infarction or MI from previous EKG's.    Imaging:   DX-CHEST-PORTABLE (1 VIEW)   Final Result      Diffuse interstitial prominence compatible with patient's history of interstitial lung disease.      Hazy bibasilar opacities may represent atelectasis or pneumonitis.      Blunting of the costophrenic angles may be related to pleural thickening or trace pleural fluid.      Mild cardiomegaly.          Previous Data Review: reviewed    Problem Representation: 70 y.o. male with a PMH of interstitial lung disease, type 2 diabetes (May 2020 A1C 8.0), grade 1 diastolic dysfunction (May 2020 EF 55%), CKD stage III, and recent discharge 10/28 for vertigo who presented 11/3/2020 with passing out episodes.    * Postural dizziness with presyncope- (present on admission)  Assessment & Plan  Patient stated he feels light-headed and then about to pass out whenever he stands from a seated or laying position since 10/30/2020. Patient states that his blood pressure is a \"little low\" at home, but is unable to quantity. Patient reports that he is compliant with all his medications and this blood sugars at home range from 150-160s, with the max at 180s. Patient does recall starting Lisinopril 10mg Daily one month ago, but current records do not indicate patient was taking this medication even from recent 10/28 d/c " summary. Etiology likely orthostatic, but patient did have elevated troponin of 118 from baseline of 50-65s and had elevated BP up to 181/80 once during interview but was 128/50 on admission. Last ECHO May 2020 EF was 55% with Grade I diastolic dysfunction.    - Admit to Telemetry  - Orthostatic vitals  - 125mL/hr NS IVF, can d/c once patient has proper PO intake  - Diabetic Diet  - Held home diabetic meds and lisinopril, started Labetalol PRN  - Restarted home albuterol and tiotropium  - Cardiology consulted, greatly appreciate recommendations    Sinus bradycardia- (present on admission)  Assessment & Plan  Patient reports palpitations but denies chest pain, and EKG on admission revealed Sinus bradycardia with no CLEAR evidence of new infarction or MI from previous EKG's. Etiology unclear.    - admitted to telemetry  - con't to monitor and reassess if patient experiences new symptoms  - cardiology following, greatly appreciate recommendations      Elevated troponin- (present on admission)  Assessment & Plan  Patient's troponin elevated at 118 from baseline 50-65s. Patient reports palpitations but denies chest pain, and EKG on admission revealed Sinus bradycardia with no CLEAR evidence of new infarction or MI from previous EKG's. Etiology unclear likely demand ischemia or from MIRIAM.    - Trending troponin q6h for 2 more occurrences  - con't to monitor and reassess if troponin does not trend down or if patient experiences new symptoms  - cardiology following, greatly appreciate recommendations  - admitted to telemetry    MIRIAM (acute kidney injury) (HCC)  Assessment & Plan  Patient's Cr is elevated by .3 at 1.51 from baseline 1.2. Patient stated he has not drink or eaten anything except a banana this morning. Patient has a PMH of CKD stage III. Etiology unclear possible Prerenal or intrinsic.      - Urine Sodium and Creatinine ordered, will calculate FeNA tomorrow.  - 125mL/hr NS IVF  - BP control with PRN Labetalol and  holding lisinopril  - Avoid nephrotoxins and renally dose medications.       Quality Measures:  Code: Full  VTE:  Heparin & SCDs  Diet: Diabetic Diet  Disposition: Remain inpatient for workup, possible d/c in a couple of days    Brian Barker M.D.  [facility], PGY-1    Please note that this dictation was created using voice recognition software. I have worked with technical experts from [facility] to optimize the interface.  I have made every reasonable attempt to correct obvious errors, but there may be errors of grammar and possibly content that I did not discover before finalizing the note.

## 2020-11-03 NOTE — CONSULTS
Cardiology Consultation Note      Date of service: 11/3/2020      Requesting Physician: Recurrent syncope      Reason for consultation: Dr. Reinaldo Allen      History of present illness    This is a 70 year old  male with long standing DM and HTN who presented for evaluation of loss of consciousness. Family stated that he turned pale with his lip turned purple. There was no warning such as chest pain, palpitations, seizure like activity, urinary or fecal incontinence.  He has had 5-6 of them for the last few months. They usually occur when tries to carry something or get up.  In fact he was admitted the end of last month for this. Work ups were unrevealing.  There was a mention of brief second degree AV block type II in early AM hour by RN on 10/27.    In addition, he has been noticing dyspnea on mild exertion.   This is quite unusual for him. He denies typical angina.  He was admitted here in May for shortness of breath as well.  Stress MPI in May was reportedly negative.  ECHO in May EF 55% , estimated RVSP 40 mmHg  The symptom resolved until more recently.    He was started on lisinopril probably a month or two ago.  He was switched from insulin to oral hypoglycemic drug 6 months ago.  Denies N/V/D  Or poor oral intake.    Father had PPM at 76    No FH of CAD    No Known Allergies    @HOMEMEDS    No current facility-administered medications for this encounter.     Current Outpatient Medications:   •  meclizine (ANTIVERT) 25 MG Tab, Take 1 Tab by mouth 3 times a day as needed for Dizziness or Vertigo. (Patient not taking: Reported on 11/3/2020), Disp: 30 Tab, Rfl: 0  •  glipiZIDE (GLUCOTROL) 5 MG Tab, Take 2.5 mg by mouth every morning., Disp: , Rfl:   •  Lancets (ONETOUCH DELICA PLUS [id number]) Misc, USE TO TEST TWICE DAILY, Disp: 180 Each, Rfl: 3  •  Blood Glucose Meter Kit, Test blood sugar as recommended by provider.blood glucose monitoring kit. BID, Disp: 1 Kit, Rfl: 0  •  Blood Glucose Test Strips,  "One test strip BID, Disp: 100 Strip, Rfl: 3  •  metFORMIN (GLUCOPHAGE) 500 MG Tab, Take 500 mg by mouth 2 Times a Day., Disp: , Rfl:   •  SITagliptin (JANUVIA) 25 MG Tab, Take 1 Tab by mouth every day., Disp: 30 Tab, Rfl: 3  •  tiotropium (SPIRIVA HANDIHALER) 18 MCG Cap, Inhale 1 Cap by mouth every day., Disp: 30 Cap, Rfl: 3  •  albuterol 108 (90 Base) MCG/ACT Aero Soln inhalation aerosol, Inhale 2 Puffs by mouth every 6 hours as needed for Shortness of Breath., Disp: 8.5 g, Rfl: 0    Past Medical History:   Diagnosis Date   • Diabetes (HCC)    • Pulmonary fibrosis (HCC)     wears 2.5L O2 at home        Past Surgical History:   Procedure Laterality Date   • OTHER      Kidney stones   • OTHER ABDOMINAL SURGERY      \"from an ulcer that burst\"       History reviewed. No pertinent family history.    Social History     Socioeconomic History   • Marital status: Single     Spouse name: Not on file   • Number of children: Not on file   • Years of education: Not on file   • Highest education level: Not on file   Occupational History   • Not on file   Social Needs   • Financial resource strain: Hard   • Food insecurity     Worry: Often true     Inability: Often true   • Transportation needs     Medical: No     Non-medical: No   Tobacco Use   • Smoking status: Former Smoker     Packs/day: 2.00     Years: 40.00     Pack years: 80.00     Types: Cigarettes     Quit date:      Years since quittin.8   • Smokeless tobacco: Never Used   Substance and Sexual Activity   • Alcohol use: Yes     Frequency: Monthly or less   • Drug use: Never   • Sexual activity: Not on file   Lifestyle   • Physical activity     Days per week: Not on file     Minutes per session: Not on file   • Stress: Not on file   Relationships   • Social connections     Talks on phone: Not on file     Gets together: Not on file     Attends [employer] service: Not on file     Active member of club or organization: Not on file     Attends meetings of clubs or " "organizations: Not on file     Relationship status: Not on file   • Intimate partner violence     Fear of current or ex partner: Not on file     Emotionally abused: Not on file     Physically abused: Not on file     Forced sexual activity: Not on file   Other Topics Concern   • Not on file   Social History Narrative   • Not on file       Review of systems;    General: No fever, chills, no recent weight change, no weakness or fatigue  HENT: No discharge, no ringing in the ears, no toothache or sore throat, no neck pain  Eyes: No redness, no blurred vision or double vision  Heart: No palpitation, no PND or orthopnea, no claudication, no leg swelling  Lung: No productive cough, no hemoptysis  Abdomen: No abdominal pain, no nausea vomiting or diarrhea, no blood in stool  : No dysuria, no frequency or hematuria  Musculoskeletal: No myalgia, no back pain, some joint pain  Hematology: No easy bruising  Skin: No rash or itching  Neurological: No headache, no new focal weakness or numbness  Psychological: Denies depression, anxiety or insomnia  All other review of systems are negative    Vitals:    11/03/20 0925 11/03/20 1059 11/03/20 1221 11/03/20 1312   BP:  150/67  (!) 178/84   Pulse:  (!) 51 (!) 55 70   Resp:  19 16 19   Temp:       TempSrc:       SpO2:  100% 97% 94%   Weight: 61.2 kg (135 lb)      Height: 1.6 m (5' 3\")        GENERAL not in acute distress, not dyspnic at rest  Head atraumatic, normocephalic  Eyes EOMI  ENT neck supple, no JVD, no carotid bruits or thyromegaly  Lung good expansion, distant sound, no rales or wheezing  Heart RRR, bradycardic, no murmur, gallop or rub  Decreased pedal pulses.  Abd soft, no tenderness, mass or bruits  Ext no edema  Skin no ecchymosis or petechiae  Musculoskeletal no deformity  Neuro grossly intact  Psych normal mood, normal affect    EKG today by my review showed sinus bradycardia, first degree AV block, T wave inversion in III and aVF    Tn T 118    CMP  BUN 31 Cr 1.5 "     Hb 11.9    Assessment and plans    1. Recurrent syncope with recent dyspnea on exertion  He is smoker. Although his dyspnea could be in part from COPD. He reported rather sudden change.   He is diabetic with a few other risk factors. Tn is mildly elevated. EKG also showed changes in inferior leads which may reflect ischemia. These facts along with reported type II second degree AV block during last admission, I believe that we need to exclude CAD. I advised him to undergo cardiac catheterization tomorrow.  Risks and benefits of the procedure were discussed at length.   The patient and his available family member (son) understood, accepted the risks and wishes to proceed.   BUN/Cr are mildly elevated. Will hydrate overnight.    2. Sinus bradycardia, probably age related  Should not be the cause his syncope  Will monitor for other form of more serious bradyarrhythmia    3. HTN  Resume home christal and adjust accordingly    4. Azotemia, prerenal    5. 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  Thank you consultation.    Please note that this dictation was created using voice recognition software. I have worked with consultants from the vendor as well as technical experts from [facility] to optimize the interface. I have made every reasonable attempt to correct obvious errors, but I expect that there are errors of grammar and possibly content I did not discover before finalizing the note

## 2020-11-03 NOTE — PROGRESS NOTES
Senior Admit Note    Patient:   Chase Harris.  MRN: [id number].                               Chief complaint:   Syncope      HPI:    70 years old Colombian-speaking male with a past medical history of interstitial lung disease on 2 L home oxygen and type 2 diabetes mellitus who presented to the ER with a complaint of multiple syncopal episode over the past few days.  The patient's son reported that he gets dizzy whenever he gets up from sitting to standing position and sometimes associated with syncope.  He also reported that the syncope was preceded heart palpitation and headache.  Followed by confusion when he regained his consciousness.  Upon presentation to the hospital: The patient was hemodynamically stable with bradycardia in 50s, chest x-ray was done showed mild cardiomegaly with diffuse interstitial prominence compatible with interstitial lung disease.  Initial blood work-up was significant for mild anemia and mild kidney function impairment (CKD versus MIRIAM) and elevated troponin at 118.  EKG was done and showed first-degree heart block with inverted T waves in inferior leads (has not changed from the previous EKGs).  Cardiology has been consulted by the ER physician, who feels this is could be related to cardiac ischemia in the setting of elevated troponin and EKG changes in inferior leads.  The patient will be having coronary angiogram tomorrow.    Of note, the patient was recently admitted to the hospital (10/26/2020-10/28/2020) for evaluation of dizziness and headache, head CT scan and brain MRI were done which were negative for any acute abnormalities.      General:  Well developed, dry mucous membranes, nontoxic appearance, not in distress.  HEENT:  Normocephalic, atraumatic, extraocular muscles are intact  Neck:  No tenderness, supple no carotid bruits.  Cardiovascular:  Normal S1/S2, no murmurs gallops or rubs.  Lungs:  Clear lungs bilaterally no added sounds.  Neuro:  Grossly  normal         Assessment and Plan:    # Syncope:  # Elevated troponin  # MIRIAM on top of CKD  #Sinus bradycardia  #First-degree heart block    Admit the patient to telemetry  Continuous Cardiac Monitoring  Orthostatic vitals  IV fluid resuscitation  Continue trending troponin  Cardiology has been consulted,  DVT prophylaxis: Heparin  Code status: Full    For complete details, please refer to H&P by Dr. Brian Alba M.D.

## 2020-11-03 NOTE — ED TRIAGE NOTES
[facility] TRIAGE [facility]ICER ER REPORT  Consult/Admission requested by: Dr Allen  Chief complaint: dizziness/syncope  Pertinent history/ER Course: HR sinus low 50s.  Mild bump Trop  Code Status: full per ERP, I personally verified with the ERP patient's code status and the ERP has confirmed this with the patient.   Patient meets admission criterion: Yes..  Recommendations given or work up & consultations requested per triage officer: Tele  Consultants involved and pertinent input from consultants: Cards  Admission status: Inpatient.   Admission order placed: Yes.   Floor requested: Tele  Assigned hospitalist: MARIANNE AREVALO to see

## 2020-11-04 ENCOUNTER — APPOINTMENT (OUTPATIENT)
Dept: RADIOLOGY | Facility: MEDICAL CENTER | Age: 70
DRG: 286 | End: 2020-11-04
Attending: STUDENT IN AN ORGANIZED HEALTH CARE EDUCATION/TRAINING PROGRAM
Payer: MEDICARE

## 2020-11-04 ENCOUNTER — APPOINTMENT (OUTPATIENT)
Dept: CARDIOLOGY | Facility: MEDICAL CENTER | Age: 70
DRG: 286 | End: 2020-11-04
Attending: INTERNAL MEDICINE
Payer: MEDICARE

## 2020-11-04 PROBLEM — I49.8 JUNCTIONAL RHYTHM: Status: ACTIVE | Noted: 2020-11-04

## 2020-11-04 LAB
ALBUMIN SERPL BCP-MCNC: 3.2 G/DL (ref 3.2–4.9)
ALBUMIN SERPL BCP-MCNC: 3.6 G/DL (ref 3.2–4.9)
ALBUMIN/GLOB SERPL: 0.9 G/DL
ALBUMIN/GLOB SERPL: 0.9 G/DL
ALP SERPL-CCNC: 58 U/L (ref 30–99)
ALP SERPL-CCNC: 62 U/L (ref 30–99)
ALT SERPL-CCNC: 36 U/L (ref 2–50)
ALT SERPL-CCNC: 38 U/L (ref 2–50)
ANION GAP SERPL CALC-SCNC: 10 MMOL/L (ref 7–16)
ANION GAP SERPL CALC-SCNC: 11 MMOL/L (ref 7–16)
AST SERPL-CCNC: 20 U/L (ref 12–45)
AST SERPL-CCNC: 24 U/L (ref 12–45)
BASOPHILS # BLD AUTO: 0.6 % (ref 0–1.8)
BASOPHILS # BLD AUTO: 0.7 % (ref 0–1.8)
BASOPHILS # BLD: 0.05 K/UL (ref 0–0.12)
BASOPHILS # BLD: 0.06 K/UL (ref 0–0.12)
BILIRUB SERPL-MCNC: 0.6 MG/DL (ref 0.1–1.5)
BILIRUB SERPL-MCNC: 0.7 MG/DL (ref 0.1–1.5)
BUN SERPL-MCNC: 23 MG/DL (ref 8–22)
BUN SERPL-MCNC: 24 MG/DL (ref 8–22)
CALCIUM SERPL-MCNC: 8 MG/DL (ref 8.5–10.5)
CALCIUM SERPL-MCNC: 8.6 MG/DL (ref 8.5–10.5)
CHLORIDE SERPL-SCNC: 104 MMOL/L (ref 96–112)
CHLORIDE SERPL-SCNC: 104 MMOL/L (ref 96–112)
CO2 SERPL-SCNC: 19 MMOL/L (ref 20–33)
CO2 SERPL-SCNC: 23 MMOL/L (ref 20–33)
CREAT SERPL-MCNC: 1.22 MG/DL (ref 0.5–1.4)
CREAT SERPL-MCNC: 1.26 MG/DL (ref 0.5–1.4)
EKG IMPRESSION: NORMAL
EOSINOPHIL # BLD AUTO: 0.16 K/UL (ref 0–0.51)
EOSINOPHIL # BLD AUTO: 0.17 K/UL (ref 0–0.51)
EOSINOPHIL NFR BLD: 1.8 % (ref 0–6.9)
EOSINOPHIL NFR BLD: 2.1 % (ref 0–6.9)
ERYTHROCYTE [DISTWIDTH] IN BLOOD BY AUTOMATED COUNT: 50.2 FL (ref 35.9–50)
ERYTHROCYTE [DISTWIDTH] IN BLOOD BY AUTOMATED COUNT: 50.7 FL (ref 35.9–50)
EST. AVERAGE GLUCOSE BLD GHB EST-MCNC: 148 MG/DL
EST. AVERAGE GLUCOSE BLD GHB EST-MCNC: 151 MG/DL
GLOBULIN SER CALC-MCNC: 3.6 G/DL (ref 1.9–3.5)
GLOBULIN SER CALC-MCNC: 3.8 G/DL (ref 1.9–3.5)
GLUCOSE BLD-MCNC: 116 MG/DL (ref 65–99)
GLUCOSE BLD-MCNC: 135 MG/DL (ref 65–99)
GLUCOSE BLD-MCNC: 142 MG/DL (ref 65–99)
GLUCOSE BLD-MCNC: 153 MG/DL (ref 65–99)
GLUCOSE BLD-MCNC: 163 MG/DL (ref 65–99)
GLUCOSE SERPL-MCNC: 202 MG/DL (ref 65–99)
GLUCOSE SERPL-MCNC: 213 MG/DL (ref 65–99)
HBA1C MFR BLD: 6.8 % (ref 0–5.6)
HBA1C MFR BLD: 6.9 % (ref 0–5.6)
HCT VFR BLD AUTO: 35.8 % (ref 42–52)
HCT VFR BLD AUTO: 36.5 % (ref 42–52)
HGB BLD-MCNC: 11.1 G/DL (ref 14–18)
HGB BLD-MCNC: 11.3 G/DL (ref 14–18)
IMM GRANULOCYTES # BLD AUTO: 0.09 K/UL (ref 0–0.11)
IMM GRANULOCYTES # BLD AUTO: 0.1 K/UL (ref 0–0.11)
IMM GRANULOCYTES NFR BLD AUTO: 1 % (ref 0–0.9)
IMM GRANULOCYTES NFR BLD AUTO: 1.2 % (ref 0–0.9)
LYMPHOCYTES # BLD AUTO: 1.11 K/UL (ref 1–4.8)
LYMPHOCYTES # BLD AUTO: 1.22 K/UL (ref 1–4.8)
LYMPHOCYTES NFR BLD: 12.4 % (ref 22–41)
LYMPHOCYTES NFR BLD: 14.7 % (ref 22–41)
MCH RBC QN AUTO: 30.8 PG (ref 27–33)
MCH RBC QN AUTO: 30.9 PG (ref 27–33)
MCHC RBC AUTO-ENTMCNC: 31 G/DL (ref 33.7–35.3)
MCHC RBC AUTO-ENTMCNC: 31 G/DL (ref 33.7–35.3)
MCV RBC AUTO: 99.5 FL (ref 81.4–97.8)
MCV RBC AUTO: 99.7 FL (ref 81.4–97.8)
MONOCYTES # BLD AUTO: 0.78 K/UL (ref 0–0.85)
MONOCYTES # BLD AUTO: 0.86 K/UL (ref 0–0.85)
MONOCYTES NFR BLD AUTO: 9.4 % (ref 0–13.4)
MONOCYTES NFR BLD AUTO: 9.6 % (ref 0–13.4)
NEUTROPHILS # BLD AUTO: 5.96 K/UL (ref 1.82–7.42)
NEUTROPHILS # BLD AUTO: 6.66 K/UL (ref 1.82–7.42)
NEUTROPHILS NFR BLD: 72 % (ref 44–72)
NEUTROPHILS NFR BLD: 74.5 % (ref 44–72)
NRBC # BLD AUTO: 0 K/UL
NRBC # BLD AUTO: 0 K/UL
NRBC BLD-RTO: 0 /100 WBC
NRBC BLD-RTO: 0 /100 WBC
NT-PROBNP SERPL IA-MCNC: 3961 PG/ML (ref 0–125)
PLATELET # BLD AUTO: 125 K/UL (ref 164–446)
PLATELET # BLD AUTO: 158 K/UL (ref 164–446)
PMV BLD AUTO: 13.1 FL (ref 9–12.9)
PMV BLD AUTO: 13.2 FL (ref 9–12.9)
POTASSIUM SERPL-SCNC: 4.1 MMOL/L (ref 3.6–5.5)
POTASSIUM SERPL-SCNC: 4.4 MMOL/L (ref 3.6–5.5)
PROT SERPL-MCNC: 6.8 G/DL (ref 6–8.2)
PROT SERPL-MCNC: 7.4 G/DL (ref 6–8.2)
RBC # BLD AUTO: 3.59 M/UL (ref 4.7–6.1)
RBC # BLD AUTO: 3.67 M/UL (ref 4.7–6.1)
SODIUM SERPL-SCNC: 134 MMOL/L (ref 135–145)
SODIUM SERPL-SCNC: 137 MMOL/L (ref 135–145)
TROPONIN T SERPL-MCNC: 113 NG/L (ref 6–19)
TSH SERPL DL<=0.005 MIU/L-ACNC: 1.57 UIU/ML (ref 0.38–5.33)
WBC # BLD AUTO: 8.3 K/UL (ref 4.8–10.8)
WBC # BLD AUTO: 8.9 K/UL (ref 4.8–10.8)

## 2020-11-04 PROCEDURE — 99152 MOD SED SAME PHYS/QHP 5/>YRS: CPT | Performed by: INTERNAL MEDICINE

## 2020-11-04 PROCEDURE — 94640 AIRWAY INHALATION TREATMENT: CPT

## 2020-11-04 PROCEDURE — 85025 COMPLETE CBC W/AUTO DIFF WBC: CPT

## 2020-11-04 PROCEDURE — B2151ZZ FLUOROSCOPY OF LEFT HEART USING LOW OSMOLAR CONTRAST: ICD-10-PCS | Performed by: INTERNAL MEDICINE

## 2020-11-04 PROCEDURE — B2111ZZ FLUOROSCOPY OF MULTIPLE CORONARY ARTERIES USING LOW OSMOLAR CONTRAST: ICD-10-PCS | Performed by: INTERNAL MEDICINE

## 2020-11-04 PROCEDURE — 700111 HCHG RX REV CODE 636 W/ 250 OVERRIDE (IP)

## 2020-11-04 PROCEDURE — 36415 COLL VENOUS BLD VENIPUNCTURE: CPT

## 2020-11-04 PROCEDURE — 4A023N7 MEASUREMENT OF CARDIAC SAMPLING AND PRESSURE, LEFT HEART, PERCUTANEOUS APPROACH: ICD-10-PCS | Performed by: INTERNAL MEDICINE

## 2020-11-04 PROCEDURE — 99233 SBSQ HOSP IP/OBS HIGH 50: CPT | Performed by: INTERNAL MEDICINE

## 2020-11-04 PROCEDURE — 94760 N-INVAS EAR/PLS OXIMETRY 1: CPT

## 2020-11-04 PROCEDURE — 700111 HCHG RX REV CODE 636 W/ 250 OVERRIDE (IP): Performed by: STUDENT IN AN ORGANIZED HEALTH CARE EDUCATION/TRAINING PROGRAM

## 2020-11-04 PROCEDURE — 770020 HCHG ROOM/CARE - TELE (206)

## 2020-11-04 PROCEDURE — 700105 HCHG RX REV CODE 258: Performed by: HOSPITALIST

## 2020-11-04 PROCEDURE — 700101 HCHG RX REV CODE 250

## 2020-11-04 PROCEDURE — A9270 NON-COVERED ITEM OR SERVICE: HCPCS | Performed by: INTERNAL MEDICINE

## 2020-11-04 PROCEDURE — 99223 1ST HOSP IP/OBS HIGH 75: CPT | Mod: AI,GC | Performed by: HOSPITALIST

## 2020-11-04 PROCEDURE — 700117 HCHG RX CONTRAST REV CODE 255: Performed by: INTERNAL MEDICINE

## 2020-11-04 PROCEDURE — 83036 HEMOGLOBIN GLYCOSYLATED A1C: CPT

## 2020-11-04 PROCEDURE — 99153 MOD SED SAME PHYS/QHP EA: CPT

## 2020-11-04 PROCEDURE — 80053 COMPREHEN METABOLIC PANEL: CPT

## 2020-11-04 PROCEDURE — 700102 HCHG RX REV CODE 250 W/ 637 OVERRIDE(OP): Performed by: STUDENT IN AN ORGANIZED HEALTH CARE EDUCATION/TRAINING PROGRAM

## 2020-11-04 PROCEDURE — 85610 PROTHROMBIN TIME: CPT

## 2020-11-04 PROCEDURE — 700102 HCHG RX REV CODE 250 W/ 637 OVERRIDE(OP)

## 2020-11-04 PROCEDURE — 11055 PARING/CUTG B9 HYPRKER LES 1: CPT

## 2020-11-04 PROCEDURE — 94664 DEMO&/EVAL PT USE INHALER: CPT

## 2020-11-04 PROCEDURE — 93010 ELECTROCARDIOGRAM REPORT: CPT | Performed by: INTERNAL MEDICINE

## 2020-11-04 PROCEDURE — 700101 HCHG RX REV CODE 250: Performed by: STUDENT IN AN ORGANIZED HEALTH CARE EDUCATION/TRAINING PROGRAM

## 2020-11-04 PROCEDURE — 82962 GLUCOSE BLOOD TEST: CPT | Mod: 91

## 2020-11-04 PROCEDURE — 83880 ASSAY OF NATRIURETIC PEPTIDE: CPT

## 2020-11-04 PROCEDURE — 93458 L HRT ARTERY/VENTRICLE ANGIO: CPT | Mod: 26 | Performed by: INTERNAL MEDICINE

## 2020-11-04 PROCEDURE — 700102 HCHG RX REV CODE 250 W/ 637 OVERRIDE(OP): Performed by: INTERNAL MEDICINE

## 2020-11-04 PROCEDURE — 71045 X-RAY EXAM CHEST 1 VIEW: CPT

## 2020-11-04 PROCEDURE — A9270 NON-COVERED ITEM OR SERVICE: HCPCS

## 2020-11-04 PROCEDURE — 93005 ELECTROCARDIOGRAM TRACING: CPT | Performed by: STUDENT IN AN ORGANIZED HEALTH CARE EDUCATION/TRAINING PROGRAM

## 2020-11-04 PROCEDURE — A9270 NON-COVERED ITEM OR SERVICE: HCPCS | Performed by: STUDENT IN AN ORGANIZED HEALTH CARE EDUCATION/TRAINING PROGRAM

## 2020-11-04 PROCEDURE — 84443 ASSAY THYROID STIM HORMONE: CPT

## 2020-11-04 RX ORDER — FUROSEMIDE 10 MG/ML
40 INJECTION INTRAMUSCULAR; INTRAVENOUS ONCE
Status: COMPLETED | OUTPATIENT
Start: 2020-11-04 | End: 2020-11-04

## 2020-11-04 RX ORDER — LIDOCAINE HYDROCHLORIDE 20 MG/ML
INJECTION, SOLUTION INFILTRATION; PERINEURAL
Status: COMPLETED
Start: 2020-11-04 | End: 2020-11-04

## 2020-11-04 RX ORDER — HEPARIN SODIUM,PORCINE 1000/ML
VIAL (ML) INJECTION
Status: COMPLETED
Start: 2020-11-04 | End: 2020-11-04

## 2020-11-04 RX ORDER — IPRATROPIUM BROMIDE AND ALBUTEROL SULFATE 2.5; .5 MG/3ML; MG/3ML
SOLUTION RESPIRATORY (INHALATION)
Status: ACTIVE
Start: 2020-11-04 | End: 2020-11-04

## 2020-11-04 RX ORDER — ASPIRIN 81 MG/1
TABLET, CHEWABLE ORAL
Status: COMPLETED
Start: 2020-11-04 | End: 2020-11-04

## 2020-11-04 RX ORDER — HEPARIN SODIUM 200 [USP'U]/100ML
INJECTION, SOLUTION INTRAVENOUS
Status: COMPLETED
Start: 2020-11-04 | End: 2020-11-04

## 2020-11-04 RX ORDER — MIDAZOLAM HYDROCHLORIDE 1 MG/ML
INJECTION INTRAMUSCULAR; INTRAVENOUS
Status: COMPLETED
Start: 2020-11-04 | End: 2020-11-04

## 2020-11-04 RX ORDER — IPRATROPIUM BROMIDE AND ALBUTEROL SULFATE 2.5; .5 MG/3ML; MG/3ML
3 SOLUTION RESPIRATORY (INHALATION)
Status: DISCONTINUED | OUTPATIENT
Start: 2020-11-04 | End: 2020-11-07 | Stop reason: HOSPADM

## 2020-11-04 RX ORDER — VERAPAMIL HYDROCHLORIDE 2.5 MG/ML
INJECTION, SOLUTION INTRAVENOUS
Status: COMPLETED
Start: 2020-11-04 | End: 2020-11-04

## 2020-11-04 RX ADMIN — GLYCOPYRROLATE 1 CAPSULE: 15.6 CAPSULE RESPIRATORY (INHALATION) at 22:28

## 2020-11-04 RX ADMIN — VERAPAMIL HYDROCHLORIDE 2.5 MG: 2.5 INJECTION, SOLUTION INTRAVENOUS at 15:58

## 2020-11-04 RX ADMIN — ASPIRIN 81 MG: 81 TABLET, COATED ORAL at 06:12

## 2020-11-04 RX ADMIN — IPRATROPIUM BROMIDE AND ALBUTEROL SULFATE 3 ML: .5; 3 SOLUTION RESPIRATORY (INHALATION) at 04:05

## 2020-11-04 RX ADMIN — MIDAZOLAM HYDROCHLORIDE 0.5 MG: 1 INJECTION, SOLUTION INTRAMUSCULAR; INTRAVENOUS at 16:14

## 2020-11-04 RX ADMIN — ASPIRIN 243 MG: 81 TABLET, CHEWABLE ORAL at 15:55

## 2020-11-04 RX ADMIN — HEPARIN SODIUM 2000 UNITS: 200 INJECTION, SOLUTION INTRAVENOUS at 15:58

## 2020-11-04 RX ADMIN — IOHEXOL 58 ML: 350 INJECTION, SOLUTION INTRAVENOUS at 16:18

## 2020-11-04 RX ADMIN — LIDOCAINE HYDROCHLORIDE: 20 INJECTION, SOLUTION INFILTRATION; PERINEURAL at 15:58

## 2020-11-04 RX ADMIN — HEPARIN SODIUM: 1000 INJECTION, SOLUTION INTRAVENOUS; SUBCUTANEOUS at 15:58

## 2020-11-04 RX ADMIN — HEPARIN SODIUM 5000 UNITS: 5000 INJECTION, SOLUTION INTRAVENOUS; SUBCUTANEOUS at 06:13

## 2020-11-04 RX ADMIN — DOCUSATE SODIUM 50 MG AND SENNOSIDES 8.6 MG 2 TABLET: 8.6; 5 TABLET, FILM COATED ORAL at 18:17

## 2020-11-04 RX ADMIN — GLYCOPYRROLATE 1 CAPSULE: 15.6 CAPSULE RESPIRATORY (INHALATION) at 08:17

## 2020-11-04 RX ADMIN — DEXTROSE AND SODIUM CHLORIDE: 5; 900 INJECTION, SOLUTION INTRAVENOUS at 03:07

## 2020-11-04 RX ADMIN — INSULIN GLARGINE 10 UNITS: 100 INJECTION, SOLUTION SUBCUTANEOUS at 18:16

## 2020-11-04 RX ADMIN — INSULIN HUMAN 1 UNITS: 100 INJECTION, SOLUTION PARENTERAL at 06:19

## 2020-11-04 RX ADMIN — FUROSEMIDE 40 MG: 10 INJECTION, SOLUTION INTRAMUSCULAR; INTRAVENOUS at 10:46

## 2020-11-04 RX ADMIN — NITROGLYCERIN 10 ML: 20 INJECTION INTRAVENOUS at 15:58

## 2020-11-04 ASSESSMENT — ENCOUNTER SYMPTOMS
SENSORY CHANGE: 0
SORE THROAT: 0
PHOTOPHOBIA: 0
INSOMNIA: 0
DEPRESSION: 0
HEADACHES: 0
CLAUDICATION: 0
WHEEZING: 0
MEMORY LOSS: 0
NECK PAIN: 0
DIARRHEA: 0
ORTHOPNEA: 0
WEIGHT LOSS: 0
POLYDIPSIA: 0
BACK PAIN: 0
SPEECH CHANGE: 0
VOMITING: 0
NERVOUS/ANXIOUS: 0
NAUSEA: 0
CONSTIPATION: 0
BRUISES/BLEEDS EASILY: 0
DOUBLE VISION: 0
SPUTUM PRODUCTION: 0
TREMORS: 0
MYALGIAS: 0
CHILLS: 0
TINGLING: 0
LOSS OF CONSCIOUSNESS: 0
PALPITATIONS: 0
HEMOPTYSIS: 0
FOCAL WEAKNESS: 0
ABDOMINAL PAIN: 0
STRIDOR: 0
FEVER: 0
FALLS: 0
DIAPHORESIS: 0
DIZZINESS: 1
BLURRED VISION: 0
SHORTNESS OF BREATH: 1
HALLUCINATIONS: 0
COUGH: 0
HEARTBURN: 0

## 2020-11-04 ASSESSMENT — LIFESTYLE VARIABLES: SUBSTANCE_ABUSE: 0

## 2020-11-04 NOTE — WOUND TEAM
[facility]  Inpatient Services  Initial Wound and Skin Care Evaluation    Admission Date: 11/3/2020     Last order of IP CONSULT TO WOUND CARE was found on 11/3/2020 from Hospital Encounter on 11/3/2020     HPI, PMH, SH: Reviewed    Unit where seen by Wound Team: [id number]     WOUND CONSULT/FOLLOW UP RELATED TO:  Plantar foot     Self Report / Pain Level:  Denies pain       OBJECTIVE:  Pt lying in bed with waffle overlay in place. No heel mepilex in use. Son at bedside to assist with translation.    WOUND TYPE, LOCATION, CHARACTERISTICS (Pressure Injuries: location, stage, POA or date identified)          Vascular:    MARY:   No results found.    Lab Values:    Lab Results   Component Value Date/Time    WBC 8.9 11/04/2020 04:01 AM    RBC 3.59 (L) 11/04/2020 04:01 AM    HEMOGLOBIN 11.1 (L) 11/04/2020 04:01 AM    HEMATOCRIT 35.8 (L) 11/04/2020 04:01 AM    SEDRATEWES 52 (H) 10/26/2020 09:16 AM    HBA1C 6.9 (H) 11/04/2020 04:01 AM      Culture Results show:  No results found for this or any previous visit (from the past 720 hour(s)).    INTERVENTIONS BY WOUND TEAM:  Chart and images reviewed. This RN in to assess patient. Pt pleasant and agreeable. Sock removed. Pt has a thickly callused area to his left medial plantar mid foot. Area was pared down using curette and scalpel. No bleeding noted. No wound noted. New image obtained. Sween 24hr moisturizing cream applied. Sock reapplied. Right foot assessed and intact. Pt down to cath lab.    Interdisciplinary consultation: Patient, Bedside RN (Arden),     EVALUATION / RATIONALE FOR TREATMENT: Pt is an older diabetic gentleman who was recently seen by wound team just last week during a previous admission. During last admission callus was pared down and offloading shoe prescription was provided to pt per pts son. Unfortunately pt was unable to get fitted for shoes as he was readmitted related to dizziness, pt going for left heart catheterization this afternoon.  Wound team was consulted regarding callus' to plantar foot which was again pared down. No wounds noted. Moisturizer applied to soften the area. Pt son will have pt get shoes once he his DC'd from the hospital.      Goals: Steady decrease in wound area and depth weekly.    NURSING PLAN OF CARE ORDERS (X):    Dressing changes: See Dressing Care orders:   Skin care: See Skin Care orders: X  RN Prevention Protocol:   Rectal tube care: See Rectal Tube Care orders:   Other orders:    RSKIN:   CURRENTLY IN PLACE (X), APPLIED THIS VISIT (A), ORDERED (O):   Q shift Darius:  X  Q shift pressure point assessments:  X  Pressure redistribution mattress   X         Low Airloss          Bariatric RAINA         Bariatric foam           Heel float boots     Heel Silicone dressing   O     Float Heels off Bed with Pillows               Barrier wipes         Barrier Cream         Barrier paste          Sacral silicone dressing DOLORES        Silicone O2 tubing  X       Anchorfast         Cannula fixation Device (Tender )          Gray Foam Ear protectors     High flow offloading Clip    Elastic head band offloading device                                                      Trach with Optifoam split foam       Z Kaveh Pillow                             Waffle cushion        Waffle Overlay    X     Rectal tube or BMS    Purwick/Condom Cath          Antifungal tx      Interdry          Reposition q 2 hours  Self mobile in bed    TAPs Turning system                 Up to chair  X      Ambulate X     PT/OT        Dietician        Diabetes Education      PO     TF     TPN     NPO  X # days 0  Other        WOUND TEAM PLAN OF CARE:   Dressing changes by wound team:                   Follow up 3 times weekly:                NPWT change 3 times weekly:     Follow up 1-2 times weekly:      Follow up Bi-Monthly:                   Follow up as needed: X      Other (explain):     Anticipated discharge plans:   LTACH:        SNF/Rehab:                   Home Health Care:           Outpatient Wound Center:            Self Care:   X, pt lives with son and grandson's locally and has other son's and grandson's locally as well.

## 2020-11-04 NOTE — PROGRESS NOTES
CROSS COVER NOTE:  - Paged because the patient had been standing up and every time he stands up he goes into a junctional rhythm with HR in the 40s. EKG was ordered at midnight when this happened and unfortunately this rhythm was not captured on EKG.   - The patient's oxygen requirements have also been increasing slowly throughout the night and when he does bryce down he is requiring 15L non-rebreather mask. It improves slightly when he gets back in bed but he was still using 7L oxymask when he was evaluated by me. On exam he had crackles bilaterally. (hard to say if it was pulmonary edema vs ILD as the patient might have had both).  - Ordered a STAT CXR and by my read, it seems that there are more pulmonary infiltrates bilaterally likely pulmonary edema on top of his known ILD. CXR looked a little worse than it did on admission.    Plan:  - Stopped IV Fluids (was on D5 /hr). The patient was admitted with an MIRIAM. Therefore, can diurese the patient depending on AM lab results.

## 2020-11-04 NOTE — ASSESSMENT & PLAN NOTE
Patient reports palpitations but denies chest pain, and EKG on admission revealed Sinus bradycardia with no CLEAR evidence of new infarction or MI from previous EKG's. 11/04 patient had junctional rhythms and 1st degree AV block whenever he stood up. Patient experienced nocturnal bradycardia with telemetry noted to have episodes of sinus bradycardia  overnight, HR low 41s at lowest. Patient's heart rate is 70 when at rest. Etiology unclear.    - Per EP, Dr. Ramos, patient does not need Pacemaker, see his note for more details  - Per Dr. Hopper, patient does not need cardiac surgery, see his note for more details  - Holding 5mg Lisinopril under suspicion of Mineralocorticoid Insufficiency.  - Aldosterone and Renin levels   - F/U with EP 2 weeks after d/c for outpatient assessment for pacemaker

## 2020-11-04 NOTE — PROGRESS NOTES
Received report from Sierra RN (ER). Pt is A&O x 4 Angolan speaking only. Pt transported via [facility] with ACLS RN and zoll monitor. Pt arrived to unit, tele box on patient, confirmed SB with monitor room. Pt ambulatedwith standby assist from rney to hospital bed, tolerated well. Pt oriented to unit and call light, verbalized understanding. Fall precautions in place. Call light and bedside table within reach, bed locked in lowest position with controls on. Assessment completed. Will continue to monitor.

## 2020-11-04 NOTE — PROGRESS NOTES
[facility] Follow-up Note    Date of Service:    11/4/2020      Name:   Chase Harris   YOB: 1950  Age:   70 y.o.  male   MRN:   [id number]      Chief Complaint: syncope.    HPI:  Mr Harris is a 69 y/o fellow with PMH including interstitial lung disease, HTN, CKD, DM2 and recurrent syncope.  He presented to [facility] on 11/3/20 with c/o orthostatic dizziness.  He has been having intermittent bradycardia and his troponin was found to be elevated, thus cardiology was consulted.       Interim Events:  Patient had episode overnight where he was in junctional rhythm, poorly responsive, hypoxic. Placed on high flow O2, then weaned to 7 lpm non rebreather - which continues.  Baseline O2 at home is 3 Lpm.   He is feeling better this morning.     Plans for cardiac angiogram this afternoon    ROS  Constitutional:  denies fatigue  Respiratory:  Denies shortness of breath, no cough.  Cardiovascular:  No chest pain.  no lower extremity edema.  Denies orthopnea or PND.  : denies polyuria, no dysuria.  GI:  Denies nausea/vomiting.  No abdominal distention.  Neuro:  Denies dizziness, syncope.  Hem/lymph: Denies easy bleeding/bruising.      All other review of systems reviewed and negative.    Past medical, surgical, social, and family history reviewed and unchanged from admission except as noted in HPI.    Medications: Reviewed in MAR  Current Facility-Administered Medications   Medication Dose Frequency Provider Last Rate Last Admin   • ipratropium-albuterol (DUONEB) nebulizer solution  3 mL Q4H PRN (RT) Brian Barker M.D.   3 mL at 11/04/20 0405   • IPRATROPIUM-ALBUTEROL 0.5-2.5 (3) MG/3ML INH SOLN       Stopped at 11/04/20 0415   • senna-docusate (PERICOLACE or SENOKOT S) 8.6-50 MG per tablet 2 Tab  2 Tab BID Brian Barker M.D.        And   • polyethylene glycol/lytes (MIRALAX) PACKET 1 Packet  1 Packet QDAY PRN Brian Barker M.D.        And   • magnesium hydroxide (MILK OF  "MAGNESIA) suspension 30 mL  30 mL QDAY PRN Brian Barker M.D.        And   • bisacodyl (DULCOLAX) suppository 10 mg  10 mg QDAY PRN Brian Barker M.D.       • heparin injection 5,000 Units  5,000 Units Q8HRS Brian Barker M.D.   5,000 Units at 11/04/20 0613   • acetaminophen (TYLENOL) tablet 650 mg  650 mg Q6HRS PRN Brian Barker M.D.       • labetalol (NORMODYNE/TRANDATE) injection 10 mg  10 mg Q4HRS PRN Brian Barker M.D.       • insulin regular (HumuLIN R,NovoLIN R) injection  1-6 Units 4X/DAY ACHS Brian Barker M.D.   1 Units at 11/04/20 0619    And   • glucose 4 g chewable tablet 16 g  16 g Q15 MIN PRN Brian Barker M.D.        And   • dextrose 50% (D50W) injection 50 mL  50 mL Q15 MIN PRN Brian Barker M.D.       • insulin glargine (Lantus) injection  10 Units Q EVENING Brian Barker M.D.   10 Units at 11/03/20 2047   • albuterol inhaler 2 Puff  2 Puff Q6HRS PRN Brian Barker M.D.       • glycopyrrolate (Seebri) 15.6 mcg inhalation capsule 1 Cap  1 Cap BID (RT) Brian Barker M.D.   1 Cap at 11/04/20 0817   • aspirin EC (ECOTRIN) tablet 81 mg  81 mg DAILY Erica Gaytan M.D.   81 mg at 11/04/20 0612   • Respiratory Therapy Consult   Continuous RT Brian Barker M.D.       Last reviewed on 11/3/2020 12:44 PM by Marii Clark    No Known Allergies    Physical Exam  Body mass index is 23.08 kg/m². /93   Pulse 85   Temp 36.7 °C (98.1 °F) (Temporal)   Resp 20   Ht 1.6 m (5' 3\")   Wt 59.1 kg (130 lb 4.7 oz)   SpO2 95%    Vitals:    11/04/20 0254 11/04/20 0300 11/04/20 0407 11/04/20 0819   BP: 145/90 152/93     Pulse: 96  72 85   Resp: 20 20 20   Temp: 36.7 °C (98.1 °F)      TempSrc: Temporal      SpO2: 96%  92% 95%   Weight:       Height:        Oxygen Therapy:  Pulse Oximetry: 95 %, O2 (LPM): 10, O2 Delivery Device: Oxymask    General: no apparent distress  Neck: no JVD   Lungs: normal effort,  Diffuse crackles  Heart: normal rate, regular rhythm, no murmur, no rub  EXT: no lower extremity " edema  Abdomen: soft, non tender, non distended  Neurological: No focal deficits, no facial asymmetry.  Normal speech  Psychiatric: Appropriate affect, alert and oriented x 3  Skin: Warm extremities, no rash    Labs (personally reviewed):     Lab Results   Component Value Date/Time    SODIUM 134 (L) 2020 04:01 AM    POTASSIUM 4.4 2020 04:01 AM    CHLORIDE 104 2020 04:01 AM    CO2 19 (L) 2020 04:01 AM    GLUCOSE 202 (H) 2020 04:01 AM    BUN 23 (H) 2020 04:01 AM    CREATININE 1.22 2020 04:01 AM     Lab Results   Component Value Date/Time    ALKPHOSPHAT 58 2020 04:01 AM    ASTSGOT 24 2020 04:01 AM    ALTSGPT 36 2020 04:01 AM    TBILIRUBIN 0.6 2020 04:01 AM      Lab Results   Component Value Date/Time    CHOLSTRLTOT 117 2020 03:17 AM    LDL 68 2020 03:17 AM    HDL 29 (A) 2020 03:17 AM    TRIGLYCERIDE 101 2020 03:17 AM         Cardiac Imaging and Procedures Review:      Personal Telemetry Review:  Most SR in the 60s.  20 at 0026 5 seconds of junction escape rhythm - slowest HR 35.  20 at 0249 patient had 10 minutes of junctional escape - HR in the 40s    Echo 20:  CONCLUSIONS  Left ventricular ejection fraction is visually estimated to be 55%.   Grade I diastolic dysfunction.  The right ventricle was normal in size and function.  Estimated right ventricular systolic pressure  is 40 mmHg, consistent   with mild pulmonary hypertension.  Estimated right atrial pressure is 3 mmHg.  Normal pericardium without effusion.    Stress Test 20:  NUCLEAR IMAGING INTERPRETATION   No evidence of significant jeopardized viable myocardium or prior myocardial    infarction.   Normal left ventricular size, ejection fraction, and wall motion.   ECG INTERPRETATION   Negative stress ECG for ischemia.      Assessment and Medical Decision Makin   Elevated troponin   - flat in the 115 range, likely demand ischemia  - though he  does have risk factors for CAD including HTN, DM2, smoker.  - has some TWI in the inferior leads on EKG - could be consistent with ischemia.   - proceed with [facility] today.     2  Acute on chronic hypoxia.   -  Hx of interstitial lung disease  -  cxr looks worse overnight with pulmonary edema - agree with dose of IV lasix.   -  EF preserved.  -  Has at least mild pulmonary hypertension.    3  CKD stage III  -  Had some mild MIRIAM on arrival, was given fluids.     4  Intermittent junctional bradycardia  -  Certainly seems to have some conduction abnormalities.   -  Occasional isolated non conducted PAC.  -  1st degree AVB on EKG.    Will follow.      Chyna Pascual PA-C  [facility]

## 2020-11-04 NOTE — PROGRESS NOTES
2 RN skin check complete with Sushila JOHN.   Devices in place o2.  Skin assessed under devices red and blanching.  Confirmed pressure ulcers found on L foot.  New potential pressure ulcers noted on n/a. Wound consult placed yes.  The following interventions in place pillows in use for support/position.      Bilateral ears pink and blanching. Red and blanching. Foam pads in place.  Sacrum red and blanching.  Dryness noted to elbows.  Bilteral heels pink and blanching.  L diabetic ulcer noted to L bottom of foot. Wound consult placed.

## 2020-11-04 NOTE — RESPIRATORY CARE
COPD EDUCATION by COPD CLINICAL EDUCATOR  11/4/2020 at 10:17 AM by Emily Reese, RRT     Patient interviewed by COPD education team.  Patient unable to participate in full program.  Short intervention has been conducted.  A comprehensive packet including information about COPD, and management has been provided in French and discussed at bedside with . He has seen  [facility] earlier in the year. He has ILD picture with Fibrosis that is requiring PFT's and follow up evaluation with Pulmonary. This has yet to be completed. He was instructed to stay on daily medications until testing completed. Per review it was noted that he was scheduled 9/2/2020 for testing but not completed  <<Son will assist Chase to his Pulmonary Appointment>>    Action Plan Completed/Updated? Completed with current medications     Pulmonary Function Testing (PFT)? Pending -he states had a test in [place] in the past -0- results available    Peak Inspiratory flow (PIF)  Patients resistance: 50 L/min    Does patient currently use inhalers/nebulizer at home? Spiriva Handihaler, Symbicort, Albuterol    Additional medication/equipment recommendations  Needs PFT    Is all Respiratory DME in place? Oxygen                   If yes, has patient been educated on use of DME?   Oxygen safety    Have all necessary follow up appointments been scheduled?   PCP or D/C clinic BARB 11/10 Rashmi MENDOZA to establish (per pt)   [facility]ty [facility].11/17/2020 this was given to son at bedside.   No, please explain asking COPD Navigator for assistance in PFT rescheduling    Palliative Care Team involved in care, been suggested or consulted? no    Has the patient been referred to Pulmonary Rehab? Pending PFT results per Dr Campuzano    Has the patient been referred to the [facility] COPD Program? n/a    Home Health referral placed? no  Recommended? Not at this time

## 2020-11-04 NOTE — ASSESSMENT & PLAN NOTE
"Patient stated he feels light-headed and then about to pass out whenever he stands from a seated or laying position since 10/30/2020. Patient states that his blood pressure is a \"little low\" at home, but is unable to quantity. Patient reports that he is compliant with all his medications and this blood sugars at home range from 150-160s, with the max at 180s. Patient does recall starting Lisinopril 10mg Daily one month ago, but current records do not indicate patient was taking this medication even from recent 10/28 d/c summary. Etiology likely orthostatic, but patient did have elevated troponin of 118 from baseline of 50-65s and had elevated BP up to 181/80 once during interview but was 128/50 on admission. Last ECHO May 2020 EF was 55% with Grade I diastolic dysfunction. Orthostatic Vitals Positive: 158/69 laying down vs 131/63 while standing up. ECHO showed normal LVEF of 70% and LVgram which also showed normal LVEF and Diffuse Coronary Artery Disease, after being reread by Dr. Hopper after original read had 30%.    - Repeat orthostatic vitals tomorrow, 11/06 Orthostatic Vitals Positive: 153/66 laying down vs 88/50 while standing up and repeat 92/52 standing up. Patient asymptomatic.  - Midodrine 5mg TID started  - Diabetic Diet  - Con't Labetalol PRN  - Con't albuterol and tiotropium  - ASCVD score > 40%, Con't 40mg Atorvastatin  - Aldosterone (sitting up) and Renin ordered  - Holding 5mg Lisinopril under suspicion of Mineralocorticoid Insufficiency   - F/U with EP 2 weeks after d/c for outpatient assessment for pacemaker  "

## 2020-11-04 NOTE — PROGRESS NOTES
Pt stood up on his own. He got light headed. He was not responding to questions for 3-4 minutes. During this time, he was in junctional rhythm for 10 minutes and dropped down to low 40s HR. Pt lung sounds are crackles in lower lobes and his oxygen demand went up to 10L on a mask. Rapid nurses evaluated Pt. MD notified. MD came to bedside. Chest X-ray, d/c fluids, dry powder inhaler, and labs ordered.    Pt recovered from both episodes slowly but is now AxOx4. He is still on 10L mask.

## 2020-11-04 NOTE — ASSESSMENT & PLAN NOTE
Patient's Cr is elevated by .3 at 1.51 from baseline 1.2. Patient stated he has not drink or eaten anything except a banana this morning. Patient has a PMH of CKD stage III. Etiology unclear likely Prerenal as Cr improved with IVF.    - Resolved  - BP control with PRN Labetalol and holding lisinopril  - Avoid nephrotoxins and renally dose medications.

## 2020-11-04 NOTE — PROGRESS NOTES
Assumed care of Pt, He is sitting up in bed. Discussed POC with day shift RN at bedside. Bed is locked in lowest position and call light/ belongings are within reach.

## 2020-11-04 NOTE — PROGRESS NOTES
Daily Progress Note:     Date of Service: 11/4/2020  Primary Team: [facility]   Attending: ALEKSANDAR Gabriel M.D.   Senior Resident: Dr. Alba  Intern: Dr. Barker  Contact:  [phone]    Chief Complaint:   Passing out    Subjective:   Overnight patient rhythm with HR in the 40s whenever he stood up while also being hypoxic, with increasing oxygen requirements. High flow O2 was weaned to 7 L with the patient's baseline O2 is 3 L. Cardiac Angiogram planned for this afternoon. Chest X-ray showed pulmonary edema likely secondary to fluid overload and IVF were stopped.    Interval Events:  Cardiac Angiogram  D/C IVF  Lasix 40mg once  TSH and T4 ordered  AM Cortisol ordered    Consultants/Specialty:  Cardiology    Review of Systems:   Review of Systems   Constitutional: Negative for chills, diaphoresis, fever, malaise/fatigue and weight loss.   HENT: Negative for hearing loss, nosebleeds, sore throat and tinnitus.    Eyes: Negative for blurred vision, double vision and photophobia.   Respiratory: Positive for shortness of breath. Negative for cough, hemoptysis, sputum production, wheezing and stridor.    Cardiovascular: Negative for chest pain, palpitations, orthopnea and claudication.   Gastrointestinal: Negative for abdominal pain, constipation, diarrhea, heartburn, nausea and vomiting.   Genitourinary: Negative for dysuria, frequency, hematuria and urgency.   Musculoskeletal: Negative for back pain, falls, joint pain, myalgias and neck pain.   Skin: Negative for itching and rash.   Neurological: Positive for dizziness. Negative for tingling, tremors, sensory change, speech change, focal weakness, loss of consciousness and headaches.   Endo/Heme/Allergies: Negative for polydipsia. Does not bruise/bleed easily.   Psychiatric/Behavioral: Negative for depression, hallucinations, memory loss and substance abuse. The patient is not nervous/anxious and does not have insomnia.        Objective Data:   Physical Exam:    Vitals:   Temp:  [36 °C (96.8 °F)-36.9 °C (98.4 °F)] 36.8 °C (98.2 °F)  Pulse:  [61-96] 66  Resp:  [14-20] 18  BP: (121-196)/(51-93) 138/63  SpO2:  [91 %-96 %] 96 %     Physical Exam  Constitutional:       General: He is not in acute distress.     Appearance: Normal appearance. He is normal weight. He is not ill-appearing.      Comments: Pleasant and cooperative mood.   HENT:      Head: Normocephalic and atraumatic.      Nose: Nose normal. No congestion.      Mouth/Throat:      Mouth: Mucous membranes are moist.      Pharynx: Oropharynx is clear. No oropharyngeal exudate.   Eyes:      Extraocular Movements: Extraocular movements intact.      Conjunctiva/sclera: Conjunctivae normal.      Pupils: Pupils are equal, round, and reactive to light.   Neck:      Musculoskeletal: Normal range of motion and neck supple.   Cardiovascular:      Rate and Rhythm: Normal rate and regular rhythm.      Pulses: Normal pulses.      Heart sounds: Normal heart sounds. No murmur.   Pulmonary:      Effort: Pulmonary effort is normal. No respiratory distress.      Breath sounds: Wheezing (Minor, soft expiratory wheezes) present.      Comments: Crackles heard in b/l lower lung fields  Chest:      Chest wall: No tenderness.   Abdominal:      General: Abdomen is flat. Bowel sounds are normal.      Palpations: Abdomen is soft.      Tenderness: There is no abdominal tenderness.   Musculoskeletal: Normal range of motion.         General: No swelling or tenderness.   Skin:     General: Skin is warm and dry.      Capillary Refill: Capillary refill takes less than 2 seconds.      Coloration: Skin is not jaundiced.   Neurological:      General: No focal deficit present.      Mental Status: He is alert and oriented to person, place, and time. Mental status is at baseline.   Psychiatric:         Mood and Affect: Mood normal.         Behavior: Behavior normal.         Thought Content: Thought content normal.         Judgment: Judgment normal.            Labs:   Recent Labs     11/03/20  1137 11/04/20  0324 11/04/20  0401   WBC 8.8 8.3 8.9   RBC 3.79* 3.67* 3.59*   HEMOGLOBIN 11.9* 11.3* 11.1*   HEMATOCRIT 37.7* 36.5* 35.8*   MCV 99.5* 99.5* 99.7*   MCH 31.4 30.8 30.9   RDW 51.2* 50.2* 50.7*   PLATELETCT 127* 158* 125*   MPV 12.8 13.2* 13.1*   NEUTSPOLYS 70.80 72.00 74.50*   LYMPHOCYTES 17.70* 14.70* 12.40*   MONOCYTES 8.50 9.40 9.60   EOSINOPHILS 1.20 2.10 1.80   BASOPHILS 0.80 0.60 0.70     Recent Labs     11/03/20  1137 11/04/20  0324 11/04/20  0401   SODIUM 134* 137 134*   POTASSIUM 4.6 4.1 4.4   CHLORIDE 102 104 104   CO2 23 23 19*   GLUCOSE 150* 213* 202*   BUN 31* 24* 23*        Imaging:   DX-CHEST-PORTABLE (1 VIEW)   Final Result      Stable reticular and hazy opacification compatible with pulmonary edema and possible underlying interstitial lung disease      Stable small effusions      DX-CHEST-PORTABLE (1 VIEW)   Final Result      Diffuse interstitial prominence compatible with patient's history of interstitial lung disease.      Hazy bibasilar opacities may represent atelectasis or pneumonitis.      Blunting of the costophrenic angles may be related to pleural thickening or trace pleural fluid.      Mild cardiomegaly.      CL-LEFT HEART CATHETERIZATION WITH POSSIBLE INTERVENTION    (Results Pending)       Problem Representation:     70 y.o. male with a PMH of interstitial lung disease, type 2 diabetes (May 2020 A1C 8.0), grade 1 diastolic dysfunction (May 2020 EF 55%), CKD stage III, and recent discharge 10/28 for vertigo who presented 11/3/2020 with passing out episodes.    * Acute respiratory failure with hypoxia (HCC)- (present on admission)  Assessment & Plan  Patient has history of interstitial lung disease and mild pulmonary hypertension with preserved EF of 55% from May 2020 ECHO. Chest X-ray looks worse overnight with pulmonary edema after patient received IVF on admission.    -One time dose of 40mg IV lasix.   -Con't Oxygen support and  "RT following, greatly appreciate recommendations  -Con't to monitor and observe if patient oxygen requirements increase or he acutely worsens      Postural dizziness with presyncope- (present on admission)  Assessment & Plan  Patient stated he feels light-headed and then about to pass out whenever he stands from a seated or laying position since 10/30/2020. Patient states that his blood pressure is a \"little low\" at home, but is unable to quantity. Patient reports that he is compliant with all his medications and this blood sugars at home range from 150-160s, with the max at 180s. Patient does recall starting Lisinopril 10mg Daily one month ago, but current records do not indicate patient was taking this medication even from recent 10/28 d/c summary. Etiology likely orthostatic, but patient did have elevated troponin of 118 from baseline of 50-65s and had elevated BP up to 181/80 once during interview but was 128/50 on admission. Last ECHO May 2020 EF was 55% with Grade I diastolic dysfunction.    - Orthostatic vitals  - D/C 125mL/hr NS IVF, patient is NPO for Cardiac Angiogram  - Con't Labetalol PRN  - Con't albuterol and tiotropium  - Cardiology consulted, greatly appreciate recommendations  - TSH and T4 ordered  - AM Cortisol ordered    Sinus bradycardia- (present on admission)  Assessment & Plan  Patient reports palpitations but denies chest pain, and EKG on admission revealed Sinus bradycardia with no CLEAR evidence of new infarction or MI from previous EKG's. 11/04 patient had junctional rhythms and 1st degree AV block whenever he stood up. Etiology unclear.    - Cardiac Angiogram scheduled this afternoon and patient is currently NPO with no IVF  - cardiology following, greatly appreciate recommendations  - EP consult possibly for PPM after the cath especially if no RCA interventon needed      Elevated troponin- (present on admission)  Assessment & Plan  Patient's troponin elevated at 118 from baseline 50-65s. " Patient reports palpitations but denies chest pain, and EKG on admission revealed Sinus bradycardia with no CLEAR evidence of new infarction or MI from previous EKG's. Etiology unclear likely demand ischemia or from MIRIAM. Troponin 115 and 113 on repeat labs.    - Cardiac Angiogram  - Cardiology following, greatly appreciate recommendations  - EP consult possibly for PPM after the cath especially if no RCA interventon needed      MIRIAM (acute kidney injury) (HCC)  Assessment & Plan  Patient's Cr is elevated by .3 at 1.51 from baseline 1.2. Patient stated he has not drink or eaten anything except a banana this morning. Patient has a PMH of CKD stage III. Etiology unclear likely Prerenal as Cr improved with IVF.    - D/C IVF as patient is fluid overloaded  - one time dose of 40mg Lasix  - BP control with PRN Labetalol and holding lisinopril  - Avoid nephrotoxins and renally dose medications.      Please note that this dictation was created using voice recognition software. I have worked with technical experts from [facility] to optimize the interface.  I have made every reasonable attempt to correct obvious errors, but there may be errors of grammar and possibly content that I did not discover before finalizing the note.

## 2020-11-04 NOTE — PROGRESS NOTES
Monitor Summary     SR 68-83  Rare B-PAC  6 minute junctional rhythm  (dropped to the low 40s)  10 min junctional rhythm (dropped to the low 40s)  .22/.08/.36

## 2020-11-04 NOTE — PROGRESS NOTES
Pt got light headed on his way back from the bathroom. He was not responding to questions for 4-5 minutes. During this time Pt was in junctional rhythm for 6 minutes and dropped into the low 40s HR. Pt's oxygen demand increased to 15L on a nonrebreather for 10 mins. Slowly got weaned down to 7L on mask. EKG ordered. MD notified. No new orders at this time.

## 2020-11-04 NOTE — NON-PROVIDER
"Daily Progress Note:     Date of Service: 11/4/2020  Primary Team: [facility]   Attending: ALEKSANDAR Gabriel M.D.   Senior Resident: Dr. Anjali M.D  Intern: Dr. Mj M.D.  Contact:  [phone]    Chief Complaint:   Syncope     ID: Mr. Harris is a 70 y.o. male who presented on 11/3/2020 to the ED after \"passing out\". Patient has a PMH of interstitial lung disease (uses 2L of home oxygen) and non insulin diabetes type 2.      Subjective: Patient reports he's feeling better, however he still has episodes of dizziness upon standing. He was on 10L of oxygen. He denied any lightheadedness, fever and chills at the time.    Interval Events:  -Overnight, patient EKG showed junctional rhythm with a HR at 40. He was on 15L nonbreather mask then 7L oxygen mask. STAT Chest Xray noted \"Stable reticular and hazy opacification compatible with pulmonary edema and possible underlying interstitial lung disease. Stable small effusions\"  -Plan for cardiac angiogram today    Consultants/Specialty:  Cardiology  Review of Systems:    Review of Systems   Constitutional: Negative for chills and fever.   HENT: Negative for ear pain.    Eyes: Negative for blurred vision and double vision.   Respiratory: Negative for cough.    Cardiovascular: Negative for chest pain and palpitations.   Gastrointestinal: Negative for abdominal pain, nausea and vomiting.   Skin: Negative for itching and rash.   Neurological: Positive for dizziness. Negative for headaches.        Reports dizziness when he stands up    Psychiatric/Behavioral: Negative for substance abuse.       Objective Data:   Physical Exam:   Vitals:   /63   Pulse 66   Temp 36.8 °C (98.2 °F) (Temporal)   Resp 18   Ht 1.6 m (5' 3\")   Wt 59.1 kg (130 lb 4.7 oz)   SpO2 96%     Physical Exam  Constitutional:       Appearance: Normal appearance. He is not ill-appearing or diaphoretic.      Comments: Patient was alert and not in acute distress when I arrive in the room. He was " laying down watching TV. Not diaphoretic   HENT:      Head: Normocephalic.      Right Ear: External ear normal.      Left Ear: External ear normal.      Nose: Nose normal.      Mouth/Throat:      Mouth: Mucous membranes are moist.      Pharynx: Oropharynx is clear.   Eyes:      Pupils: Pupils are equal, round, and reactive to light.   Neck:      Musculoskeletal: Normal range of motion.   Cardiovascular:      Rate and Rhythm: Regular rhythm. Bradycardia present.      Heart sounds: Normal heart sounds.   Pulmonary:      Comments: Diminished breath sounds  Abdominal:      Tenderness: There is no abdominal tenderness.   Skin:     General: Skin is warm and dry.      Coloration: Skin is not jaundiced.   Neurological:      Mental Status: He is oriented to person, place, and time.   Psychiatric:         Mood and Affect: Mood normal.         Behavior: Behavior normal.       Labs:   Results for BREANNE BOOTH (MRN [id number]) as of 11/4/2020 14:07   Ref. Range 11/4/2020 04:01 11/4/2020 08:01   Sodium Latest Ref Range: 135 - 145 mmol/L 134 (L)    Potassium Latest Ref Range: 3.6 - 5.5 mmol/L 4.4    Chloride Latest Ref Range: 96 - 112 mmol/L 104    Co2 Latest Ref Range: 20 - 33 mmol/L 19 (L)    Anion Gap Latest Ref Range: 7.0 - 16.0  11.0    Glucose Latest Ref Range: 65 - 99 mg/dL 202 (H)    Bun Latest Ref Range: 8 - 22 mg/dL 23 (H)    Creatinine Latest Ref Range: 0.50 - 1.40 mg/dL 1.22    GFR If  Latest Ref Range: >60 mL/min/1.73 m 2 >60    GFR If Non  Latest Ref Range: >60 mL/min/1.73 m 2 59 (A)    Calcium Latest Ref Range: 8.5 - 10.5 mg/dL 8.0 (L)    AST(SGOT) Latest Ref Range: 12 - 45 U/L 24    ALT(SGPT) Latest Ref Range: 2 - 50 U/L 36    Alkaline Phosphatase Latest Ref Range: 30 - 99 U/L 58    Total Bilirubin Latest Ref Range: 0.1 - 1.5 mg/dL 0.6    Albumin Latest Ref Range: 3.2 - 4.9 g/dL 3.2    Total Protein Latest Ref Range: 6.0 - 8.2 g/dL 6.8    Globulin Latest Ref Range: 1.9 - 3.5 g/dL  3.6 (H)    A-G Ratio Latest Units: g/dL 0.9    Glycohemoglobin Latest Ref Range: 0.0 - 5.6 % 6.9 (H)    Estim. Avg Glu Latest Units: mg/dL 151    NT-proBNP Latest Ref Range: 0 - 125 pg/mL  3961 (H)     Imaging:   See Interval events    Problem Presentation  1. Pulmonary edema   Chest Xray noted for pulmonary edema possibly due to fluid overload.   Plan:  -Continue Lasix 40mg IV  -Patient will have an echo today     2. Syncope  Etiology is unclear. Patient EKG noted for sinus bradycardia this morning. Syncope could be due to his bradycardia heart rate or hypotension as pt recently started Lisinopril.  -Elevated Troponin at 113 could be due to his MIRIAM  Plan  -Continue telemetry  -Hold Lisinopril  - Echo per Cardio  -Ordered TSH/T4 and Cortisol AM level  -D/CDextrose 50% 50mL IV 15 minutes PRN    3. Acute Kidney injury most likely due to prerenal   -Patient BUN has improved from 31 on day of admission to 23. Creatinine is 1.22  -Patient GFR has improved from 46 to 56  Patient MIRIAM is improving  Plan:  - Monitor I's and out's       4. Interstitial lung disease  -Hx of chronic respiratory disease  -Uses 2 L of home oxygen  -Today on 10L of oxygen  Plan:  -Reassess after echo     Diabetes  -History of non insulin dependent DM  Plan  -Hold DM medication due to possible cause of hypoglycemia     Sade MENDOZA-S2

## 2020-11-04 NOTE — ASSESSMENT & PLAN NOTE
Patient's troponin elevated at 118 from baseline 50-65s. Patient reports palpitations but denies chest pain, and EKG on admission revealed Sinus bradycardia with no CLEAR evidence of new infarction or MI from previous EKG's. Etiology unclear likely demand ischemia or from MIRIAM. Troponin 115 and 113 on repeat labs. Patient still denies chest pain or palpitations.    - Per EP Dr. Ramos, patient does not need Pacemaker, see his note for more details  - Continue to monitor if patient develops new onset chest pain.

## 2020-11-04 NOTE — ASSESSMENT & PLAN NOTE
Patient has history of interstitial lung disease and mild pulmonary hypertension with preserved EF of 55% from May 2020 ECHO. Chest X-ray looks worse overnight with pulmonary edema after patient received IVF on admission. Patient back to baseline at 2-3L NC, patient walked with PT with 3L NC.    -Improving, patient back to baseline 2L NC at rest and 3L when ambulating  -Con't Oxygen support and RT following, greatly appreciate recommendations  -Con't to monitor and observe if patient oxygen requirements increase or he acutely worsens  -One time trial of Albuterol Nebulizer therapy with pre and post flow to see if patient's breathing significantly improves

## 2020-11-05 ENCOUNTER — PATIENT OUTREACH (OUTPATIENT)
Dept: HEALTH INFORMATION MANAGEMENT | Facility: OTHER | Age: 70
End: 2020-11-05

## 2020-11-05 ENCOUNTER — APPOINTMENT (OUTPATIENT)
Dept: CARDIOLOGY | Facility: MEDICAL CENTER | Age: 70
DRG: 286 | End: 2020-11-05
Attending: INTERNAL MEDICINE
Payer: MEDICARE

## 2020-11-05 LAB
ANION GAP SERPL CALC-SCNC: 8 MMOL/L (ref 7–16)
BUN SERPL-MCNC: 24 MG/DL (ref 8–22)
CALCIUM SERPL-MCNC: 9.2 MG/DL (ref 8.5–10.5)
CHLORIDE SERPL-SCNC: 103 MMOL/L (ref 96–112)
CHOLEST SERPL-MCNC: 129 MG/DL (ref 100–199)
CO2 SERPL-SCNC: 29 MMOL/L (ref 20–33)
CORTIS SERPL-MCNC: 7.6 UG/DL (ref 0–23)
CREAT SERPL-MCNC: 1.16 MG/DL (ref 0.5–1.4)
GLUCOSE BLD-MCNC: 131 MG/DL (ref 65–99)
GLUCOSE BLD-MCNC: 156 MG/DL (ref 65–99)
GLUCOSE BLD-MCNC: 204 MG/DL (ref 65–99)
GLUCOSE BLD-MCNC: 224 MG/DL (ref 65–99)
GLUCOSE SERPL-MCNC: 137 MG/DL (ref 65–99)
HDLC SERPL-MCNC: 32 MG/DL
INR BLD: 1.1
LDLC SERPL CALC-MCNC: 74 MG/DL
POTASSIUM SERPL-SCNC: 3.9 MMOL/L (ref 3.6–5.5)
SODIUM SERPL-SCNC: 140 MMOL/L (ref 135–145)
TRIGL SERPL-MCNC: 113 MG/DL (ref 0–149)

## 2020-11-05 PROCEDURE — 94640 AIRWAY INHALATION TREATMENT: CPT

## 2020-11-05 PROCEDURE — A9270 NON-COVERED ITEM OR SERVICE: HCPCS | Performed by: INTERNAL MEDICINE

## 2020-11-05 PROCEDURE — 93321 DOPPLER ECHO F-UP/LMTD STD: CPT | Mod: 26 | Performed by: INTERNAL MEDICINE

## 2020-11-05 PROCEDURE — 93308 TTE F-UP OR LMTD: CPT

## 2020-11-05 PROCEDURE — 93308 TTE F-UP OR LMTD: CPT | Mod: 26 | Performed by: INTERNAL MEDICINE

## 2020-11-05 PROCEDURE — 700102 HCHG RX REV CODE 250 W/ 637 OVERRIDE(OP): Performed by: STUDENT IN AN ORGANIZED HEALTH CARE EDUCATION/TRAINING PROGRAM

## 2020-11-05 PROCEDURE — 36415 COLL VENOUS BLD VENIPUNCTURE: CPT

## 2020-11-05 PROCEDURE — 700102 HCHG RX REV CODE 250 W/ 637 OVERRIDE(OP): Performed by: INTERNAL MEDICINE

## 2020-11-05 PROCEDURE — 82533 TOTAL CORTISOL: CPT

## 2020-11-05 PROCEDURE — 99232 SBSQ HOSP IP/OBS MODERATE 35: CPT | Performed by: INTERNAL MEDICINE

## 2020-11-05 PROCEDURE — 770020 HCHG ROOM/CARE - TELE (206)

## 2020-11-05 PROCEDURE — 99223 1ST HOSP IP/OBS HIGH 75: CPT | Performed by: INTERNAL MEDICINE

## 2020-11-05 PROCEDURE — 80048 BASIC METABOLIC PNL TOTAL CA: CPT

## 2020-11-05 PROCEDURE — A9270 NON-COVERED ITEM OR SERVICE: HCPCS | Performed by: STUDENT IN AN ORGANIZED HEALTH CARE EDUCATION/TRAINING PROGRAM

## 2020-11-05 PROCEDURE — 80061 LIPID PANEL: CPT

## 2020-11-05 PROCEDURE — 700111 HCHG RX REV CODE 636 W/ 250 OVERRIDE (IP): Performed by: STUDENT IN AN ORGANIZED HEALTH CARE EDUCATION/TRAINING PROGRAM

## 2020-11-05 PROCEDURE — 99233 SBSQ HOSP IP/OBS HIGH 50: CPT | Mod: GC | Performed by: HOSPITALIST

## 2020-11-05 PROCEDURE — 82962 GLUCOSE BLOOD TEST: CPT | Mod: 91

## 2020-11-05 RX ORDER — LISINOPRIL 5 MG/1
5 TABLET ORAL
Status: DISCONTINUED | OUTPATIENT
Start: 2020-11-05 | End: 2020-11-05

## 2020-11-05 RX ORDER — ATORVASTATIN CALCIUM 40 MG/1
40 TABLET, FILM COATED ORAL EVERY EVENING
Status: DISCONTINUED | OUTPATIENT
Start: 2020-11-05 | End: 2020-11-07 | Stop reason: HOSPADM

## 2020-11-05 RX ORDER — FUROSEMIDE 10 MG/ML
40 INJECTION INTRAMUSCULAR; INTRAVENOUS ONCE
Status: COMPLETED | OUTPATIENT
Start: 2020-11-05 | End: 2020-11-05

## 2020-11-05 RX ADMIN — HEPARIN SODIUM 5000 UNITS: 5000 INJECTION, SOLUTION INTRAVENOUS; SUBCUTANEOUS at 14:24

## 2020-11-05 RX ADMIN — FUROSEMIDE 40 MG: 10 INJECTION, SOLUTION INTRAMUSCULAR; INTRAVENOUS at 14:25

## 2020-11-05 RX ADMIN — LISINOPRIL 5 MG: 5 TABLET ORAL at 11:23

## 2020-11-05 RX ADMIN — ASPIRIN 81 MG: 81 TABLET, COATED ORAL at 05:52

## 2020-11-05 RX ADMIN — INSULIN HUMAN 2 UNITS: 100 INJECTION, SOLUTION PARENTERAL at 11:26

## 2020-11-05 RX ADMIN — INSULIN HUMAN 2 UNITS: 100 INJECTION, SOLUTION PARENTERAL at 20:44

## 2020-11-05 RX ADMIN — HEPARIN SODIUM 5000 UNITS: 5000 INJECTION, SOLUTION INTRAVENOUS; SUBCUTANEOUS at 05:52

## 2020-11-05 RX ADMIN — INSULIN GLARGINE 10 UNITS: 100 INJECTION, SOLUTION SUBCUTANEOUS at 16:51

## 2020-11-05 RX ADMIN — HEPARIN SODIUM 5000 UNITS: 5000 INJECTION, SOLUTION INTRAVENOUS; SUBCUTANEOUS at 20:44

## 2020-11-05 RX ADMIN — INSULIN HUMAN 1 UNITS: 100 INJECTION, SOLUTION PARENTERAL at 16:50

## 2020-11-05 RX ADMIN — ATORVASTATIN CALCIUM 40 MG: 40 TABLET, FILM COATED ORAL at 16:48

## 2020-11-05 RX ADMIN — DOCUSATE SODIUM 50 MG AND SENNOSIDES 8.6 MG 2 TABLET: 8.6; 5 TABLET, FILM COATED ORAL at 16:47

## 2020-11-05 RX ADMIN — GLYCOPYRROLATE 1 CAPSULE: 15.6 CAPSULE RESPIRATORY (INHALATION) at 20:16

## 2020-11-05 ASSESSMENT — ENCOUNTER SYMPTOMS
HEMOPTYSIS: 0
WHEEZING: 0
TINGLING: 0
POLYDIPSIA: 0
STRIDOR: 0
DIARRHEA: 0
FEVER: 0
DOUBLE VISION: 0
HEARTBURN: 0
DIAPHORESIS: 0
WEIGHT LOSS: 0
PHOTOPHOBIA: 0
COUGH: 0
SPUTUM PRODUCTION: 0
PALPITATIONS: 0
FALLS: 0
DEPRESSION: 0
MEMORY LOSS: 0
HEADACHES: 0
SORE THROAT: 0
MYALGIAS: 0
SENSORY CHANGE: 0
HALLUCINATIONS: 0
CLAUDICATION: 0
FOCAL WEAKNESS: 0
CONSTIPATION: 0
BACK PAIN: 0
ABDOMINAL PAIN: 0
VOMITING: 0
BLURRED VISION: 0
NERVOUS/ANXIOUS: 0
LOSS OF CONSCIOUSNESS: 0
INSOMNIA: 0
NECK PAIN: 0
SHORTNESS OF BREATH: 1
NAUSEA: 0
SPEECH CHANGE: 0
CHILLS: 0
TREMORS: 0
DIZZINESS: 1
BRUISES/BLEEDS EASILY: 0
ORTHOPNEA: 0

## 2020-11-05 ASSESSMENT — FIBROSIS 4 INDEX
FIB4 SCORE: 2.272
FIB4 SCORE: 2.272

## 2020-11-05 ASSESSMENT — LIFESTYLE VARIABLES: SUBSTANCE_ABUSE: 0

## 2020-11-05 NOTE — PROCEDURES
DATE OF SERVICE:  11/04/2020    REFERRING PHYSICIAN:  Erica Gaytan MD    PROCEDURES:  1. Left heart catheterization.  2. Coronary angiography.  3. Left ventriculogram.  4. Monitored conscious sedation.    PREPROCEDURE DIAGNOSIS:  Syncope.    POSTPROCEDURE DIAGNOSES:  1. Diffuse coronary artery disease.  2. Reduced left ventricular systolic function with ejection fraction of 30%,   diaphragmatic hypokinesis.  3. Elevated left ventricular end-diastolic pressure.    INDICATION:  The patient is a 70-year-old male with past medical history   significant for hypertension and interstitial lung disease.  The patient has   been admitted for recurrent syncope and was scheduled for cardiac   catheterization.    DESCRIPTION OF PROCEDURE:  After informed consent was signed by the   patient, the patient was brought to the cardiac catheterization laboratory.  He   was prepped and draped in the usual sterile manner.  The right wrist area was   anesthetized with 2% Xylocaine.  Multiple attempts were performed to access   the right radial artery; however, this was unsuccessful under ultrasound due   to heavy calcified atherosclerosis.  The right inguinal area was anesthetized   with 2% Xylocaine.  A 4-Hebrew sheath was inserted into the right femoral   artery using the modified Seldinger technique under ultrasound guidance.    A 4-Hebrew pigtail catheter was positioned into the left ventricle.  Left angiography   was performed.  This was exchanged for a 4-Hebrew JL4 catheter, which   was positioned into the left main coronary artery.  Coronary angiography  was performed.  This catheter was exchanged for a 3DRC catheter, which was   positioned into the right coronary artery.  Coronary angiography was performed.    The patient tolerated the procedure well.  At the end of procedure, all catheters   and sheaths were removed.  The patient was transferred back to telemetry in   stable condition.    HEMODYNAMIC DATA:  Hemodynamic  data shows aortic pressures of 180/80 mmHg  with mean of 120 mmHg and /0 with LVEDP of 20 mmHg.    AORTIC VALVE:  There was no significant gradient noted.    LEFT VENTRICULOGRAM:  A 10 mL of contrast were delivered for 2 seconds.    Ejection fraction was estimated to be 30%.  There was diaphragmatic   hypokinesis noted.    ANGIOGRAM:  1. Left main coronary artery:  Left main coronary artery is a moderate-length   small-caliber vessel with luminal irregularities of 10-20%.  2. Left anterior descending artery:  Left anterior descending artery is a long   small-caliber vessel, which is diffusely stenosed, proximal portion being   60-70%.  There are small-caliber bifurcating first diagonal and small-caliber   second diagonal branch noted with diffuse stenosis of 50-60%.  3. Left circumflex artery:  Left circumflex artery is a nondominant   moderate-caliber vessel with concentric 70-80% in the midportion.  There is a   moderate-caliber first obtuse marginal branch originating at the area of the   stenosis, the ostial proximal concentric 70% stenosis and a small distal   obtuse marginal branch noted free of disease.  4. Right coronary artery:  Right coronary artery is a dominant small-caliber   vessel with proximal concentric 90%, mid diffuse 80%, distal concentric 90%   and small-caliber posterior descending artery noted free of disease.    Small-caliber posterolateral branch with mid concentric 90%.    IMPRESSION:  1. Diffuse coronary artery disease.  2. Reduced left ventricular systolic function with ejection fraction of 30%,   diaphragmatic hypokinesis.  3. Elevated left ventricular end-diastolic pressure.    RECOMMENDATIONS:  Recommend medical therapy for coronary artery disease,   aggressive hypertensive management and consider alternative causes for   recurrent syncope.    SEDATION TIME: The patient's sedation was managed by myself with continuous   face to face time with the patient for 15 minutes from 15:50  to 16:05.             ____________________________________     MD JARON GEIGER / TEOFILO    DD:  11/04/2020 16:46:56  DT:  11/04/2020 17:11:07    D#:  [id number]  Job#:  [id number]

## 2020-11-05 NOTE — PROGRESS NOTES
Assumed care of patient, bedside report received from Wendy JOHN. Updated on POC, call light within reach and fall precautions in place. Bed locked and in lowest position. Patient instructed to call for assistance before getting out of bed. All questions answered, no other needs at this time.

## 2020-11-05 NOTE — CONSULTS
"Electrophysiology Initial Consult Note    DOS: 11/5/2020    Referring physician: Dr Gaytan    Chief complaint/Reason for consult: Bradycardia    HPI: 71 y/o M with h/o ILD with chronic hypoxia and ILD, on home oxygen. Admitted initially for 2 days of dizziness. His son noted that he had slurred speech in the AM and overall didn't feel well. He feels most dizzy when standing up and when ambulating. No syncope. No prior episodes of dizziness like this. He denied chest pain. Troponin was noted to be elevated on admission. Taken for C which showed diffuse CAD. On telemetry he is noted to have episodes of sinus bradycardia and junctional rhythm overnight, HR low 30s at lowest. He is also noted to have blocked PACs. EP was consulted to evaluate if PPM is necessary.    I spoke with pt and pt's son who assisted with interpretation over the phone.    ROS (+ highlighted in bold):  Constitutional: Fevers/chills/fatigue/weightloss  HEENT: Blurry vision/eye pain/sore throat/hearing loss  Respiratory: Shortness of breath/cough  Cardiovascular: Chest pain/palpitations/edema/orthopnea/syncope  GI: Nausea/vomitting/diarrhea  MSK: Arthralgias/myagias/muscle weakness  Skin: Rash/sores  Neurological: Numbness/tremors/vertigo  Endocrine: Excessive thirst/polyuria/cold intolerance/heat intolerance  Psych: Depression/anxiety    Past Medical History:   Diagnosis Date   • Diabetes (HCC)    • Pulmonary fibrosis (HCC)     wears 2.5L O2 at home 24/7       Past Surgical History:   Procedure Laterality Date   • OTHER      Kidney stones   • OTHER ABDOMINAL SURGERY      \"from an ulcer that burst\"       Social History     Socioeconomic History   • Marital status: Single     Spouse name: Not on file   • Number of children: Not on file   • Years of education: Not on file   • Highest education level: Not on file   Occupational History   • Not on file   Social Needs   • Financial resource strain: Hard   • Food insecurity     Worry: Often true "     Inability: Often true   • Transportation needs     Medical: No     Non-medical: No   Tobacco Use   • Smoking status: Former Smoker     Packs/day: 2.00     Years: 40.00     Pack years: 80.00     Types: Cigarettes     Quit date:      Years since quittin.8   • Smokeless tobacco: Never Used   Substance and Sexual Activity   • Alcohol use: Yes     Frequency: Monthly or less   • Drug use: Never   • Sexual activity: Not on file   Lifestyle   • Physical activity     Days per week: Not on file     Minutes per session: Not on file   • Stress: Not on file   Relationships   • Social connections     Talks on phone: Not on file     Gets together: Not on file     Attends Voodoo service: Not on file     Active member of club or organization: Not on file     Attends meetings of clubs or organizations: Not on file     Relationship status: Not on file   • Intimate partner violence     Fear of current or ex partner: Not on file     Emotionally abused: Not on file     Physically abused: Not on file     Forced sexual activity: Not on file   Other Topics Concern   • Not on file   Social History Narrative   • Not on file       History reviewed. No pertinent family history.    No Known Allergies    Current Facility-Administered Medications   Medication Dose Route Frequency Provider Last Rate Last Admin   • atorvastatin (LIPITOR) tablet 40 mg  40 mg Oral Q EVENING Brian Barker M.D.       • lisinopril (PRINIVIL) tablet 5 mg  5 mg Oral Q DAY Celina Hopper M.D.       • ipratropium-albuterol (DUONEB) nebulizer solution  3 mL Nebulization Q4H PRN (RT) Brian Barker M.D.   3 mL at 20 0405   • senna-docusate (PERICOLACE or SENOKOT S) 8.6-50 MG per tablet 2 Tab  2 Tab Oral BID Brian Barker M.D.   2 Tab at 20 1817    And   • polyethylene glycol/lytes (MIRALAX) PACKET 1 Packet  1 Packet Oral QDAY PRN Brian Barker M.D.        And   • magnesium hydroxide (MILK OF MAGNESIA) suspension 30 mL  30 mL Oral QDAY PRN Brian SADLER  [place] Barker        And   • bisacodyl (DULCOLAX) suppository 10 mg  10 mg Rectal QDAY PRN Brian Barker M.D.       • heparin injection 5,000 Units  5,000 Units Subcutaneous Q8HRS Brian Barker M.D.   5,000 Units at 11/05/20 0552   • acetaminophen (TYLENOL) tablet 650 mg  650 mg Oral Q6HRS PRN Brian Barker M.D.       • labetalol (NORMODYNE/TRANDATE) injection 10 mg  10 mg Intravenous Q4HRS PRN Brian Barker M.D.   Stopped at 11/05/20 0548   • insulin regular (HumuLIN R,NovoLIN R) injection  1-6 Units Subcutaneous 4X/DAY ACHS Brian Barker M.D.   Stopped at 11/04/20 2100    And   • glucose 4 g chewable tablet 16 g  16 g Oral Q15 MIN PRN Brian Barker M.D.        And   • dextrose 50% (D50W) injection 50 mL  50 mL Intravenous Q15 MIN PRN Brian Barker M.D.       • insulin glargine (Lantus) injection  10 Units Subcutaneous Q EVENING Brian Barker M.D.   10 Units at 11/04/20 1816   • albuterol inhaler 2 Puff  2 Puff Inhalation Q6HRS PRN Brian Barker M.D.       • glycopyrrolate (Seebri) 15.6 mcg inhalation capsule 1 Cap  1 Cap Inhalation BID (RT) Brian Barker M.D.   Stopped at 11/05/20 0800   • aspirin EC (ECOTRIN) tablet 81 mg  81 mg Oral DAILY Erica Gaytan M.D.   81 mg at 11/05/20 0552   • Respiratory Therapy Consult   Nebulization Continuous RT Brian Barker M.D.           Physical Exam:  Vitals:    11/04/20 2300 11/05/20 0000 11/05/20 0455 11/05/20 0730   BP: 151/63 149/64 (!) 171/74 (!) 165/68   Pulse: 64 64 66 67   Resp: 16 16 17 16   Temp: 37.1 °C (98.8 °F) 36.7 °C (98.1 °F) 37.3 °C (99.1 °F) 36.9 °C (98.4 °F)   TempSrc: Temporal Temporal Temporal Temporal   SpO2: 98% 98% 98% 96%   Weight:   61.3 kg (135 lb 2.3 oz)    Height:         General appearance: NAD, conversant   Eyes: anicteric sclerae, moist conjunctivae; no lid-lag; PERRLA  HENT: Atraumatic; oropharynx clear with moist mucous membranes and no mucosal ulcerations; normal hard and soft palate  Neck: Trachea midline; FROM, supple, no thyromegaly or  lymphadenopathy  Lungs: CTA, with normal respiratory effort and no intercostal retractions  CV: RRR, no MRGs, no JVD   Abdomen: Soft, non-tender; no masses or HSM  Extremities: No peripheral edema or extremity lymphadenopathy  Skin: Normal temperature, turgor and texture; no rash, ulcers or subcutaneous nodules  Psych: Appropriate affect, alert and oriented to person, place and time    Data:  Lipids:   Lab Results   Component Value Date/Time    CHOLSTRLTOT 117 05/24/2020 03:17 AM    TRIGLYCERIDE 101 05/24/2020 03:17 AM    HDL 29 (A) 05/24/2020 03:17 AM    LDL 68 05/24/2020 03:17 AM        BMP:  Lab Results   Component Value Date/Time    SODIUM 140 11/05/2020 0321    POTASSIUM 3.9 11/05/2020 0321    CHLORIDE 103 11/05/2020 0321    CO2 29 11/05/2020 0321    GLUCOSE 137 (H) 11/05/2020 0321    BUN 24 (H) 11/05/2020 0321    CREATININE 1.16 11/05/2020 0321    CALCIUM 9.2 11/05/2020 0321    ANION 8.0 11/05/2020 0321        TSH:   Lab Results   Component Value Date/Time    TSHULTRASEN 1.570 11/04/2020 0324        THYROXINE (T4):   No results found for: ADA     CBC:   Lab Results   Component Value Date/Time    WBC 8.9 11/04/2020 04:01 AM    RBC 3.59 (L) 11/04/2020 04:01 AM    HEMOGLOBIN 11.1 (L) 11/04/2020 04:01 AM    HEMATOCRIT 35.8 (L) 11/04/2020 04:01 AM    MCV 99.7 (H) 11/04/2020 04:01 AM    MCH 30.9 11/04/2020 04:01 AM    MCHC 31.0 (L) 11/04/2020 04:01 AM    RDW 50.7 (H) 11/04/2020 04:01 AM    PLATELETCT 125 (L) 11/04/2020 04:01 AM    MPV 13.1 (H) 11/04/2020 04:01 AM    NEUTSPOLYS 74.50 (H) 11/04/2020 04:01 AM    LYMPHOCYTES 12.40 (L) 11/04/2020 04:01 AM    MONOCYTES 9.60 11/04/2020 04:01 AM    EOSINOPHILS 1.80 11/04/2020 04:01 AM    BASOPHILS 0.70 11/04/2020 04:01 AM    IMMGRAN 1.00 (H) 11/04/2020 04:01 AM    NRBC 0.00 11/04/2020 04:01 AM    NEUTS 6.66 11/04/2020 04:01 AM    LYMPHS 1.11 11/04/2020 04:01 AM    MONOS 0.86 (H) 11/04/2020 04:01 AM    EOS 0.16 11/04/2020 04:01 AM    BASO 0.06 11/04/2020 04:01 AM     IMMGRANAB 0.09 11/04/2020 04:01 AM    NRBCAB 0.00 11/04/2020 04:01 AM        CBC w/o DIFF  Lab Results   Component Value Date/Time    WBC 8.9 11/04/2020 04:01 AM    RBC 3.59 (L) 11/04/2020 04:01 AM    HEMOGLOBIN 11.1 (L) 11/04/2020 04:01 AM    MCV 99.7 (H) 11/04/2020 04:01 AM    MCH 30.9 11/04/2020 04:01 AM    MCHC 31.0 (L) 11/04/2020 04:01 AM    RDW 50.7 (H) 11/04/2020 04:01 AM    MPV 13.1 (H) 11/04/2020 04:01 AM       Prior echo/stress results reviewed: Today's echo by my interpretation shows preserved LV function    Prior cath results reviewed: Diffuse CAD    EKG interpreted by me:  with 1dAVB    Impression/Plan:  1. Bradycardia     2. Dizziness     3. Elevated troponin     4. Coronary artery disease involving native coronary artery of native heart without angina pectoris  lisinopril (PRINIVIL) tablet 5 mg     1. Nocturnal bradycardia  2. First degree AVB with blocked PACs  3. CAD, diffuse  4. HTN    - We discussed his findings of nocturnal bradycardia. Unclear if he is symptomatic, there are varying reports in the chart but given the language barrier this is hard to verify even with assistance with . He did have an overnight episode where a nurse noted symptoms of hypoxiam and altered mental status when returning to the bathroom which correlated to bradycardia on telemetry, however pt does not recall this episode upon my questioning. Furthermore, I am not sure that bradycardia to the 40s should cause altered mental status and hypoxia.  - I do not see any evidence of pathologic heart block.  - At bedside, resting, his HR is 70 bpm  - At this time I am not certain that his dizziness is due to bradycardia. It is possible that he has symptomatic intermittent Mobitz 1 heart block but I haven't seen evidence of this. Only significant sinus node dysfunction appears to be nocturnal. He has a normal EF and no indication for AVN blockers that I can see.   - I would prefer to evaluate with outpatient  ambulatory monitoring before committing to PPM implantation  - Continue telemetry monitoring while inpatient and if pathologic block is noted we can certainly re-evaluate. May be beneficial to ambulate patient with physical therapy on telemetry to assess if he is dizzy ambulating and if that is associated with bradycardia.    Thank you for this consult, will hold off for PPM for now, can reconsider if new telemetry findings or abnormal outpatient ambulatory monitoring.    Jv Ramos MD  Cardiac Electrophysiology

## 2020-11-05 NOTE — PROGRESS NOTES
Assumed care of Pt, He is alert and oriented. Discussed POC with day shift RN at bedside. Bed is locked in lowest position and call light/ belongings are within reach.

## 2020-11-05 NOTE — THERAPY
Occupational Therapy Contact Note    Pt post cath, pending possible PPM. Will hold until POC established.    Miley Ocasio, OTR/L

## 2020-11-05 NOTE — THERAPY
Missed Therapy     Patient Name: Chase Harris  Age:  70 y.o., Sex:  male  Medical Record #: [id number]  Today's Date: 11/4/2020    PT consult received. Per chart, pt scheduled for cardiac cath this afternoon with consult for possible PPM after. Will hold for further cardiac work up and definitive POC.

## 2020-11-05 NOTE — PROGRESS NOTES
Monitor Summary    SR 63-74  Rare B-PAC, Rare PVC, 1 beat of A. Flutter  2.05 sec pause  1.95 sec pause  1.75 sec pause  1.75 sec pause  .24/.08/.40

## 2020-11-05 NOTE — PROGRESS NOTES
This charge RN notified by bedside nurse about concern that pt is not keeping his oxygen on.  Pt currently on oxymask 8L.  Pt desats quickly when oxygen removed.  Using the interpretation ipad with interpretor Zulay, ID number [id number].  This RN introduced self as charge nurse.  Pt educated on concerns of removing oxymask.  While at bedside, pt noted to be sating at 98% on 8L oxy mask.  Pt O2 reduced to 6L and pt switched over to nasal cannula remaining at 96% on continuous pulse ox.  Pt educated on breathing in through his nose and out through his mouth. Pt verbalized understanding.  Pt stated that he felt dizzy earlier.  This RN educated pt that dizziness can result from lack of oxygen and pt educated that he needs to keep his oxygen on at all times. Pt educated that if his oxygen does not stay at a safe level that he will need to wear the mask again.  Pt stated that he was told he cannot eat with the mask. Education provided that he can eat but he can only lift the mask to put food in his mouth and that he must put the mask back on immediately.  Pt educated that he cannot take the mask off.  Pt verbalized understanding. Pt asked if he had any questions and pt stated no.

## 2020-11-05 NOTE — NON-PROVIDER
"Daily Progress Note:     Date of Service: 11/5/2020  Primary Team: [facility] Team  Attending: ALEKSANDAR Gabriel M.D.   Senior Resident: Dr. Anjali M.D  Intern: Dr. Mj M.D  Contact:  [phone]    Chief Complaint:   \"passing out\"    ID: Mr. Harris is a 70 y.o. male who presented on 11/3/2020 to the ED after \"passing out\". Patient has a PMH of interstitial lung disease (uses 2L of home oxygen) and non insulin dependent diabetes type 2.      Subjective: No acute events overnight. Per , this morning, patient stated he was feeling better and his breathing was \"good\". He reported an 2-3 minute episode of feeling dizzy/spinning of the room yesterday while laying down. He denied any chest pain, headache, palpitation or nausea at this time.    Interval Events:   -Angiogram completed yesterday, see imaging tab for findings.  -Echo completed today.    Consultants/Specialty:  Cardio  Review of Systems:    Review of Systems   Constitutional: Negative for chills and fever.   HENT: Negative for ear pain and sore throat.    Eyes: Negative for blurred vision, double vision and photophobia.   Respiratory: Negative for cough.    Cardiovascular: Negative for chest pain and palpitations.   Gastrointestinal: Negative for abdominal pain and nausea.   Neurological: Positive for dizziness. Negative for headaches.        Dizziness yesterday for 2-3 minutes while laying down       Objective Data:   Physical Exam:   Vitals:   Vitals:    11/05/20 1130   BP: (!) 166/79   Pulse: 69   Resp: 16   Temp:    SpO2: 97%   Orthostatic vitals: 158/69 laying down 131/63 while standing up  Physical Exam  Constitutional:       General: He is not in acute distress.     Appearance: He is not ill-appearing.      Comments: Patient was alert, nl appearance and listening to music when I arrive in the room   HENT:      Head: Normocephalic.      Right Ear: External ear normal.      Left Ear: External ear normal.      Mouth/Throat:      Mouth: " Mucous membranes are moist.      Pharynx: Oropharynx is clear.   Eyes:      Pupils: Pupils are equal, round, and reactive to light.   Neck:      Musculoskeletal: Normal range of motion.   Cardiovascular:      Rate and Rhythm: Regular rhythm. Bradycardia present.      Heart sounds: Normal heart sounds.   Pulmonary:      Comments: Diminished breath sounds  Abdominal:      Tenderness: There is no abdominal tenderness.   Skin:     General: Skin is warm and dry.      Coloration: Skin is not jaundiced.   Neurological:      Mental Status: He is oriented to person, place, and time.   Psychiatric:         Mood and Affect: Mood normal.         Behavior: Behavior normal.         Labs:   Results for BREANNE BOOTH (MRN [id number]) as of 11/5/2020 13:44   Ref. Range 11/4/2020 04:01   RBC Latest Ref Range: 4.70 - 6.10 M/uL 3.59 (L)   Hemoglobin Latest Ref Range: 14.0 - 18.0 g/dL 11.1 (L)   Hematocrit Latest Ref Range: 42.0 - 52.0 % 35.8 (L)   MCV Latest Ref Range: 81.4 - 97.8 fL 99.7 (H)   MCH Latest Ref Range: 27.0 - 33.0 pg 30.9   MCHC Latest Ref Range: 33.7 - 35.3 g/dL 31.0 (L)   RDW Latest Ref Range: 35.9 - 50.0 fL 50.7 (H)   Platelet Count Latest Ref Range: 164 - 446 K/uL 125 (L)   MPV Latest Ref Range: 9.0 - 12.9 fL 13.1 (H)       Imaging:    Angiogram on 11/4  IMPRESSION:  1. Diffuse coronary artery disease.  2. Reduced left ventricular systolic function with ejection fraction of 30%,   diaphragmatic hypokinesis.  3. Elevated left ventricular end-diastolic pressure.  ECHO on 11/5  FINDINGS  Left Ventricle  Normal left ventricular chamber size. Normal left ventricular wall   thickness. Normal left ventricular systolic function. Left ventricular   ejection fraction is visually estimated to be 70%. Normal regional wall   motion.    Assessment and Plan:  Mr. Stone is an 70 old with a PMH of diabetes, interstitial lung disease who presented for a recent syncope episode. Patient is currently stable.   Problem Presentation  1.  Acute Hypoxemic Respiratory failure  -Hx of interstitial lung disease, uses 2L of home oxygen  -Today patient was on 10-12L oxygen with SpO2 of 95%    Chest Xray noted for pulmonary edema and small effusions on 11/4  Plan:  -Another dose of Lasix 40mg IV  -Titrate oxygen level to target a SpO2 range of 92%     2. Syncope most likely due to Orthostatic Hypotension  -Patient systolic BP decreased by 27 this morning.  Plan:  - Repeat Orthostatic vitals in the AM  -Ordered TSH/T4 and Cortisol AM level    3. Bradycardia  -Patient EKG noted for sinus bradycardia this morning with HR of 54  -Angiogram noted for diffuse coronary artery disease and echo remarkable for a left ventricular ejection fraction of 70%. Cardio recommends that patient is evaluated with outpatient ambulatory monitoring.  Plan  -Continue telemetry  -Hold Lisinopril at this time  -Cardio recommend to ambulate patient with PT to assess if the dizziness occurs.    4. Macrocytic Anemia  -Patient RBC is low with an elevated MCV. Anemia could be due to folate or B12 deficiency.  Plan:  -Order Vitamin B12/Folate lab    5. Acute Kidney injury most likely due to prerenal   -Patient BUN has improved from 31 on day of admission to 24.   -Patient GFR has improved from 46 to 60  Patient MIRIAM is improving  Plan:  - Monitor I's and out's     6. Diabetes  -History of non insulin dependent DM  Plan  -Hold DM medication due to possible cause of hypoglycemia    Sade MENDOZA-S2

## 2020-11-05 NOTE — PROGRESS NOTES
Community Health Worker Intake    • Social determinates of health intake completed.   • Identified barriers to: financial insecurity.  • Contact information provided to Chase Selvinalphonso Kimberly   • Has PCP appointment scheduled for: Tuesday, November 10 at 7:30 am   • Scheduled Food Delivery or Pick-Up: Once discarged  • Accepted Meds-To-Beds.   • Inpatient assessment completed.    CHW Darwin spoke with pt at bedside to reintroduce CCM services. Pt accepted. CHW was unable to f/u with pt after last d/c due to readmission a few days later. He already has a f/u appt from last admission on 11/10 and will have one of his sons take him. Pt was still interested in receiving food assistance and will be utilizing food bank after d/c. He is still living with his son and family members and does not need additional resources/services at this time other than food assistance.    Plan: give appt reminder, f/u after d/c to identify any additional needs.

## 2020-11-05 NOTE — PROGRESS NOTES
[facility] Cardiology Follow-up Note    Date of Service:    11/5/2020      Name:   Chase Harris   YOB: 1950  Age:   70 y.o.  male   MRN:   [id number]      Chief Complaint: syncope.    HPI:  Mr Harris is a 69 y/o fellow with PMH including interstitial lung disease, HTN, CKD, DM2 and recurrent syncope.  He presented to [facility] on 11/3/20 with c/o orthostatic dizziness.  He has been having intermittent bradycardia and his troponin was found to be elevated, thus cardiology was consulted.       Interim Events:  11/5/2020: SR 60-70s pauses  [facility] 11/4/2020 showed diffuse disease, EF 30%, elevated LVEDP 20 mmHg  EP consult for junctional rhythm, pauses, syncope  Patient denies chest pain, palpitations, orthopnea, shortness of breath, and cough    ROS  Constitutional:  Denies fatigue  Respiratory:  Denies shortness of breath, no cough.  Cardiovascular:  No chest pain.  No lower extremity edema.  Denies orthopnea or PND.  : denies polyuria, no dysuria.  GI:  Denies nausea/vomiting.  No abdominal distention.  Neuro:  Denies dizziness, syncope.  Hem/lymph: Denies easy bleeding/bruising.      All other review of systems reviewed and negative.    Past medical, surgical, social, and family history reviewed and unchanged from admission except as noted in HPI.    Medications: Reviewed in MAR  Current Facility-Administered Medications   Medication Dose Frequency Provider Last Rate Last Admin   • atorvastatin (LIPITOR) tablet 40 mg  40 mg Q EVENING Brian Barker M.D.       • ipratropium-albuterol (DUONEB) nebulizer solution  3 mL Q4H PRN (RT) Brian Barker M.D.   3 mL at 11/04/20 0405   • senna-docusate (PERICOLACE or SENOKOT S) 8.6-50 MG per tablet 2 Tab  2 Tab BID Brian Barker M.D.   2 Tab at 11/04/20 1817    And   • polyethylene glycol/lytes (MIRALAX) PACKET 1 Packet  1 Packet QDAY PRN Brian Barker M.D.        And   • magnesium hydroxide (MILK OF MAGNESIA) suspension 30 mL  30 mL QDAY  "PRN Brian Barker M.D.        And   • bisacodyl (DULCOLAX) suppository 10 mg  10 mg QDAY PRN Brian Barker M.D.       • heparin injection 5,000 Units  5,000 Units Q8HRS Brian Barker M.D.   5,000 Units at 11/05/20 0552   • acetaminophen (TYLENOL) tablet 650 mg  650 mg Q6HRS PRN Brian Barker M.D.       • labetalol (NORMODYNE/TRANDATE) injection 10 mg  10 mg Q4HRS PRN Brian Barker M.D.   Stopped at 11/05/20 0548   • insulin regular (HumuLIN R,NovoLIN R) injection  1-6 Units 4X/DAY ACHS Brian Barker M.D.   Stopped at 11/04/20 2100    And   • glucose 4 g chewable tablet 16 g  16 g Q15 MIN PRN Brian Barker M.D.        And   • dextrose 50% (D50W) injection 50 mL  50 mL Q15 MIN PRN Brian Barker M.D.       • insulin glargine (Lantus) injection  10 Units Q EVENING Brian Barker M.D.   10 Units at 11/04/20 1816   • albuterol inhaler 2 Puff  2 Puff Q6HRS PRN Brian Barker M.D.       • glycopyrrolate (Seebri) 15.6 mcg inhalation capsule 1 Cap  1 Cap BID (RT) Brian Barker M.D.   Stopped at 11/05/20 0800   • aspirin EC (ECOTRIN) tablet 81 mg  81 mg DAILY Erica Gaytan M.D.   81 mg at 11/05/20 0552   • Respiratory Therapy Consult   Continuous RT Brian Barker M.D.       Last reviewed on 11/3/2020 12:44 PM by Marii Clark    No Known Allergies    Physical Exam  Body mass index is 23.94 kg/m². BP (!) 165/68   Pulse 67   Temp 36.9 °C (98.4 °F) (Temporal)   Resp 16   Ht 1.6 m (5' 3\")   Wt 61.3 kg (135 lb 2.3 oz)   SpO2 96%    Vitals:    11/04/20 2300 11/05/20 0000 11/05/20 0455 11/05/20 0730   BP: 151/63 149/64 (!) 171/74 (!) 165/68   Pulse: 64 64 66 67   Resp: 16 16 17 16   Temp: 37.1 °C (98.8 °F) 36.7 °C (98.1 °F) 37.3 °C (99.1 °F) 36.9 °C (98.4 °F)   TempSrc: Temporal Temporal Temporal Temporal   SpO2: 98% 98% 98% 96%   Weight:   61.3 kg (135 lb 2.3 oz)    Height:        Oxygen Therapy:  Pulse Oximetry: 96 %, O2 (LPM): 8, O2 Delivery Device: Silicone Nasal Cannula    General: no apparent distress  Neck: " no JVD   Lungs: normal effort,  Diffuse crackles  Heart: normal rate, regular rhythm, no murmur, no rub  EXT: no lower extremity edema  Abdomen: soft, non tender, non distended  Neurological: No focal deficits, no facial asymmetry.  Normal speech  Psychiatric: Appropriate affect, alert and oriented x 3  Skin: Warm extremities, no rash    Labs (personally reviewed):     Lab Results   Component Value Date/Time    SODIUM 140 11/05/2020 03:21 AM    POTASSIUM 3.9 11/05/2020 03:21 AM    CHLORIDE 103 11/05/2020 03:21 AM    CO2 29 11/05/2020 03:21 AM    GLUCOSE 137 (H) 11/05/2020 03:21 AM    BUN 24 (H) 11/05/2020 03:21 AM    CREATININE 1.16 11/05/2020 03:21 AM     Lab Results   Component Value Date/Time    ALKPHOSPHAT 58 11/04/2020 04:01 AM    ASTSGOT 24 11/04/2020 04:01 AM    ALTSGPT 36 11/04/2020 04:01 AM    TBILIRUBIN 0.6 11/04/2020 04:01 AM      Lab Results   Component Value Date/Time    CHOLSTRLTOT 117 05/24/2020 03:17 AM    LDL 68 05/24/2020 03:17 AM    HDL 29 (A) 05/24/2020 03:17 AM    TRIGLYCERIDE 101 05/24/2020 03:17 AM         Cardiac Imaging and Procedures Review:      Personal Telemetry Review:  Most SR in the 60s.  11/4/20 at 0026 5 seconds of junction escape rhythm - slowest HR 35.  11/4/20 at 0249 patient had 10 minutes of junctional escape - HR in the 40s    Echo 5/23/20:  CONCLUSIONS  Left ventricular ejection fraction is visually estimated to be 55%.   Grade I diastolic dysfunction.  The right ventricle was normal in size and function.  Estimated right ventricular systolic pressure  is 40 mmHg, consistent   with mild pulmonary hypertension.  Estimated right atrial pressure is 3 mmHg.  Normal pericardium without effusion.    [facility] 11/4/2020  POSTPROCEDURE DIAGNOSES:  1. Diffuse coronary artery disease.  2. Reduced left ventricular systolic function with ejection fraction of 30%,   diaphragmatic hypokinesis.  3. Elevated left ventricular end-diastolic pressure.    Stress Test 5/27/20:  NUCLEAR IMAGING  INTERPRETATION   No evidence of significant jeopardized viable myocardium or prior myocardial    infarction.   Normal left ventricular size, ejection fraction, and wall motion.   ECG INTERPRETATION   Negative stress ECG for ischemia.      Assessment and Medical Decision Makin   Elevated troponin   - flat in the 115 range, likely demand ischemia  - though he does have risk factors for CAD including HTN, DM2, smoker.  - has some TWI in the inferior leads on EKG - could be consistent with ischemia.   -Left heart cath shows diffuse disease  -Limited echo to assess EF    2  Acute on chronic hypoxia.   -  Hx of interstitial lung disease  -  CXR looks worse overnight with pulmonary edema - agree with dose of IV lasix.   -  EF preserved.  -  Has at least mild pulmonary hypertension.    3  CKD stage III  -  Had some mild MIRIAM on arrival, was given fluids.     4  Intermittent junctional bradycardia  -  Some conduction abnormalities.   -  Occasional isolated non conducted PAC.  -  1st degree AVB on EKG.    5. HTN  -Lisinopril 5 mg daily  -No beta-blocker at this time, contraindicated due to pauses and junctional rhythm    Will follow.          CYNDIE Ryan  [facility]  [phone]    Future Appointments   Date Time Provider Department Center   2020  9:50 AM Chyna Campuzano M.D. [facility] None   2020 10:00 AM HAZEL Roberts [facility] None       Please note that this dictation was created using voice recognition software. I have worked with consultants from the vendor as well as technical experts from [facility] to optimize the interface. I have made every reasonable attempt to correct obvious errors, but I expect that there are errors of grammar and possibly content I did not discover before finalizing the note.

## 2020-11-05 NOTE — PROGRESS NOTES
Patient Seen in: [id number] [facility]    History   Patient presents with:  Cough/URI    Stated Complaint: Chest Cough (x1 week)    HPI  7 mo M child here with chest cough for 1 week  Normal birth history, normal development, child is otherwise Pt arrived back from heart cath in stable condition, from a right groin approach. No stents placed. Will monitor site and keep pt on bed rest.     LAD noted bilaterally +  CHEST: Symmetrical, no subcostal or intercostal retractions noted at this time   LUNGS: good air exchange, normal breath sounds, moving air well bilaterally, no rhonchi and no crackles.  No stridor at rest. No accessory muscle use

## 2020-11-05 NOTE — DISCHARGE PLANNING
"LSW met with pt at bedside and confirmed information on face sheet.    Pt uses home O2 and baseline is 3L. Pt denies using any other DME. PCP is Rashmi Cedeno and has an appt on 11/10. Pt is also followed by community Health Worker, Pamela Granados who will be providing assistance to pt upon d/c. Pt lives with his son and son's family. Pt's son Juan Jose, drives him to appointments. Pt reports some difficulty with medication management and states \"no le ajustan (he runs out of medicine)\".   Care Transition Team Assessment    Information Source  Orientation : Oriented x 4  Information Given By: (Chart review and pt)  Informant's Name: (Chart Review)  Who is responsible for making decisions for patient? : Patient         Elopement Risk  Legal Hold: No  Ambulatory or Self Mobile in Wheelchair: Yes  Disoriented: No  Psychiatric Symptoms: None  History of Wandering: No  Elopement this Admit: No  Vocalizing Wanting to Leave: No  Displays Behaviors, Body Language Wanting to Leave: No-Not at Risk for Elopement  Elopement Risk: Not at Risk for Elopement    Interdisciplinary Discharge Planning  Patient or legal guardian wants to designate a caregiver: No    Discharge Preparedness  What is your plan after discharge?: Uncertain - pending medical team collaboration  What are your discharge supports?: Child  Prior Functional Level: Ambulatory, Independent with Activities of Daily Living, Needs Assist with Medication Management  Difficulity with ADLs: None  Difficulity with IADLs: Managing medication  Difficulity with IADL Comments: Pt states that he runs out of his medication and needs help    Functional Assesment  Prior Functional Level: Ambulatory, Independent with Activities of Daily Living, Needs Assist with Medication Management    Finances  Financial Barriers to Discharge: No  Prescription Coverage: Yes    Vision / Hearing Impairment  Vision Impairment : Yes  Right Eye Vision: Impaired, Wears Glasses  Left Eye Vision: Impaired, " Wears Glasses  Hearing Impairment : No         Advance Directive  Advance Directive?: None    Domestic Abuse  Have you ever been the victim of abuse or violence?: No  Physical Abuse or Sexual Abuse: No  Verbal Abuse or Emotional Abuse: No  Possible Abuse/Neglect Reported to:: Not Applicable    Psychological Assessment  History of Substance Abuse: None  History of Psychiatric Problems: No  Non-compliant with Treatment: No  Newly Diagnosed Illness: No    Discharge Risks or Barriers  Discharge risks or barriers?: No

## 2020-11-05 NOTE — PROGRESS NOTES
12 HR CC    Monitor summary: SR/SB 52-79  Multiple junctional rhythms. Dropping as low as 31 bpm. .24/.08/.40

## 2020-11-06 DIAGNOSIS — R00.1 SINUS BRADYCARDIA: ICD-10-CM

## 2020-11-06 LAB
CORTIS SERPL-MCNC: 19.6 UG/DL (ref 0–23)
EKG IMPRESSION: NORMAL
ERYTHROCYTE [DISTWIDTH] IN BLOOD BY AUTOMATED COUNT: 49.5 FL (ref 35.9–50)
GLUCOSE BLD-MCNC: 142 MG/DL (ref 65–99)
GLUCOSE BLD-MCNC: 223 MG/DL (ref 65–99)
GLUCOSE BLD-MCNC: 95 MG/DL (ref 65–99)
HCT VFR BLD AUTO: 44.1 % (ref 42–52)
HGB BLD-MCNC: 13.9 G/DL (ref 14–18)
MCH RBC QN AUTO: 30.6 PG (ref 27–33)
MCHC RBC AUTO-ENTMCNC: 31.5 G/DL (ref 33.7–35.3)
MCV RBC AUTO: 97.1 FL (ref 81.4–97.8)
PLATELET # BLD AUTO: 243 K/UL (ref 164–446)
PMV BLD AUTO: 12.8 FL (ref 9–12.9)
RBC # BLD AUTO: 4.54 M/UL (ref 4.7–6.1)
WBC # BLD AUTO: 10.1 K/UL (ref 4.8–10.8)

## 2020-11-06 PROCEDURE — 93005 ELECTROCARDIOGRAM TRACING: CPT | Performed by: NURSE PRACTITIONER

## 2020-11-06 PROCEDURE — 84244 ASSAY OF RENIN: CPT

## 2020-11-06 PROCEDURE — 97165 OT EVAL LOW COMPLEX 30 MIN: CPT

## 2020-11-06 PROCEDURE — 82533 TOTAL CORTISOL: CPT

## 2020-11-06 PROCEDURE — 85027 COMPLETE CBC AUTOMATED: CPT

## 2020-11-06 PROCEDURE — A9270 NON-COVERED ITEM OR SERVICE: HCPCS | Performed by: STUDENT IN AN ORGANIZED HEALTH CARE EDUCATION/TRAINING PROGRAM

## 2020-11-06 PROCEDURE — 82962 GLUCOSE BLOOD TEST: CPT | Mod: 91

## 2020-11-06 PROCEDURE — 97161 PT EVAL LOW COMPLEX 20 MIN: CPT

## 2020-11-06 PROCEDURE — 99232 SBSQ HOSP IP/OBS MODERATE 35: CPT | Mod: GC | Performed by: HOSPITALIST

## 2020-11-06 PROCEDURE — 93010 ELECTROCARDIOGRAM REPORT: CPT | Performed by: INTERNAL MEDICINE

## 2020-11-06 PROCEDURE — 700102 HCHG RX REV CODE 250 W/ 637 OVERRIDE(OP): Performed by: STUDENT IN AN ORGANIZED HEALTH CARE EDUCATION/TRAINING PROGRAM

## 2020-11-06 PROCEDURE — A9270 NON-COVERED ITEM OR SERVICE: HCPCS | Performed by: INTERNAL MEDICINE

## 2020-11-06 PROCEDURE — 700102 HCHG RX REV CODE 250 W/ 637 OVERRIDE(OP): Performed by: INTERNAL MEDICINE

## 2020-11-06 PROCEDURE — 770020 HCHG ROOM/CARE - TELE (206)

## 2020-11-06 PROCEDURE — 700111 HCHG RX REV CODE 636 W/ 250 OVERRIDE (IP): Performed by: STUDENT IN AN ORGANIZED HEALTH CARE EDUCATION/TRAINING PROGRAM

## 2020-11-06 PROCEDURE — 94640 AIRWAY INHALATION TREATMENT: CPT

## 2020-11-06 PROCEDURE — 99231 SBSQ HOSP IP/OBS SF/LOW 25: CPT | Performed by: NURSE PRACTITIONER

## 2020-11-06 PROCEDURE — 36415 COLL VENOUS BLD VENIPUNCTURE: CPT

## 2020-11-06 PROCEDURE — 82088 ASSAY OF ALDOSTERONE: CPT

## 2020-11-06 RX ORDER — MIDODRINE HYDROCHLORIDE 5 MG/1
5 TABLET ORAL
Status: DISCONTINUED | OUTPATIENT
Start: 2020-11-06 | End: 2020-11-07 | Stop reason: HOSPADM

## 2020-11-06 RX ORDER — MIDODRINE HYDROCHLORIDE 5 MG/1
5 TABLET ORAL
Status: DISCONTINUED | OUTPATIENT
Start: 2020-11-06 | End: 2020-11-06

## 2020-11-06 RX ADMIN — INSULIN HUMAN 1 UNITS: 100 INJECTION, SOLUTION PARENTERAL at 21:41

## 2020-11-06 RX ADMIN — ASPIRIN 81 MG: 81 TABLET, COATED ORAL at 06:14

## 2020-11-06 RX ADMIN — INSULIN HUMAN 2 UNITS: 100 INJECTION, SOLUTION PARENTERAL at 11:56

## 2020-11-06 RX ADMIN — GLYCOPYRROLATE 1 CAPSULE: 15.6 CAPSULE RESPIRATORY (INHALATION) at 07:37

## 2020-11-06 RX ADMIN — MIDODRINE HYDROCHLORIDE 5 MG: 5 TABLET ORAL at 11:57

## 2020-11-06 RX ADMIN — ATORVASTATIN CALCIUM 40 MG: 40 TABLET, FILM COATED ORAL at 16:47

## 2020-11-06 RX ADMIN — MIDODRINE HYDROCHLORIDE 5 MG: 5 TABLET ORAL at 16:47

## 2020-11-06 RX ADMIN — HEPARIN SODIUM 5000 UNITS: 5000 INJECTION, SOLUTION INTRAVENOUS; SUBCUTANEOUS at 06:14

## 2020-11-06 RX ADMIN — HEPARIN SODIUM 5000 UNITS: 5000 INJECTION, SOLUTION INTRAVENOUS; SUBCUTANEOUS at 14:00

## 2020-11-06 RX ADMIN — DOCUSATE SODIUM 50 MG AND SENNOSIDES 8.6 MG 2 TABLET: 8.6; 5 TABLET, FILM COATED ORAL at 06:14

## 2020-11-06 RX ADMIN — DOCUSATE SODIUM 50 MG AND SENNOSIDES 8.6 MG 2 TABLET: 8.6; 5 TABLET, FILM COATED ORAL at 16:47

## 2020-11-06 RX ADMIN — GLYCOPYRROLATE 1 CAPSULE: 15.6 CAPSULE RESPIRATORY (INHALATION) at 21:12

## 2020-11-06 RX ADMIN — HEPARIN SODIUM 5000 UNITS: 5000 INJECTION, SOLUTION INTRAVENOUS; SUBCUTANEOUS at 21:41

## 2020-11-06 RX ADMIN — INSULIN GLARGINE 10 UNITS: 100 INJECTION, SOLUTION SUBCUTANEOUS at 16:50

## 2020-11-06 ASSESSMENT — COGNITIVE AND FUNCTIONAL STATUS - GENERAL
SUGGESTED CMS G CODE MODIFIER DAILY ACTIVITY: CH
WALKING IN HOSPITAL ROOM: A LITTLE
CLIMB 3 TO 5 STEPS WITH RAILING: A LITTLE
MOBILITY SCORE: 22
SUGGESTED CMS G CODE MODIFIER MOBILITY: CJ
DAILY ACTIVITIY SCORE: 24

## 2020-11-06 ASSESSMENT — ENCOUNTER SYMPTOMS
SORE THROAT: 0
FEVER: 0
TINGLING: 0
FOCAL WEAKNESS: 0
CHEST TIGHTNESS: 0
HEARTBURN: 0
LOSS OF CONSCIOUSNESS: 0
MEMORY LOSS: 0
SENSORY CHANGE: 0
DIARRHEA: 0
BLURRED VISION: 0
DEPRESSION: 0
COLOR CHANGE: 0
LIGHT-HEADEDNESS: 0
BRUISES/BLEEDS EASILY: 0
INSOMNIA: 0
NERVOUS/ANXIOUS: 0
ORTHOPNEA: 0
COUGH: 0
SPUTUM PRODUCTION: 0
DIZZINESS: 1
CLAUDICATION: 0
FALLS: 0
CONSTIPATION: 0
DIZZINESS: 0
BACK PAIN: 0
POLYDIPSIA: 0
SPEECH CHANGE: 0
PALPITATIONS: 0
MYALGIAS: 0
ABDOMINAL PAIN: 0
WHEEZING: 0
HEADACHES: 0
HEMOPTYSIS: 0
TREMORS: 0
PHOTOPHOBIA: 0
NAUSEA: 0
HALLUCINATIONS: 0
DOUBLE VISION: 0
VOMITING: 0
WEIGHT LOSS: 0
NECK PAIN: 0
STRIDOR: 0
FATIGUE: 0
CHILLS: 0
SHORTNESS OF BREATH: 0
DIAPHORESIS: 0

## 2020-11-06 ASSESSMENT — GAIT ASSESSMENTS
DISTANCE (FEET): 300
DEVIATION: NO DEVIATION
GAIT LEVEL OF ASSIST: SUPERVISED
ASSISTIVE DEVICE: FRONT WHEEL WALKER

## 2020-11-06 ASSESSMENT — ACTIVITIES OF DAILY LIVING (ADL): TOILETING: INDEPENDENT

## 2020-11-06 ASSESSMENT — LIFESTYLE VARIABLES: SUBSTANCE_ABUSE: 0

## 2020-11-06 NOTE — PROGRESS NOTES
Received report from night shift [employer]. Assumed care of pt. Pt reports no pain at this time. Updated pt on plan of care. Pt resting comfortably in bed. Fall precautions in place. Educated on use of call light. Hourly rounding and continuous monitoring in place.

## 2020-11-06 NOTE — PROGRESS NOTES
Monitor Summary: . 16/.12/.44 SB/SR 48-68  R PAC, R PVC, 1.9-2.1 SP, down to 41 not sustaining.

## 2020-11-06 NOTE — CARE PLAN
Problem: Knowledge Deficit  Goal: Knowledge of disease process/condition, treatment plan, diagnostic tests, and medications will improve  Outcome: PROGRESSING AS EXPECTED  Note: Discussed POC and medications with patient.  Patient verbalized understanding.

## 2020-11-06 NOTE — PROGRESS NOTES
Monitor Summary:    SR 63 - 82  Converted to AJ @ 0731  Back to SR @ 0331  (R) PVC, (F) PAC   1st degree block  .22/.08/.40

## 2020-11-06 NOTE — THERAPY
"Occupational Therapy   Initial Evaluation     Patient Name: Chase Harris  Age:  70 y.o., Sex:  male  Medical Record #: [id number]  Today's Date: 11/6/2020     Precautions: Fall Risk  Comments: pt reports he uses 3L O2 at home    Assessment  Patient is 70 y.o. male admitted for episodes of dizziness. Pt has  PMHx of type 2 diabetes, grade 1 diastolic dysfunction, interstitial lung disease, stage 3 CKD, and vertigo.   Patient demonstrated the ability to safely complete ADLs and functional transfers today. Reports having assistance as needed from son at home. Anticipate no further OT needs in this setting.      Plan  Recommend Occupational Therapy for Evaluation only.    DC Equipment Recommendations:  None  Discharge Recommendations:  Anticipate that the patient will have no further occupational therapy needs after discharge from the hospital     Subjective  \"I understand a little english\"     Objective   11/06/20 1139   Prior Living Situation   Prior Services None   Housing / Facility [address] [facility]   Bathroom Set up Bathtub / Shower Combination;Shower Chair   Equipment Owned Tub / Shower Seat;Grab Bar(s) In Tub / Shower   Lives with - Patient's Self Care Capacity Adult Children   Comments pt reports living with his son who helps with some IADLs   Prior Level of ADL Function   Self Feeding Independent   Grooming / Hygiene Independent   Bathing Independent   Dressing Independent   Toileting Independent   Prior Level of IADL Function   Medication Management Independent   Laundry Independent   Kitchen Mobility Independent   Finances Requires Assist   Home Management Requires Assist   Shopping Requires Assist   Prior Level Of Mobility Independent Without Device in Community   Comments pt reports son helps with some ADLs, but primarily helps financially   Precautions   Precautions Fall Risk   Comments pt reports he uses 3L O2 at home   Vitals   Vitals Comments 3L O2   Pain 0 - 10 Group   Therapist Pain Assessment   (no " c/o pain )   Cognition    Cognition / Consciousness WDL   Level of Consciousness Alert   Comments pleasant and cooperative   Active ROM Upper Body   Active ROM Upper Body  WDL   Strength Upper Body   Upper Body Strength  WDL   Sensation Upper Body   Upper Extremity Sensation  WDL   Upper Body Muscle Tone   Upper Body Muscle Tone  WDL   Coordination Upper Body   Coordination WDL   Balance Assessment   Sitting Balance (Static) Fair +   Sitting Balance (Dynamic) Fair   Standing Balance (Static) Fair   Standing Balance (Dynamic) Fair   Weight Shift Sitting Good   Weight Shift Standing Fair   Comments w/ FWW today; did not rely on walker; reports no AE at home   Bed Mobility    Supine to Sit Supervised   Scooting Supervised   ADL Assessment   Grooming Supervision;Standing   Lower Body Dressing Supervision   Toileting Supervision   Functional Mobility   Sit to Stand Supervised   Bed, Chair, Wheelchair Transfer Supervised   Toilet Transfers Supervised   Mobility walked to bathroom and back to bed w/ FWW   Comments w/FWW; used initally for safety, did not rely on walker   Activity Tolerance   Sitting Edge of Bed 10 min   Standing 10 min   Comments no c/o of lightheaded feeling or fatigue   Education Group   Education Provided Home Safety;Role of Occupational Therapist;Activities of Daily Living   Role of Occupational Therapist Patient Response Patient;Acceptance;Explanation   Home Safety Patient Response Patient;Acceptance;Explanation;Verbal Demonstration   ADL Patient Response Patient;Acceptance;Explanation;Verbal Demonstration;Action Demonstration   Additional Comments Focused on safety with ADLs, and discussed home safety strategies in place   Problem List   Problem List None   Interdisciplinary Plan of Care Collaboration   IDT Collaboration with  Nursing   Patient Position at End of Therapy Edge of Bed;Bed Alarm On;Tray Table within Reach;Call Light within Reach   Collaboration Comments RN updated   Session Information    Date / Session Number  11/6 1  (1x only)   Priority 0

## 2020-11-06 NOTE — PROGRESS NOTES
Daily Progress Note:     Date of Service: 11/6/2020  Primary Team: [facility]   Attending: ALEKSANDAR Gabriel M.D.   Senior Resident: Dr. Alba  Intern: Dr. Barker  Contact:  [phone]    Chief Complaint:   Passing out    Subjective:   Patient experienced nocturnal bradycardia with telemetry  noted to have episodes of sinus bradycardia  overnight, HR low 41s at lowest. Patient is on 2-3 L NC with sating at 93%, which is his home baseline. Patient stated he still is having dizziness and shortness of breath when he stands up, equal to the previous day. Patient report no chest pain, nausea/vomiting, bleeding or any other problems.    Interval Events:  Orthostatic Vitals Positive: 153/66 laying down vs 88/50 while standing up and repeat 92/52 standing up. Patient asymptomatic.  8am Cortisol 19.6, but getting Aldosterone and Renin levels in case of Mineralocorticoid deficiency  Con't to hold 5mg Lisinopril under suspicion of Mineralocorticoid Insufficiency.  Patient back to baseline O2 at 2L NC at rest  One time trial of Albuterol Nebulizer therapy with pre and post flow to see if patient's breathing significantly improves    Consultants/Specialty:  Cardiology  Electrophysiology    Review of Systems:   Review of Systems   Constitutional: Negative for chills, diaphoresis, fever, malaise/fatigue and weight loss.   HENT: Negative for hearing loss, nosebleeds, sore throat and tinnitus.    Eyes: Negative for blurred vision, double vision and photophobia.   Respiratory: Negative for cough, hemoptysis, sputum production, shortness of breath, wheezing and stridor.    Cardiovascular: Negative for chest pain, palpitations, orthopnea and claudication.   Gastrointestinal: Negative for abdominal pain, constipation, diarrhea, heartburn, nausea and vomiting.   Genitourinary: Negative for dysuria, frequency, hematuria and urgency.   Musculoskeletal: Negative for back pain, falls, joint pain, myalgias and neck pain.   Skin:  Negative for itching and rash.   Neurological: Positive for dizziness (Pre-syncope). Negative for tingling, tremors, sensory change, speech change, focal weakness, loss of consciousness and headaches.   Endo/Heme/Allergies: Negative for polydipsia. Does not bruise/bleed easily.   Psychiatric/Behavioral: Negative for depression, hallucinations, memory loss and substance abuse. The patient is not nervous/anxious and does not have insomnia.        Objective Data:   Physical Exam:   Vitals:   Temp:  [36.2 °C (97.1 °F)-36.9 °C (98.4 °F)] 36.2 °C (97.1 °F)  Pulse:  [49-75] 75  Resp:  [14-16] 15  BP: (101-144)/(45-73) 141/73  SpO2:  [95 %-100 %] 95 %     Physical Exam  Vitals signs (Orthostatic Vitals Positive: 158/69 laying down vs 131/63 while standing up) and nursing note reviewed.   Constitutional:       General: He is not in acute distress.     Appearance: Normal appearance. He is normal weight. He is not ill-appearing.      Comments: Cheerful and cooperative mood.   HENT:      Head: Normocephalic and atraumatic.      Nose: Nose normal. No congestion.      Mouth/Throat:      Mouth: Mucous membranes are moist.      Pharynx: Oropharynx is clear. No oropharyngeal exudate.   Eyes:      Extraocular Movements: Extraocular movements intact.      Conjunctiva/sclera: Conjunctivae normal.      Pupils: Pupils are equal, round, and reactive to light.   Neck:      Musculoskeletal: Normal range of motion and neck supple.   Cardiovascular:      Rate and Rhythm: Normal rate and regular rhythm.      Pulses: Normal pulses.      Heart sounds: Normal heart sounds. No murmur.   Pulmonary:      Effort: Pulmonary effort is normal. No respiratory distress.      Breath sounds: Wheezing (Faint Inspiratory wheezes) present.      Comments: No crackles heard in b/l lung fields  Chest:      Chest wall: No tenderness.   Abdominal:      General: Abdomen is flat. Bowel sounds are normal.      Palpations: Abdomen is soft.      Tenderness: There is no  abdominal tenderness.   Musculoskeletal: Normal range of motion.         General: No swelling or tenderness.   Skin:     General: Skin is warm and dry.      Capillary Refill: Capillary refill takes less than 2 seconds.      Coloration: Skin is not jaundiced.   Neurological:      General: No focal deficit present.      Mental Status: He is alert and oriented to person, place, and time. Mental status is at baseline.   Psychiatric:         Mood and Affect: Mood normal.         Behavior: Behavior normal.         Thought Content: Thought content normal.         Judgment: Judgment normal.           Labs:   Recent Labs     11/04/20 0324 11/04/20 0401 11/06/20  0837   WBC 8.3 8.9 10.1   RBC 3.67* 3.59* 4.54*   HEMOGLOBIN 11.3* 11.1* 13.9*   HEMATOCRIT 36.5* 35.8* 44.1   MCV 99.5* 99.7* 97.1   MCH 30.8 30.9 30.6   RDW 50.2* 50.7* 49.5   PLATELETCT 158* 125* 243   MPV 13.2* 13.1* 12.8   NEUTSPOLYS 72.00 74.50*  --    LYMPHOCYTES 14.70* 12.40*  --    MONOCYTES 9.40 9.60  --    EOSINOPHILS 2.10 1.80  --    BASOPHILS 0.60 0.70  --      Recent Labs     11/04/20 0324 11/04/20 0401 11/05/20  0321   SODIUM 137 134* 140   POTASSIUM 4.1 4.4 3.9   CHLORIDE 104 104 103   CO2 23 19* 29   GLUCOSE 213* 202* 137*   BUN 24* 23* 24*        Imaging:   EC-ECHOCARDIOGRAM LTD W/O CONT   Final Result      DX-CHEST-PORTABLE (1 VIEW)   Final Result      Stable reticular and hazy opacification compatible with pulmonary edema and possible underlying interstitial lung disease      Stable small effusions      DX-CHEST-PORTABLE (1 VIEW)   Final Result      Diffuse interstitial prominence compatible with patient's history of interstitial lung disease.      Hazy bibasilar opacities may represent atelectasis or pneumonitis.      Blunting of the costophrenic angles may be related to pleural thickening or trace pleural fluid.      Mild cardiomegaly.      CL-LEFT HEART CATHETERIZATION WITH POSSIBLE INTERVENTION    (Results Pending)       Problem  "Representation:     70 y.o. male with a PMH of interstitial lung disease, type 2 diabetes (May 2020 A1C 8.0), grade 1 diastolic dysfunction (May 2020 EF 55%), CKD stage III, and recent discharge 10/28 for vertigo who presented 11/3/2020 with passing out episodes.    * Postural dizziness with presyncope- (present on admission)  Assessment & Plan  Patient stated he feels light-headed and then about to pass out whenever he stands from a seated or laying position since 10/30/2020. Patient states that his blood pressure is a \"little low\" at home, but is unable to quantity. Patient reports that he is compliant with all his medications and this blood sugars at home range from 150-160s, with the max at 180s. Patient does recall starting Lisinopril 10mg Daily one month ago, but current records do not indicate patient was taking this medication even from recent 10/28 d/c summary. Etiology likely orthostatic, but patient did have elevated troponin of 118 from baseline of 50-65s and had elevated BP up to 181/80 once during interview but was 128/50 on admission. Last ECHO May 2020 EF was 55% with Grade I diastolic dysfunction. Orthostatic Vitals Positive: 158/69 laying down vs 131/63 while standing up. ECHO showed normal LVEF of 70% and LVgram which also showed normal LVEF and Diffuse Coronary Artery Disease, after being reread by Dr. Hopper after original read had 30%.    - Repeat orthostatic vitals tomorrow, 11/06 Orthostatic Vitals Positive: 153/66 laying down vs 88/50 while standing up and repeat 92/52 standing up. Patient asymptomatic.  - Midodrine 5mg TID started  - Diabetic Diet  - Con't Labetalol PRN  - Con't albuterol and tiotropium  - ASCVD score > 40%, Con't 40mg Atorvastatin  - Aldosterone (sitting up) and Renin ordered  - Holding 5mg Lisinopril under suspicion of Mineralocorticoid Insufficiency   - F/U with EP 2 weeks after d/c for outpatient assessment for pacemaker    Sinus bradycardia- (present on " admission)  Assessment & Plan  Patient reports palpitations but denies chest pain, and EKG on admission revealed Sinus bradycardia with no CLEAR evidence of new infarction or MI from previous EKG's. 11/04 patient had junctional rhythms and 1st degree AV block whenever he stood up. Patient experienced nocturnal bradycardia with telemetry noted to have episodes of sinus bradycardia  overnight, HR low 41s at lowest. Patient's heart rate is 70 when at rest. Etiology unclear.    - Per EP, Dr. Ramos, patient does not need Pacemaker, see his note for more details  - Per Dr. Hopper, patient does not need cardiac surgery, see his note for more details  - Holding 5mg Lisinopril under suspicion of Mineralocorticoid Insufficiency.  - Aldosterone and Renin levels   - F/U with EP 2 weeks after d/c for outpatient assessment for pacemaker    Acute respiratory failure with hypoxia (HCC)- (present on admission)  Assessment & Plan  Patient has history of interstitial lung disease and mild pulmonary hypertension with preserved EF of 55% from May 2020 ECHO. Chest X-ray looks worse overnight with pulmonary edema after patient received IVF on admission. Patient back to baseline at 2-3L NC, patient walked with PT with 3L NC.    -Improving, patient back to baseline 2L NC at rest and 3L when ambulating  -Con't Oxygen support and RT following, greatly appreciate recommendations  -Con't to monitor and observe if patient oxygen requirements increase or he acutely worsens  -One time trial of Albuterol Nebulizer therapy with pre and post flow to see if patient's breathing significantly improves      Elevated troponin- (present on admission)  Assessment & Plan  Patient's troponin elevated at 118 from baseline 50-65s. Patient reports palpitations but denies chest pain, and EKG on admission revealed Sinus bradycardia with no CLEAR evidence of new infarction or MI from previous EKG's. Etiology unclear likely demand ischemia or from MIRIAM. Troponin 115 and  113 on repeat labs. Patient still denies chest pain or palpitations.    - Per EP Dr. Ramos, patient does not need Pacemaker, see his note for more details  - Continue to monitor if patient develops new onset chest pain.        MIRIAM (acute kidney injury) (HCC)  Assessment & Plan  Patient's Cr is elevated by .3 at 1.51 from baseline 1.2. Patient stated he has not drink or eaten anything except a banana this morning. Patient has a PMH of CKD stage III. Etiology unclear likely Prerenal as Cr improved with IVF.    - Resolved  - BP control with PRN Labetalol and holding lisinopril  - Avoid nephrotoxins and renally dose medications.       Please note that this dictation was created using voice recognition software. I have worked with technical experts from [employer] to optimize the interface.  I have made every reasonable attempt to correct obvious errors, but there may be errors of grammar and possibly content that I did not discover before finalizing the note.

## 2020-11-06 NOTE — NON-PROVIDER
"Daily Progress Note:     Date of Service: 11/6/2020  Primary Team: [facility]   Attending: ALEKSANDAR Gabriel M.D.   Senior Resident: Dr. Anjali M.D  Intern: Dr. Mj M.D  Contact:  [phone]    Chief Complaint:   \"passed out\"    ID: Mr. Harris is a 70 y.o. male who presented on 11/3/2020 to the ED after \"passing out\". Patient has a PMH of interstitial lung disease (uses 2L of home oxygen) and non insulin dependent diabetes type 2.     Subjective: Overnight, patient was noted to have episodes of bradycardia, HR in the 40s. This morning, patient was on 3L NC with a SpO2 of 97%. He reports occasional dizziness when he stands but denied any chest pain or headache. Overall, patient reports his breathing is improving.     Consultants/Specialty:  Cardiology  Review of Systems:    Review of Systems   Constitutional: Negative for chills and fever.   Eyes: Negative for blurred vision, double vision and photophobia.   Respiratory: Negative for cough.    Cardiovascular: Negative for chest pain, palpitations and orthopnea.   Gastrointestinal: Negative for heartburn, nausea and vomiting.   Neurological: Positive for dizziness.       Objective Data:   Physical Exam:   Vitals:   Vitals:    11/06/20 1220   BP: 144/62   Pulse: 67   Resp: 16   Temp: 36.7 °C (98.1 °F)   SpO2: 93%   Orthostatic Vitals 158/69 while laying down  88/50 standing up and 92/52 (repeat of standing up)  Physical Exam  Constitutional:       General: He is not in acute distress.     Appearance: He is not ill-appearing.      Comments: Patient is pleasant and interactive.     HENT:      Head: Normocephalic.      Right Ear: External ear normal.      Left Ear: External ear normal.      Nose: Nose normal.   Neck:      Musculoskeletal: Normal range of motion.   Cardiovascular:      Rate and Rhythm: Normal rate and regular rhythm.      Pulses: Normal pulses.   Pulmonary:      Effort: Pulmonary effort is normal.      Comments: Diffuse mild " wheezes  Abdominal:      Tenderness: There is no abdominal tenderness.   Skin:     General: Skin is warm and dry.      Coloration: Skin is not jaundiced.           Labs:   Results for BREANNE BOOTH (MRN [id number]) as of 11/6/2020 14:45   Ref. Range 11/6/2020 08:37   Cortisol Latest Ref Range: 0.0 - 23.0 ug/dL 19.6     Imaging:   No new imaging    Assessment and Plan:  Mr. Stone is an 70 old with a PMH of diabetes, interstitial lung disease who presented for a recent syncope episode. Patient is currently stable.   Problem Presentation     1.. Syncope most likely due to Orthostatic Hypotension  -Patient reports feeling lightheaded/dizzy every time he stands up  -Echo showed normal LVEF of 70%. Patient etiology is most likely due to orthostatic hypotension  -Patient systolic BP yesterday decreased by 27 this morning and 60 this morning  Plan:  - Repeat Orthostatic vitals in the AM  -Midodrine 5mg TID started  -Aldosterone and Renin labs ordered due to suspicion of mineralocorticoid insufficiency  -Cortisol level is normal  -Holding Lisinopril 5mg until aldosterone and renin result    2.. Acute Hypoxemic Respiratory failure  -Hx of interstitial lung disease, uses 2L of home oxygen    Chest Xray noted for pulmonary edema and small effusions on 11/4  -Today patient was back on his baseline of 2-3L NC with SpO2 at 91-95%  Plan:  - Monitor patient oxygen level at 2-3L NC  -Trial of albuterol nebulizer to assess patient breathing     3. Bradycardia  -Patient EKG noted for sinus bradycardia this morning with HR of 54  -Angiogram noted for diffuse coronary artery disease and echo remarkable for a left ventricular ejection fraction of 70%. Cardio recommends that patient is evaluated with outpatient ambulatory monitoring.  Plan  -Continue telemetry  -Hold Lisinopril at this time  -F/U with outpatient cardio        5. Acute Kidney injury most likely due to prerenal (stable)   -Patient BUN has improved from 31 on day of  admission to 24.   -Patient GFR has improved from 46 to 60 and creatine is 1.16  Patient MIRIAM has resolved  Plan:  - Monitor I's and out's     6. Diabetes  -History of non insulin dependent DM  Plan  -Hold DM medication due to possible cause of hypoglycemia     Sade MENDOZA-S2

## 2020-11-06 NOTE — CARE PLAN
Problem: Knowledge Deficit  Goal: Knowledge of disease process/condition, treatment plan, diagnostic tests, and medications will improve  Outcome: PROGRESSING AS EXPECTED   POC discussed w/ pt at beginning of shift, pt verbalized understanding and encouraged to call for assistance    Problem: Skin Integrity  Goal: Risk for impaired skin integrity will decrease  Outcome: PROGRESSING AS EXPECTED   Skin intact. Grey foam pads and silicone oxygen tubing in use

## 2020-11-06 NOTE — THERAPY
Physical Therapy   Initial Evaluation     Patient Name: Chase Harris  Age:  70 y.o., Sex:  male  Medical Record #: [id number]  Today's Date: 11/6/2020     Precautions: Fall Risk, continuous supplemental O2 at 3 l/m.    Assessment  Patient is 70 y.o. male with postural dizziness with presyncope, sinus bradycardia and elevated  Troponin.  Pt was seen for PT assessment. Pt required a FWW for ambulation, pt was able to safely and timely ambulate for over 300 ft with FWW and O2 at 3 l/m SpO2 ranged from 85 to 92 %. Educated on breathing and pacing. No c/o SOB, dizziness or lightheadedness. No further inpatient or post acute PT needs. RN notified of recommendation for a FWW .     Plan    Recommend Physical Therapy for Evaluation only, education and recommendation only    DC Equipment Recommendations: Front-Wheel Walker  Discharge Recommendations: Anticipate that the patient will have no further physical therapy needs after discharge from the hospital          Objective       11/06/20 1234   Prior Living Situation   Prior Services None   Housing / Facility 1 Story House   Equipment Owned Grab Bar(s) In Tub / Shower   Lives with - Patient's Self Care Capacity Adult Children   Comments resides with his son   Prior Level of Functional Mobility   Bed Mobility Independent   Transfer Status Independent   Ambulation Independent   Distance Ambulation (Feet)   (household)   Assistive Devices Used None   Comments Used supplemental O2   Balance Assessment   Sitting Balance (Static) Good   Sitting Balance (Dynamic) Fair +   Standing Balance (Static) Fair +   Standing Balance (Dynamic) Fair   Weight Shift Sitting Good   Weight Shift Standing Fair   Comments w/ FWW   Gait Analysis   Gait Level Of Assist Supervised   Assistive Device Front Wheel Walker   Distance (Feet) 300   # of Times Distance was Traveled 1   Deviation No deviation   Comments assist with O2 tank.   Bed Mobility    Supine to Sit Independent   Sit to Supine Supervised    Scooting Independent   Rolling Supervised   Functional Mobility   Sit to Stand Supervised   Bed, Chair, Wheelchair Transfer Supervised   Transfer Method Stand Step  (w/ FWw)   Education Group   Education Provided Role of Physical Therapist;Gait Training;Use of Assistive Device   Role of Physical Therapist Patient Response Patient;Acceptance;Explanation;Verbal Demonstration   Gait Training Patient Response Patient;Acceptance;Explanation;Demonstration;Action Demonstration   Use of Assistive Device Patient Response Patient;Acceptance;Explanation;Action Demonstration   Additional Comments agreed with FWW for home.

## 2020-11-06 NOTE — RESPIRATORY CARE
Called to bedside to assess pt breathing. Pt had clear yet diminished lung sounds. Pt said he was breathing good now and did not want breathing medication. Pt has PRN bronchodilators and BID [place] and has no complaints in his breathing. RT and RN will continue to assess for the need for bronchodilators, but at this time there is no indication.

## 2020-11-06 NOTE — CARE PLAN
Problem: Respiratory:  Goal: Respiratory status will improve  Outcome: PROGRESSING AS EXPECTED   Patient was able to be weened from 12 L oxy mask this morning to nasal canula at 6 L. Extensive education provided to patient and sons on importance of keeping oxygen in use. Will continue to monitor.     Problem: Skin Integrity  Goal: Risk for impaired skin integrity will decrease  Outcome: PROGRESSING AS EXPECTED   Skin assessment completed. Pillows in use for positioning. Silicone oxygen tubing with grey foams in place. Frequent linen checks and changes to ensure patient stays dry.

## 2020-11-06 NOTE — PROGRESS NOTES
Cardiology Follow Up Progress Note    Date of Service  11/6/2020    Attending Physician  ALEKSANDAR Gabriel M.D.    Chief Complaint/Reason for EP consult: Bradycardia    HPI  Chase Harris is a 70 y.o. male admitted 11/3/2020 with dizziness.  Per Dr. Ramos's consult note: 71 y/o M with h/o ILD with chronic hypoxia and ILD, on home oxygen. Admitted initially for 2 days of dizziness. His son noted that he had slurred speech in the AM and overall didn't feel well. He feels most dizzy when standing up and when ambulating. No syncope. No prior episodes of dizziness like this. He denied chest pain. Troponin was noted to be elevated on admission. Taken for [facility] which showed diffuse CAD. On telemetry he is noted to have episodes of sinus bradycardia and junctional rhythm overnight, HR low 30s at lowest. He is also noted to have blocked PACs. EP was consulted to evaluate if PPM is necessary.    Interim Events  11/06/2020: Seen in EP Rounds  A qualified  was used to interpret Serbian during this encounter.  ’s Memorop/[id number] was [employer] and mode of interpretation was iPad. The content of the interpretation included Patient Assessment, Education, Medications.  Vital Signs/Labs: Were reviewed. Afebrile, BP stable.   Telemetry monitor: SR/SB, 60s bpm, down to 39 bpm at night non-sustained. Asymptomatic.   No significant events overnight.     Review of Systems  Review of Systems   Constitutional: Negative for chills, diaphoresis, fatigue and fever.   Respiratory: Negative for cough, chest tightness, shortness of breath, wheezing and stridor.    Cardiovascular: Negative for chest pain, palpitations and leg swelling.   Gastrointestinal: Negative for abdominal pain.   Genitourinary: Negative for difficulty urinating and hematuria.   Skin: Negative for color change.   Neurological: Negative for dizziness and light-headedness.     Vital signs in last 24 hours  Temp:  [36.5 °C (97.7 °F)-36.9 °C (98.4  °F)] 36.5 °C (97.7 °F)  Pulse:  [49-70] 49  Resp:  [14-16] 14  BP: (101-166)/(45-79) 144/69  SpO2:  [96 %-100 %] 100 %    Physical Exam  Physical Exam   Constitutional: He is oriented to person, place, and time. No distress.   Neck: No JVD present.   Cardiovascular: Normal rate, regular rhythm and intact distal pulses.   No murmur heard.  Pulmonary/Chest: Effort normal and breath sounds normal. No respiratory distress. He has no wheezes. He has no rales. He exhibits no tenderness.   2L o2 nasal cannula   Musculoskeletal:         General: No edema.   Neurological: He is alert and oriented to person, place, and time.   Skin: Skin is warm and dry. He is not diaphoretic. No erythema.   Nursing note and vitals reviewed.    Lab Review  Lab Results   Component Value Date/Time    WBC 8.9 11/04/2020 04:01 AM    RBC 3.59 (L) 11/04/2020 04:01 AM    HEMOGLOBIN 11.1 (L) 11/04/2020 04:01 AM    HEMATOCRIT 35.8 (L) 11/04/2020 04:01 AM    MCV 99.7 (H) 11/04/2020 04:01 AM    MCH 30.9 11/04/2020 04:01 AM    MCHC 31.0 (L) 11/04/2020 04:01 AM    MPV 13.1 (H) 11/04/2020 04:01 AM      Lab Results   Component Value Date/Time    SODIUM 140 11/05/2020 03:21 AM    POTASSIUM 3.9 11/05/2020 03:21 AM    CHLORIDE 103 11/05/2020 03:21 AM    CO2 29 11/05/2020 03:21 AM    GLUCOSE 137 (H) 11/05/2020 03:21 AM    BUN 24 (H) 11/05/2020 03:21 AM    CREATININE 1.16 11/05/2020 03:21 AM      Lab Results   Component Value Date/Time    ASTSGOT 24 11/04/2020 04:01 AM    ALTSGPT 36 11/04/2020 04:01 AM     Lab Results   Component Value Date/Time    CHOLSTRLTOT 129 11/05/2020 03:21 AM    LDL 74 11/05/2020 03:21 AM    HDL 32 (A) 11/05/2020 03:21 AM    TRIGLYCERIDE 113 11/05/2020 03:21 AM           Cardiac Imaging and Procedures Review  EKG 11/06/2020:  Sinus bradycardia, HR 59 bpm    Echocardiogram 11/05/2020:    Prior echo 5/23/20, no changes  Left ventricular ejection fraction is visually estimated to be 70%.Normal regional wall motion.    Assessment/Plan  1.  Nocturnal Bradycardia  - Remains in sinus rhythm/bradycardia, down to 39 bpm overnight, nonsustaied. Asymptomatic.   - Nocturnal bradycardia.   - No evidence of pathologic heart block.  - Unknown etiology of dizziness, may not be due to bradycardia. It is possible that he has symptomatic intermittent Mobitz 1 heart block but I haven't seen evidence of this. Only significant sinus node dysfunction appears to be nocturnal. He has a normal EF and no indication for AVN blockers that I can see.   - No PPM at this time, can reconsider if new telemetry findings or abnormal outpatient ambulatory monitoring.  - WIll evaluate with outpatient ambulatory monitoring before committing to PPM implantation.   - Continue telemetry monitoring while inpatient and if pathologic block is noted we can certainly re-evaluate. May be beneficial to ambulate patient with physical therapy on telemetry to assess if he is dizzy ambulating and if that is associated with bradycardia.  - Pt to f/u with EP in 2 weeks as outpatient as scheduled.     Thank you for allowing me to participate in the care of this patient.  EP will sign off of this patient.    Future Appointments   Date Time Provider Department Center   11/17/2020  9:50 AM Chyna Campuzano M.D. [facility] None   11/20/2020  2:00 PM Jv Ramos M.D. [facility] None   12/4/2020 10:00 AM HAZEL Roberts [facility] None     Please contact me with any questions.    ARMEN Martinez.   [facility]  [phone]

## 2020-11-06 NOTE — PROGRESS NOTES
Daily Progress Note:     Date of Service: 11/5/2020  Primary Team: [facility] Team   Attending: ALEKSANDAR Gabriel M.D.   Senior Resident: Dr. Alba  Intern: Dr. Barker  Contact:  [phone]    Chief Complaint:   Passing out    Subjective:   Patient experienced nocturnal bradycardia with telemetry  noted to have episodes of sinus bradycardia and junctional rhythm overnight, HR low 30s at lowest. Patient was desating to the 30s when not having oxymask on was reminded constantly by nursing staff to have it on. Eventually patient was transitioned to Nasal Canula, but still requiring 8L. Patient stated he still is having dizziness and shortness of breath when he stands up, equal to the previous day. Patient report no chest pain, nausea/vomiting, bleeding or any other problems.    Interval Events:  Orthostatic Vitals Positive: 158/69 laying down vs 131/63 while standing up  Cardiac Angiogram  ECHO  Nasal Canula  Per Dr. Ramos, patient does not need Pacemaker, see his note for more details  Per Dr. Hopper, patient does not need cardiac surgery, see his note for more details  Cardiology started 5mg Lisinopril, but this was held pending 11/06 8am Cortisol level under suspicion of Mineralocorticoid or Adrenal Insufficiency.  Cortisol 8am 11/06/2020  40mg IV Lasix once    Consultants/Specialty:  Cardiology    Review of Systems:   Review of Systems   Constitutional: Negative for chills, diaphoresis, fever, malaise/fatigue and weight loss.   HENT: Negative for hearing loss, nosebleeds, sore throat and tinnitus.    Eyes: Negative for blurred vision, double vision and photophobia.   Respiratory: Positive for shortness of breath. Negative for cough, hemoptysis, sputum production, wheezing and stridor.    Cardiovascular: Negative for chest pain, palpitations, orthopnea and claudication.   Gastrointestinal: Negative for abdominal pain, constipation, diarrhea, heartburn, nausea and vomiting.   Genitourinary: Negative for dysuria,  frequency, hematuria and urgency.   Musculoskeletal: Negative for back pain, falls, joint pain, myalgias and neck pain.   Skin: Negative for itching and rash.   Neurological: Positive for dizziness. Negative for tingling, tremors, sensory change, speech change, focal weakness, loss of consciousness and headaches.   Endo/Heme/Allergies: Negative for polydipsia. Does not bruise/bleed easily.   Psychiatric/Behavioral: Negative for depression, hallucinations, memory loss and substance abuse. The patient is not nervous/anxious and does not have insomnia.        Objective Data:   Physical Exam:   Vitals:   Temp:  [36.6 °C (97.9 °F)-37.3 °C (99.1 °F)] 36.8 °C (98.3 °F)  Pulse:  [55-73] 59  Resp:  [14-17] 15  BP: (128-171)/(56-79) 137/70  SpO2:  [95 %-99 %] 99 %     Physical Exam  Vitals signs (Orthostatic Vitals Positive: 158/69 laying down vs 131/63 while standing up) and nursing note reviewed.   Constitutional:       General: He is not in acute distress.     Appearance: Normal appearance. He is normal weight. He is not ill-appearing.      Comments: Frustrated mood initially until the afternoon when patient was more cooperative and pleasant after being updated several times.   HENT:      Head: Normocephalic and atraumatic.      Nose: Nose normal. No congestion.      Mouth/Throat:      Mouth: Mucous membranes are moist.      Pharynx: Oropharynx is clear. No oropharyngeal exudate.   Eyes:      Extraocular Movements: Extraocular movements intact.      Conjunctiva/sclera: Conjunctivae normal.      Pupils: Pupils are equal, round, and reactive to light.   Neck:      Musculoskeletal: Normal range of motion and neck supple.   Cardiovascular:      Rate and Rhythm: Normal rate and regular rhythm.      Pulses: Normal pulses.      Heart sounds: Normal heart sounds. No murmur.   Pulmonary:      Effort: Pulmonary effort is normal. No respiratory distress.      Breath sounds: Wheezing (Minor, soft inspiratory wheezes) present.       Comments: No crackles heard in b/l lower lung fields  Chest:      Chest wall: No tenderness.   Abdominal:      General: Abdomen is flat. Bowel sounds are normal.      Palpations: Abdomen is soft.      Tenderness: There is no abdominal tenderness.   Musculoskeletal: Normal range of motion.         General: No swelling or tenderness.   Skin:     General: Skin is warm and dry.      Capillary Refill: Capillary refill takes less than 2 seconds.      Coloration: Skin is not jaundiced.   Neurological:      General: No focal deficit present.      Mental Status: He is alert and oriented to person, place, and time. Mental status is at baseline.   Psychiatric:         Mood and Affect: Mood normal.         Behavior: Behavior normal.         Thought Content: Thought content normal.         Judgment: Judgment normal.           Labs:   Recent Labs     11/03/20  1137 11/04/20  0324 11/04/20  0401   WBC 8.8 8.3 8.9   RBC 3.79* 3.67* 3.59*   HEMOGLOBIN 11.9* 11.3* 11.1*   HEMATOCRIT 37.7* 36.5* 35.8*   MCV 99.5* 99.5* 99.7*   MCH 31.4 30.8 30.9   RDW 51.2* 50.2* 50.7*   PLATELETCT 127* 158* 125*   MPV 12.8 13.2* 13.1*   NEUTSPOLYS 70.80 72.00 74.50*   LYMPHOCYTES 17.70* 14.70* 12.40*   MONOCYTES 8.50 9.40 9.60   EOSINOPHILS 1.20 2.10 1.80   BASOPHILS 0.80 0.60 0.70     Recent Labs     11/04/20  0324 11/04/20  0401 11/05/20  0321   SODIUM 137 134* 140   POTASSIUM 4.1 4.4 3.9   CHLORIDE 104 104 103   CO2 23 19* 29   GLUCOSE 213* 202* 137*   BUN 24* 23* 24*        Imaging:   EC-ECHOCARDIOGRAM LTD W/O CONT   Final Result      DX-CHEST-PORTABLE (1 VIEW)   Final Result      Stable reticular and hazy opacification compatible with pulmonary edema and possible underlying interstitial lung disease      Stable small effusions      DX-CHEST-PORTABLE (1 VIEW)   Final Result      Diffuse interstitial prominence compatible with patient's history of interstitial lung disease.      Hazy bibasilar opacities may represent atelectasis or pneumonitis.     "  Blunting of the costophrenic angles may be related to pleural thickening or trace pleural fluid.      Mild cardiomegaly.      CL-LEFT HEART CATHETERIZATION WITH POSSIBLE INTERVENTION    (Results Pending)       Problem Representation:     70 y.o. male with a PMH of interstitial lung disease, type 2 diabetes (May 2020 A1C 8.0), grade 1 diastolic dysfunction (May 2020 EF 55%), CKD stage III, and recent discharge 10/28 for vertigo who presented 11/3/2020 with passing out episodes.    * Postural dizziness with presyncope- (present on admission)  Assessment & Plan  Patient stated he feels light-headed and then about to pass out whenever he stands from a seated or laying position since 10/30/2020. Patient states that his blood pressure is a \"little low\" at home, but is unable to quantity. Patient reports that he is compliant with all his medications and this blood sugars at home range from 150-160s, with the max at 180s. Patient does recall starting Lisinopril 10mg Daily one month ago, but current records do not indicate patient was taking this medication even from recent 10/28 d/c summary. Etiology likely orthostatic, but patient did have elevated troponin of 118 from baseline of 50-65s and had elevated BP up to 181/80 once during interview but was 128/50 on admission. Last ECHO May 2020 EF was 55% with Grade I diastolic dysfunction. Orthostatic Vitals Positive: 158/69 laying down vs 131/63 while standing up. ECHO showed normal LVEF of 70% and LVgram which also showed normal LVEF and Diffuse Coronary Artery Disease, after being reread by Dr. Hopper after original read had 30%.    - Repeat orthostatic vitals tomorrow  - Diabetic Diet  - Con't Labetalol PRN  - Con't albuterol and tiotropium  - ASCVD score > 40%, started patient on 40mg Atorvastatin  - 8 AM Cortisol ordered  - Cardiology started 5mg Lisinopril, but this was held pending 11/06 8am Cortisol level under suspicion of Mineralocorticoid or Adrenal Insufficiency "     Sinus bradycardia- (present on admission)  Assessment & Plan  Patient reports palpitations but denies chest pain, and EKG on admission revealed Sinus bradycardia with no CLEAR evidence of new infarction or MI from previous EKG's. 11/04 patient had junctional rhythms and 1st degree AV block whenever he stood up. Patient experienced nocturnal bradycardia with telemetry  noted to have episodes of sinus bradycardia and junctional rhythm overnight, HR low 30s at lowest. Patient's heart rate is 70 when at rest. Etiology unclear.    - Per EP, Dr. Ramos, patient does not need Pacemaker, see his note for more details  - Per Dr. Hopper, patient does not need cardiac surgery, see his note for more details  - Cardiology started 5mg Lisinopril, but this was held pending 11/06 8am Cortisol level under suspicion of Mineralocorticoid or Adrenal Insufficiency.  - Cortisol 8am 11/06/2020    Acute respiratory failure with hypoxia (HCC)- (present on admission)  Assessment & Plan  Patient has history of interstitial lung disease and mild pulmonary hypertension with preserved EF of 55% from May 2020 ECHO. Chest X-ray looks worse overnight with pulmonary edema after patient received IVF on admission. 11/05 patient was desating to the 30s when not having oxymask on was reminded constantly by nursing staff to have it on. Eventually patient was transitioned to Nasal Canula, but still requiring 8L.    -One time dose of 40mg IV lasix.  -Con't Oxygen support and RT following, greatly appreciate recommendations  -Con't to monitor and observe if patient oxygen requirements increase or he acutely worsens      Elevated troponin- (present on admission)  Assessment & Plan  Patient's troponin elevated at 118 from baseline 50-65s. Patient reports palpitations but denies chest pain, and EKG on admission revealed Sinus bradycardia with no CLEAR evidence of new infarction or MI from previous EKG's. Etiology unclear likely demand ischemia or from MIRIAM.  Troponin 115 and 113 on repeat labs. Patient still denies chest pain or palpitations.    - Per EP Dr. Ramos, patient does not need Pacemaker, see his note for more details  - Continue to monitor if patient develops new onset chest pain.        MIRIAM (acute kidney injury) (HCC)  Assessment & Plan  Patient's Cr is elevated by .3 at 1.51 from baseline 1.2. Patient stated he has not drink or eaten anything except a banana this morning. Patient has a PMH of CKD stage III. Etiology unclear likely Prerenal as Cr improved with IVF.    - one time dose of 40mg Lasix  - BP control with PRN Labetalol and holding lisinopril  - Avoid nephrotoxins and renally dose medications.      Please note that this dictation was created using voice recognition software. I have worked with technical experts from [facility] [employer] to optimize the interface.  I have made every reasonable attempt to correct obvious errors, but there may be errors of grammar and possibly content that I did not discover before finalizing the note.

## 2020-11-07 VITALS
HEIGHT: 63 IN | RESPIRATION RATE: 16 BRPM | WEIGHT: 135.36 LBS | SYSTOLIC BLOOD PRESSURE: 159 MMHG | BODY MASS INDEX: 23.98 KG/M2 | OXYGEN SATURATION: 99 % | HEART RATE: 60 BPM | TEMPERATURE: 97.2 F | DIASTOLIC BLOOD PRESSURE: 68 MMHG

## 2020-11-07 PROBLEM — J96.01 ACUTE RESPIRATORY FAILURE WITH HYPOXIA (HCC): Status: RESOLVED | Noted: 2020-05-23 | Resolved: 2020-11-07

## 2020-11-07 PROBLEM — N17.9 AKI (ACUTE KIDNEY INJURY) (HCC): Status: RESOLVED | Noted: 2020-05-23 | Resolved: 2020-11-07

## 2020-11-07 PROBLEM — R79.89 ELEVATED TROPONIN: Status: RESOLVED | Noted: 2020-05-26 | Resolved: 2020-11-07

## 2020-11-07 LAB
GLUCOSE BLD-MCNC: 126 MG/DL (ref 65–99)
GLUCOSE BLD-MCNC: 168 MG/DL (ref 65–99)
GLUCOSE BLD-MCNC: 184 MG/DL (ref 65–99)

## 2020-11-07 PROCEDURE — 700111 HCHG RX REV CODE 636 W/ 250 OVERRIDE (IP): Performed by: STUDENT IN AN ORGANIZED HEALTH CARE EDUCATION/TRAINING PROGRAM

## 2020-11-07 PROCEDURE — 94760 N-INVAS EAR/PLS OXIMETRY 1: CPT

## 2020-11-07 PROCEDURE — A9270 NON-COVERED ITEM OR SERVICE: HCPCS | Performed by: STUDENT IN AN ORGANIZED HEALTH CARE EDUCATION/TRAINING PROGRAM

## 2020-11-07 PROCEDURE — 99239 HOSP IP/OBS DSCHRG MGMT >30: CPT | Mod: GC | Performed by: HOSPITALIST

## 2020-11-07 PROCEDURE — 94640 AIRWAY INHALATION TREATMENT: CPT

## 2020-11-07 PROCEDURE — A9270 NON-COVERED ITEM OR SERVICE: HCPCS | Performed by: INTERNAL MEDICINE

## 2020-11-07 PROCEDURE — 82962 GLUCOSE BLOOD TEST: CPT | Mod: 91

## 2020-11-07 PROCEDURE — 700102 HCHG RX REV CODE 250 W/ 637 OVERRIDE(OP): Performed by: STUDENT IN AN ORGANIZED HEALTH CARE EDUCATION/TRAINING PROGRAM

## 2020-11-07 PROCEDURE — 700102 HCHG RX REV CODE 250 W/ 637 OVERRIDE(OP): Performed by: INTERNAL MEDICINE

## 2020-11-07 RX ORDER — MIDODRINE HYDROCHLORIDE 5 MG/1
5 TABLET ORAL
Qty: 60 TAB | Refills: 3 | Status: SHIPPED | OUTPATIENT
Start: 2020-11-07 | End: 2021-01-13 | Stop reason: SDUPTHER

## 2020-11-07 RX ORDER — ATORVASTATIN CALCIUM 40 MG/1
40 TABLET, FILM COATED ORAL EVERY EVENING
Qty: 30 TAB | Refills: 3 | Status: SHIPPED | OUTPATIENT
Start: 2020-11-07 | End: 2020-11-07

## 2020-11-07 RX ORDER — ASPIRIN 81 MG/1
81 TABLET ORAL DAILY
Qty: 30 TAB | Refills: 3 | Status: SHIPPED | OUTPATIENT
Start: 2020-11-08 | End: 2022-01-01

## 2020-11-07 RX ORDER — ATORVASTATIN CALCIUM 40 MG/1
40 TABLET, FILM COATED ORAL EVERY EVENING
Qty: 30 TAB | Refills: 3 | Status: SHIPPED | OUTPATIENT
Start: 2020-11-07 | End: 2021-01-01 | Stop reason: SDUPTHER

## 2020-11-07 RX ORDER — ASPIRIN 81 MG/1
81 TABLET ORAL DAILY
Qty: 30 TAB | Refills: 3 | Status: SHIPPED | OUTPATIENT
Start: 2020-11-08 | End: 2020-11-07

## 2020-11-07 RX ORDER — MIDODRINE HYDROCHLORIDE 5 MG/1
5 TABLET ORAL
Qty: 60 TAB | Refills: 3 | Status: SHIPPED | OUTPATIENT
Start: 2020-11-07 | End: 2020-11-07

## 2020-11-07 RX ADMIN — HEPARIN SODIUM 5000 UNITS: 5000 INJECTION, SOLUTION INTRAVENOUS; SUBCUTANEOUS at 06:24

## 2020-11-07 RX ADMIN — MIDODRINE HYDROCHLORIDE 5 MG: 5 TABLET ORAL at 07:30

## 2020-11-07 RX ADMIN — INSULIN HUMAN 1 UNITS: 100 INJECTION, SOLUTION PARENTERAL at 11:49

## 2020-11-07 RX ADMIN — ASPIRIN 81 MG: 81 TABLET, COATED ORAL at 06:24

## 2020-11-07 RX ADMIN — GLYCOPYRROLATE 1 CAPSULE: 15.6 CAPSULE RESPIRATORY (INHALATION) at 07:14

## 2020-11-07 RX ADMIN — MIDODRINE HYDROCHLORIDE 5 MG: 5 TABLET ORAL at 11:48

## 2020-11-07 NOTE — CARE PLAN
Problem: Knowledge Deficit  Goal: Knowledge of disease process/condition, treatment plan, diagnostic tests, and medications will improve  Outcome: PROGRESSING AS EXPECTED   POC discussed w/ pt at beginning of shift, pt verbalized understanding and encouraged to call for assistance    Problem: Respiratory:  Goal: Respiratory status will improve  Outcome: MET   Pt on 2L back to baseline O2

## 2020-11-07 NOTE — DISCHARGE SUMMARY
Discharge Summary    CHIEF COMPLAINT ON ADMISSION  Chief Complaint   Patient presents with   • Syncope     pt on phone and passed out       Reason for Admission  Dizziness/Weakness  Postural syncope    Admission Date  11/3/2020    CODE STATUS  Full Code     HPI & HOSPITAL COURSE  70 year-old Moldovan-speaking male patient with a past medical history of interstitial lung disease on 2 L home oxygen and type 2 diabetes mellitus who presented to the ER with a complaint of multiple syncopal episode for a few days prior to presentation to the hospital  He he reported that his dizziness is mainly when he gets up from sitting to standing position and sometimes associated with syncope/loss of consciousness.  He described preceding palpitations and headache and followed by confusion.  Upon presentation to the hospital the patient was hemodynamically stable with bradycardia in 50s, chest x-ray was done showed mild cardiomegaly with diffuse interstitial prominence compatible with interstitial lung disease.  Initial blood work-up was significant for mild anemia with mild kidney impairment (MIRIAM on the top of CKD) with mild mildly elevated troponin at 118.  EKG was done and showed first degree heart block with inverted T waves in inferior leads(which has not changed from the previous EKGs).  The patient was then admitted to medical floor on telemetry with continuous cardiac monitoring, cardiology was consulted, who recommended coronary angiogram in the setting of EKG changes and elevated troponin.  Orthostatic vitals were strongly positive, TSH and morning cortisol were within normal limits. Renin/Aldosterone still pending The patient was started on IV fluids for impaired kidney functions, however the patient developed volume overload with pulmonary edema associated with increased oxygen needs.  IV fluids were discontinued and started on IV Lasix.  Patient underwent left heart catheterization with coronary angiogram on 11/4/2020  which showed diffuse coronary artery disease, reduced LV ejection fraction and elevated left ventricular end-diastolic pressure and recommendation for medical therapy. Limited TTE was also ordered for assessment of EF which revealed normal LV ejection fraction. LVgram was also interpreted again by the cardiologist which showed normal LVEF.The patient responded very well to IV Lasix, and his oxygen decreased to his baseline (2L). EP cardiologist was also consulted who didn't believe his dizziness is related to bradycardia, and recommended ambulatory monitoring as outpatient. he was also started on midodrine 5 mg three times/day. The patient continued to feel better during his stay, his pressure were holding and pulses were within normal range.The patient was also seen and assessed by physical therapy, he was deemed at baseline with recommendation of the use of FFW.    Therefore, he is discharged in fair and stable condition to home with close outpatient follow-up.    The patient met 2-midnight criteria for an inpatient stay at the time of discharge.    Discharge Date  11/7/2020    FOLLOW UP ITEMS POST DISCHARGE  Outpatient follow up with PCP  Outpatient follow up with cardiology    DISCHARGE DIAGNOSES  Principal Problem:    Postural dizziness with presyncope POA: Yes  Active Problems:    Sinus bradycardia POA: Yes  Resolved Problems:    Acute respiratory failure with hypoxia (HCC) POA: Yes    MIRIAM (acute kidney injury) (HCC) POA: Unknown    Elevated troponin POA: Yes      FOLLOW UP  Future Appointments   Date Time Provider Department Center   11/11/2020  4:00 PM CARDIAC EVENT MONITOR-[facility] None   11/17/2020  9:50 AM Chyna Campuzano M.D. [facility] None   11/23/2020 12:40 PM HAZEL Diana [facility] None   12/4/2020 10:00 AM HAZEL Roberts [facility] None     Rashmi Cedeno P.A.-C.  [address] [facility] [id number]  [phone]    Go on 11/10/2020  Please go to PCP appointment on Tuesday, November 10 at 7:30 am.        MEDICATIONS ON DISCHARGE     Medication List      START taking these medications      Instructions   aspirin 81 MG EC tablet  Start taking on: November 8, 2020   Take 1 Tab by mouth every day.  Dose: 81 mg     atorvastatin 40 MG Tabs  Commonly known as: LIPITOR   Take 1 Tab by mouth every evening.  Dose: 40 mg     midodrine 5 MG Tabs  Commonly known as: PROAMATINE   Take 1 Tab by mouth 3 times a day, with meals.  Dose: 5 mg        CONTINUE taking these medications      Instructions   albuterol 108 (90 Base) MCG/ACT Aers inhalation aerosol   Inhale 2 Puffs by mouth every 6 hours as needed for Shortness of Breath.  Dose: 2 Puff     * Blood Glucose Meter Kit   Doctor's comments: Or per formulary preference.DMII  Test blood sugar as recommended by provider.blood glucose monitoring kit. BID     * Blood Glucose Test Strips   Doctor's comments: Or per formulary preference. DM II  One test strip BID     glipiZIDE 5 MG Tabs  Commonly known as: GLUCOTROL   Take 2.5 mg by mouth every morning.  Dose: 2.5 mg     metFORMIN 500 MG Tabs  Commonly known as: GLUCOPHAGE   Take 500 mg by mouth 2 Times a Day.  Dose: 500 mg     OneTouch Delica Plus [id number] Misc   Doctor's comments: **Patient requests 90 days supply**  USE TO TEST TWICE DAILY     SITagliptin 25 MG Tabs  Commonly known as: Januvia   Take 1 Tab by mouth every day.  Dose: 25 mg     Spiriva HandiHaler 18 MCG Caps  Generic drug: tiotropium   Inhale 1 Cap by mouth every day.  Dose: 18 mcg         * This list has 2 medication(s) that are the same as other medications prescribed for you. Read the directions carefully, and ask your doctor or other care provider to review them with you.            STOP taking these medications    meclizine 25 MG Tabs  Commonly known as: ANTIVERT            Allergies  No Known Allergies    DIET  Orders Placed This Encounter   Procedures   • Diet Order Diet: Consistent CHO (Diabetic)     Standing Status:   Standing     Number of Occurrences:    1     Order Specific Question:   Diet:     Answer:   Consistent CHO (Diabetic) [4]       ACTIVITY  As tolerated.  Weight bearing as tolerated    CONSULTATIONS  Cardiology    PROCEDURES  EC-ECHOCARDIOGRAM LTD W/O CONT   Final Result      DX-CHEST-PORTABLE (1 VIEW)   Final Result      Stable reticular and hazy opacification compatible with pulmonary edema and possible underlying interstitial lung disease      Stable small effusions      DX-CHEST-PORTABLE (1 VIEW)   Final Result      Diffuse interstitial prominence compatible with patient's history of interstitial lung disease.      Hazy bibasilar opacities may represent atelectasis or pneumonitis.      Blunting of the costophrenic angles may be related to pleural thickening or trace pleural fluid.      Mild cardiomegaly.      CL-LEFT HEART CATHETERIZATION WITH POSSIBLE INTERVENTION    (Results Pending)         LABORATORY  Lab Results   Component Value Date    SODIUM 140 11/05/2020    POTASSIUM 3.9 11/05/2020    CHLORIDE 103 11/05/2020    CO2 29 11/05/2020    GLUCOSE 137 (H) 11/05/2020    BUN 24 (H) 11/05/2020    CREATININE 1.16 11/05/2020        Lab Results   Component Value Date    WBC 10.1 11/06/2020    HEMOGLOBIN 13.9 (L) 11/06/2020    HEMATOCRIT 44.1 11/06/2020    PLATELETCT 243 11/06/2020        Total time of the discharge process exceeds 45 minutes.

## 2020-11-07 NOTE — DISCHARGE PLANNING
Anticipated Discharge Disposition: Home    Action: Patient is on service with [facility], however tank that is at bedside for patient to D/C with is empty. Called [facility] to bring a bunker tank up for patient from [facility]. They will deliver shortly.    Barriers to Discharge: O2 delivery.    Plan: D/C home.

## 2020-11-07 NOTE — DISCHARGE INSTRUCTIONS
Discharge Instructions    Discharged to home by car with relative. Discharged via wheelchair, hospital escort: Yes.  Special equipment needed: Walker    Be sure to schedule a follow-up appointment with your primary care doctor or any specialists as instructed.     Discharge Plan:   Diet Plan: Discussed  Activity Level: Discussed  Confirmed Follow up Appointment: Appointment Scheduled  Confirmed Symptoms Management: Discussed  Medication Reconciliation Updated: Yes  Influenza Vaccine Indication: Patient Refuses    I understand that a diet low in cholesterol, fat, and sodium is recommended for good health. Unless I have been given specific instructions below for another diet, I accept this instruction as my diet prescription.   Other diet: diabetic    Special Instructions: None    · Is patient discharged on Warfarin / Coumadin?   No     Depression / Suicide Risk    As you are discharged from this [facility] facility, it is important to learn how to keep safe from harming yourself.    Recognize the warning signs:  · Abrupt changes in personality, positive or negative- including increase in energy   · Giving away possessions  · Change in eating patterns- significant weight changes-  positive or negative  · Change in sleeping patterns- unable to sleep or sleeping all the time   · Unwillingness or inability to communicate  · Depression  · Unusual sadness, discouragement and loneliness  · Talk of wanting to die  · Neglect of personal appearance   · Rebelliousness- reckless behavior  · Withdrawal from people/activities they love  · Confusion- inability to concentrate     If you or a loved one observes any of these behaviors or has concerns about self-harm, here's what you can do:  · Talk about it- your feelings and reasons for harming yourself  · Remove any means that you might use to hurt yourself (examples: pills, rope, extension cords, firearm)  · Get professional help from the community (Mental Health, Substance  Abuse, psychological counseling)  · Do not be alone:Call your Safe Contact- someone whom you trust who will be there for you.  · Call your local CRISIS HOTLINE 846-3045 or 561-308-5234  · Call your local Children's Mobile Crisis Response Team [place] (930) 034-0740 or www.[employer]  · Call the toll free National Suicide Prevention Hotlines   · National Suicide Prevention Lifeline 396-395-BDPH (5220)  · [employer] Line Network 800-SUICIDE (571-8190)        Take Midodrine (Proamatine) three times a day and STOP taking Lisinopril!  Be sure to follow-up appointment with Electrophysiology Advanced Practice Nurse (EP APN) appointment to review ambulatory monitoring and follow-up with cardiology clinic for further cardiac care.    Discharge Plan:   Diet Plan: Discussed  Activity Level: Discussed  Confirmed Follow up Appointment: Patient to Call and Schedule Appointment  Confirmed Symptoms Management: Discussed  Medication Reconciliation Updated: Yes      Instrucciones de descarga    [place] a domicilio en coche con familiar. [place] en silla de eduin, escta hospitalaria: No.  Equipo especial necesario: no aplicable      ¡[place] Midodrine (Proamatine) jose roberto veces al día y DEJE de tristen Lisinopril!  Asegúrese de hacer shane mojgan de seguimiento con la enfermera de práctica avanzada de electrofisiología (EP APN) para revisar el monitoreo ambulatorio y el seguimiento con la clínica de cardiología para obtener más atención cardíaca.    Plan de miguelito:  Plan de dieta: discutido  Nivel de actividad: discutido  Mojgan de seguimiento confirmada: el paciente debe llamar y programar shane mojgan  Manejo de síntomas confirmados: discutido  Reconciliación de medicamentos actualizada: Sí        Hipotensión ortostática  Orthostatic Hypotension  La presión arterial es la medida de la fuerza de la joaquin al presionar contra las carlton de las arterias. La hipotensión ortostática es shane disminución repentina de la presión arterial que ocurre  al cambiar de posición rápidamente, lita cuando se pone de pie después de estar sentado o recostado.  Las arterias son los vasos sanguíneos que transportan la joaquin desde el corazón hacia todas las partes del cuerpo. Cuando la presión arterial es demasiado baja, puede ser que no llegue suficiente joaquin al cerebro o al violet de radha órganos. [place] puede causar debilidad, sensación de desvanecimiento, latidos cardíacos acelerados y desmayos. [place] puede durar unos pocos segundos o unos minutos. Por lo general, la hipotensión ortostática no es un problema grave. Sin embargo, si sucede con frecuencia o empeora, puede ser un signo de algo más grave.  ¿Cuáles son las causas?  Esta afección puede ser causada por lo siguiente:  · Cambios repentinos en la postura, por ejemplo, ponerse de pie rápidamente después de sage estado sentado o acostado.  · Pérdida de joaquin.  · Pérdida de líquidos corporales (deshidratación).  · Problemas cardíacos.  · Problemas hormonales (endocrinos).  · Embarazo.  · Infección grave.  · Falta de ciertos nutrientes.  · Reacciones alérgicas graves (anafilaxis).  · Ciertos medicamentos, lita medicamentos para la presión arterial o medicamentos que hacen que el cuerpo pierda el exceso de líquidos (diuréticos). En ocasiones, esta afección puede deberse a no maxx los medicamentos según las indicaciones, por ejemplo, maxx shane cantidad excesiva de un medicamento.  ¿Qué incrementa el riesgo?  Los siguientes factores pueden hacer que usted sea más propenso a tener esta afección:  · Edad. El riesgo aumenta a medida que envejece.  · Afecciones que afectan el corazón o el sistema nervioso central.  · Maxx ciertos medicamentos, lita medicamentos para la presión arterial o diuréticos.  · Estar embarazada.  ¿Cuáles son los signos o los síntomas?  Los síntomas de esta afección pueden incluir los siguientes:  · Debilidad.  · Desvanecimiento.  · Mareos.  · Visión borrosa.  · Fatiga.  · Latidos cardíacos  "rápidos.  · Desmayos, cuando los casos son graves.  ¿Cómo se diagnostica?  Esta afección se diagnostica en función de lo siguiente:  · Heide antecedentes médicos.  · Heide síntomas.  · La medición de la presión arterial. El médico le controlará la presión arterial cuando usted esté:  ? Acostado.  ? Sentado.  ? De pie.  La lectura de la presión arterial se registra con dos números, por ejemplo \"120 sobre 80\" (o 120/80). El primer número (\"superior\") es la presión sistólica. Es la medida de la presión de las arterias cuando el corazón late. El ajay número (\"inferior\") es la presión diastólica. Es la medida de la presión en las arterias cuando el corazón se relaja entre latidos. La presión arterial se mide en shane unidad llamada mm Hg. Shane presión arterial saludable para la mayoría de los adultos es de 120/80. Es posible que se le diagnostique hipotensión si parrish presión arterial está por debajo de 90/60.  Entre los estudios e información que pueden ayudar a diagnosticar la hipotensión ortostática se incluyen los siguientes:  · Heide otros signos vitales, por ejemplo la frecuencia cardíaca y la temperatura.  · Análisis de joaquin.  · Prueba de basculación. Para realizar la prueba, se lo sujeta de forma nash a shane mckee que lo moverá de shane posición acostada a shane posición vertical. Sascha la prueba, se controlarán el ritmo cardíaco y la presión arterial.  ¿Cómo se trata?  El tratamiento para esta afección puede incluir lo siguiente:  · Cambios en la dieta. Estos cambios implican comer con más sal (sodio) o tristen más agua.  · Medicamentos para elevar la presión arterial.  · Cambiar la dosis de ciertos medicamentos que franko y que podrían bajar parrish presión arterial.  · Usar medias de compresión. Estas medias ayudan a evitar la formación de coágulos de joaquin y a reducir la hinchazón de las piernas.  En algunos casos, puede ser necesario ir al hospital para:  · Reposición de líquidos. [place] implica que recibirá líquidos por shane " vía intravenosa.  · Reposición de joaquin. Ceci es un procedimiento en el cual recibirá joaquin de un donante a través de kandy vía intravenosa (transfusión).  · El tratamiento de kandy infección o problemas cardíacos, si corresponde.  · Control. Necesitará supervisión mientras desaparecen los efectos de los medicamentos que está tomando.  Siga estas indicaciones en parrish casa:  Comida y bebida    · Gricelda suficiente líquido lita para mantener la orina de color amarillo pálido.  · Mantenga kandy dieta saludable y siga las indicaciones del médico respecto de las restricciones para las comidas o las bebidas. Kandy dieta saludable incluye:  ? Frutas y verduras frescas.  ? Cereales integrales.  ? Louis magras.  ? Productos lácteos descremados.  · Consuma la sal adicional únicamente según las indicaciones. No añada dolly adicional a parrish dieta si no se lo indica el médico.  · Cheri comidas pequeñas y frecuentes.  · Evite ponerse de pie de repente después de comer.  Medicamentos  · [place] los medicamentos de venta diane y los recetados solamente lita se lo haya indicado el médico.  ? Siga las indicaciones del médico respecto del cambio de la dosis de radha medicamentos actuales, si corresponde.  ? No deje de tristen los medicamentos ni modifique la dosis por parrish cuenta.  Indicaciones generales    · Use medias de compresión lita se lo haya indicado parrish médico.  · Levántese despacio cuando esté acostado o sentado. [place] posibilitará que la presión arterial se adapte.  · Evite las duchas calientes o el calor excesivo según las indicaciones del médico.  · Reanude radha actividades normales según lo indicado por el médico. Pregúntele al médico qué actividades son seguras para usted.  · No consuma ningún producto que contenga nicotina o tabaco, lita cigarrillos, cigarrillos electrónicos y tabaco de mascar. Si necesita ayuda para dejar de consumir, consulte al médico.  · Concurra a todas las visitas de control lita se lo haya indicado el médico. [place] es  importante.  Comuníquese con un médico si:  · Vomita.  · Tiene diarrea.  · Tiene fiebre por más de 2 o 3 días.  · Tiene más sed que lo habitual.  · Se siente débil y cansado.  Solicite ayuda inmediatamente si:  · Siente dolor en el pecho.  · Tiene latidos cardíacos rápidos o irregulares.  · Siente adormecimiento en alguna parte del cuerpo.  · No puede  los brazos o las piernas.  · Tiene dificultad para hablar.  · Comienza a sudar o se siente mareado.  · Se desmaya.  · Le falta el aire.  · Tiene dificultad para mantenerse despierto.  · Se siente confundido.  Resumen  · La hipotensión ortostática es shane disminución repentina de la presión arterial que ocurre al cambiar de posición rápidamente.  · Por lo general, la hipotensión ortostática no es un problema grave.  · Se diagnostica midiéndole la presión arterial mientras está acostado o sentado y luego se pone de pie.  · Puede tratarse con un cambio en la alimentación o en los medicamentos que franko.  Esta información no tiene lita fin reemplazar el consejo del médico. Asegúrese de hacerle al médico cualquier pregunta que tenga.  Document Released: 09/27/2006 Document Revised: 08/01/2019 Document Reviewed: 08/01/2019  Elsevier Patient Education © 2020 Elsevier Inc.      Bradicardia  (Bradycardia)  Usted presenta shane frecuencia cardíaca lenta. [place] se denomina bradicardia. Sascha el estado de reposo, la frecuencia cardíaca normal es de 60 a 100 latidos por minuto. Shane frecuencia lenta puede causar debilidad, mareos, pérdida de la conciencia y falta de aire. Los síntomas empeoran al realizar actividades.   Entre las causas de la bradicardia se incluyen:  · Problemas cardíacos y tiroideos.   · Niveles de potasio elevados.   · Efectos secundarios de algunos medicamentos.   Los deportistas que realizan entrenamientos intensivos pueden llegar a niveles de frecuencia cardíaca buckley bajos lita de 42 latidos por minuto. Para la evaluación de la bradicardia se solicitará un  electrocardiograma, pruebas de joaquin y otros estudios del corazón.   La bradicardia originada en shane enfermedad cardíaca es un problema de gravedad. Si el sistema eléctrico del corazón está dañado, podrá ser necesaria la colocación de un marcapasos. Los betabloqueantes, la digoxina y otros medicamentos utilizados para controlar la presión arterial y el ritmo cardíaco también harán más lenta la frecuencia cardiaca. Si usted presenta bradicardia, deberán disminuirse las dosis o suspender estos medicamentos.   SOLICITE ATENCIÓN MÉDICA DE INMEDIATO SI:  · Se desmaya, se siente muy débil, le falta el aire o tiene fiebre.   · Siente dolor de pecho o abdominal intenso, vómitos a repetición o está deshidratado.   · Comienza a transpirar y se siente débil.   ESTÉ SEGURO QUE:   · Comprende las instrucciones para el miguelito médica.   · Controlará parrish enfermedad.   · Solicitará atención médica de inmediato según las indicaciones.   Document Released: 2006 Document Revised: 2013  ExitCare® Patient Information © [employer].    Mareos  Dizziness  Los mareos son un problema muy frecuente. [place] sensación de inestabilidad o de desvanecimiento. Puede sentir que se va a desmayar. Los mareos pueden provocarle shane lesión si se tropieza o se . La causa puede deberse a muchos problemas, tales lita los siguientes:  · Medicamentos.  · No tener suficiente agua en el cuerpo (deshidratación).  · Enfermedad.  Siga estas indicaciones en parrish casa:  Comida y bebida    · Lilliana suficiente líquido para mantener el pis (orina) karel o de color amarillo pálido. [place] liset la deshidratación. Trate de beber más líquidos transparentes, lita agua.  · No lilliana alcohol.  · Limite la cantidad de cafeína que debbie o come si el médico se lo indica.  · Limite la cantidad de sal (sodio) que debbie o come si el médico se lo indica.  Actividad    · Evite los movimientos rápidos.  ? Cuando se levante de shane silla, sujétese hasta sentirse dominique.  ? Por  la mañana, siéntese devaughn a un lado de la cama. Cuando se sienta dominique, póngase lentamente de pie mientras se sostiene de algo. Cheri esto hasta que se sienta seguro en cuanto al equilibrio.  · Mueva las piernas con frecuencia si debe estar de pie en un lugar olga mucho tiempo. Mientras esté de pie, contraiga y relaje los músculos de las piernas.  · No conduzca vehículos ni opere maquinaria pesada si se siente mareado.  · Evite agacharse si se siente mareado. En parrish casa, coloque los objetos en algún lugar que le resulte fácil alcanzarlos sin agacharse.  Estilo de ranjit  · No consuma ningún producto que contenga nicotina o tabaco, lita cigarrillos y cigarrillos electrónicos. Si necesita ayuda para dejar de fumar, consulte al médico.  · Intente bajar el nivel de estrés. Para hacerlo, puede usar métodos lita el yoga o la meditación. Hable con el médico si necesita ayuda.  Instrucciones generales  · Controle radha mareos para troy si hay cambios.  · [place] los medicamentos de venta diane y los recetados solamente lita se lo haya indicado el médico. Hable con el médico si fran que la causa de radha mareos es algún medicamento que está tomando.  · Infórmele a un amigo o a un familiar si se siente mareado. [place]ídale a esta persona que llame al médico si observa cambios en parrish comportamiento.  · Concurra a todas las visitas de control lita se lo haya indicado el médico. [place] es importante.  Comuníquese con un médico si:  · Los mareos persisten.  · Los mareos o la sensación de desvanecimiento empeoran.  · Siente malestar estomacal (náuseas).  · Tiene problemas para escuchar.  · Aparecen nuevos síntomas.  · Siente inestabilidad al estar de pie.  · Siente que la habitación da vueltas.  Solicite ayuda de inmediato si:  · Vomita o tiene heces acuosas (diarrea), y no puede comer o beber nada.  · Tiene dificultad para hacer lo siguiente:  ? Hablar.  ? Caminar.  ? Tragar.  ? Usar los brazos, las orestes o las piernas.  · Se siente  constantemente débil.  · No piensa con claridad o tiene dificultad para armar oraciones. Es posible que un amigo o un familiar adviertan que esto ocurre.  · Tiene los siguientes síntomas:  ? Dolor en el pecho.  ? Dolor en el vientre (abdomen).  ? Falta de aire.  ? Sudoración.  · Cambios en la visión.  · Sangrado.  · Dolor de cuauhtemoc muy intenso.  · Dolor o rigidez en el nick.  · Fiebre.  Estos síntomas pueden indicar shane emergencia. No espere hasta que los síntomas desaparezcan. Solicite atención médica de inmediato. Comuníquese con el servicio de emergencias de parrish localidad (911 en los [place]). No conduzca por radha propios medios hasta el hospital.  Resumen  · Los mareos causan sensación de inestabilidad o de desvanecimiento. Puede sentir que se va a desmayar.  · Gricelda suficiente líquido para mantener el pis (orina) karel o de color amarillo pálido. No gricelda alcohol.  · Evite los movimientos rápidos si se siente mareado.  · Controle radha mareos para troy si hay cambios.  Esta información no tiene lita fin reemplazar el consejo del médico. Asegúrese de hacerle al médico cualquier pregunta que tenga.  Document Released: 12/06/2012 Document Revised: 06/21/2018 Document Reviewed: 06/21/2018  Elsevier Patient Education © 2020 Elsevier Inc.      Arteriopatía coronaria en hombres  Coronary Artery Disease, Male  La arteriopatía coronaria (AC) es shane afección por la cual las arterias que llegan al corazón (arterias coronarias) se estrechan u obstruyen. El estrechamiento u obstrucción puede conducir a shane disminución del flujo sanguíneo al corazón. La reducción prolongada del flujo sanguíneo puede causar un ataque cardíaco (infarto de miocardio o IM). Esta afección también es conocida lita enfermedad cardíaca.  Debido a que la CA es la causa principal de muerte en los hombres, es importante entender lo que causa esta afección y el modo de tratarla.  ¿Cuáles son las causas?  La AC a menudo está causada por aterosclerosis.  Es decir, la acumulación de grasa y colesterol (placa) en la parte interna de las arterias. Con el paso del tiempo, la placa puede estrechar u obstruir la arteria, y de esta manera reducir el flujo sanguíneo hacia el corazón. La placa también puede debilitarse y desprenderse dentro de shane arteria coronaria y causar shane obstrucción repentina. Otras causas menos frecuentes de AC incluyen lo siguiente:  · Un coágulo de joaquin o un fragmento de un coágulo de joaquin u otra sustancia que obstruye el flujo de joaquin en shane arteria coronaria (embolia).  · Shane ruptura de la arteria (disección espontánea de arterias coronarias).  · Agrandamiento de shane arteria (aneurisma).  · Inflamación (vasculitis) en la pared de la arteria.  ¿Qué incrementa el riesgo?  Los siguientes factores pueden hacer que sea más propenso a desarrollar esta afección:  · Edad. Los hombres mayores de 45 años corren más riesgo de sufrir shane AC.  · Antecedentes familiares de arteriopatía coronaria.  · Sexo. A menudo, los hombres presentan AC antes que las mujeres.  · Presión arterial miguelito (hipertensión arterial).  · Diabetes.  · Niveles elevados de colesterol.  · Consumo de tabaco.  · Consumo excesivo de bebidas alcohólicas.  · Falta de actividad física.  · Shane dieta con alto contenido de grasas trans y saturadas, lita alimentos fritos y carne procesada.  Entre otros factores de riesgo posibles se incluyen los siguientes:  · Niveles elevados de estrés.  · Depresión.  · Obesidad.  · Apnea del sueño.  ¿Cuáles son los signos o los síntomas?  Muchas personas no presentan síntomas olga las etapas iniciales de la AC. A medida que la enfermedad avanza, los síntomas pueden incluir lo siguiente:  · Dolor en el pecho (angina). El dolor:  ? Puede ser un dolor contundente u opresivo, o lita shane sensación de opresión, presión, distensión o pesadez en el pecho.  ? Puede durar algunos minutos o puede detenerse y regresar. Tiende a agudizarse con el ejercicio o el  estrés y desaparece con el reposo.  · Dolor en los brazos, el nick, la mandíbula, el oído o la espalda.  · Acidez estomacal o indigestión sin causa aparente.  · Falta de aire.  · Náuseas o vómitos.  · Sensación repentina de desvanecimiento.  · Sudor frío repentino.  · Latidos cardíacos rápidos o liat un aleteo (palpitaciones).  ¿Cómo se diagnostica?  Esta afección se diagnostica en función de lo siguiente:  · Heide antecedentes médicos y familiares.  · Un examen físico.  · Otras pruebas que incluyen:  ? Un estudio para controlar las señales eléctricas en el corazón (electrocardiograma).  ? Ergometría. Busca signos de obstrucción cuando el corazón está realizando mucho esfuerzo debido al ejercicio, lita correr o caminar en la cinta.  ? Ergometría farmacológica. Se utiliza para detectar signos de obstrucción cuando el corazón se somete a esfuerzo mediante el uso de un medicamento.  ? Análisis de joaquin.  ? Angiograma coronario. En tia procedimiento se detecta si hay obstrucción en las arterias coronarias. Sascha tia estudio, se inyecta un tinte de contraste en las arterias para que se vean en la radiografía.  ? Exploración por tomografía computarizada (TC) de la arteria coronaria. Esta exploración por tomografía computarizada (TC) ayuda a detectar depósitos de calcio en las arterias coronarias. Los depósitos de calcio son un indicador de AC.  ? Un estudio que franko shane imagen del corazón mediante el uso de ondas sonoras (ecocardiograma).  ? Radiografía de tórax.  ¿Cómo se trata?  El tratamiento para esta afección puede incluir lo siguiente:  · Hacer modificaciones saludables en el estilo de ranjit para reducir los factores de riesgo.  · Medicamentos tales lita los siguientes:  ? Medicamentos antiplaquetarios y anticoagulantes, lita la aspirina. Estos ayudan a evitar la formación de coágulos sanguíneos.  ? Nitroglicerina.  ? Medicamentos para la presión arterial.  ? Medicamentos para bajar el  colesterol.  · Angioplastia coronaria y colocación de stents. Sascha tia procedimiento, se inserta un tubo hardin y flexible a través de un vaso sanguíneo hasta shane arteria obstruida. En el extremo del tubo se infla un balón o dispositivo similar para abrir la arteria. En algunos casos, se inserta un tubo pequeño de gordon (stent) dentro de la arteria para mantenerla abierta.  · Cirugía de revascularización de la arteria coronaria. Sascha esta cirugía, se usan venas y arterias de otras partes del cuerpo para crear un desvío (bypass) alrededor de la obstrucción y permitir que la joaquin llegue al corazón.  Siga estas instrucciones en parrish casa:  Medicamentos  · [place] los medicamentos de venta diane y los recetados solamente lita se lo haya indicado el médico.  · No tome los siguientes medicamentos a menos que el médico lo autorice:  ? Antiinflamatorios no esteroideos (TORREY), lita ibuprofeno, naproxeno o celecoxib.  ? Suplementos vitamínicos que contienen vitamina A, vitamina E o ambas.  Estilo de ranjit  · Siga un programa de actividad física aprobado por el médico. Trate de hacer 150 minutos de actividad física moderada o 75 minutos de actividad física intensa por semana.  · Mantenga un peso saludable o pierda peso según se lo haya indicado parrish médico.  · Aprenda a manejar el estrés o trate de limitarlo. [place] ayuda al médico si es necesario.  · Hágase estudios para detectar depresión y busque tratamiento, si fuera necesario.  · No consuma ningún producto que contenga nicotina o tabaco, lita cigarrillos, cigarrillos electrónicos y tabaco de mascar. Si necesita ayuda para dejar de fumar, consulte al médico.  · No consuma drogas ilegales.  Comida y bebida    · Siga shane dieta cardiosaludable. Un nutricionista puede enseñarle sobre opciones de alimentos saludables y los cambios correspondientes. En general, ingiera muchas frutas y verduras, saniya magras y cereales integrales.  · Evite los alimentos con alto contenido  de:  ? Azúcar.  ? Sal (sodio).  ? Grasa saturada, lita saniya procesadas o con grasa.  ? Grasa trans, lita alimentos fritos.  · Use métodos de cocción saludables, lita asar, grillar, hervir, hornear, escalfar, cocer al vapor o saltear.  · No lilliana alcohol si el médico se lo prohíbe.  · Si debbie alcohol:  ? Limite la cantidad que consume de 0 a 2 medidas por día.  ? Esté atento a la cantidad de alcohol que hay en las bebidas que franko. En los [place], shane medida equivale a shane botella de cerveza de 12 oz (355 ml), un vaso de vino de 5 oz (148 ml) o un vaso de shane bebida alcohólica de miguelito graduación de 1½ oz (44 ml).  Instrucciones generales  · Controle cualquier otra afección de la ivory, lita hipertensión y diabetes. Estas afecciones afectan el corazón.  · El médico puede pedirle que controle parrish presión arterial. En condiciones ideales, la presión arterial debe estar por debajo de 130/80.  · Concurra a todas las visitas de seguimiento lita se lo haya indicado el médico. [place] es importante.  Solicite ayuda inmediatamente si:  · Tiene dolor en el pecho, el nick, el oído, el brazo, la mandíbula, el estómago o la espalda que:  ? Dura más de unos minutos.  ? Es recurrente.  ? No se juan después de tristen medicamentos sublinguales (nitroglicerinasublingual).  · Tiene sudoraciones intensas sin causa.  · Tiene estos síntomas sin causa aparente:  ? Acidez estomacal o indigestión.  ? Falta de aire o dificultad para respirar.  ? Latidos cardíacos rápidos o lita un aleteo (palpitaciones).  ? Náuseas o vómitos.  ? Fatiga.  ? Nerviosismo o ansiedad.  ? Debilidad.  ? Diarrea.  · Mareos o sensación de desvanecimiento repentinos.  · Se desmaya.  · Quiere hacerse daño o piensa en quitarse la ranjit.  Estos síntomas pueden representar un problema grave que constituye shane emergencia. No espere a troy si los síntomas desaparecen. Solicite atención médica de inmediato. Comuníquese con el servicio de emergencias de parrish localidad (911  en los [place]). No conduzca por radha propios medios hasta el hospital.  Resumen  · La arteriopatía coronaria (AC) es shane afección por la cual las arterias que llegan al corazón (arterias coronarias) se estrechan u obstruyen. El estrechamiento u obstrucción puede conducir a un infarto de miocardio.  · Muchas personas no presentan síntomas olga las etapas iniciales de la AC.  · La AC se puede tratar con cambios en el estilo de ranjit, medicamentos, cirugía o shane combinación de estos.  Esta información no tiene lita fin reemplazar el consejo del médico. Asegúrese de hacerle al médico cualquier pregunta que tenga.  Document Released: 07/15/2015 Document Revised: 09/27/2019 Document Reviewed: 09/27/2019  [employer] Patient Education © 2020 [employer]      Actividad física con enfermedad cardíaca  Physical Activity With Heart Disease  Estar activo tiene muchos beneficios, especialmente si se tiene shane enfermedad cardíaca. La actividad física puede ayudarle a hacer más y sentirse más yousuf. Comience lentamente y aumente la cantidad de tiempo que pasa haciendo actividad física. La mayoría de los adultos debe apuntar a la actividad física que:  · Causa shane respiración más intensa y eleva la frecuencia cardíaca (actividad aeróbica). Trate de hacer por lo menos 150 minutos de actividad aeróbica por semana. Myra significa unos 30 minutos por día, 5 días por semana.  · Ayuda a desarrollar fuerza muscular (actividad de fortalecimiento). Hágala por lo menos 2 veces por semana.  Siempre consulte a parrish médico antes de comenzar cualquier programa nuevo de actividad o si tiene algún cambio en parrish afección.  ¿Cuáles son los beneficios de la actividad física?  Cuando se tiene shane enfermedad cardíaca, la actividad física puede ayudar a:  · Reducir la presión arterial.  · Disminuir el nivel de colesterol.  · Controlar parrish peso.  · Mejorar el sueño.  · Ayudar a controlar los niveles de azúcar en la joaquin.  · Mejorar la función  cardíaca y pulmonar.  · Reducir el riesgo de formación de coágulos de joaquin (tromboflebitis).  · Mejorar el nivel de energía.  · Reducir el estrés.  ¿Cuáles son algunos tipos de actividad física que podría probar?  Hay muchas formas de estar activo. Consulte al médico sobre qué tipos e intensidad de actividades son adecuados para usted.  Actividad aeróbica    La actividad aeróbica (cardiovascular) puede ser de intensidad moderada o vigorosa, según el esfuerzo que cheri.  La actividad de intensidad moderada incluye:  · Caminar.  · Andar en bicicleta lentamente.  · Gimnasia acuática.  · Bailar.  · Hacer trabajos de jardinería o tareas domésticas livianos.  La actividad de intensidad vigorosa incluye:  · Trotar o correr.  · Subir escaleras.  · Nadar.  · Caminar lisa arriba.  · Hacer trabajos de jardinería pesados, lita cavar zanjas.    Actividad de fortalecimiento  Las actividades de fortalecimiento hacen trabajar los músculos para desarrollar fuerza. Algunos ejemplos son los siguientes:  · Hacer flexiones de brazos, abdominales o lagartijas.  · Levantar pesas pequeñas.  · Usar bandas elásticas de resistencia.    Ejercicios de flexibilidad  Las actividades de flexibilidad alargan los músculos para mantenerlos flexibles y menos tensos y mejoran el equilibrio. Algunos ejemplos son los siguientes:  · Estiramiento.  · Yoga.  · Koby chi.  · Malissa de ballet.    Siga estas indicaciones en parrish casa:  Cómo comenzar  · [place]ley con el profesional que lo asiste sobre:  ? Qué tipos de actividades son seguras para usted.  ? Si debería controlarse el pulso o tristen otras precauciones olga la actividad física.  · Consiga un calendario. Cheri un cronograma y planifique parrish nueva rutina.  · Tómese tiempo para descubrir qué funciona para usted. Considere la posibilidad de:  ? Participar en un programa de la comunidad, lita un carmen de ciclismo, un gimnasio local, clases de yoga o membresía en shane piscina.  ? Manténgase activo por parrish  cuenta descargando en un teléfono inteligente u otros dispositivos aplicaciones gratuitas para entrenarse o comprando DVD de entrenamiento.  · Si no ha estado practicando ninguna actividad física, comience con sesiones que [place] entre 10 y 15 minutos. Aumente gradualmente hasta llegar a sesiones de 20 a 30 minutos, 5 veces por semana. Siga todas las recomendaciones de parrish médico.  · Sea paciente con usted mismo. Desarrollar fuerza y capacidad pulmonar lleva tiempo.  Seguridad  · Cheri ejercicio dentro de un establecimiento climatizado, lita se lo haya indicado el médico. Puede ser necesario que cheri esto si:  ? Afuera hay condiciones climáticas extremas, lita calor, humedad o frío.  ? Hay shane alerta de contaminación del aire. Las noticias locales, la junta de ivory o el hospital pueden brindarle información sobre la calidad del aire.  · Prentiss precauciones extra lita se lo haya indicado el médico. [place] puede incluir lo siguiente:  ? Controlar parrish frecuencia cardíaca.  ? Evitar levantar objetos pesados.  ? Entender cómo los medicamentos que franko pueden afectarlo olga la actividad física. Ciertos medicamentos pueden provocar intolerancia al calor y cambios en los niveles de azúcar en la joaquin.  ? Disminuir el ritmo y descansar cuando lo necesite.  ? Si tiene angina, lleve consigo los comprimidos y el aerosol de nitroglicerina en todo momento. Úselos lita se lo hayan indicado para prevenir y tratar los síntomas.  · Gricelda abundante agua antes, olga y después de realizar actividad física.  · Conozca los síntomas que pueden ser signos de un problema. Suspenda la actividad física de inmediato si tiene alguno de esos síntomas.  Obtenga ayuda de inmediato si tiene cualquiera de estos síntomas al hacer ejercicio:  · Dolor en el pecho, dificultad para respirar o se siente muy cansado.  · Dolor en el brazo, el hombro, el nick o la mandíbula.  · Se siente débil, mareado o tiene sensación de desvanecimiento.  · Parrish frecuencia  cardíaca es irregular o mayor a 100 latidos por minuto (lpm), antes de hacer ejercicio.  Estos síntomas pueden representar un problema grave que constituye shane emergencia. No espere hasta que los síntomas desaparezcan. Solicite atención médica de inmediato. Comuníquese con el servicio de emergencias de parrish localidad (911 en los [place]). No conduzca por radha propios medios hasta el hospital.   Resumen  · La actividad física tiene muchos beneficios, especialmente si se tiene shane enfermedad cardíaca.  · Antes de comenzar un programa de actividad física, consulte a parrish médico sobre la frecuencia con la que debería hacer actividad y qué tipo de actividad es seguro para usted.  · Parrish plan de actividad física puede incluir actividad aeróbica moderada o vigorosa, actividades de fortalecimiento y flexibilidad.  · Conozca los síntomas que pueden ser signos de un problema. Suspenda la actividad física inmediatamente y comuníquese con los servicios de emergencias (911 en los [place] si tiene alguno de esos síntomas.  Esta información no tiene lita fin reemplazar el consejo del médico. Asegúrese de hacerle al médico cualquier pregunta que tenga.  Document Released: 07/15/2015 Document Revised: 02/07/2019 Document Reviewed: 02/07/2019  Elsevier Patient Education © 2020 [employer]

## 2020-11-07 NOTE — PROGRESS NOTES
Pt discharged with medication education, follow-up care education. IV removed. Monitor room notified. Charge notified. Transported to home with son and belongings (new full oxygen tank and walker).

## 2020-11-08 LAB — ALDOST SERPL-MCNC: <3 NG/DL

## 2020-11-09 ENCOUNTER — PATIENT OUTREACH (OUTPATIENT)
Dept: HEALTH INFORMATION MANAGEMENT | Facility: OTHER | Age: 70
End: 2020-11-09

## 2020-11-09 ENCOUNTER — TELEPHONE (OUTPATIENT)
Dept: CARDIOLOGY | Facility: MEDICAL CENTER | Age: 70
End: 2020-11-09

## 2020-11-09 NOTE — PROGRESS NOTES
Community Health Worker Follow-Up     • Contact information provided to Chase Harris   • Has PCP appointment scheduled for: Tuesday, November 17   • Outpatient assessment completed  • Did the patient receive medications post discharge: Yes    CHW Darwin reached out to pt via TC to f/u after d/c. Spoke with pt's son Marty who indicated that his father was doing okay but was experiencing headaches/dizziness yesterday. However, he denied speaking with CCM RN for now but was given CCM number if he changed his mind. He said his father has all of his medications, no side-effects so far. Per Marty, pt's f/u appt was rescheduled by PCP office for November 17 which he will be taking his father to. He mentioned not needing additional resources/services at this time but will reach out to CHW if that changes.     Plan: D/c due to all goals met 11/9.

## 2020-11-09 NOTE — TELEPHONE ENCOUNTER
----- Message from Jv Ramos M.D. sent at 11/6/2020  3:21 PM PST -----  Inpatient currently scheduled with me in 2 weeks but this needs to be cancelled as it will not be enough time to review any ambulatory monitoring.    I discussed with ED - she will see pt in ~4 wks. Can see one of the EP MDs after that if we need to discuss PPM. So let's cancel appt with me and make one with ED.  Thanks    MC

## 2020-11-10 LAB — RENIN PLAS-CCNC: 0.4 NG/ML/HR

## 2020-11-11 ENCOUNTER — NON-PROVIDER VISIT (OUTPATIENT)
Dept: CARDIOLOGY | Facility: MEDICAL CENTER | Age: 70
End: 2020-11-11
Payer: MEDICARE

## 2020-11-11 ENCOUNTER — TELEPHONE (OUTPATIENT)
Dept: CARDIOLOGY | Facility: MEDICAL CENTER | Age: 70
End: 2020-11-11

## 2020-11-11 DIAGNOSIS — I47.29 NSVT (NONSUSTAINED VENTRICULAR TACHYCARDIA) (HCC): ICD-10-CM

## 2020-11-11 DIAGNOSIS — R00.1 SINUS BRADYCARDIA: ICD-10-CM

## 2020-11-11 PROCEDURE — 93268 ECG RECORD/REVIEW: CPT | Performed by: INTERNAL MEDICINE

## 2020-11-12 NOTE — TELEPHONE ENCOUNTER
Patient enrolled in the 28 day [employer] [facility] Heart monitoring program per Lana Woodward, APRN  >In office hookup with [facility] transmitted.  >Pending EOS.

## 2020-11-16 ENCOUNTER — TELEPHONE (OUTPATIENT)
Dept: SLEEP MEDICINE | Facility: MEDICAL CENTER | Age: 70
End: 2020-11-16

## 2020-11-16 NOTE — TELEPHONE ENCOUNTER
Patient is scheduled to see Dr. Campuzano 11/17/2020. Per last OV 6/24/2020 completed Cpft, 6mw, Hrct. No testing have been completed. I call number on file son Laisha answered states patient has not completed testing and request patient be seen tomorrow.

## 2020-11-17 ENCOUNTER — OFFICE VISIT (OUTPATIENT)
Dept: SLEEP MEDICINE | Facility: MEDICAL CENTER | Age: 70
End: 2020-11-17
Payer: MEDICARE

## 2020-11-17 VITALS
OXYGEN SATURATION: 99 % | HEART RATE: 69 BPM | SYSTOLIC BLOOD PRESSURE: 112 MMHG | TEMPERATURE: 98.1 F | DIASTOLIC BLOOD PRESSURE: 60 MMHG | RESPIRATION RATE: 16 BRPM

## 2020-11-17 DIAGNOSIS — J84.9 INTERSTITIAL LUNG DISEASE (HCC): ICD-10-CM

## 2020-11-17 DIAGNOSIS — R55 SYNCOPE, UNSPECIFIED SYNCOPE TYPE: ICD-10-CM

## 2020-11-17 DIAGNOSIS — J96.11 CHRONIC RESPIRATORY FAILURE WITH HYPOXIA (HCC): ICD-10-CM

## 2020-11-17 PROCEDURE — 99213 OFFICE O/P EST LOW 20 MIN: CPT | Performed by: INTERNAL MEDICINE

## 2020-11-17 ASSESSMENT — ENCOUNTER SYMPTOMS
SINUS PAIN: 0
DIZZINESS: 0
FALLS: 0
PND: 0
WEAKNESS: 0
ABDOMINAL PAIN: 0
EYE PAIN: 0
HEMOPTYSIS: 0
PHOTOPHOBIA: 0
CLAUDICATION: 0
CONSTIPATION: 0
TREMORS: 0
WEIGHT LOSS: 0
FEVER: 0
NECK PAIN: 0
PALPITATIONS: 0
HEARTBURN: 0
STRIDOR: 0
BACK PAIN: 0
MYALGIAS: 0
NAUSEA: 0
EYE REDNESS: 0
VOMITING: 0
SPUTUM PRODUCTION: 0
FOCAL WEAKNESS: 0
SHORTNESS OF BREATH: 1
WHEEZING: 0
CHILLS: 0
ORTHOPNEA: 0
SPEECH CHANGE: 0
DOUBLE VISION: 0
DIARRHEA: 0
HEADACHES: 0
BLURRED VISION: 0
EYE DISCHARGE: 0
SORE THROAT: 0
DIAPHORESIS: 0
DEPRESSION: 0
COUGH: 1

## 2020-11-17 NOTE — PROGRESS NOTES
Chief Complaint   Patient presents with   • Follow-Up     ild last seen 6/24/2020         HPI: This patient is a 70 y.o. male whom is followed in our clinic for ILD complicated by chronic hypoxic respiratory failure last seen by me on 6/24/20.  The patient's past medical history significant for type 2 diabetes previously requiring insulin therapy, hypertension currently well controlled, peptic ulcer disease status post severe GI bleed roughly 1 year ago in [place], nephrolithiasis.  The patient is a former tobacco user with roughly 40-pack-year history and quit 25 years ago.  He has no known allergies.  Family history is negative for any heart or lung disease. The pt is retired from work as a . He is from [place] and has been living in [place] for the past year but does travel back to [place] where he stays for 3 to 4 months at a time.  No exposure to asbestos.  No history of working on a farm.  No pet birds.He was referred to me after and ED admission for SOB and dizziness. He was found to have markedly elevated BP and troponin. CTA showed b/l, peripheral reticulation with associated traction bronchiectasis and fibrotic changes. A CT chest from [place] done in 8/2019 revealed that these findings were likely chronic. He was treated at that time with inhaled therapies for COPD. He was requiring 2LPM of supplemental O2 at all times. I was most suspicious for IPF and screening for CTD during his hospital stay was negative. We had planned to obtain HRCT, full PFTs and 6 MWT to qualify him for pulmonary rehab however he did not schedule theses and was admitted earlier this month with syncope with orthostasis, reduced LVEF and diffuse CAD for which he was managed medically and started on midodrine. He had a limited TTE with normal LV fx and TTE from May showed mild PH with RVSP of 40 mmHg. The pt main concern is lightheadedness upon standing but no associated CP. He does get SOB with activity and currently they  increase his O2 to 5L when this happens. No significant cough.     Past Medical History:   Diagnosis Date   • Diabetes (HCC)    • Pulmonary fibrosis (HCC)     wears 2.5L O2 at home        Social History     Socioeconomic History   • Marital status: Single     Spouse name: Not on file   • Number of children: Not on file   • Years of education: Not on file   • Highest education level: Not on file   Occupational History   • Not on file   Social Needs   • Financial resource strain: Hard   • Food insecurity     Worry: Often true     Inability: Often true   • Transportation needs     Medical: No     Non-medical: No   Tobacco Use   • Smoking status: Former Smoker     Packs/day: 2.00     Years: 40.00     Pack years: 80.00     Types: Cigarettes     Quit date:      Years since quittin.8   • Smokeless tobacco: Never Used   Substance and Sexual Activity   • Alcohol use: Yes     Frequency: Monthly or less   • Drug use: Never   • Sexual activity: Not on file   Lifestyle   • Physical activity     Days per week: Not on file     Minutes per session: Not on file   • Stress: Not on file   Relationships   • Social connections     Talks on phone: Not on file     Gets together: Not on file     Attends Protestant service: Not on file     Active member of club or organization: Not on file     Attends meetings of clubs or organizations: Not on file     Relationship status: Not on file   • Intimate partner violence     Fear of current or ex partner: Not on file     Emotionally abused: Not on file     Physically abused: Not on file     Forced sexual activity: Not on file   Other Topics Concern   • Not on file   Social History Narrative   • Not on file       History reviewed. No pertinent family history.    Current Outpatient Medications on File Prior to Visit   Medication Sig Dispense Refill   • albuterol 108 (90 Base) MCG/ACT Aero Soln inhalation aerosol Inhale 2 Puffs by mouth every 6 hours as needed for Shortness of Breath. 8.5  g 0   • aspirin 81 MG EC tablet Take 1 Tab by mouth every day. 30 Tab 3   • atorvastatin (LIPITOR) 40 MG Tab Take 1 Tab by mouth every evening. 30 Tab 3   • midodrine (PROAMATINE) 5 MG Tab Take 1 Tab by mouth 3 times a day, with meals. 60 Tab 3   • glipiZIDE (GLUCOTROL) 5 MG Tab Take 2.5 mg by mouth every morning.     • Lancets (ONETOUCH DELICA PLUS [id number]) Misc USE TO TEST TWICE DAILY 180 Each 3   • Blood Glucose Meter Kit Test blood sugar as recommended by provider.blood glucose monitoring kit. BID 1 Kit 0   • Blood Glucose Test Strips One test strip  Strip 3   • metFORMIN (GLUCOPHAGE) 500 MG Tab Take 500 mg by mouth 2 Times a Day.     • SITagliptin (JANUVIA) 25 MG Tab Take 1 Tab by mouth every day. 30 Tab 3   • tiotropium (SPIRIVA HANDIHALER) 18 MCG Cap Inhale 1 Cap by mouth every day. 30 Cap 3     No current facility-administered medications on file prior to visit.        Patient has no known allergies.      ROS:   Review of Systems   Constitutional: Negative for chills, diaphoresis, fever, malaise/fatigue and weight loss.   HENT: Negative for congestion, ear discharge, ear pain, hearing loss, nosebleeds, sinus pain, sore throat and tinnitus.    Eyes: Negative for blurred vision, double vision, photophobia, pain, discharge and redness.   Respiratory: Positive for cough and shortness of breath. Negative for hemoptysis, sputum production, wheezing and stridor.    Cardiovascular: Negative for chest pain, palpitations, orthopnea, claudication, leg swelling and PND.   Gastrointestinal: Negative for abdominal pain, constipation, diarrhea, heartburn, nausea and vomiting.   Genitourinary: Negative for dysuria and urgency.   Musculoskeletal: Negative for back pain, falls, joint pain, myalgias and neck pain.   Skin: Negative for itching and rash.   Neurological: Negative for dizziness, tremors, speech change, focal weakness, weakness and headaches.   Endo/Heme/Allergies: Negative for environmental allergies.    Psychiatric/Behavioral: Negative for depression.       /60 (BP Location: Right arm, Patient Position: Sitting, BP Cuff Size: Adult)   Pulse 69   Temp 36.7 °C (98.1 °F) (Temporal)   Resp 16   SpO2 99%   Physical Exam  Vitals signs reviewed.   Constitutional:       General: He is not in acute distress.     Appearance: Normal appearance. He is normal weight.   HENT:      Head: Normocephalic and atraumatic.      Right Ear: External ear normal.      Left Ear: External ear normal.      Nose: Nose normal. No congestion.      Mouth/Throat:      Mouth: Mucous membranes are moist.      Pharynx: Oropharynx is clear. No oropharyngeal exudate.   Eyes:      General: No scleral icterus.     Extraocular Movements: Extraocular movements intact.      Conjunctiva/sclera: Conjunctivae normal.      Pupils: Pupils are equal, round, and reactive to light.   Neck:      Musculoskeletal: Normal range of motion and neck supple.   Cardiovascular:      Rate and Rhythm: Normal rate and regular rhythm.      Pulses: Normal pulses.      Heart sounds: Normal heart sounds. No murmur. No gallop.    Pulmonary:      Effort: Pulmonary effort is normal. No respiratory distress.      Breath sounds: No wheezing.      Comments: Early, dry inspiratory crackles of 50% bilateral posterior lung fields  Abdominal:      General: There is no distension.      Palpations: Abdomen is soft.   Musculoskeletal: Normal range of motion.      Right lower leg: No edema.      Left lower leg: No edema.   Skin:     General: Skin is warm and dry.      Findings: No rash.   Neurological:      Mental Status: He is alert and oriented to person, place, and time.      Cranial Nerves: No cranial nerve deficit.   Psychiatric:         Mood and Affect: Mood normal.         Behavior: Behavior normal.         PFTs as reviewed by me personally: None    Imagaing as reviewed by me personally: As per HPI    Assessment:  1. Interstitial lung disease (HCC)     2. Chronic respiratory  failure with hypoxia (HCC)     3. Syncope, unspecified syncope type         Plan:  1. This pt has what appear to be IPF and likely chronic. As per my last note, cannot r/o exposure related lung disease.  I suspect he has needed O2 for some time.  CTD screening negative. I will have pt schedule PFTs with 6 MWT today and gave number to schedule HRCT. Pending results, we can try and get him enrolled in pulmonary rehab and consider antifibrotic therapy although given several recent admissions for syncope, I am more concerned about the side effect profile of these medications. We will continue supplemental O2 at 2LPM and up to 5L with activity. I did stress the importance of checking SaO2 as he may be developing worsening PH contributing to syncope if hypoxia not adequately treated.   2. See above. Likely chronic. 2LPM at all times and up to 5LPM with activity.  3. Recurrent and ? Related to CHF with reduced LVEF. He does have TTE e/o of mild pulmonary hypertension but also L sided heart failure. F/U PCP and cardiology.  Return in about 3 months (around 2/17/2021) for PFTs, 6 MWT and CT chest.

## 2020-11-24 ENCOUNTER — HOSPITAL ENCOUNTER (OUTPATIENT)
Dept: RADIOLOGY | Facility: MEDICAL CENTER | Age: 70
End: 2020-11-24
Attending: INTERNAL MEDICINE
Payer: MEDICARE

## 2020-11-24 DIAGNOSIS — J84.9 ILD (INTERSTITIAL LUNG DISEASE) (HCC): ICD-10-CM

## 2020-11-24 PROCEDURE — 71250 CT THORAX DX C-: CPT

## 2020-12-04 ENCOUNTER — OFFICE VISIT (OUTPATIENT)
Dept: CARDIOLOGY | Facility: MEDICAL CENTER | Age: 70
End: 2020-12-04
Payer: MEDICARE

## 2020-12-04 VITALS
DIASTOLIC BLOOD PRESSURE: 56 MMHG | WEIGHT: 132 LBS | HEART RATE: 62 BPM | SYSTOLIC BLOOD PRESSURE: 114 MMHG | OXYGEN SATURATION: 100 % | BODY MASS INDEX: 23.39 KG/M2 | HEIGHT: 63 IN

## 2020-12-04 DIAGNOSIS — I10 HTN (HYPERTENSION), MALIGNANT: ICD-10-CM

## 2020-12-04 DIAGNOSIS — I49.8 JUNCTIONAL RHYTHM: ICD-10-CM

## 2020-12-04 DIAGNOSIS — I25.10 CORONARY ARTERY DISEASE INVOLVING NATIVE CORONARY ARTERY OF NATIVE HEART WITHOUT ANGINA PECTORIS: ICD-10-CM

## 2020-12-04 DIAGNOSIS — J44.9 CHRONIC OBSTRUCTIVE PULMONARY DISEASE, UNSPECIFIED COPD TYPE (HCC): ICD-10-CM

## 2020-12-04 DIAGNOSIS — R00.1 SINUS BRADYCARDIA: ICD-10-CM

## 2020-12-04 DIAGNOSIS — E11.65 TYPE 2 DIABETES MELLITUS WITH HYPERGLYCEMIA, WITHOUT LONG-TERM CURRENT USE OF INSULIN (HCC): ICD-10-CM

## 2020-12-04 DIAGNOSIS — J84.9 INTERSTITIAL LUNG DISEASE (HCC): ICD-10-CM

## 2020-12-04 PROCEDURE — 99214 OFFICE O/P EST MOD 30 MIN: CPT | Performed by: NURSE PRACTITIONER

## 2020-12-04 ASSESSMENT — ENCOUNTER SYMPTOMS
ABDOMINAL PAIN: 0
SHORTNESS OF BREATH: 1
COUGH: 0
MYALGIAS: 0
FEVER: 0
PALPITATIONS: 0
ORTHOPNEA: 0
DIZZINESS: 1
PND: 0
CLAUDICATION: 0

## 2020-12-04 ASSESSMENT — FIBROSIS 4 INDEX: FIB4 SCORE: 1.17

## 2020-12-04 NOTE — PROGRESS NOTES
"Chief Complaint   Patient presents with   • HTN (Controlled)     follow up       Subjective:   Chase Harris is a 70 y.o. male who presents today for follow-up after his recent hospitalization with his son, Juan Jose.    Patient of Dr. Hopper.  He was last seen in clinic on 6/3/2020.  Patient was ruled out as having heart failure.    Patient had a recent hospitalization from 11/3/2020-11/7/2020. Pt presented with syncopal episode.  Patient was found to be bradycardic, chest x-ray showed mild cardiomegaly and diffuse interstitial prominence compatible with internal tissue lung disease.  Blood work showed mild anemia MIRIAM/CKD.  Troponin was elevated.  Cardiology was consulted and recommended coronary angiogram.  Orthostatic blood pressures were positive, TSH and cortisol levels were normal.  Renin/aldosterone were normal.  Patient was started on midodrine.  EP was consulted for bradycardia/junctional rhythm, and patient was recommended to have an event monitor.    Patient is wearing his event monitor at this time.    For his symptoms, he continues to have some mild shortness of breath, using oxygen at 3 L.  He also reports dizziness in the mornings.  He denies chest pain, palpitations, orthopnea, PND or edema. He was instructed to increase oxygen to 5L with exertion.    Home BPs in the 120s, systolic, but can be lower in the mornings.    Additonally, patient has the following medical problems:    -Interstitial lung disease: Followed by pulmonary, does have a follow-up appointment and testing early next year    -Hypertension    -CKD, stage III    -Diabetes    Past Medical History:   Diagnosis Date   • Diabetes (HCC)    • Pulmonary fibrosis (HCC)     wears 2.5L O2 at home 24/7     Past Surgical History:   Procedure Laterality Date   • OTHER      Kidney stones   • OTHER ABDOMINAL SURGERY      \"from an ulcer that burst\"     History reviewed. No pertinent family history.  Social History     Socioeconomic History   • Marital " status: Single     Spouse name: Not on file   • Number of children: Not on file   • Years of education: Not on file   • Highest education level: Not on file   Occupational History   • Not on file   Social Needs   • Financial resource strain: Hard   • Food insecurity     Worry: Often true     Inability: Often true   • Transportation needs     Medical: No     Non-medical: No   Tobacco Use   • Smoking status: Former Smoker     Packs/day: 2.00     Years: 40.00     Pack years: 80.00     Types: Cigarettes     Quit date:      Years since quittin.9   • Smokeless tobacco: Never Used   Substance and Sexual Activity   • Alcohol use: Yes     Frequency: Monthly or less   • Drug use: Never   • Sexual activity: Not on file   Lifestyle   • Physical activity     Days per week: Not on file     Minutes per session: Not on file   • Stress: Not on file   Relationships   • Social connections     Talks on phone: Not on file     Gets together: Not on file     Attends Latter day service: Not on file     Active member of club or organization: Not on file     Attends meetings of clubs or organizations: Not on file     Relationship status: Not on file   • Intimate partner violence     Fear of current or ex partner: Not on file     Emotionally abused: Not on file     Physically abused: Not on file     Forced sexual activity: Not on file   Other Topics Concern   • Not on file   Social History Narrative   • Not on file     No Known Allergies  Outpatient Encounter Medications as of 2020   Medication Sig Dispense Refill   • aspirin 81 MG EC tablet Take 1 Tab by mouth every day. 30 Tab 3   • atorvastatin (LIPITOR) 40 MG Tab Take 1 Tab by mouth every evening. 30 Tab 3   • midodrine (PROAMATINE) 5 MG Tab Take 1 Tab by mouth 3 times a day, with meals. 60 Tab 3   • glipiZIDE (GLUCOTROL) 5 MG Tab Take 2.5 mg by mouth every morning.     • metFORMIN (GLUCOPHAGE) 500 MG Tab Take 500 mg by mouth 2 Times a Day.     • SITagliptin (JANUVIA) 25 MG  "Tab Take 1 Tab by mouth every day. 30 Tab 3   • tiotropium (SPIRIVA HANDIHALER) 18 MCG Cap Inhale 1 Cap by mouth every day. 30 Cap 3   • albuterol 108 (90 Base) MCG/ACT Aero Soln inhalation aerosol Inhale 2 Puffs by mouth every 6 hours as needed for Shortness of Breath. 8.5 g 0   • Lancets (ONETOUCH DELICA PLUS [id number]) Misc USE TO TEST TWICE DAILY 180 Each 3   • Blood Glucose Meter Kit Test blood sugar as recommended by provider.blood glucose monitoring kit. BID 1 Kit 0   • Blood Glucose Test Strips One test strip  Strip 3     No facility-administered encounter medications on file as of 12/4/2020.      Review of Systems   Constitutional: Negative for fever and malaise/fatigue.   Respiratory: Positive for shortness of breath. Negative for cough.    Cardiovascular: Negative for chest pain, palpitations, orthopnea, claudication, leg swelling and PND.   Gastrointestinal: Negative for abdominal pain.   Musculoskeletal: Negative for myalgias.   Neurological: Positive for dizziness (in mornings after waking ).   All other systems reviewed and are negative.       Objective:   /56 (BP Location: Left arm, Patient Position: Sitting)   Pulse 62   Ht 1.6 m (5' 3\")   Wt 59.9 kg (132 lb)   SpO2 100%   BMI 23.38 kg/m²     Physical Exam   Constitutional: He is oriented to person, place, and time. He appears well-developed and well-nourished.   HENT:   Head: Normocephalic and atraumatic.   Eyes: Pupils are equal, round, and reactive to light. EOM are normal.   Neck: Normal range of motion. Neck supple. No JVD present.   Cardiovascular: Normal rate, regular rhythm and normal heart sounds.   Pulmonary/Chest: Effort normal and breath sounds normal. No respiratory distress. He has no wheezes. He has no rales.   Inspiratory crackles    Abdominal: Soft. Bowel sounds are normal.   Musculoskeletal:         General: No edema.   Neurological: He is alert and oriented to person, place, and time.   Skin: Skin is warm and " dry.   Psychiatric: He has a normal mood and affect. His behavior is normal.   Vitals reviewed.    Lab Results   Component Value Date/Time    CHOLSTRLTOT 129 11/05/2020 03:21 AM    LDL 74 11/05/2020 03:21 AM    HDL 32 (A) 11/05/2020 03:21 AM    TRIGLYCERIDE 113 11/05/2020 03:21 AM       Lab Results   Component Value Date/Time    SODIUM 140 11/05/2020 03:21 AM    POTASSIUM 3.9 11/05/2020 03:21 AM    CHLORIDE 103 11/05/2020 03:21 AM    CO2 29 11/05/2020 03:21 AM    GLUCOSE 137 (H) 11/05/2020 03:21 AM    BUN 24 (H) 11/05/2020 03:21 AM    CREATININE 1.16 11/05/2020 03:21 AM     Lab Results   Component Value Date/Time    ALKPHOSPHAT 58 11/04/2020 04:01 AM    ASTSGOT 24 11/04/2020 04:01 AM    ALTSGPT 36 11/04/2020 04:01 AM    TBILIRUBIN 0.6 11/04/2020 04:01 AM      Transthoracic Echo Report 5/23/2020  Left ventricular ejection fraction is visually estimated to be 55%.   Grade I diastolic dysfunction.  The right ventricle was normal in size and function.  Estimated right ventricular systolic pressure  is 40 mmHg, consistent   with mild pulmonary hypertension.  Estimated right atrial pressure is 3 mmHg.  Normal pericardium without effusion.     No prior study is available for comparison.       Myocardial Perfusion Report 5/27/2020   NUCLEAR IMAGING INTERPRETATION   No evidence of significant jeopardized viable myocardium or prior myocardial infarction.   Normal left ventricular size, ejection fraction, and wall motion.   ECG INTERPRETATION   Negative stress ECG for ischemia.    Coronary angiogram 11/04/2020     REFERRING PHYSICIAN:  Erica Gaytan MD     PROCEDURES:  1. Left heart catheterization.  2. Coronary angiography.  3. Left ventriculogram.  4. Monitored conscious sedation.     PREPROCEDURE DIAGNOSIS:  Syncope.     POSTPROCEDURE DIAGNOSES:  1. Diffuse coronary artery disease.  2. Reduced left ventricular systolic function with ejection fraction of 30%,   diaphragmatic hypokinesis.  3. Elevated left ventricular  end-diastolic pressure     Transthoracic Echo Report 11/5/2020  Prior echo 5/23/20, no changes  Left ventricular ejection fraction is visually estimated to be 70%.  Normal regional wall motion.    Assessment:     1. Coronary artery disease involving native coronary artery of native heart without angina pectoris     2. Sinus bradycardia     3. Junctional rhythm     4. Interstitial lung disease (HCC)     5. HTN (hypertension), malignant     6. Chronic obstructive pulmonary disease, unspecified COPD type (HCC)     7. Type 2 diabetes mellitus with hyperglycemia, without long-term current use of insulin ([facility])         Medical Decision Making:  Today's Assessment / Status / Plan:   1. CAD, diffuse disease seen on recent angiogram:  -Continue aspirin 81 mg daily  -Continue atorvastatin 40 mg daily  -Normal EF on echo and LV gram reinterpreted as normal LVEF    2.  Bradycardia/junctional rhythm:  -Currently wearing bio tel  -Has follow-up with EP in the next 1 to 2 weeks  -So far no further problems at home with syncope    3.  Orthostatic hypotension: Hx HTN  -not on any Antihypertensives.   -TSH and cortisol levels were normal.  Renin/aldosterone were normal.  -Continue midodrine 5 mg 3 times a day at this time we will look at decreasing at next visit    4. Diabetes:  -Continue current regimen, followed by PCP  -Consider SGLT2 inhibitor for cardiovascular risk reductions in the future.     5. Interstitial Lung disease:  -Continue follow up and testing with Pulmonary.     FU in clinic in 2 weeks with EP and back with Dr. Hopper in 8 weeks. Sooner if needed.    Patient verbalizes understanding and agrees with the plan of care.     PLEASE NOTE: This Note was created using voice recognition Software. I have made every reasonable attempt to correct obvious errors, but I expect that there are errors of grammar and possibly content that I did not discover before finalizing the note

## 2020-12-17 ENCOUNTER — TELEPHONE (OUTPATIENT)
Dept: CARDIOLOGY | Facility: MEDICAL CENTER | Age: 70
End: 2020-12-17

## 2020-12-18 NOTE — TELEPHONE ENCOUNTER
Prelim report overall looks ok without sig bradycardia or evidence of high grade heart block.  Events appear to be during nocturnal hours.  No significant daytime findings.   Please route strips to MD for interpretation per [facility] policy.    Thanks  mathieu

## 2021-01-01 ENCOUNTER — PATIENT OUTREACH (OUTPATIENT)
Dept: HEALTH INFORMATION MANAGEMENT | Facility: OTHER | Age: 71
End: 2021-01-01

## 2021-01-01 ENCOUNTER — OFFICE VISIT (OUTPATIENT)
Dept: CARDIOLOGY | Facility: MEDICAL CENTER | Age: 71
End: 2021-01-01
Payer: MEDICARE

## 2021-01-01 ENCOUNTER — OFFICE VISIT (OUTPATIENT)
Dept: SLEEP MEDICINE | Facility: MEDICAL CENTER | Age: 71
End: 2021-01-01
Payer: MEDICARE

## 2021-01-01 ENCOUNTER — HOSPITAL ENCOUNTER (EMERGENCY)
Facility: MEDICAL CENTER | Age: 71
End: 2021-12-01
Attending: EMERGENCY MEDICINE
Payer: MEDICARE

## 2021-01-01 ENCOUNTER — APPOINTMENT (OUTPATIENT)
Dept: CARDIOLOGY | Facility: MEDICAL CENTER | Age: 71
DRG: 281 | End: 2021-01-01
Attending: STUDENT IN AN ORGANIZED HEALTH CARE EDUCATION/TRAINING PROGRAM
Payer: MEDICARE

## 2021-01-01 ENCOUNTER — IMMUNIZATION (OUTPATIENT)
Dept: FAMILY PLANNING/WOMEN'S HEALTH CLINIC | Facility: IMMUNIZATION CENTER | Age: 71
End: 2021-01-01
Attending: INTERNAL MEDICINE
Payer: MEDICARE

## 2021-01-01 ENCOUNTER — APPOINTMENT (OUTPATIENT)
Dept: CARDIOLOGY | Facility: MEDICAL CENTER | Age: 71
End: 2021-01-01
Attending: INTERNAL MEDICINE
Payer: MEDICARE

## 2021-01-01 ENCOUNTER — DOCUMENTATION (OUTPATIENT)
Dept: VASCULAR LAB | Facility: MEDICAL CENTER | Age: 71
End: 2021-01-01

## 2021-01-01 ENCOUNTER — HOME STUDY (OUTPATIENT)
Dept: SLEEP MEDICINE | Facility: MEDICAL CENTER | Age: 71
End: 2021-01-01
Attending: INTERNAL MEDICINE
Payer: MEDICARE

## 2021-01-01 ENCOUNTER — APPOINTMENT (OUTPATIENT)
Dept: RADIOLOGY | Facility: MEDICAL CENTER | Age: 71
End: 2021-01-01
Attending: EMERGENCY MEDICINE
Payer: MEDICARE

## 2021-01-01 ENCOUNTER — NON-PROVIDER VISIT (OUTPATIENT)
Dept: CARDIOLOGY | Facility: MEDICAL CENTER | Age: 71
End: 2021-01-01
Payer: MEDICARE

## 2021-01-01 ENCOUNTER — HOSPITAL ENCOUNTER (INPATIENT)
Facility: MEDICAL CENTER | Age: 71
LOS: 1 days | DRG: 189 | End: 2021-01-26
Attending: EMERGENCY MEDICINE | Admitting: STUDENT IN AN ORGANIZED HEALTH CARE EDUCATION/TRAINING PROGRAM
Payer: MEDICARE

## 2021-01-01 ENCOUNTER — HOSPITAL ENCOUNTER (INPATIENT)
Facility: MEDICAL CENTER | Age: 71
LOS: 2 days | DRG: 281 | End: 2021-06-29
Attending: EMERGENCY MEDICINE | Admitting: HOSPITALIST
Payer: MEDICARE

## 2021-01-01 ENCOUNTER — APPOINTMENT (OUTPATIENT)
Dept: CARDIOLOGY | Facility: MEDICAL CENTER | Age: 71
End: 2021-01-01
Attending: NURSE PRACTITIONER
Payer: MEDICARE

## 2021-01-01 ENCOUNTER — APPOINTMENT (OUTPATIENT)
Dept: CARDIOLOGY | Facility: MEDICAL CENTER | Age: 71
End: 2021-01-01
Attending: HOSPITALIST
Payer: MEDICARE

## 2021-01-01 ENCOUNTER — NON-PROVIDER VISIT (OUTPATIENT)
Dept: SLEEP MEDICINE | Facility: MEDICAL CENTER | Age: 71
End: 2021-01-01
Payer: MEDICARE

## 2021-01-01 ENCOUNTER — APPOINTMENT (OUTPATIENT)
Dept: RADIOLOGY | Facility: MEDICAL CENTER | Age: 71
End: 2021-01-01
Attending: INTERNAL MEDICINE
Payer: MEDICARE

## 2021-01-01 ENCOUNTER — HOSPITAL ENCOUNTER (OUTPATIENT)
Facility: MEDICAL CENTER | Age: 71
End: 2021-01-22
Attending: EMERGENCY MEDICINE | Admitting: HOSPITALIST
Payer: MEDICARE

## 2021-01-01 ENCOUNTER — APPOINTMENT (OUTPATIENT)
Dept: RADIOLOGY | Facility: MEDICAL CENTER | Age: 71
DRG: 189 | End: 2021-01-01
Attending: EMERGENCY MEDICINE
Payer: MEDICARE

## 2021-01-01 ENCOUNTER — TELEPHONE (OUTPATIENT)
Dept: VASCULAR LAB | Facility: MEDICAL CENTER | Age: 71
End: 2021-01-01

## 2021-01-01 ENCOUNTER — APPOINTMENT (OUTPATIENT)
Dept: RADIOLOGY | Facility: MEDICAL CENTER | Age: 71
DRG: 189 | End: 2021-01-01
Attending: STUDENT IN AN ORGANIZED HEALTH CARE EDUCATION/TRAINING PROGRAM
Payer: MEDICARE

## 2021-01-01 ENCOUNTER — APPOINTMENT (OUTPATIENT)
Dept: RADIOLOGY | Facility: MEDICAL CENTER | Age: 71
DRG: 281 | End: 2021-01-01
Attending: EMERGENCY MEDICINE
Payer: MEDICARE

## 2021-01-01 ENCOUNTER — TELEMEDICINE (OUTPATIENT)
Dept: CARDIOLOGY | Facility: MEDICAL CENTER | Age: 71
End: 2021-01-01
Payer: MEDICARE

## 2021-01-01 ENCOUNTER — APPOINTMENT (OUTPATIENT)
Dept: CARDIOLOGY | Facility: MEDICAL CENTER | Age: 71
DRG: 281 | End: 2021-01-01
Attending: HOSPITALIST
Payer: MEDICARE

## 2021-01-01 VITALS
WEIGHT: 132.28 LBS | BODY MASS INDEX: 22.58 KG/M2 | TEMPERATURE: 97.5 F | OXYGEN SATURATION: 96 % | HEIGHT: 64 IN | HEART RATE: 72 BPM | DIASTOLIC BLOOD PRESSURE: 84 MMHG | RESPIRATION RATE: 16 BRPM | SYSTOLIC BLOOD PRESSURE: 128 MMHG

## 2021-01-01 VITALS
HEIGHT: 62 IN | TEMPERATURE: 98.8 F | WEIGHT: 124.34 LBS | SYSTOLIC BLOOD PRESSURE: 170 MMHG | OXYGEN SATURATION: 100 % | BODY MASS INDEX: 22.88 KG/M2 | DIASTOLIC BLOOD PRESSURE: 77 MMHG | RESPIRATION RATE: 18 BRPM | HEART RATE: 60 BPM

## 2021-01-01 VITALS — BODY MASS INDEX: 24.11 KG/M2 | HEIGHT: 62 IN | WEIGHT: 131 LBS

## 2021-01-01 VITALS
OXYGEN SATURATION: 99 % | BODY MASS INDEX: 23.32 KG/M2 | WEIGHT: 131.6 LBS | RESPIRATION RATE: 16 BRPM | HEIGHT: 63 IN | HEART RATE: 64 BPM | SYSTOLIC BLOOD PRESSURE: 128 MMHG | DIASTOLIC BLOOD PRESSURE: 80 MMHG

## 2021-01-01 VITALS
WEIGHT: 130.95 LBS | BODY MASS INDEX: 23.2 KG/M2 | SYSTOLIC BLOOD PRESSURE: 89 MMHG | HEIGHT: 63 IN | DIASTOLIC BLOOD PRESSURE: 45 MMHG | RESPIRATION RATE: 18 BRPM | OXYGEN SATURATION: 96 % | HEART RATE: 72 BPM | TEMPERATURE: 98.5 F

## 2021-01-01 VITALS
DIASTOLIC BLOOD PRESSURE: 67 MMHG | HEIGHT: 64 IN | OXYGEN SATURATION: 97 % | RESPIRATION RATE: 17 BRPM | TEMPERATURE: 97.8 F | SYSTOLIC BLOOD PRESSURE: 147 MMHG | BODY MASS INDEX: 21.79 KG/M2 | HEART RATE: 66 BPM | WEIGHT: 127.65 LBS

## 2021-01-01 VITALS — WEIGHT: 131 LBS | BODY MASS INDEX: 24.35 KG/M2

## 2021-01-01 VITALS
OXYGEN SATURATION: 100 % | DIASTOLIC BLOOD PRESSURE: 58 MMHG | WEIGHT: 130 LBS | SYSTOLIC BLOOD PRESSURE: 106 MMHG | HEART RATE: 64 BPM | HEIGHT: 62 IN | BODY MASS INDEX: 23.92 KG/M2

## 2021-01-01 VITALS
DIASTOLIC BLOOD PRESSURE: 54 MMHG | OXYGEN SATURATION: 99 % | BODY MASS INDEX: 22.89 KG/M2 | WEIGHT: 124.4 LBS | SYSTOLIC BLOOD PRESSURE: 88 MMHG | HEIGHT: 62 IN | HEART RATE: 74 BPM | RESPIRATION RATE: 12 BRPM

## 2021-01-01 VITALS
WEIGHT: 120 LBS | OXYGEN SATURATION: 100 % | SYSTOLIC BLOOD PRESSURE: 139 MMHG | DIASTOLIC BLOOD PRESSURE: 63 MMHG | HEART RATE: 64 BPM | HEIGHT: 62 IN | BODY MASS INDEX: 22.08 KG/M2

## 2021-01-01 VITALS
DIASTOLIC BLOOD PRESSURE: 63 MMHG | SYSTOLIC BLOOD PRESSURE: 122 MMHG | WEIGHT: 132.2 LBS | BODY MASS INDEX: 24.18 KG/M2 | HEART RATE: 59 BPM

## 2021-01-01 DIAGNOSIS — J96.21 ACUTE ON CHRONIC RESPIRATORY FAILURE WITH HYPOXIA (HCC): ICD-10-CM

## 2021-01-01 DIAGNOSIS — E11.00 TYPE 2 DIABETES MELLITUS WITH HYPEROSMOLARITY WITHOUT COMA, WITHOUT LONG-TERM CURRENT USE OF INSULIN (HCC): ICD-10-CM

## 2021-01-01 DIAGNOSIS — J96.11 CHRONIC RESPIRATORY FAILURE WITH HYPOXIA (HCC): ICD-10-CM

## 2021-01-01 DIAGNOSIS — I25.119 CORONARY ARTERY DISEASE INVOLVING NATIVE CORONARY ARTERY OF NATIVE HEART WITH ANGINA PECTORIS (HCC): ICD-10-CM

## 2021-01-01 DIAGNOSIS — R79.89 ELEVATED TROPONIN: ICD-10-CM

## 2021-01-01 DIAGNOSIS — I10 HTN (HYPERTENSION), MALIGNANT: ICD-10-CM

## 2021-01-01 DIAGNOSIS — I25.10 CORONARY ARTERY DISEASE INVOLVING NATIVE CORONARY ARTERY OF NATIVE HEART WITHOUT ANGINA PECTORIS: ICD-10-CM

## 2021-01-01 DIAGNOSIS — J84.10 PULMONARY FIBROSIS (HCC): ICD-10-CM

## 2021-01-01 DIAGNOSIS — R55 POSTURAL DIZZINESS WITH PRESYNCOPE: ICD-10-CM

## 2021-01-01 DIAGNOSIS — R42 POSTURAL DIZZINESS WITH PRESYNCOPE: ICD-10-CM

## 2021-01-01 DIAGNOSIS — R55 SYNCOPE, UNSPECIFIED SYNCOPE TYPE: ICD-10-CM

## 2021-01-01 DIAGNOSIS — R73.9 HYPERGLYCEMIA: ICD-10-CM

## 2021-01-01 DIAGNOSIS — N17.9 ACUTE KIDNEY INJURY (HCC): ICD-10-CM

## 2021-01-01 DIAGNOSIS — D64.9 NORMOCYTIC ANEMIA: ICD-10-CM

## 2021-01-01 DIAGNOSIS — E11.65 TYPE 2 DIABETES MELLITUS WITH HYPERGLYCEMIA, WITHOUT LONG-TERM CURRENT USE OF INSULIN (HCC): ICD-10-CM

## 2021-01-01 DIAGNOSIS — I70.213 ATHEROSCLEROSIS OF NATIVE ARTERY OF BOTH LOWER EXTREMITIES WITH INTERMITTENT CLAUDICATION (HCC): ICD-10-CM

## 2021-01-01 DIAGNOSIS — J84.9 INTERSTITIAL LUNG DISEASE (HCC): ICD-10-CM

## 2021-01-01 DIAGNOSIS — Z87.09 HISTORY OF PULMONARY FIBROSIS: ICD-10-CM

## 2021-01-01 DIAGNOSIS — R91.8 OTHER NONSPECIFIC ABNORMAL FINDING OF LUNG FIELD: ICD-10-CM

## 2021-01-01 DIAGNOSIS — Z23 NEED FOR VACCINATION: ICD-10-CM

## 2021-01-01 DIAGNOSIS — R41.82 ALTERED MENTAL STATUS, UNSPECIFIED ALTERED MENTAL STATUS TYPE: ICD-10-CM

## 2021-01-01 DIAGNOSIS — Z23 ENCOUNTER FOR VACCINATION: Primary | ICD-10-CM

## 2021-01-01 DIAGNOSIS — S29.012A STRAIN OF THORACIC SPINE: ICD-10-CM

## 2021-01-01 DIAGNOSIS — I21.4 NSTEMI (NON-ST ELEVATED MYOCARDIAL INFARCTION) (HCC): ICD-10-CM

## 2021-01-01 DIAGNOSIS — Z79.899 HIGH RISK MEDICATION USE: ICD-10-CM

## 2021-01-01 DIAGNOSIS — R93.89 ABNORMAL CHEST X-RAY: ICD-10-CM

## 2021-01-01 DIAGNOSIS — J84.9 ILD (INTERSTITIAL LUNG DISEASE) (HCC): ICD-10-CM

## 2021-01-01 DIAGNOSIS — R06.09 DYSPNEA ON EXERTION: ICD-10-CM

## 2021-01-01 DIAGNOSIS — R42 DIZZINESS: ICD-10-CM

## 2021-01-01 DIAGNOSIS — R40.4 TRANSIENT ALTERATION OF AWARENESS: ICD-10-CM

## 2021-01-01 DIAGNOSIS — N18.30 STAGE 3 CHRONIC KIDNEY DISEASE, UNSPECIFIED WHETHER STAGE 3A OR 3B CKD: ICD-10-CM

## 2021-01-01 DIAGNOSIS — E11.65 UNCONTROLLED TYPE 2 DIABETES MELLITUS WITH HYPERGLYCEMIA (HCC): ICD-10-CM

## 2021-01-01 LAB
ABO + RH BLD: NORMAL
ABO GROUP BLD: NORMAL
ALBUMIN SERPL BCP-MCNC: 3.1 G/DL (ref 3.2–4.9)
ALBUMIN SERPL BCP-MCNC: 3.4 G/DL (ref 3.2–4.9)
ALBUMIN SERPL BCP-MCNC: 3.4 G/DL (ref 3.2–4.9)
ALBUMIN SERPL BCP-MCNC: 3.8 G/DL (ref 3.2–4.9)
ALBUMIN SERPL BCP-MCNC: 3.8 G/DL (ref 3.2–4.9)
ALBUMIN SERPL BCP-MCNC: 4.1 G/DL (ref 3.2–4.9)
ALBUMIN/GLOB SERPL: 0.9 G/DL
ALBUMIN/GLOB SERPL: 1 G/DL
ALBUMIN/GLOB SERPL: 1 G/DL
ALBUMIN/GLOB SERPL: 1.2 G/DL
ALP SERPL-CCNC: 62 U/L (ref 30–99)
ALP SERPL-CCNC: 66 U/L (ref 30–99)
ALP SERPL-CCNC: 68 U/L (ref 30–99)
ALP SERPL-CCNC: 77 U/L (ref 30–99)
ALP SERPL-CCNC: 88 U/L (ref 30–99)
ALP SERPL-CCNC: 99 U/L (ref 30–99)
ALT SERPL-CCNC: 20 U/L (ref 2–50)
ALT SERPL-CCNC: 26 U/L (ref 2–50)
ALT SERPL-CCNC: 26 U/L (ref 2–50)
ALT SERPL-CCNC: 33 U/L (ref 2–50)
ALT SERPL-CCNC: 43 U/L (ref 2–50)
ALT SERPL-CCNC: 45 U/L (ref 2–50)
ANION GAP SERPL CALC-SCNC: 10 MMOL/L (ref 7–16)
ANION GAP SERPL CALC-SCNC: 11 MMOL/L (ref 7–16)
ANION GAP SERPL CALC-SCNC: 12 MMOL/L (ref 7–16)
ANION GAP SERPL CALC-SCNC: 13 MMOL/L (ref 7–16)
ANION GAP SERPL CALC-SCNC: 14 MMOL/L (ref 7–16)
ANION GAP SERPL CALC-SCNC: 4 MMOL/L (ref 7–16)
ANION GAP SERPL CALC-SCNC: 9 MMOL/L (ref 7–16)
APTT PPP: 114.9 SEC (ref 24.7–36)
APTT PPP: 35.2 SEC (ref 24.7–36)
APTT PPP: 35.8 SEC (ref 24.7–36)
AST SERPL-CCNC: 22 U/L (ref 12–45)
AST SERPL-CCNC: 25 U/L (ref 12–45)
AST SERPL-CCNC: 29 U/L (ref 12–45)
AST SERPL-CCNC: 30 U/L (ref 12–45)
AST SERPL-CCNC: 41 U/L (ref 12–45)
AST SERPL-CCNC: 55 U/L (ref 12–45)
BACTERIA BLD CULT: NORMAL
BACTERIA BLD CULT: NORMAL
BASOPHILS # BLD AUTO: 0.5 % (ref 0–1.8)
BASOPHILS # BLD AUTO: 0.8 % (ref 0–1.8)
BASOPHILS # BLD AUTO: 0.9 % (ref 0–1.8)
BASOPHILS # BLD AUTO: 1.3 % (ref 0–1.8)
BASOPHILS # BLD: 0.06 K/UL (ref 0–0.12)
BASOPHILS # BLD: 0.08 K/UL (ref 0–0.12)
BASOPHILS # BLD: 0.09 K/UL (ref 0–0.12)
BASOPHILS # BLD: 0.09 K/UL (ref 0–0.12)
BILIRUB SERPL-MCNC: 0.4 MG/DL (ref 0.1–1.5)
BILIRUB SERPL-MCNC: 0.4 MG/DL (ref 0.1–1.5)
BILIRUB SERPL-MCNC: 0.5 MG/DL (ref 0.1–1.5)
BLD GP AB SCN SERPL QL: NORMAL
BUN SERPL-MCNC: 19 MG/DL (ref 8–22)
BUN SERPL-MCNC: 20 MG/DL (ref 8–22)
BUN SERPL-MCNC: 20 MG/DL (ref 8–22)
BUN SERPL-MCNC: 23 MG/DL (ref 8–22)
BUN SERPL-MCNC: 23 MG/DL (ref 8–22)
BUN SERPL-MCNC: 24 MG/DL (ref 8–22)
BUN SERPL-MCNC: 24 MG/DL (ref 8–22)
BUN SERPL-MCNC: 25 MG/DL (ref 8–22)
BUN SERPL-MCNC: 25 MG/DL (ref 8–22)
BUN SERPL-MCNC: 30 MG/DL (ref 8–22)
BUN SERPL-MCNC: 37 MG/DL (ref 8–22)
C PNEUM DNA SPEC QL NAA+PROBE: NOT DETECTED
CALCIUM SERPL-MCNC: 8 MG/DL (ref 8.5–10.5)
CALCIUM SERPL-MCNC: 8.6 MG/DL (ref 8.5–10.5)
CALCIUM SERPL-MCNC: 8.7 MG/DL (ref 8.5–10.5)
CALCIUM SERPL-MCNC: 8.8 MG/DL (ref 8.5–10.5)
CALCIUM SERPL-MCNC: 8.9 MG/DL (ref 8.5–10.5)
CALCIUM SERPL-MCNC: 9 MG/DL (ref 8.4–10.2)
CALCIUM SERPL-MCNC: 9.1 MG/DL (ref 8.5–10.5)
CALCIUM SERPL-MCNC: 9.3 MG/DL (ref 8.5–10.5)
CALCIUM SERPL-MCNC: 9.8 MG/DL (ref 8.5–10.5)
CHLORIDE SERPL-SCNC: 100 MMOL/L (ref 96–112)
CHLORIDE SERPL-SCNC: 102 MMOL/L (ref 96–112)
CHLORIDE SERPL-SCNC: 102 MMOL/L (ref 96–112)
CHLORIDE SERPL-SCNC: 103 MMOL/L (ref 96–112)
CHLORIDE SERPL-SCNC: 103 MMOL/L (ref 96–112)
CHLORIDE SERPL-SCNC: 105 MMOL/L (ref 96–112)
CHLORIDE SERPL-SCNC: 96 MMOL/L (ref 96–112)
CHLORIDE SERPL-SCNC: 97 MMOL/L (ref 96–112)
CHLORIDE SERPL-SCNC: 98 MMOL/L (ref 96–112)
CHLORIDE SERPL-SCNC: 98 MMOL/L (ref 96–112)
CHLORIDE SERPL-SCNC: 99 MMOL/L (ref 96–112)
CHOLEST SERPL-MCNC: 87 MG/DL (ref 100–199)
CO2 SERPL-SCNC: 19 MMOL/L (ref 20–33)
CO2 SERPL-SCNC: 23 MMOL/L (ref 20–33)
CO2 SERPL-SCNC: 24 MMOL/L (ref 20–33)
CO2 SERPL-SCNC: 25 MMOL/L (ref 20–33)
CO2 SERPL-SCNC: 26 MMOL/L (ref 20–33)
CO2 SERPL-SCNC: 27 MMOL/L (ref 20–33)
CO2 SERPL-SCNC: 27 MMOL/L (ref 20–33)
CO2 SERPL-SCNC: 28 MMOL/L (ref 20–33)
CO2 SERPL-SCNC: 30 MMOL/L (ref 20–33)
CORTIS SERPL-MCNC: 21.9 UG/DL (ref 0–23)
CREAT SERPL-MCNC: 0.8 MG/DL (ref 0.5–1.4)
CREAT SERPL-MCNC: 0.96 MG/DL (ref 0.5–1.4)
CREAT SERPL-MCNC: 1.04 MG/DL (ref 0.5–1.4)
CREAT SERPL-MCNC: 1.07 MG/DL (ref 0.5–1.4)
CREAT SERPL-MCNC: 1.07 MG/DL (ref 0.5–1.4)
CREAT SERPL-MCNC: 1.09 MG/DL (ref 0.5–1.4)
CREAT SERPL-MCNC: 1.11 MG/DL (ref 0.5–1.4)
CREAT SERPL-MCNC: 1.12 MG/DL (ref 0.5–1.4)
CREAT SERPL-MCNC: 1.14 MG/DL (ref 0.5–1.4)
CREAT SERPL-MCNC: 1.23 MG/DL (ref 0.5–1.4)
CREAT SERPL-MCNC: 1.72 MG/DL (ref 0.5–1.4)
EKG IMPRESSION: NORMAL
EOSINOPHIL # BLD AUTO: 0.14 K/UL (ref 0–0.51)
EOSINOPHIL # BLD AUTO: 0.2 K/UL (ref 0–0.51)
EOSINOPHIL # BLD AUTO: 0.25 K/UL (ref 0–0.51)
EOSINOPHIL # BLD AUTO: 0.26 K/UL (ref 0–0.51)
EOSINOPHIL # BLD AUTO: 0.28 K/UL (ref 0–0.51)
EOSINOPHIL # BLD AUTO: 0.34 K/UL (ref 0–0.51)
EOSINOPHIL NFR BLD: 1.2 % (ref 0–6.9)
EOSINOPHIL NFR BLD: 2.3 % (ref 0–6.9)
EOSINOPHIL NFR BLD: 2.4 % (ref 0–6.9)
EOSINOPHIL NFR BLD: 3 % (ref 0–6.9)
EOSINOPHIL NFR BLD: 3.6 % (ref 0–6.9)
EOSINOPHIL NFR BLD: 4 % (ref 0–6.9)
ERYTHROCYTE [DISTWIDTH] IN BLOOD BY AUTOMATED COUNT: 46.5 FL (ref 35.9–50)
ERYTHROCYTE [DISTWIDTH] IN BLOOD BY AUTOMATED COUNT: 47 FL (ref 35.9–50)
ERYTHROCYTE [DISTWIDTH] IN BLOOD BY AUTOMATED COUNT: 47.4 FL (ref 35.9–50)
ERYTHROCYTE [DISTWIDTH] IN BLOOD BY AUTOMATED COUNT: 48.6 FL (ref 35.9–50)
ERYTHROCYTE [DISTWIDTH] IN BLOOD BY AUTOMATED COUNT: 49.7 FL (ref 35.9–50)
ERYTHROCYTE [DISTWIDTH] IN BLOOD BY AUTOMATED COUNT: 50.3 FL (ref 35.9–50)
ERYTHROCYTE [DISTWIDTH] IN BLOOD BY AUTOMATED COUNT: 50.3 FL (ref 35.9–50)
ERYTHROCYTE [DISTWIDTH] IN BLOOD BY AUTOMATED COUNT: 50.6 FL (ref 35.9–50)
ERYTHROCYTE [DISTWIDTH] IN BLOOD BY AUTOMATED COUNT: 51.9 FL (ref 35.9–50)
ERYTHROCYTE [DISTWIDTH] IN BLOOD BY AUTOMATED COUNT: 52.5 FL (ref 35.9–50)
ERYTHROCYTE [DISTWIDTH] IN BLOOD BY AUTOMATED COUNT: 53.1 FL (ref 35.9–50)
EST. AVERAGE GLUCOSE BLD GHB EST-MCNC: 148 MG/DL
FLUAV H1 2009 PAND RNA SPEC QL NAA+PROBE: NOT DETECTED
FLUAV H1 RNA SPEC QL NAA+PROBE: NOT DETECTED
FLUAV H3 RNA SPEC QL NAA+PROBE: NOT DETECTED
FLUAV RNA SPEC QL NAA+PROBE: NEGATIVE
FLUAV RNA SPEC QL NAA+PROBE: NEGATIVE
FLUAV RNA SPEC QL NAA+PROBE: NOT DETECTED
FLUBV RNA SPEC QL NAA+PROBE: NEGATIVE
FLUBV RNA SPEC QL NAA+PROBE: NEGATIVE
FLUBV RNA SPEC QL NAA+PROBE: NOT DETECTED
GLOBULIN SER CALC-MCNC: 3.3 G/DL (ref 1.9–3.5)
GLOBULIN SER CALC-MCNC: 3.4 G/DL (ref 1.9–3.5)
GLOBULIN SER CALC-MCNC: 3.8 G/DL (ref 1.9–3.5)
GLOBULIN SER CALC-MCNC: 3.9 G/DL (ref 1.9–3.5)
GLOBULIN SER CALC-MCNC: 3.9 G/DL (ref 1.9–3.5)
GLOBULIN SER CALC-MCNC: 4 G/DL (ref 1.9–3.5)
GLUCOSE BLD-MCNC: 110 MG/DL (ref 65–99)
GLUCOSE BLD-MCNC: 112 MG/DL (ref 65–99)
GLUCOSE BLD-MCNC: 117 MG/DL (ref 65–99)
GLUCOSE BLD-MCNC: 119 MG/DL (ref 65–99)
GLUCOSE BLD-MCNC: 128 MG/DL (ref 65–99)
GLUCOSE BLD-MCNC: 130 MG/DL (ref 65–99)
GLUCOSE BLD-MCNC: 130 MG/DL (ref 65–99)
GLUCOSE BLD-MCNC: 133 MG/DL (ref 65–99)
GLUCOSE BLD-MCNC: 137 MG/DL (ref 65–99)
GLUCOSE BLD-MCNC: 142 MG/DL (ref 65–99)
GLUCOSE BLD-MCNC: 143 MG/DL (ref 65–99)
GLUCOSE BLD-MCNC: 147 MG/DL (ref 65–99)
GLUCOSE BLD-MCNC: 165 MG/DL (ref 65–99)
GLUCOSE BLD-MCNC: 183 MG/DL (ref 65–99)
GLUCOSE BLD-MCNC: 184 MG/DL (ref 65–99)
GLUCOSE BLD-MCNC: 194 MG/DL (ref 65–99)
GLUCOSE BLD-MCNC: 222 MG/DL (ref 65–99)
GLUCOSE BLD-MCNC: 226 MG/DL (ref 65–99)
GLUCOSE BLD-MCNC: 245 MG/DL (ref 65–99)
GLUCOSE BLD-MCNC: 282 MG/DL (ref 65–99)
GLUCOSE BLD-MCNC: 287 MG/DL (ref 65–99)
GLUCOSE BLD-MCNC: 290 MG/DL (ref 65–99)
GLUCOSE BLD-MCNC: 82 MG/DL (ref 65–99)
GLUCOSE SERPL-MCNC: 121 MG/DL (ref 65–99)
GLUCOSE SERPL-MCNC: 126 MG/DL (ref 65–99)
GLUCOSE SERPL-MCNC: 144 MG/DL (ref 65–99)
GLUCOSE SERPL-MCNC: 144 MG/DL (ref 65–99)
GLUCOSE SERPL-MCNC: 155 MG/DL (ref 65–99)
GLUCOSE SERPL-MCNC: 161 MG/DL (ref 65–99)
GLUCOSE SERPL-MCNC: 182 MG/DL (ref 65–99)
GLUCOSE SERPL-MCNC: 184 MG/DL (ref 65–99)
GLUCOSE SERPL-MCNC: 250 MG/DL (ref 65–99)
GLUCOSE SERPL-MCNC: 256 MG/DL (ref 65–99)
GLUCOSE SERPL-MCNC: 372 MG/DL (ref 65–99)
HADV DNA SPEC QL NAA+PROBE: NOT DETECTED
HBA1C MFR BLD: 6.8 % (ref 0–5.6)
HCOV RNA SPEC QL NAA+PROBE: NOT DETECTED
HCT VFR BLD AUTO: 29.4 % (ref 42–52)
HCT VFR BLD AUTO: 29.6 % (ref 42–52)
HCT VFR BLD AUTO: 29.6 % (ref 42–52)
HCT VFR BLD AUTO: 29.7 % (ref 42–52)
HCT VFR BLD AUTO: 29.9 % (ref 42–52)
HCT VFR BLD AUTO: 30.1 % (ref 42–52)
HCT VFR BLD AUTO: 30.6 % (ref 42–52)
HCT VFR BLD AUTO: 30.7 % (ref 42–52)
HCT VFR BLD AUTO: 31.5 % (ref 42–52)
HCT VFR BLD AUTO: 31.6 % (ref 42–52)
HCT VFR BLD AUTO: 32.2 % (ref 42–52)
HDLC SERPL-MCNC: 32 MG/DL
HGB BLD-MCNC: 9.4 G/DL (ref 14–18)
HGB BLD-MCNC: 9.6 G/DL (ref 14–18)
HGB BLD-MCNC: 9.7 G/DL (ref 14–18)
HGB BLD-MCNC: 9.8 G/DL (ref 14–18)
HGB BLD-MCNC: 9.9 G/DL (ref 14–18)
HGB BLD-MCNC: 9.9 G/DL (ref 14–18)
HMPV RNA SPEC QL NAA+PROBE: NOT DETECTED
HPIV1 RNA SPEC QL NAA+PROBE: NOT DETECTED
HPIV2 RNA SPEC QL NAA+PROBE: NOT DETECTED
HPIV3 RNA SPEC QL NAA+PROBE: NOT DETECTED
HPIV4 RNA SPEC QL NAA+PROBE: NOT DETECTED
IMM GRANULOCYTES # BLD AUTO: 0.06 K/UL (ref 0–0.11)
IMM GRANULOCYTES # BLD AUTO: 0.1 K/UL (ref 0–0.11)
IMM GRANULOCYTES # BLD AUTO: 0.11 K/UL (ref 0–0.11)
IMM GRANULOCYTES # BLD AUTO: 0.12 K/UL (ref 0–0.11)
IMM GRANULOCYTES # BLD AUTO: 0.14 K/UL (ref 0–0.11)
IMM GRANULOCYTES # BLD AUTO: 0.14 K/UL (ref 0–0.11)
IMM GRANULOCYTES NFR BLD AUTO: 0.8 % (ref 0–0.9)
IMM GRANULOCYTES NFR BLD AUTO: 0.9 % (ref 0–0.9)
IMM GRANULOCYTES NFR BLD AUTO: 1.1 % (ref 0–0.9)
IMM GRANULOCYTES NFR BLD AUTO: 1.4 % (ref 0–0.9)
IMM GRANULOCYTES NFR BLD AUTO: 1.5 % (ref 0–0.9)
IMM GRANULOCYTES NFR BLD AUTO: 1.6 % (ref 0–0.9)
INR PPP: 1.08 (ref 0.87–1.13)
INR PPP: 1.1 (ref 0.87–1.13)
INR PPP: 1.15 (ref 0.87–1.13)
INR PPP: 1.23 (ref 0.87–1.13)
LACTATE BLD-SCNC: 2 MMOL/L (ref 0.5–2)
LACTATE BLD-SCNC: 2.3 MMOL/L (ref 0.5–2)
LDLC SERPL CALC-MCNC: 28 MG/DL
LV EJECT FRACT  99904: 60
LV EJECT FRACT  99904: 65
LV EJECT FRACT MOD 2C 99903: 49.85
LV EJECT FRACT MOD 2C 99903: 70.35
LV EJECT FRACT MOD 4C 99902: 63.15
LV EJECT FRACT MOD 4C 99902: 68.88
LV EJECT FRACT MOD BP 99901: 56.48
LV EJECT FRACT MOD BP 99901: 68.65
LYMPHOCYTES # BLD AUTO: 1.35 K/UL (ref 1–4.8)
LYMPHOCYTES # BLD AUTO: 1.39 K/UL (ref 1–4.8)
LYMPHOCYTES # BLD AUTO: 1.49 K/UL (ref 1–4.8)
LYMPHOCYTES # BLD AUTO: 1.61 K/UL (ref 1–4.8)
LYMPHOCYTES # BLD AUTO: 1.91 K/UL (ref 1–4.8)
LYMPHOCYTES # BLD AUTO: 1.99 K/UL (ref 1–4.8)
LYMPHOCYTES NFR BLD: 11.4 % (ref 22–41)
LYMPHOCYTES NFR BLD: 13.5 % (ref 22–41)
LYMPHOCYTES NFR BLD: 17.1 % (ref 22–41)
LYMPHOCYTES NFR BLD: 17.2 % (ref 22–41)
LYMPHOCYTES NFR BLD: 22.3 % (ref 22–41)
LYMPHOCYTES NFR BLD: 28.6 % (ref 22–41)
M PNEUMO DNA SPEC QL NAA+PROBE: NOT DETECTED
MAGNESIUM SERPL-MCNC: 1.3 MG/DL (ref 1.5–2.5)
MAGNESIUM SERPL-MCNC: 1.6 MG/DL (ref 1.5–2.5)
MAGNESIUM SERPL-MCNC: 2 MG/DL (ref 1.5–2.5)
MCH RBC QN AUTO: 30 PG (ref 27–33)
MCH RBC QN AUTO: 30.1 PG (ref 27–33)
MCH RBC QN AUTO: 30.3 PG (ref 27–33)
MCH RBC QN AUTO: 30.4 PG (ref 27–33)
MCH RBC QN AUTO: 30.5 PG (ref 27–33)
MCH RBC QN AUTO: 30.6 PG (ref 27–33)
MCH RBC QN AUTO: 30.7 PG (ref 27–33)
MCH RBC QN AUTO: 30.7 PG (ref 27–33)
MCH RBC QN AUTO: 31 PG (ref 27–33)
MCH RBC QN AUTO: 31 PG (ref 27–33)
MCH RBC QN AUTO: 31.3 PG (ref 27–33)
MCHC RBC AUTO-ENTMCNC: 30.4 G/DL (ref 33.7–35.3)
MCHC RBC AUTO-ENTMCNC: 31.1 G/DL (ref 33.7–35.3)
MCHC RBC AUTO-ENTMCNC: 31.3 G/DL (ref 33.7–35.3)
MCHC RBC AUTO-ENTMCNC: 31.4 G/DL (ref 33.7–35.3)
MCHC RBC AUTO-ENTMCNC: 31.8 G/DL (ref 33.7–35.3)
MCHC RBC AUTO-ENTMCNC: 32.1 G/DL (ref 33.7–35.3)
MCHC RBC AUTO-ENTMCNC: 32.2 G/DL (ref 33.7–35.3)
MCHC RBC AUTO-ENTMCNC: 32.2 G/DL (ref 33.7–35.3)
MCHC RBC AUTO-ENTMCNC: 32.3 G/DL (ref 33.7–35.3)
MCHC RBC AUTO-ENTMCNC: 32.7 G/DL (ref 33.7–35.3)
MCHC RBC AUTO-ENTMCNC: 33.1 G/DL (ref 33.7–35.3)
MCV RBC AUTO: 94.3 FL (ref 81.4–97.8)
MCV RBC AUTO: 94.6 FL (ref 81.4–97.8)
MCV RBC AUTO: 94.8 FL (ref 81.4–97.8)
MCV RBC AUTO: 95.3 FL (ref 81.4–97.8)
MCV RBC AUTO: 95.5 FL (ref 81.4–97.8)
MCV RBC AUTO: 95.9 FL (ref 81.4–97.8)
MCV RBC AUTO: 96.2 FL (ref 81.4–97.8)
MCV RBC AUTO: 96.4 FL (ref 81.4–97.8)
MCV RBC AUTO: 96.9 FL (ref 81.4–97.8)
MCV RBC AUTO: 97.5 FL (ref 81.4–97.8)
MCV RBC AUTO: 98.5 FL (ref 81.4–97.8)
MONOCYTES # BLD AUTO: 0.65 K/UL (ref 0–0.85)
MONOCYTES # BLD AUTO: 0.76 K/UL (ref 0–0.85)
MONOCYTES # BLD AUTO: 0.78 K/UL (ref 0–0.85)
MONOCYTES # BLD AUTO: 0.85 K/UL (ref 0–0.85)
MONOCYTES # BLD AUTO: 1 K/UL (ref 0–0.85)
MONOCYTES # BLD AUTO: 1 K/UL (ref 0–0.85)
MONOCYTES NFR BLD AUTO: 10.6 % (ref 0–13.4)
MONOCYTES NFR BLD AUTO: 7.2 % (ref 0–13.4)
MONOCYTES NFR BLD AUTO: 8.8 % (ref 0–13.4)
MONOCYTES NFR BLD AUTO: 9.1 % (ref 0–13.4)
MONOCYTES NFR BLD AUTO: 9.4 % (ref 0–13.4)
MONOCYTES NFR BLD AUTO: 9.7 % (ref 0–13.4)
NEUTROPHILS # BLD AUTO: 3.88 K/UL (ref 1.82–7.42)
NEUTROPHILS # BLD AUTO: 5.4 K/UL (ref 1.82–7.42)
NEUTROPHILS # BLD AUTO: 5.98 K/UL (ref 1.82–7.42)
NEUTROPHILS # BLD AUTO: 6.27 K/UL (ref 1.82–7.42)
NEUTROPHILS # BLD AUTO: 7.49 K/UL (ref 1.82–7.42)
NEUTROPHILS # BLD AUTO: 9.35 K/UL (ref 1.82–7.42)
NEUTROPHILS NFR BLD: 55.8 % (ref 44–72)
NEUTROPHILS NFR BLD: 63.3 % (ref 44–72)
NEUTROPHILS NFR BLD: 66.4 % (ref 44–72)
NEUTROPHILS NFR BLD: 69.2 % (ref 44–72)
NEUTROPHILS NFR BLD: 72.4 % (ref 44–72)
NEUTROPHILS NFR BLD: 78.9 % (ref 44–72)
NRBC # BLD AUTO: 0 K/UL
NRBC BLD-RTO: 0 /100 WBC
NT-PROBNP SERPL IA-MCNC: 2489 PG/ML (ref 0–125)
NT-PROBNP SERPL IA-MCNC: 4751 PG/ML (ref 0–125)
PLATELET # BLD AUTO: 123 K/UL (ref 164–446)
PLATELET # BLD AUTO: 131 K/UL (ref 164–446)
PLATELET # BLD AUTO: 135 K/UL (ref 164–446)
PLATELET # BLD AUTO: 137 K/UL (ref 164–446)
PLATELET # BLD AUTO: 137 K/UL (ref 164–446)
PLATELET # BLD AUTO: 143 K/UL (ref 164–446)
PLATELET # BLD AUTO: 145 K/UL (ref 164–446)
PLATELET # BLD AUTO: 147 K/UL (ref 164–446)
PLATELET # BLD AUTO: 157 K/UL (ref 164–446)
PLATELET # BLD AUTO: 158 K/UL (ref 164–446)
PLATELET # BLD AUTO: 97 K/UL (ref 164–446)
PMV BLD AUTO: 12.5 FL (ref 9–12.9)
PMV BLD AUTO: 12.6 FL (ref 9–12.9)
PMV BLD AUTO: 12.7 FL (ref 9–12.9)
PMV BLD AUTO: 12.9 FL (ref 9–12.9)
PMV BLD AUTO: 13 FL (ref 9–12.9)
PMV BLD AUTO: 13 FL (ref 9–12.9)
PMV BLD AUTO: 13.1 FL (ref 9–12.9)
PMV BLD AUTO: 13.1 FL (ref 9–12.9)
PMV BLD AUTO: 13.2 FL (ref 9–12.9)
PMV BLD AUTO: 13.3 FL (ref 9–12.9)
PMV BLD AUTO: 13.6 FL (ref 9–12.9)
POTASSIUM SERPL-SCNC: 3.7 MMOL/L (ref 3.6–5.5)
POTASSIUM SERPL-SCNC: 3.7 MMOL/L (ref 3.6–5.5)
POTASSIUM SERPL-SCNC: 3.9 MMOL/L (ref 3.6–5.5)
POTASSIUM SERPL-SCNC: 4.1 MMOL/L (ref 3.6–5.5)
POTASSIUM SERPL-SCNC: 4.5 MMOL/L (ref 3.6–5.5)
POTASSIUM SERPL-SCNC: 4.7 MMOL/L (ref 3.6–5.5)
POTASSIUM SERPL-SCNC: 4.7 MMOL/L (ref 3.6–5.5)
POTASSIUM SERPL-SCNC: 5.1 MMOL/L (ref 3.6–5.5)
PROCALCITONIN SERPL-MCNC: <0.05 NG/ML
PROT SERPL-MCNC: 6.4 G/DL (ref 6–8.2)
PROT SERPL-MCNC: 7.3 G/DL (ref 6–8.2)
PROT SERPL-MCNC: 7.4 G/DL (ref 6–8.2)
PROT SERPL-MCNC: 7.5 G/DL (ref 6–8.2)
PROT SERPL-MCNC: 7.6 G/DL (ref 6–8.2)
PROT SERPL-MCNC: 7.7 G/DL (ref 6–8.2)
PROTHROMBIN TIME: 14.3 SEC (ref 12–14.6)
PROTHROMBIN TIME: 14.4 SEC (ref 12–14.6)
PROTHROMBIN TIME: 14.5 SEC (ref 12–14.6)
PROTHROMBIN TIME: 15.1 SEC (ref 12–14.6)
RBC # BLD AUTO: 3.07 M/UL (ref 4.7–6.1)
RBC # BLD AUTO: 3.1 M/UL (ref 4.7–6.1)
RBC # BLD AUTO: 3.13 M/UL (ref 4.7–6.1)
RBC # BLD AUTO: 3.13 M/UL (ref 4.7–6.1)
RBC # BLD AUTO: 3.15 M/UL (ref 4.7–6.1)
RBC # BLD AUTO: 3.16 M/UL (ref 4.7–6.1)
RBC # BLD AUTO: 3.19 M/UL (ref 4.7–6.1)
RBC # BLD AUTO: 3.19 M/UL (ref 4.7–6.1)
RBC # BLD AUTO: 3.23 M/UL (ref 4.7–6.1)
RBC # BLD AUTO: 3.26 M/UL (ref 4.7–6.1)
RBC # BLD AUTO: 3.27 M/UL (ref 4.7–6.1)
RH BLD: NORMAL
RSV A RNA SPEC QL NAA+PROBE: NOT DETECTED
RSV B RNA SPEC QL NAA+PROBE: NOT DETECTED
RSV RNA SPEC QL NAA+PROBE: NEGATIVE
RSV RNA SPEC QL NAA+PROBE: NEGATIVE
RV+EV RNA SPEC QL NAA+PROBE: NOT DETECTED
SARS-COV+SARS-COV-2 AG RESP QL IA.RAPID: NOTDETECTED
SARS-COV-2 RNA RESP QL NAA+PROBE: NOTDETECTED
SIGNIFICANT IND 70042: NORMAL
SIGNIFICANT IND 70042: NORMAL
SITE SITE: NORMAL
SITE SITE: NORMAL
SODIUM SERPL-SCNC: 131 MMOL/L (ref 135–145)
SODIUM SERPL-SCNC: 132 MMOL/L (ref 135–145)
SODIUM SERPL-SCNC: 132 MMOL/L (ref 135–145)
SODIUM SERPL-SCNC: 133 MMOL/L (ref 135–145)
SODIUM SERPL-SCNC: 135 MMOL/L (ref 135–145)
SODIUM SERPL-SCNC: 137 MMOL/L (ref 135–145)
SODIUM SERPL-SCNC: 138 MMOL/L (ref 135–145)
SODIUM SERPL-SCNC: 139 MMOL/L (ref 135–145)
SODIUM SERPL-SCNC: 140 MMOL/L (ref 135–145)
SOURCE SOURCE: NORMAL
SOURCE SOURCE: NORMAL
SPECIMEN SOURCE: NORMAL
TRIGL SERPL-MCNC: 136 MG/DL (ref 0–149)
TROPONIN T SERPL-MCNC: 1161 NG/L (ref 6–19)
TROPONIN T SERPL-MCNC: 1247 NG/L (ref 6–19)
TROPONIN T SERPL-MCNC: 161 NG/L (ref 6–19)
TROPONIN T SERPL-MCNC: 161 NG/L (ref 6–19)
TROPONIN T SERPL-MCNC: 181 NG/L (ref 6–19)
TROPONIN T SERPL-MCNC: 185 NG/L (ref 6–19)
TROPONIN T SERPL-MCNC: 223 NG/L (ref 6–19)
TROPONIN T SERPL-MCNC: 234 NG/L (ref 6–19)
TROPONIN T SERPL-MCNC: 263 NG/L (ref 6–19)
TROPONIN T SERPL-MCNC: 273 NG/L (ref 6–19)
TROPONIN T SERPL-MCNC: 49 NG/L (ref 6–19)
TROPONIN T SERPL-MCNC: 58 NG/L (ref 6–19)
TROPONIN T SERPL-MCNC: 926 NG/L (ref 6–19)
TROPONIN T SERPL-MCNC: 980 NG/L (ref 6–19)
UFH PPP CHRO-ACNC: 0.21 IU/ML
UFH PPP CHRO-ACNC: 0.24 IU/ML
UFH PPP CHRO-ACNC: 0.29 IU/ML
UFH PPP CHRO-ACNC: 0.3 IU/ML
UFH PPP CHRO-ACNC: 0.47 IU/ML
UFH PPP CHRO-ACNC: <0.1 IU/ML
WBC # BLD AUTO: 10.3 K/UL (ref 4.8–10.8)
WBC # BLD AUTO: 10.4 K/UL (ref 4.8–10.8)
WBC # BLD AUTO: 11.9 K/UL (ref 4.8–10.8)
WBC # BLD AUTO: 12.4 K/UL (ref 4.8–10.8)
WBC # BLD AUTO: 7 K/UL (ref 4.8–10.8)
WBC # BLD AUTO: 8.1 K/UL (ref 4.8–10.8)
WBC # BLD AUTO: 8.2 K/UL (ref 4.8–10.8)
WBC # BLD AUTO: 8.6 K/UL (ref 4.8–10.8)
WBC # BLD AUTO: 8.7 K/UL (ref 4.8–10.8)
WBC # BLD AUTO: 8.8 K/UL (ref 4.8–10.8)
WBC # BLD AUTO: 9.4 K/UL (ref 4.8–10.8)

## 2021-01-01 PROCEDURE — 85027 COMPLETE CBC AUTOMATED: CPT

## 2021-01-01 PROCEDURE — 93010 ELECTROCARDIOGRAM REPORT: CPT | Performed by: STUDENT IN AN ORGANIZED HEALTH CARE EDUCATION/TRAINING PROGRAM

## 2021-01-01 PROCEDURE — A9270 NON-COVERED ITEM OR SERVICE: HCPCS | Performed by: HOSPITALIST

## 2021-01-01 PROCEDURE — 93306 TTE W/DOPPLER COMPLETE: CPT

## 2021-01-01 PROCEDURE — 93005 ELECTROCARDIOGRAM TRACING: CPT | Performed by: HOSPITALIST

## 2021-01-01 PROCEDURE — 80048 BASIC METABOLIC PNL TOTAL CA: CPT

## 2021-01-01 PROCEDURE — 99215 OFFICE O/P EST HI 40 MIN: CPT | Performed by: INTERNAL MEDICINE

## 2021-01-01 PROCEDURE — 36415 COLL VENOUS BLD VENIPUNCTURE: CPT

## 2021-01-01 PROCEDURE — 96366 THER/PROPH/DIAG IV INF ADDON: CPT

## 2021-01-01 PROCEDURE — A9270 NON-COVERED ITEM OR SERVICE: HCPCS | Performed by: STUDENT IN AN ORGANIZED HEALTH CARE EDUCATION/TRAINING PROGRAM

## 2021-01-01 PROCEDURE — 700101 HCHG RX REV CODE 250: Performed by: STUDENT IN AN ORGANIZED HEALTH CARE EDUCATION/TRAINING PROGRAM

## 2021-01-01 PROCEDURE — 94760 N-INVAS EAR/PLS OXIMETRY 1: CPT

## 2021-01-01 PROCEDURE — 99217 PR OBSERVATION CARE DISCHARGE: CPT | Performed by: INTERNAL MEDICINE

## 2021-01-01 PROCEDURE — 84145 PROCALCITONIN (PCT): CPT

## 2021-01-01 PROCEDURE — 97161 PT EVAL LOW COMPLEX 20 MIN: CPT

## 2021-01-01 PROCEDURE — 99223 1ST HOSP IP/OBS HIGH 75: CPT | Mod: AI | Performed by: HOSPITALIST

## 2021-01-01 PROCEDURE — 99285 EMERGENCY DEPT VISIT HI MDM: CPT

## 2021-01-01 PROCEDURE — C9803 HOPD COVID-19 SPEC COLLECT: HCPCS | Performed by: EMERGENCY MEDICINE

## 2021-01-01 PROCEDURE — 700102 HCHG RX REV CODE 250 W/ 637 OVERRIDE(OP): Performed by: INTERNAL MEDICINE

## 2021-01-01 PROCEDURE — 84484 ASSAY OF TROPONIN QUANT: CPT

## 2021-01-01 PROCEDURE — 85025 COMPLETE CBC W/AUTO DIFF WBC: CPT

## 2021-01-01 PROCEDURE — 82962 GLUCOSE BLOOD TEST: CPT

## 2021-01-01 PROCEDURE — 0241U HCHG SARS-COV-2 COVID-19 NFCT DS RESP RNA 4 TRGT MIC: CPT

## 2021-01-01 PROCEDURE — 83880 ASSAY OF NATRIURETIC PEPTIDE: CPT

## 2021-01-01 PROCEDURE — 700102 HCHG RX REV CODE 250 W/ 637 OVERRIDE(OP): Performed by: STUDENT IN AN ORGANIZED HEALTH CARE EDUCATION/TRAINING PROGRAM

## 2021-01-01 PROCEDURE — 80053 COMPREHEN METABOLIC PANEL: CPT

## 2021-01-01 PROCEDURE — 86901 BLOOD TYPING SEROLOGIC RH(D): CPT

## 2021-01-01 PROCEDURE — 96375 TX/PRO/DX INJ NEW DRUG ADDON: CPT | Mod: XU

## 2021-01-01 PROCEDURE — U0005 INFEC AGEN DETEC AMPLI PROBE: HCPCS

## 2021-01-01 PROCEDURE — 86850 RBC ANTIBODY SCREEN: CPT

## 2021-01-01 PROCEDURE — A9270 NON-COVERED ITEM OR SERVICE: HCPCS

## 2021-01-01 PROCEDURE — 87486 CHLMYD PNEUM DNA AMP PROBE: CPT

## 2021-01-01 PROCEDURE — G0378 HOSPITAL OBSERVATION PER HR: HCPCS

## 2021-01-01 PROCEDURE — 99214 OFFICE O/P EST MOD 30 MIN: CPT | Mod: 95,CR | Performed by: INTERNAL MEDICINE

## 2021-01-01 PROCEDURE — 99214 OFFICE O/P EST MOD 30 MIN: CPT | Performed by: INTERNAL MEDICINE

## 2021-01-01 PROCEDURE — 93005 ELECTROCARDIOGRAM TRACING: CPT | Performed by: EMERGENCY MEDICINE

## 2021-01-01 PROCEDURE — 99232 SBSQ HOSP IP/OBS MODERATE 35: CPT | Performed by: INTERNAL MEDICINE

## 2021-01-01 PROCEDURE — 94640 AIRWAY INHALATION TREATMENT: CPT

## 2021-01-01 PROCEDURE — 99284 EMERGENCY DEPT VISIT MOD MDM: CPT

## 2021-01-01 PROCEDURE — 85610 PROTHROMBIN TIME: CPT

## 2021-01-01 PROCEDURE — 700105 HCHG RX REV CODE 258: Performed by: INTERNAL MEDICINE

## 2021-01-01 PROCEDURE — 99223 1ST HOSP IP/OBS HIGH 75: CPT | Mod: AI | Performed by: STUDENT IN AN ORGANIZED HEALTH CARE EDUCATION/TRAINING PROGRAM

## 2021-01-01 PROCEDURE — 96372 THER/PROPH/DIAG INJ SC/IM: CPT | Mod: XU

## 2021-01-01 PROCEDURE — 91300 PFIZER SARS-COV-2 VACCINE: CPT

## 2021-01-01 PROCEDURE — A9270 NON-COVERED ITEM OR SERVICE: HCPCS | Performed by: NURSE PRACTITIONER

## 2021-01-01 PROCEDURE — 93005 ELECTROCARDIOGRAM TRACING: CPT | Performed by: NURSE PRACTITIONER

## 2021-01-01 PROCEDURE — 87633 RESP VIRUS 12-25 TARGETS: CPT

## 2021-01-01 PROCEDURE — 99226 PR SUBSEQUENT OBSERVATION CARE,LEVEL III: CPT | Performed by: INTERNAL MEDICINE

## 2021-01-01 PROCEDURE — 770020 HCHG ROOM/CARE - TELE (206)

## 2021-01-01 PROCEDURE — 71045 X-RAY EXAM CHEST 1 VIEW: CPT

## 2021-01-01 PROCEDURE — 97165 OT EVAL LOW COMPLEX 30 MIN: CPT

## 2021-01-01 PROCEDURE — 700111 HCHG RX REV CODE 636 W/ 250 OVERRIDE (IP): Performed by: EMERGENCY MEDICINE

## 2021-01-01 PROCEDURE — 72128 CT CHEST SPINE W/O DYE: CPT

## 2021-01-01 PROCEDURE — 99223 1ST HOSP IP/OBS HIGH 75: CPT | Performed by: THORACIC SURGERY (CARDIOTHORACIC VASCULAR SURGERY)

## 2021-01-01 PROCEDURE — 700111 HCHG RX REV CODE 636 W/ 250 OVERRIDE (IP): Performed by: HOSPITALIST

## 2021-01-01 PROCEDURE — 700105 HCHG RX REV CODE 258: Performed by: HOSPITALIST

## 2021-01-01 PROCEDURE — 0001A PFIZER SARS-COV-2 VACCINE: CPT

## 2021-01-01 PROCEDURE — 90662 IIV NO PRSV INCREASED AG IM: CPT | Performed by: INTERNAL MEDICINE

## 2021-01-01 PROCEDURE — 99211 OFF/OP EST MAY X REQ PHY/QHP: CPT | Performed by: INTERNAL MEDICINE

## 2021-01-01 PROCEDURE — 82962 GLUCOSE BLOOD TEST: CPT | Mod: 91

## 2021-01-01 PROCEDURE — 94618 PULMONARY STRESS TESTING: CPT | Performed by: INTERNAL MEDICINE

## 2021-01-01 PROCEDURE — 70450 CT HEAD/BRAIN W/O DYE: CPT

## 2021-01-01 PROCEDURE — 87426 SARSCOV CORONAVIRUS AG IA: CPT

## 2021-01-01 PROCEDURE — 93306 TTE W/DOPPLER COMPLETE: CPT | Mod: 26 | Performed by: INTERNAL MEDICINE

## 2021-01-01 PROCEDURE — 700102 HCHG RX REV CODE 250 W/ 637 OVERRIDE(OP): Performed by: HOSPITALIST

## 2021-01-01 PROCEDURE — 93005 ELECTROCARDIOGRAM TRACING: CPT

## 2021-01-01 PROCEDURE — 86900 BLOOD TYPING SEROLOGIC ABO: CPT

## 2021-01-01 PROCEDURE — 83605 ASSAY OF LACTIC ACID: CPT | Mod: 91

## 2021-01-01 PROCEDURE — 71250 CT THORAX DX C-: CPT

## 2021-01-01 PROCEDURE — 700111 HCHG RX REV CODE 636 W/ 250 OVERRIDE (IP): Performed by: INTERNAL MEDICINE

## 2021-01-01 PROCEDURE — 99214 OFFICE O/P EST MOD 30 MIN: CPT | Performed by: NURSE PRACTITIONER

## 2021-01-01 PROCEDURE — 96365 THER/PROPH/DIAG IV INF INIT: CPT

## 2021-01-01 PROCEDURE — 71275 CT ANGIOGRAPHY CHEST: CPT

## 2021-01-01 PROCEDURE — 94060 EVALUATION OF WHEEZING: CPT | Performed by: INTERNAL MEDICINE

## 2021-01-01 PROCEDURE — 96375 TX/PRO/DX INJ NEW DRUG ADDON: CPT

## 2021-01-01 PROCEDURE — A9270 NON-COVERED ITEM OR SERVICE: HCPCS | Performed by: INTERNAL MEDICINE

## 2021-01-01 PROCEDURE — 94729 DIFFUSING CAPACITY: CPT | Performed by: INTERNAL MEDICINE

## 2021-01-01 PROCEDURE — 99220 PR INITIAL OBSERVATION CARE,LEVL III: CPT | Performed by: INTERNAL MEDICINE

## 2021-01-01 PROCEDURE — U0003 INFECTIOUS AGENT DETECTION BY NUCLEIC ACID (DNA OR RNA); SEVERE ACUTE RESPIRATORY SYNDROME CORONAVIRUS 2 (SARS-COV-2) (CORONAVIRUS DISEASE [COVID-19]), AMPLIFIED PROBE TECHNIQUE, MAKING USE OF HIGH THROUGHPUT TECHNOLOGIES AS DESCRIBED BY CMS-2020-01-R: HCPCS

## 2021-01-01 PROCEDURE — 700117 HCHG RX CONTRAST REV CODE 255: Performed by: INTERNAL MEDICINE

## 2021-01-01 PROCEDURE — 96365 THER/PROPH/DIAG IV INF INIT: CPT | Mod: XU

## 2021-01-01 PROCEDURE — 700102 HCHG RX REV CODE 250 W/ 637 OVERRIDE(OP)

## 2021-01-01 PROCEDURE — 82533 TOTAL CORTISOL: CPT

## 2021-01-01 PROCEDURE — 90471 IMMUNIZATION ADMIN: CPT

## 2021-01-01 PROCEDURE — 700102 HCHG RX REV CODE 250 W/ 637 OVERRIDE(OP): Performed by: EMERGENCY MEDICINE

## 2021-01-01 PROCEDURE — 99153 MOD SED SAME PHYS/QHP EA: CPT

## 2021-01-01 PROCEDURE — 99215 OFFICE O/P EST HI 40 MIN: CPT | Mod: 25 | Performed by: INTERNAL MEDICINE

## 2021-01-01 PROCEDURE — 87040 BLOOD CULTURE FOR BACTERIA: CPT

## 2021-01-01 PROCEDURE — 700111 HCHG RX REV CODE 636 W/ 250 OVERRIDE (IP): Performed by: STUDENT IN AN ORGANIZED HEALTH CARE EDUCATION/TRAINING PROGRAM

## 2021-01-01 PROCEDURE — 700102 HCHG RX REV CODE 250 W/ 637 OVERRIDE(OP): Performed by: PHYSICIAN ASSISTANT

## 2021-01-01 PROCEDURE — 80061 LIPID PANEL: CPT

## 2021-01-01 PROCEDURE — 85520 HEPARIN ASSAY: CPT | Mod: 91

## 2021-01-01 PROCEDURE — 85730 THROMBOPLASTIN TIME PARTIAL: CPT | Mod: 91

## 2021-01-01 PROCEDURE — A9270 NON-COVERED ITEM OR SERVICE: HCPCS | Performed by: EMERGENCY MEDICINE

## 2021-01-01 PROCEDURE — 94726 PLETHYSMOGRAPHY LUNG VOLUMES: CPT | Performed by: INTERNAL MEDICINE

## 2021-01-01 PROCEDURE — 83036 HEMOGLOBIN GLYCOSYLATED A1C: CPT

## 2021-01-01 PROCEDURE — 94762 N-INVAS EAR/PLS OXIMTRY CONT: CPT | Performed by: INTERNAL MEDICINE

## 2021-01-01 PROCEDURE — 87581 M.PNEUMON DNA AMP PROBE: CPT

## 2021-01-01 PROCEDURE — 99223 1ST HOSP IP/OBS HIGH 75: CPT | Performed by: STUDENT IN AN ORGANIZED HEALTH CARE EDUCATION/TRAINING PROGRAM

## 2021-01-01 PROCEDURE — 99239 HOSP IP/OBS DSCHRG MGMT >30: CPT | Mod: GC | Performed by: INTERNAL MEDICINE

## 2021-01-01 PROCEDURE — 85730 THROMBOPLASTIN TIME PARTIAL: CPT

## 2021-01-01 PROCEDURE — 97535 SELF CARE MNGMENT TRAINING: CPT

## 2021-01-01 PROCEDURE — 93010 ELECTROCARDIOGRAM REPORT: CPT | Mod: 76 | Performed by: INTERNAL MEDICINE

## 2021-01-01 PROCEDURE — 83735 ASSAY OF MAGNESIUM: CPT

## 2021-01-01 PROCEDURE — 85520 HEPARIN ASSAY: CPT

## 2021-01-01 PROCEDURE — 72125 CT NECK SPINE W/O DYE: CPT

## 2021-01-01 PROCEDURE — 3E02340 INTRODUCTION OF INFLUENZA VACCINE INTO MUSCLE, PERCUTANEOUS APPROACH: ICD-10-PCS | Performed by: INTERNAL MEDICINE

## 2021-01-01 PROCEDURE — A9270 NON-COVERED ITEM OR SERVICE: HCPCS | Performed by: PHYSICIAN ASSISTANT

## 2021-01-01 PROCEDURE — 97162 PT EVAL MOD COMPLEX 30 MIN: CPT

## 2021-01-01 PROCEDURE — 700102 HCHG RX REV CODE 250 W/ 637 OVERRIDE(OP): Performed by: NURSE PRACTITIONER

## 2021-01-01 PROCEDURE — 85610 PROTHROMBIN TIME: CPT | Mod: 91

## 2021-01-01 PROCEDURE — 84484 ASSAY OF TROPONIN QUANT: CPT | Mod: 91

## 2021-01-01 PROCEDURE — 99223 1ST HOSP IP/OBS HIGH 75: CPT | Performed by: INTERNAL MEDICINE

## 2021-01-01 RX ORDER — BISACODYL 10 MG
10 SUPPOSITORY, RECTAL RECTAL
Status: DISCONTINUED | OUTPATIENT
Start: 2021-01-01 | End: 2021-01-01 | Stop reason: HOSPADM

## 2021-01-01 RX ORDER — ATORVASTATIN CALCIUM 40 MG/1
40 TABLET, FILM COATED ORAL EVERY EVENING
Qty: 90 TABLET | Refills: 4 | Status: SHIPPED | OUTPATIENT
Start: 2021-01-01

## 2021-01-01 RX ORDER — LOSARTAN POTASSIUM 25 MG/1
25 TABLET ORAL DAILY
Qty: 30 TAB | Refills: 11 | Status: SHIPPED | OUTPATIENT
Start: 2021-01-01 | End: 2021-01-01 | Stop reason: SDUPTHER

## 2021-01-01 RX ORDER — ISOSORBIDE MONONITRATE 30 MG/1
30 TABLET, EXTENDED RELEASE ORAL
Status: DISCONTINUED | OUTPATIENT
Start: 2021-01-01 | End: 2021-01-01 | Stop reason: HOSPADM

## 2021-01-01 RX ORDER — CLOPIDOGREL BISULFATE 75 MG/1
75 TABLET ORAL DAILY
Status: DISCONTINUED | OUTPATIENT
Start: 2021-01-01 | End: 2021-01-01 | Stop reason: HOSPADM

## 2021-01-01 RX ORDER — ONDANSETRON 4 MG/1
4 TABLET, ORALLY DISINTEGRATING ORAL EVERY 4 HOURS PRN
Status: DISCONTINUED | OUTPATIENT
Start: 2021-01-01 | End: 2021-01-01 | Stop reason: HOSPADM

## 2021-01-01 RX ORDER — ATORVASTATIN CALCIUM 40 MG/1
40 TABLET, FILM COATED ORAL EVERY EVENING
Status: DISCONTINUED | OUTPATIENT
Start: 2021-01-01 | End: 2021-01-01 | Stop reason: HOSPADM

## 2021-01-01 RX ORDER — AMOXICILLIN 250 MG
2 CAPSULE ORAL 2 TIMES DAILY
Status: DISCONTINUED | OUTPATIENT
Start: 2021-01-01 | End: 2021-01-01 | Stop reason: HOSPADM

## 2021-01-01 RX ORDER — ACETAMINOPHEN 325 MG/1
650 TABLET ORAL EVERY 6 HOURS PRN
Status: DISCONTINUED | OUTPATIENT
Start: 2021-01-01 | End: 2021-01-01 | Stop reason: HOSPADM

## 2021-01-01 RX ORDER — ASPIRIN 81 MG/1
324 TABLET, CHEWABLE ORAL DAILY
Status: DISCONTINUED | OUTPATIENT
Start: 2021-01-01 | End: 2021-01-01

## 2021-01-01 RX ORDER — DEXTROSE MONOHYDRATE 25 G/50ML
50 INJECTION, SOLUTION INTRAVENOUS
Status: DISCONTINUED | OUTPATIENT
Start: 2021-01-01 | End: 2021-01-01 | Stop reason: HOSPADM

## 2021-01-01 RX ORDER — MECLIZINE HCL 25MG 25 MG/1
25 TABLET, CHEWABLE ORAL
COMMUNITY
End: 2022-01-01

## 2021-01-01 RX ORDER — ASPIRIN 325 MG
TABLET ORAL
Status: COMPLETED
Start: 2021-01-01 | End: 2021-01-01

## 2021-01-01 RX ORDER — ASPIRIN 300 MG/1
300 SUPPOSITORY RECTAL DAILY
Status: DISCONTINUED | OUTPATIENT
Start: 2021-01-01 | End: 2021-01-01

## 2021-01-01 RX ORDER — LOSARTAN POTASSIUM 25 MG/1
25 TABLET ORAL
Status: DISCONTINUED | OUTPATIENT
Start: 2021-01-01 | End: 2021-01-01

## 2021-01-01 RX ORDER — DEXAMETHASONE SODIUM PHOSPHATE 10 MG/ML
10 INJECTION, SOLUTION INTRAMUSCULAR; INTRAVENOUS ONCE
Status: DISCONTINUED | OUTPATIENT
Start: 2021-01-01 | End: 2021-01-01

## 2021-01-01 RX ORDER — BUDESONIDE AND FORMOTEROL FUMARATE DIHYDRATE 160; 4.5 UG/1; UG/1
2 AEROSOL RESPIRATORY (INHALATION) 2 TIMES DAILY
COMMUNITY

## 2021-01-01 RX ORDER — MIDODRINE HYDROCHLORIDE 2.5 MG/1
2.5 TABLET ORAL 2 TIMES DAILY
Qty: 60 TAB | Refills: 3 | Status: SHIPPED | OUTPATIENT
Start: 2021-01-01 | End: 2021-01-01

## 2021-01-01 RX ORDER — AMLODIPINE BESYLATE 5 MG/1
5 TABLET ORAL DAILY
Qty: 60 TABLET | Refills: 0 | Status: SHIPPED | OUTPATIENT
Start: 2021-01-01 | End: 2021-01-01

## 2021-01-01 RX ORDER — EMPAGLIFLOZIN 10 MG/1
10 TABLET, FILM COATED ORAL DAILY
Qty: 90 TABLET | Refills: 4 | Status: SHIPPED | OUTPATIENT
Start: 2021-01-01

## 2021-01-01 RX ORDER — SODIUM CHLORIDE 9 MG/ML
INJECTION, SOLUTION INTRAVENOUS CONTINUOUS
Status: DISCONTINUED | OUTPATIENT
Start: 2021-01-01 | End: 2021-01-01

## 2021-01-01 RX ORDER — MIDODRINE HYDROCHLORIDE 5 MG/1
2.5 TABLET ORAL
Status: DISCONTINUED | OUTPATIENT
Start: 2021-01-01 | End: 2021-01-01 | Stop reason: HOSPADM

## 2021-01-01 RX ORDER — ACETAMINOPHEN 325 MG/1
650 TABLET ORAL EVERY 4 HOURS PRN
Status: DISCONTINUED | OUTPATIENT
Start: 2021-01-01 | End: 2021-01-01 | Stop reason: HOSPADM

## 2021-01-01 RX ORDER — BUDESONIDE AND FORMOTEROL FUMARATE DIHYDRATE 160; 4.5 UG/1; UG/1
2 AEROSOL RESPIRATORY (INHALATION) 2 TIMES DAILY
Status: DISCONTINUED | OUTPATIENT
Start: 2021-01-01 | End: 2021-01-01 | Stop reason: HOSPADM

## 2021-01-01 RX ORDER — ONDANSETRON 2 MG/ML
4 INJECTION INTRAMUSCULAR; INTRAVENOUS EVERY 4 HOURS PRN
Status: DISCONTINUED | OUTPATIENT
Start: 2021-01-01 | End: 2021-01-01 | Stop reason: HOSPADM

## 2021-01-01 RX ORDER — LABETALOL HYDROCHLORIDE 5 MG/ML
10 INJECTION, SOLUTION INTRAVENOUS ONCE
Status: COMPLETED | OUTPATIENT
Start: 2021-01-01 | End: 2021-01-01

## 2021-01-01 RX ORDER — ISOSORBIDE MONONITRATE 30 MG/1
30 TABLET, EXTENDED RELEASE ORAL DAILY
Qty: 60 TABLET | Refills: 0 | Status: SHIPPED | OUTPATIENT
Start: 2021-01-01 | End: 2021-01-01

## 2021-01-01 RX ORDER — ASPIRIN 325 MG
325 TABLET ORAL DAILY
Status: DISCONTINUED | OUTPATIENT
Start: 2021-01-01 | End: 2021-01-01

## 2021-01-01 RX ORDER — LOSARTAN POTASSIUM 25 MG/1
25 TABLET ORAL DAILY
Qty: 90 TABLET | Refills: 4 | Status: SHIPPED | OUTPATIENT
Start: 2021-01-01

## 2021-01-01 RX ORDER — HEPARIN SODIUM 200 [USP'U]/100ML
INJECTION, SOLUTION INTRAVENOUS
Status: DISCONTINUED
Start: 2021-01-01 | End: 2021-01-01 | Stop reason: HOSPADM

## 2021-01-01 RX ORDER — HEPARIN SODIUM 1000 [USP'U]/ML
2000 INJECTION, SOLUTION INTRAVENOUS; SUBCUTANEOUS PRN
Status: DISCONTINUED | OUTPATIENT
Start: 2021-01-01 | End: 2021-01-01

## 2021-01-01 RX ORDER — HEPARIN SODIUM 5000 [USP'U]/100ML
0-30 INJECTION, SOLUTION INTRAVENOUS CONTINUOUS
Status: DISCONTINUED | OUTPATIENT
Start: 2021-01-01 | End: 2021-01-01

## 2021-01-01 RX ORDER — CLOPIDOGREL BISULFATE 75 MG/1
75 TABLET ORAL DAILY
Qty: 60 TABLET | Refills: 0 | Status: SHIPPED | OUTPATIENT
Start: 2021-01-01 | End: 2021-01-01

## 2021-01-01 RX ORDER — MIDODRINE HYDROCHLORIDE 5 MG/1
5 TABLET ORAL
Status: DISCONTINUED | OUTPATIENT
Start: 2021-01-01 | End: 2021-01-01 | Stop reason: HOSPADM

## 2021-01-01 RX ORDER — CLOPIDOGREL 300 MG/1
300 TABLET, FILM COATED ORAL ONCE
Status: COMPLETED | OUTPATIENT
Start: 2021-01-01 | End: 2021-01-01

## 2021-01-01 RX ORDER — ALBUTEROL SULFATE 90 UG/1
2 AEROSOL, METERED RESPIRATORY (INHALATION)
Status: DISCONTINUED | OUTPATIENT
Start: 2021-01-01 | End: 2021-01-01 | Stop reason: HOSPADM

## 2021-01-01 RX ORDER — LOSARTAN POTASSIUM 25 MG/1
25 TABLET ORAL DAILY
Status: DISCONTINUED | OUTPATIENT
Start: 2021-01-01 | End: 2021-01-01

## 2021-01-01 RX ORDER — HEPARIN SODIUM 1000 [USP'U]/ML
INJECTION, SOLUTION INTRAVENOUS; SUBCUTANEOUS
Status: DISCONTINUED
Start: 2021-01-01 | End: 2021-01-01 | Stop reason: HOSPADM

## 2021-01-01 RX ORDER — VERAPAMIL HYDROCHLORIDE 2.5 MG/ML
INJECTION, SOLUTION INTRAVENOUS
Status: DISCONTINUED
Start: 2021-01-01 | End: 2021-01-01 | Stop reason: HOSPADM

## 2021-01-01 RX ORDER — EMPAGLIFLOZIN 10 MG/1
10 TABLET, FILM COATED ORAL DAILY
Qty: 30 TAB | Refills: 11 | Status: SHIPPED | OUTPATIENT
Start: 2021-01-01 | End: 2021-01-01 | Stop reason: SDUPTHER

## 2021-01-01 RX ORDER — SODIUM CHLORIDE, SODIUM LACTATE, POTASSIUM CHLORIDE, CALCIUM CHLORIDE 600; 310; 30; 20 MG/100ML; MG/100ML; MG/100ML; MG/100ML
INJECTION, SOLUTION INTRAVENOUS CONTINUOUS
Status: DISCONTINUED | OUTPATIENT
Start: 2021-01-01 | End: 2021-01-01

## 2021-01-01 RX ORDER — AMLODIPINE BESYLATE 5 MG/1
TABLET ORAL
COMMUNITY
End: 2021-01-01

## 2021-01-01 RX ORDER — POLYETHYLENE GLYCOL 3350 17 G/17G
1 POWDER, FOR SOLUTION ORAL
Status: DISCONTINUED | OUTPATIENT
Start: 2021-01-01 | End: 2021-01-01 | Stop reason: HOSPADM

## 2021-01-01 RX ORDER — FUROSEMIDE 40 MG/1
40 TABLET ORAL ONCE
Status: COMPLETED | OUTPATIENT
Start: 2021-01-01 | End: 2021-01-01

## 2021-01-01 RX ORDER — HEPARIN SODIUM 1000 [USP'U]/ML
4000 INJECTION, SOLUTION INTRAVENOUS; SUBCUTANEOUS ONCE
Status: COMPLETED | OUTPATIENT
Start: 2021-01-01 | End: 2021-01-01

## 2021-01-01 RX ORDER — BUDESONIDE AND FORMOTEROL FUMARATE DIHYDRATE 160; 4.5 UG/1; UG/1
2 AEROSOL RESPIRATORY (INHALATION)
Status: DISCONTINUED | OUTPATIENT
Start: 2021-01-01 | End: 2021-01-01 | Stop reason: HOSPADM

## 2021-01-01 RX ORDER — ISOSORBIDE MONONITRATE 30 MG/1
TABLET, EXTENDED RELEASE ORAL
COMMUNITY
End: 2022-01-01

## 2021-01-01 RX ORDER — CLOPIDOGREL BISULFATE 75 MG/1
TABLET ORAL
Status: COMPLETED
Start: 2021-01-01 | End: 2021-01-01

## 2021-01-01 RX ORDER — MIDODRINE HYDROCHLORIDE 5 MG/1
5 TABLET ORAL 3 TIMES DAILY
Status: ON HOLD | COMMUNITY
End: 2021-01-01

## 2021-01-01 RX ORDER — LIDOCAINE HYDROCHLORIDE 20 MG/ML
INJECTION, SOLUTION INFILTRATION; PERINEURAL
Status: DISCONTINUED
Start: 2021-01-01 | End: 2021-01-01 | Stop reason: HOSPADM

## 2021-01-01 RX ORDER — LOSARTAN POTASSIUM 50 MG/1
TABLET ORAL
Status: DISCONTINUED
Start: 2021-01-01 | End: 2021-01-01 | Stop reason: HOSPADM

## 2021-01-01 RX ORDER — TIOTROPIUM BROMIDE 18 UG/1
1 CAPSULE ORAL; RESPIRATORY (INHALATION) DAILY
Status: DISCONTINUED | OUTPATIENT
Start: 2021-01-01 | End: 2021-01-01

## 2021-01-01 RX ORDER — CHOLECALCIFEROL (VITAMIN D3) 125 MCG
5 CAPSULE ORAL NIGHTLY
Status: DISCONTINUED | OUTPATIENT
Start: 2021-01-01 | End: 2021-01-01 | Stop reason: HOSPADM

## 2021-01-01 RX ORDER — IPRATROPIUM BROMIDE AND ALBUTEROL SULFATE 2.5; .5 MG/3ML; MG/3ML
3 SOLUTION RESPIRATORY (INHALATION)
Status: DISCONTINUED | OUTPATIENT
Start: 2021-01-01 | End: 2021-01-01 | Stop reason: HOSPADM

## 2021-01-01 RX ORDER — CLOPIDOGREL BISULFATE 75 MG/1
75 TABLET ORAL DAILY
COMMUNITY

## 2021-01-01 RX ORDER — MAGNESIUM SULFATE HEPTAHYDRATE 40 MG/ML
4 INJECTION, SOLUTION INTRAVENOUS ONCE
Status: COMPLETED | OUTPATIENT
Start: 2021-01-01 | End: 2021-01-01

## 2021-01-01 RX ORDER — LOSARTAN POTASSIUM 50 MG/1
50 TABLET ORAL DAILY
Status: DISCONTINUED | OUTPATIENT
Start: 2021-01-01 | End: 2021-01-01 | Stop reason: HOSPADM

## 2021-01-01 RX ORDER — AMLODIPINE BESYLATE 5 MG/1
5 TABLET ORAL
Status: DISCONTINUED | OUTPATIENT
Start: 2021-01-01 | End: 2021-01-01 | Stop reason: HOSPADM

## 2021-01-01 RX ADMIN — LOSARTAN POTASSIUM 50 MG: 50 TABLET, FILM COATED ORAL at 04:54

## 2021-01-01 RX ADMIN — GLYCOPYRROLATE 1 CAPSULE: 15.6 CAPSULE RESPIRATORY (INHALATION) at 07:42

## 2021-01-01 RX ADMIN — INFLUENZA A VIRUS A/MICHIGAN/45/2015 X-275 (H1N1) ANTIGEN (FORMALDEHYDE INACTIVATED), INFLUENZA A VIRUS A/SINGAPORE/INFIMH-16-0019/2016 IVR-186 (H3N2) ANTIGEN (FORMALDEHYDE INACTIVATED), INFLUENZA B VIRUS B/PHUKET/3073/2013 ANTIGEN (FORMALDEHYDE INACTIVATED), AND INFLUENZA B VIRUS B/MARYLAND/15/2016 BX-69A ANTIGEN (FORMALDEHYDE INACTIVATED) 0.7 ML: 60; 60; 60; 60 INJECTION, SUSPENSION INTRAMUSCULAR at 15:33

## 2021-01-01 RX ADMIN — MIDODRINE HYDROCHLORIDE 5 MG: 5 TABLET ORAL at 17:27

## 2021-01-01 RX ADMIN — ACETAMINOPHEN 650 MG: 325 TABLET ORAL at 21:02

## 2021-01-01 RX ADMIN — IPRATROPIUM BROMIDE AND ALBUTEROL SULFATE 3 ML: .5; 3 SOLUTION RESPIRATORY (INHALATION) at 07:34

## 2021-01-01 RX ADMIN — BUDESONIDE AND FORMOTEROL FUMARATE DIHYDRATE 2 PUFF: 160; 4.5 AEROSOL RESPIRATORY (INHALATION) at 05:53

## 2021-01-01 RX ADMIN — SODIUM CHLORIDE: 9 INJECTION, SOLUTION INTRAVENOUS at 09:16

## 2021-01-01 RX ADMIN — HEPARIN SODIUM 4000 UNITS: 1000 INJECTION, SOLUTION INTRAVENOUS; SUBCUTANEOUS at 07:45

## 2021-01-01 RX ADMIN — ATORVASTATIN CALCIUM 40 MG: 40 TABLET, FILM COATED ORAL at 17:40

## 2021-01-01 RX ADMIN — HEPARIN SODIUM 2000 UNITS: 1000 INJECTION, SOLUTION INTRAVENOUS; SUBCUTANEOUS at 23:01

## 2021-01-01 RX ADMIN — GLYCOPYRROLATE 1 CAPSULE: 15.6 CAPSULE RESPIRATORY (INHALATION) at 08:36

## 2021-01-01 RX ADMIN — ASPIRIN 81 MG: 81 TABLET, COATED ORAL at 04:55

## 2021-01-01 RX ADMIN — INSULIN HUMAN 2 UNITS: 100 INJECTION, SOLUTION PARENTERAL at 21:10

## 2021-01-01 RX ADMIN — ENOXAPARIN SODIUM 40 MG: 40 INJECTION SUBCUTANEOUS at 17:27

## 2021-01-01 RX ADMIN — IPRATROPIUM BROMIDE AND ALBUTEROL SULFATE 3 ML: .5; 3 SOLUTION RESPIRATORY (INHALATION) at 07:18

## 2021-01-01 RX ADMIN — Medication 5 MG: at 21:06

## 2021-01-01 RX ADMIN — GLYCOPYRROLATE 1 CAPSULE: 15.6 CAPSULE RESPIRATORY (INHALATION) at 07:36

## 2021-01-01 RX ADMIN — LABETALOL HYDROCHLORIDE 10 MG: 5 INJECTION, SOLUTION INTRAVENOUS at 21:09

## 2021-01-01 RX ADMIN — ASPIRIN 325 MG ORAL TABLET 325 MG: 325 PILL ORAL at 04:32

## 2021-01-01 RX ADMIN — SODIUM CHLORIDE: 9 INJECTION, SOLUTION INTRAVENOUS at 21:19

## 2021-01-01 RX ADMIN — GLYCOPYRROLATE 1 CAPSULE: 15.6 CAPSULE RESPIRATORY (INHALATION) at 11:25

## 2021-01-01 RX ADMIN — INSULIN HUMAN 2 UNITS: 100 INJECTION, SOLUTION PARENTERAL at 20:44

## 2021-01-01 RX ADMIN — Medication 5 MG: at 21:09

## 2021-01-01 RX ADMIN — CLOPIDOGREL BISULFATE 300 MG: 300 TABLET, FILM COATED ORAL at 16:28

## 2021-01-01 RX ADMIN — ACETAMINOPHEN 650 MG: 325 TABLET, FILM COATED ORAL at 20:41

## 2021-01-01 RX ADMIN — ASPIRIN 325 MG ORAL TABLET 325 MG: 325 PILL ORAL at 07:26

## 2021-01-01 RX ADMIN — MIDODRINE HYDROCHLORIDE 2.5 MG: 5 TABLET ORAL at 12:34

## 2021-01-01 RX ADMIN — BUDESONIDE AND FORMOTEROL FUMARATE DIHYDRATE 2 PUFF: 160; 4.5 AEROSOL RESPIRATORY (INHALATION) at 10:50

## 2021-01-01 RX ADMIN — ASPIRIN 81 MG: 81 TABLET, COATED ORAL at 10:48

## 2021-01-01 RX ADMIN — ENOXAPARIN SODIUM 40 MG: 40 INJECTION SUBCUTANEOUS at 05:54

## 2021-01-01 RX ADMIN — ISOSORBIDE MONONITRATE 30 MG: 30 TABLET, EXTENDED RELEASE ORAL at 09:00

## 2021-01-01 RX ADMIN — INSULIN HUMAN 2 UNITS: 100 INJECTION, SOLUTION PARENTERAL at 20:10

## 2021-01-01 RX ADMIN — CLOPIDOGREL BISULFATE 75 MG: 75 TABLET ORAL at 04:54

## 2021-01-01 RX ADMIN — GLYCOPYRROLATE 1 CAPSULE: 15.6 CAPSULE RESPIRATORY (INHALATION) at 20:26

## 2021-01-01 RX ADMIN — AMLODIPINE BESYLATE 5 MG: 5 TABLET ORAL at 09:00

## 2021-01-01 RX ADMIN — Medication 5 MG: at 20:05

## 2021-01-01 RX ADMIN — ATORVASTATIN CALCIUM 40 MG: 40 TABLET, FILM COATED ORAL at 17:45

## 2021-01-01 RX ADMIN — HEPARIN SODIUM 16 UNITS/KG/HR: 5000 INJECTION, SOLUTION INTRAVENOUS at 08:29

## 2021-01-01 RX ADMIN — MIDODRINE HYDROCHLORIDE 5 MG: 5 TABLET ORAL at 17:45

## 2021-01-01 RX ADMIN — SODIUM CHLORIDE, POTASSIUM CHLORIDE, SODIUM LACTATE AND CALCIUM CHLORIDE: 600; 310; 30; 20 INJECTION, SOLUTION INTRAVENOUS at 10:49

## 2021-01-01 RX ADMIN — TIOTROPIUM BROMIDE INHALATION SPRAY 5 MCG: 3.12 SPRAY, METERED RESPIRATORY (INHALATION) at 08:07

## 2021-01-01 RX ADMIN — Medication 325 MG: at 04:32

## 2021-01-01 RX ADMIN — ENOXAPARIN SODIUM 40 MG: 40 INJECTION SUBCUTANEOUS at 06:25

## 2021-01-01 RX ADMIN — ATORVASTATIN CALCIUM 40 MG: 40 TABLET, FILM COATED ORAL at 18:20

## 2021-01-01 RX ADMIN — TIOTROPIUM BROMIDE INHALATION SPRAY 5 MCG: 3.12 SPRAY, METERED RESPIRATORY (INHALATION) at 10:19

## 2021-01-01 RX ADMIN — DOCUSATE SODIUM 50 MG AND SENNOSIDES 8.6 MG 2 TABLET: 8.6; 5 TABLET, FILM COATED ORAL at 17:45

## 2021-01-01 RX ADMIN — BUDESONIDE AND FORMOTEROL FUMARATE DIHYDRATE 2 PUFF: 160; 4.5 AEROSOL RESPIRATORY (INHALATION) at 21:37

## 2021-01-01 RX ADMIN — INSULIN HUMAN 2 UNITS: 100 INJECTION, SOLUTION PARENTERAL at 12:28

## 2021-01-01 RX ADMIN — DOCUSATE SODIUM 50 MG AND SENNOSIDES 8.6 MG 2 TABLET: 8.6; 5 TABLET, FILM COATED ORAL at 04:55

## 2021-01-01 RX ADMIN — INSULIN HUMAN 2 UNITS: 100 INJECTION, SOLUTION PARENTERAL at 11:36

## 2021-01-01 RX ADMIN — ENOXAPARIN SODIUM 40 MG: 40 INJECTION SUBCUTANEOUS at 17:45

## 2021-01-01 RX ADMIN — ATORVASTATIN CALCIUM 40 MG: 40 TABLET, FILM COATED ORAL at 18:40

## 2021-01-01 RX ADMIN — BUDESONIDE AND FORMOTEROL FUMARATE DIHYDRATE 2 PUFF: 160; 4.5 AEROSOL RESPIRATORY (INHALATION) at 19:43

## 2021-01-01 RX ADMIN — TIOTROPIUM BROMIDE INHALATION SPRAY 5 MCG: 3.12 SPRAY, METERED RESPIRATORY (INHALATION) at 10:49

## 2021-01-01 RX ADMIN — DOCUSATE SODIUM 50 MG AND SENNOSIDES 8.6 MG 2 TABLET: 8.6; 5 TABLET, FILM COATED ORAL at 10:50

## 2021-01-01 RX ADMIN — MIDODRINE HYDROCHLORIDE 5 MG: 5 TABLET ORAL at 07:42

## 2021-01-01 RX ADMIN — INSULIN HUMAN 9 UNITS: 100 INJECTION, SOLUTION PARENTERAL at 12:41

## 2021-01-01 RX ADMIN — HEPARIN SODIUM 2000 UNITS: 1000 INJECTION, SOLUTION INTRAVENOUS; SUBCUTANEOUS at 15:21

## 2021-01-01 RX ADMIN — BUDESONIDE AND FORMOTEROL FUMARATE DIHYDRATE 2 PUFF: 160; 4.5 AEROSOL RESPIRATORY (INHALATION) at 10:19

## 2021-01-01 RX ADMIN — ACETAMINOPHEN 650 MG: 325 TABLET, FILM COATED ORAL at 19:43

## 2021-01-01 RX ADMIN — ASPIRIN 81 MG: 81 TABLET, COATED ORAL at 05:07

## 2021-01-01 RX ADMIN — INSULIN HUMAN 9 UNITS: 100 INJECTION, SOLUTION PARENTERAL at 23:55

## 2021-01-01 RX ADMIN — MAGNESIUM SULFATE IN WATER 4 G: 40 INJECTION, SOLUTION INTRAVENOUS at 10:47

## 2021-01-01 RX ADMIN — DOCUSATE SODIUM 50 MG AND SENNOSIDES 8.6 MG 2 TABLET: 8.6; 5 TABLET, FILM COATED ORAL at 17:27

## 2021-01-01 RX ADMIN — SODIUM CHLORIDE: 9 INJECTION, SOLUTION INTRAVENOUS at 06:00

## 2021-01-01 RX ADMIN — GLYCOPYRROLATE 1 CAPSULE: 15.6 CAPSULE RESPIRATORY (INHALATION) at 20:04

## 2021-01-01 RX ADMIN — MIDODRINE HYDROCHLORIDE 2.5 MG: 5 TABLET ORAL at 09:45

## 2021-01-01 RX ADMIN — ATORVASTATIN CALCIUM 40 MG: 40 TABLET, FILM COATED ORAL at 17:25

## 2021-01-01 RX ADMIN — INSULIN HUMAN 2 UNITS: 100 INJECTION, SOLUTION PARENTERAL at 17:43

## 2021-01-01 RX ADMIN — IPRATROPIUM BROMIDE AND ALBUTEROL SULFATE 3 ML: .5; 3 SOLUTION RESPIRATORY (INHALATION) at 14:11

## 2021-01-01 RX ADMIN — FUROSEMIDE 40 MG: 40 TABLET ORAL at 09:45

## 2021-01-01 RX ADMIN — DOCUSATE SODIUM 50 MG AND SENNOSIDES 8.6 MG 2 TABLET: 8.6; 5 TABLET, FILM COATED ORAL at 17:25

## 2021-01-01 RX ADMIN — DOCUSATE SODIUM 50 MG AND SENNOSIDES 8.6 MG 2 TABLET: 8.6; 5 TABLET, FILM COATED ORAL at 17:40

## 2021-01-01 RX ADMIN — HEPARIN SODIUM 18 UNITS/KG/HR: 5000 INJECTION, SOLUTION INTRAVENOUS at 06:59

## 2021-01-01 RX ADMIN — ASPIRIN 81 MG: 81 TABLET, COATED ORAL at 05:27

## 2021-01-01 RX ADMIN — INSULIN HUMAN 3 UNITS: 100 INJECTION, SOLUTION PARENTERAL at 17:23

## 2021-01-01 RX ADMIN — ATORVASTATIN CALCIUM 40 MG: 40 TABLET, FILM COATED ORAL at 17:26

## 2021-01-01 RX ADMIN — INSULIN HUMAN 2 UNITS: 100 INJECTION, SOLUTION PARENTERAL at 17:29

## 2021-01-01 RX ADMIN — IOHEXOL 65 ML: 350 INJECTION, SOLUTION INTRAVENOUS at 16:45

## 2021-01-01 RX ADMIN — SODIUM CHLORIDE, POTASSIUM CHLORIDE, SODIUM LACTATE AND CALCIUM CHLORIDE: 600; 310; 30; 20 INJECTION, SOLUTION INTRAVENOUS at 06:40

## 2021-01-01 RX ADMIN — LOSARTAN POTASSIUM 25 MG: 25 TABLET, FILM COATED ORAL at 15:08

## 2021-01-01 RX ADMIN — INSULIN HUMAN 1 UNITS: 100 INJECTION, SOLUTION PARENTERAL at 20:11

## 2021-01-01 RX ADMIN — ASPIRIN 81 MG: 81 TABLET, COATED ORAL at 06:13

## 2021-01-01 RX ADMIN — ASPIRIN 81 MG: 81 TABLET, COATED ORAL at 06:25

## 2021-01-01 RX ADMIN — BUDESONIDE AND FORMOTEROL FUMARATE DIHYDRATE 2 PUFF: 160; 4.5 AEROSOL RESPIRATORY (INHALATION) at 08:07

## 2021-01-01 RX ADMIN — DOCUSATE SODIUM 50 MG AND SENNOSIDES 8.6 MG 2 TABLET: 8.6; 5 TABLET, FILM COATED ORAL at 04:33

## 2021-01-01 RX ADMIN — INSULIN HUMAN 5 UNITS: 100 INJECTION, SOLUTION PARENTERAL at 19:42

## 2021-01-01 RX ADMIN — INSULIN HUMAN 9 UNITS: 100 INJECTION, SOLUTION PARENTERAL at 12:08

## 2021-01-01 RX ADMIN — ASPIRIN 81 MG: 81 TABLET, COATED ORAL at 06:00

## 2021-01-01 RX ADMIN — IPRATROPIUM BROMIDE AND ALBUTEROL SULFATE 3 ML: .5; 3 SOLUTION RESPIRATORY (INHALATION) at 19:56

## 2021-01-01 RX ADMIN — GLYCOPYRROLATE 1 CAPSULE: 15.6 CAPSULE RESPIRATORY (INHALATION) at 21:10

## 2021-01-01 RX ADMIN — HEPARIN SODIUM 12 UNITS/KG/HR: 5000 INJECTION, SOLUTION INTRAVENOUS at 07:45

## 2021-01-01 RX ADMIN — MIDODRINE HYDROCHLORIDE 5 MG: 5 TABLET ORAL at 11:54

## 2021-01-01 RX ADMIN — HEPARIN SODIUM 2000 UNITS: 1000 INJECTION, SOLUTION INTRAVENOUS; SUBCUTANEOUS at 05:46

## 2021-01-01 SDOH — ECONOMIC STABILITY: TRANSPORTATION INSECURITY
IN THE PAST 12 MONTHS, HAS THE LACK OF TRANSPORTATION KEPT YOU FROM MEDICAL APPOINTMENTS OR FROM GETTING MEDICATIONS?: YES

## 2021-01-01 SDOH — ECONOMIC STABILITY: INCOME INSECURITY: HOW HARD IS IT FOR YOU TO PAY FOR THE VERY BASICS LIKE FOOD, HOUSING, MEDICAL CARE, AND HEATING?: SOMEWHAT HARD

## 2021-01-01 SDOH — ECONOMIC STABILITY: FOOD INSECURITY: WITHIN THE PAST 12 MONTHS, THE FOOD YOU BOUGHT JUST DIDN'T LAST AND YOU DIDN'T HAVE MONEY TO GET MORE.: NEVER TRUE

## 2021-01-01 SDOH — ECONOMIC STABILITY: FOOD INSECURITY: WITHIN THE PAST 12 MONTHS, YOU WORRIED THAT YOUR FOOD WOULD RUN OUT BEFORE YOU GOT MONEY TO BUY MORE.: SOMETIMES TRUE

## 2021-01-01 ASSESSMENT — PAIN DESCRIPTION - PAIN TYPE
TYPE: ACUTE PAIN

## 2021-01-01 ASSESSMENT — 6 MINUTE WALK TEST (6MWT)
COMMENTS: ON 2LPM
PERCEIVED FATIGUE AT 2 MIN: 0
PERCEIVED FATIGUE AT 6 MIN: 0
COMMENTS: ON 4LPM
PERCEIVED FATIGUE AT 3 MIN: 0
HEART RATE AT 3 MIN: 88
NUMBER OF RESTS: 0
HEART RATE: 71
SAO2 AT 2 MIN: 86
HEART RATE AT 2 MIN: 91
SAO2 AT 5 MIN: 90
SAO2 AT 6 MIN: 88
HEART RATE AT 4 MIN: 87
BLOOD PRESSURE AT 1 MIN: 120/60
HEART RATE AT 1 MIN: 81
O2 SATURATION: CONTINUOUS
COMMENTS: ON 4LPM
O2 SAT PERCENT ON O2: 98
PERCEIVED FATIGUE AT 1 MIN: 0
O2 FLOW RATE - LITERS PER MIN: 2
PERCEIVED FATIGUE AT 2 MIN: 0
SAO2 AT 3 MIN: 94
BLOOD PRESSURE: RIGHT ARM
PERCEIVED BREATHLESSNESS AT 5 MIN: 0
PERCEIVED BREATHLESSNESS AT 4 MIN: 0
SAO2 AT 1 MIN: 85
COMMENTS: ON 4LPM
HEART RATE AT 2 MIN: 95
COMMENTS: ON 4LPM
HEART RATE AT 1 MIN: 99
PERCEIVED BREATHLESSNESS AT 3 MIN: 0
PERCEIVED BREATHLESSNESS AT 6 MIN: 1
PERCEIVED BREATHLESSNESS AT 1 MIN: 0
SAO2 AT 4 MIN: 90
SITTING BLOOD PRESSURE: 126/62
PERCEIVED BREATHLESSNESS AT 2 MIN: 0
PERCEIVED FATIGUE AT 5 MIN: 0
PERCENT OF NORMAL WALKED: 62
COMMENTS: ON 4LPM
COMMENTS: ON 4LPM
HEART RATE AT 5 MIN: 95
PERCEIVED BREATHLESSNESS AT 1 MIN: 1
PERCEIVED BREATHLESSNESS AT 2 MIN: 0
PERCEIVED FATIGUE AT 1 MIN: 0
SAO2 AT 2 MIN: 94
COMMENTS: ON 2LPM
HEART RATE AT 6 MIN: 103
PERCEIVED FATIGUE AT 4 MIN: 0
SAO2 AT 1 MIN: 91

## 2021-01-01 ASSESSMENT — PAIN DESCRIPTION - DESCRIPTORS: DESCRIPTORS: ACHING

## 2021-01-01 ASSESSMENT — COGNITIVE AND FUNCTIONAL STATUS - GENERAL
WALKING IN HOSPITAL ROOM: A LITTLE
HELP NEEDED FOR BATHING: A LITTLE
SUGGESTED CMS G CODE MODIFIER MOBILITY: CH
MOBILITY SCORE: 20
SUGGESTED CMS G CODE MODIFIER MOBILITY: CJ
TOILETING: A LITTLE
SUGGESTED CMS G CODE MODIFIER DAILY ACTIVITY: CJ
CLIMB 3 TO 5 STEPS WITH RAILING: A LOT
STANDING UP FROM CHAIR USING ARMS: A LITTLE
SUGGESTED CMS G CODE MODIFIER MOBILITY: CJ
HELP NEEDED FOR BATHING: A LITTLE
SUGGESTED CMS G CODE MODIFIER DAILY ACTIVITY: CJ
MOBILITY SCORE: 22
DAILY ACTIVITIY SCORE: 22
WALKING IN HOSPITAL ROOM: A LITTLE
CLIMB 3 TO 5 STEPS WITH RAILING: A LITTLE
TOILETING: A LITTLE
DAILY ACTIVITIY SCORE: 22
MOBILITY SCORE: 24

## 2021-01-01 ASSESSMENT — ENCOUNTER SYMPTOMS
MYALGIAS: 0
EYES NEGATIVE: 1
WHEEZING: 0
FALLS: 0
PSYCHIATRIC NEGATIVE: 1
SHORTNESS OF BREATH: 1
LOSS OF CONSCIOUSNESS: 1
FEVER: 0
CHILLS: 0
EYE PAIN: 0
NEUROLOGICAL NEGATIVE: 1
PALPITATIONS: 0
ORTHOPNEA: 0
HEADACHES: 0
MYALGIAS: 0
FEVER: 0
SHORTNESS OF BREATH: 1
SPUTUM PRODUCTION: 0
SHORTNESS OF BREATH: 1
ABDOMINAL PAIN: 0
CLAUDICATION: 0
SPUTUM PRODUCTION: 0
RESPIRATORY NEGATIVE: 1
ABDOMINAL PAIN: 0
DEPRESSION: 0
CHILLS: 0
DIAPHORESIS: 0
ORTHOPNEA: 0
FOCAL WEAKNESS: 0
FALLS: 0
NAUSEA: 0
SEIZURES: 0
WEAKNESS: 0
NAUSEA: 0
HEMOPTYSIS: 0
FEVER: 0
SHORTNESS OF BREATH: 0
DOUBLE VISION: 0
EYES NEGATIVE: 1
DOUBLE VISION: 0
FEVER: 0
SHORTNESS OF BREATH: 0
CHILLS: 0
CHILLS: 0
DIZZINESS: 0
PALPITATIONS: 0
HEADACHES: 0
DIZZINESS: 0
ABDOMINAL PAIN: 0
LOSS OF CONSCIOUSNESS: 1
DEPRESSION: 0
VOMITING: 0
SHORTNESS OF BREATH: 1
DIZZINESS: 0
CHILLS: 0
DIAPHORESIS: 0
DIARRHEA: 0
GASTROINTESTINAL NEGATIVE: 1
DIZZINESS: 0
STRIDOR: 0
FALLS: 0
VOMITING: 0
COUGH: 0
CHEST TIGHTNESS: 0
VOMITING: 0
COUGH: 0
TREMORS: 0
WEAKNESS: 0
DIAPHORESIS: 0
ABDOMINAL PAIN: 0
SPEECH CHANGE: 0
COUGH: 1
PSYCHIATRIC NEGATIVE: 1
WEIGHT LOSS: 0
BACK PAIN: 0
NAUSEA: 0
CHOKING: 0
MYALGIAS: 1
FALLS: 0
BLURRED VISION: 0
WHEEZING: 0
COUGH: 0
LOSS OF CONSCIOUSNESS: 1
FEVER: 0
CARDIOVASCULAR NEGATIVE: 1
CHOKING: 0
WEIGHT LOSS: 0
BLURRED VISION: 0
STRIDOR: 0
ABDOMINAL PAIN: 0
DIZZINESS: 1
APNEA: 0
APNEA: 0
CHEST TIGHTNESS: 0
GASTROINTESTINAL NEGATIVE: 1
MYALGIAS: 1
SHORTNESS OF BREATH: 1
DIAPHORESIS: 0
COUGH: 0
WHEEZING: 1
RESPIRATORY NEGATIVE: 1
PALPITATIONS: 0
EYE PAIN: 0
CONSTITUTIONAL NEGATIVE: 1
FEVER: 0
MUSCULOSKELETAL NEGATIVE: 1
HEMOPTYSIS: 0
PALPITATIONS: 0
ABDOMINAL PAIN: 0
BRUISES/BLEEDS EASILY: 0
FALLS: 1
MYALGIAS: 0
COUGH: 0
WEAKNESS: 1
SHORTNESS OF BREATH: 1
CONSTITUTIONAL NEGATIVE: 1
SHORTNESS OF BREATH: 0
NECK PAIN: 0
VOMITING: 0
FALLS: 1
SORE THROAT: 0
WEAKNESS: 1
SPEECH CHANGE: 0
BACK PAIN: 0
FEVER: 0
NECK PAIN: 0
EYE REDNESS: 0
SENSORY CHANGE: 0
HEARTBURN: 0
RESPIRATORY NEGATIVE: 1
SHORTNESS OF BREATH: 1
GASTROINTESTINAL NEGATIVE: 1
POLYDIPSIA: 0
COUGH: 0
COUGH: 1
HEADACHES: 0
BLURRED VISION: 0
BRUISES/BLEEDS EASILY: 0
DIZZINESS: 0
DIZZINESS: 1
EYE DISCHARGE: 0
SENSORY CHANGE: 0
EYES NEGATIVE: 1
HEARTBURN: 0
MEMORY LOSS: 0
SINUS PAIN: 0
CONSTIPATION: 0
CONSTIPATION: 0
COUGH: 0
ABDOMINAL PAIN: 0
MYALGIAS: 0
FEVER: 0
FEVER: 0
HALLUCINATIONS: 0
BRUISES/BLEEDS EASILY: 0
PND: 0
CHILLS: 0
PHOTOPHOBIA: 0
BLURRED VISION: 0
MEMORY LOSS: 0
HEADACHES: 0
DEPRESSION: 0
HEMOPTYSIS: 0
NAUSEA: 0
EYE DISCHARGE: 0
VOMITING: 0
DOUBLE VISION: 0
PSYCHIATRIC NEGATIVE: 1
HEADACHES: 0
WHEEZING: 0
SORE THROAT: 0
NAUSEA: 0
STRIDOR: 0
LOSS OF CONSCIOUSNESS: 1
STRIDOR: 0
PALPITATIONS: 0
SHORTNESS OF BREATH: 0
CONSTITUTIONAL NEGATIVE: 1
DEPRESSION: 0
FALLS: 0
FLANK PAIN: 0
DOUBLE VISION: 0
FOCAL WEAKNESS: 0

## 2021-01-01 ASSESSMENT — LIFESTYLE VARIABLES
HOW MANY TIMES IN THE PAST YEAR HAVE YOU HAD 5 OR MORE DRINKS IN A DAY: 0
HAVE YOU EVER FELT YOU SHOULD CUT DOWN ON YOUR DRINKING: NO
ALCOHOL_USE: NO
ALCOHOL_USE: NO
TOTAL SCORE: 0
ON A TYPICAL DAY WHEN YOU DRINK ALCOHOL HOW MANY DRINKS DO YOU HAVE: 0
HAVE PEOPLE ANNOYED YOU BY CRITICIZING YOUR DRINKING: NO
EVER FELT BAD OR GUILTY ABOUT YOUR DRINKING: NO
HAVE YOU EVER FELT YOU SHOULD CUT DOWN ON YOUR DRINKING: NO
TOTAL SCORE: 0
CONSUMPTION TOTAL: NEGATIVE
HAVE PEOPLE ANNOYED YOU BY CRITICIZING YOUR DRINKING: NO
CONSUMPTION TOTAL: NEGATIVE
AVERAGE NUMBER OF DAYS PER WEEK YOU HAVE A DRINK CONTAINING ALCOHOL: 0
TOTAL SCORE: 0
DO YOU DRINK ALCOHOL: NO
EVER FELT BAD OR GUILTY ABOUT YOUR DRINKING: NO
TOTAL SCORE: 0
HOW MANY TIMES IN THE PAST YEAR HAVE YOU HAD 5 OR MORE DRINKS IN A DAY: 0
EVER HAD A DRINK FIRST THING IN THE MORNING TO STEADY YOUR NERVES TO GET RID OF A HANGOVER: NO
ON A TYPICAL DAY WHEN YOU DRINK ALCOHOL HOW MANY DRINKS DO YOU HAVE: 0
TOTAL SCORE: 0
EVER HAD A DRINK FIRST THING IN THE MORNING TO STEADY YOUR NERVES TO GET RID OF A HANGOVER: NO
SUBSTANCE_ABUSE: 0
TOTAL SCORE: 0
AVERAGE NUMBER OF DAYS PER WEEK YOU HAVE A DRINK CONTAINING ALCOHOL: 0

## 2021-01-01 ASSESSMENT — PULMONARY FUNCTION TESTS
FEV1/FVC_PERCENT_CHANGE: 100
FEV1/FVC: 94.82
FEV1: 1.79
FVC_PERCENT_PREDICTED: 62
FVC_PERCENT_PREDICTED: 60
FEV1: 1.83
FVC_LLN: 2.57
FEV1/FVC_PERCENT_PREDICTED: 78
FEV1/FVC_PERCENT_PREDICTED: 122
FEV1_PERCENT_PREDICTED: 75
FEV1/FVC_PERCENT_PREDICTED: 123
FVC_PREDICTED: 3.08
FEV1/FVC_PERCENT_PREDICTED: 122
FEV1/FVC_PERCENT_PREDICTED: 125
FVC: 1.93
FEV1_PERCENT_CHANGE: 2
FEV1_LLN: 1.99
FEV1/FVC_PERCENT_LLN: 65
FEV1/FVC_PERCENT_CHANGE: 0
FEV1_PERCENT_PREDICTED: 76
FEV1/FVC: 95
FEV1/FVC: 95
FVC: 1.88
FEV1/FVC_PREDICTED: 77
FEV1/FVC: 95
FEV1_PREDICTED: 2.39
FEV1_PERCENT_CHANGE: 2

## 2021-01-01 ASSESSMENT — PATIENT HEALTH QUESTIONNAIRE - PHQ9
2. FEELING DOWN, DEPRESSED, IRRITABLE, OR HOPELESS: NOT AT ALL
SUM OF ALL RESPONSES TO PHQ9 QUESTIONS 1 AND 2: 0
1. LITTLE INTEREST OR PLEASURE IN DOING THINGS: NOT AT ALL
SUM OF ALL RESPONSES TO PHQ9 QUESTIONS 1 AND 2: 0
1. LITTLE INTEREST OR PLEASURE IN DOING THINGS: NOT AT ALL
2. FEELING DOWN, DEPRESSED, IRRITABLE, OR HOPELESS: NOT AT ALL
SUM OF ALL RESPONSES TO PHQ9 QUESTIONS 1 AND 2: 0
1. LITTLE INTEREST OR PLEASURE IN DOING THINGS: NOT AT ALL
SUM OF ALL RESPONSES TO PHQ9 QUESTIONS 1 AND 2: 0
2. FEELING DOWN, DEPRESSED, IRRITABLE, OR HOPELESS: NOT AT ALL
1. LITTLE INTEREST OR PLEASURE IN DOING THINGS: NOT AT ALL

## 2021-01-01 ASSESSMENT — GAIT ASSESSMENTS
ASSISTIVE DEVICE: FRONT WHEEL WALKER
GAIT LEVEL OF ASSIST: SUPERVISED
DEVIATION: OTHER (COMMENT)
GAIT LEVEL OF ASSIST: MODERATE ASSIST
DISTANCE (FEET): 25
DISTANCE (FEET): 5

## 2021-01-01 ASSESSMENT — FIBROSIS 4 INDEX
FIB4 SCORE: 5.36
FIB4 SCORE: 2.01
FIB4 SCORE: 2.01
FIB4 SCORE: 1.97
FIB4 SCORE: 2.29
FIB4 SCORE: 2.01
FIB4 SCORE: 1.97
FIB4 SCORE: 2.01
FIB4 SCORE: 2.01
FIB4 SCORE: 5.36
FIB4 SCORE: 2.29
FIB4 SCORE: 1.17
FIB4 SCORE: 6.23
FIB4 SCORE: 2.29
FIB4 SCORE: 2.01
FIB4 SCORE: 2.01
FIB4 SCORE: 5.89

## 2021-01-01 ASSESSMENT — ACTIVITIES OF DAILY LIVING (ADL): TOILETING: INDEPENDENT

## 2021-01-13 ENCOUNTER — OFFICE VISIT (OUTPATIENT)
Dept: CARDIOLOGY | Facility: MEDICAL CENTER | Age: 71
End: 2021-01-13
Payer: MEDICARE

## 2021-01-13 VITALS
SYSTOLIC BLOOD PRESSURE: 150 MMHG | WEIGHT: 130 LBS | HEIGHT: 63 IN | DIASTOLIC BLOOD PRESSURE: 62 MMHG | OXYGEN SATURATION: 100 % | BODY MASS INDEX: 23.04 KG/M2 | HEART RATE: 62 BPM

## 2021-01-13 DIAGNOSIS — I95.0 IDIOPATHIC HYPOTENSION: ICD-10-CM

## 2021-01-13 DIAGNOSIS — E11.21 CONTROLLED TYPE 2 DIABETES MELLITUS WITH DIABETIC NEPHROPATHY, WITHOUT LONG-TERM CURRENT USE OF INSULIN (HCC): ICD-10-CM

## 2021-01-13 DIAGNOSIS — N18.30 STAGE 3 CHRONIC KIDNEY DISEASE, UNSPECIFIED WHETHER STAGE 3A OR 3B CKD: ICD-10-CM

## 2021-01-13 DIAGNOSIS — J84.9 INTERSTITIAL LUNG DISEASE (HCC): ICD-10-CM

## 2021-01-13 DIAGNOSIS — R00.1 SINUS BRADYCARDIA: ICD-10-CM

## 2021-01-13 DIAGNOSIS — I44.1 MOBITZ TYPE 2 SECOND DEGREE HEART BLOCK: ICD-10-CM

## 2021-01-13 PROCEDURE — 99214 OFFICE O/P EST MOD 30 MIN: CPT | Performed by: NURSE PRACTITIONER

## 2021-01-13 RX ORDER — MIDODRINE HYDROCHLORIDE 2.5 MG/1
2.5 TABLET ORAL
Qty: 90 TAB | Refills: 0 | Status: SHIPPED | OUTPATIENT
Start: 2021-01-13 | End: 2021-01-01 | Stop reason: SDUPTHER

## 2021-01-13 ASSESSMENT — FIBROSIS 4 INDEX: FIB4 SCORE: 1.17

## 2021-01-13 NOTE — PROGRESS NOTES
"Chief Complaint   Patient presents with   • Bradycardia     hospital follow up       Subjective:   Chase Esquivel is a 70 y.o. male patient of Dr. Edgard Hopper who presents today for follow up.     Patient was scheduled to be seen by EP today however there was a mix up during the check in process and he missed his appointment and was rescheduled with me.     Patient was last seen by Christina AGEE on 12/4/2020. He was overall doing well, no significant changes were made to his cardiovascular regimen.     Past medical history significant for diffuse multivessel CAD, bradycardia, second degree type I heart block, orthostatic HOTN (treated with midodrine),  HTN, CKD stage III, DM, interstitial and lung disease.     Today patient declined an interpretor and requested that his son Brendon interpret for him. Patient states that he is feeling well. Has some occasional lightheadedness when he first wakes up but states that it resolves within a few minutes of getting up and walking around. Denies having any syncope or presyncope episodes. He is not sure how his BP has been running at home as he has not been checking it. States that he is overall feeling well.     Past Medical History:   Diagnosis Date   • Diabetes (HCC)    • Pulmonary fibrosis (HCC)     wears 2.5L O2 at home 24/7     Past Surgical History:   Procedure Laterality Date   • OTHER      Kidney stones   • OTHER ABDOMINAL SURGERY      \"from an ulcer that burst\"     No family history on file.  Social History     Socioeconomic History   • Marital status: Single     Spouse name: Not on file   • Number of children: Not on file   • Years of education: Not on file   • Highest education level: Not on file   Occupational History   • Not on file   Social Needs   • Financial resource strain: Hard   • Food insecurity     Worry: Often true     Inability: Often true   • Transportation needs     Medical: No     Non-medical: No   Tobacco Use   • Smoking status: Former Smoker    "  Packs/day: 2.00     Years: 40.00     Pack years: 80.00     Types: Cigarettes     Quit date:      Years since quittin.0   • Smokeless tobacco: Never Used   Substance and Sexual Activity   • Alcohol use: Yes     Frequency: Monthly or less   • Drug use: Never   • Sexual activity: Not on file   Lifestyle   • Physical activity     Days per week: Not on file     Minutes per session: Not on file   • Stress: Not on file   Relationships   • Social connections     Talks on phone: Not on file     Gets together: Not on file     Attends Spiritism service: Not on file     Active member of club or organization: Not on file     Attends meetings of clubs or organizations: Not on file     Relationship status: Not on file   • Intimate partner violence     Fear of current or ex partner: Not on file     Emotionally abused: Not on file     Physically abused: Not on file     Forced sexual activity: Not on file   Other Topics Concern   • Not on file   Social History Narrative   • Not on file     No Known Allergies  Outpatient Encounter Medications as of 2021   Medication Sig Dispense Refill   • midodrine (PROAMATINE) 2.5 MG Tab Take 1 Tab by mouth 3 times a day, with meals. 90 Tab 0   • aspirin 81 MG EC tablet Take 1 Tab by mouth every day. 30 Tab 3   • atorvastatin (LIPITOR) 40 MG Tab Take 1 Tab by mouth every evening. 30 Tab 3   • glipiZIDE (GLUCOTROL) 5 MG Tab Take 2.5 mg by mouth every morning.     • metFORMIN (GLUCOPHAGE) 500 MG Tab Take 500 mg by mouth 2 Times a Day.     • SITagliptin (JANUVIA) 25 MG Tab Take 1 Tab by mouth every day. 30 Tab 3   • tiotropium (SPIRIVA HANDIHALER) 18 MCG Cap Inhale 1 Cap by mouth every day. 30 Cap 3   • albuterol 108 (90 Base) MCG/ACT Aero Soln inhalation aerosol Inhale 2 Puffs by mouth every 6 hours as needed for Shortness of Breath. 8.5 g 0   • [DISCONTINUED] midodrine (PROAMATINE) 5 MG Tab Take 1 Tab by mouth 3 times a day, with meals. 60 Tab 3   • Lancets (ONETOUCH DELICA PLUS  "[id number]) Misc USE TO TEST TWICE DAILY 180 Each 3   • Blood Glucose Meter Kit Test blood sugar as recommended by provider.blood glucose monitoring kit. BID 1 Kit 0   • Blood Glucose Test Strips One test strip  Strip 3     No facility-administered encounter medications on file as of 1/13/2021.      Review of Systems   Constitutional: Negative for malaise/fatigue and weight loss.   Respiratory: Positive for shortness of breath (At baseline).    Cardiovascular: Negative for chest pain, palpitations, orthopnea, claudication, leg swelling and PND.   Neurological: Negative for dizziness and weakness.   All other systems reviewed and are negative.       Objective:   /62 (BP Location: Left arm, Patient Position: Sitting)   Pulse 62   Ht 1.6 m (5' 3\")   Wt 59 kg (130 lb)   SpO2 100%   BMI 23.03 kg/m²     Physical Exam   Constitutional: He is oriented to person, place, and time. He appears well-developed and well-nourished. No distress.   HENT:   Head: Normocephalic.   Eyes: EOM are normal.   Neck: No JVD present.   Cardiovascular: Normal rate and regular rhythm.   Pulmonary/Chest: Effort normal. He has decreased breath sounds.   Abdominal: Soft. There is no abdominal tenderness.   Musculoskeletal:         General: No edema.   Neurological: He is alert and oriented to person, place, and time.   Skin: Skin is warm and dry.   Psychiatric: He has a normal mood and affect. His behavior is normal.        Cardiac Catheterization 11/4/2020:     ANGIOGRAM:  1. Left main coronary artery:  Left main coronary artery is a moderate-length small-caliber vessel with luminal irregularities of 10-20%.  2. Left anterior descending artery:  Left anterior descending artery is a long small-caliber vessel, which is diffusely stenosed, proximal portion being 60-70%.  There are small-caliber bifurcating first diagonal and small-caliber second diagonal branch noted with diffuse stenosis of 50-60%.  3. Left circumflex artery:  Left " circumflex artery is a nondominant moderate-caliber vessel with concentric 70-80% in the midportion.  There is a moderate-caliber first obtuse marginal branch originating at the area of the stenosis, the ostial proximal concentric 70% stenosis and a small distal obtuse marginal branch noted free of disease.  4. Right coronary artery:  Right coronary artery is a dominantsmall-caliber   vessel with proximal concentric 90%, mid diffuse 80%, distal concentric 90% and small-caliber posterior descending artery noted free of disease.    Small-caliber posterolateral branch with mid concentric 90%.     IMPRESSION:  1. Diffuse coronary artery disease.  2. Reduced left ventricular systolic function with ejection fraction of 30%,   diaphragmatic hypokinesis.  3. Elevated left ventricular end-diastolic pressure.     RECOMMENDATIONS:  Recommend medical therapy for coronary artery disease, aggressive hypertensive management and consider alternative causes for recurrent syncope.    Echocardiogram 11/5/2020:      Assessment:     1. Stage 3 chronic kidney disease, unspecified whether stage 3a or 3b CKD     2. Sinus bradycardia     3. Mobitz type 2 second degree heart block     4. Idiopathic hypotension     5. Controlled type 2 diabetes mellitus with diabetic nephropathy, without long-term current use of insulin (HCC)     6. Interstitial lung disease (HCC)         Medical Decision Making:  Today's Assessment / Status / Plan:   1. CAD, diffuse multivessel observed on recent angiogram:  -Denies angina.   -LDL near goal (74) (statin therapy started in November of 2019).  -Continue aspirin 81 mg daily and atorvastatin 40 mg daily  -TTE showed preserved LVEF and no WMA.      2.  Bradycardia/junctional rhythm:  -Biotel showed some nocturnal bradycardia and second degree type I HB.  -Confirmed with Lana Woodward with EP that there are currently no indications for PPM placement.  -Offered OPO, patient will discuss with pulmonary.    -Patient  asked to call office immediately should he have another syncopal or near syncopal episode.      3.  Orthostatic hypotension: Hx HTN:  -Resolved, no syncopal episodes since last admission.   -TSH and cortisol levels were normal.  Renin/aldosterone were normal.  -BP elevated in office today, will decrease Midodrine to 2.5 mg TID from 5 mg TID.  -Patient asked to start checking his BP once daily and log for follow up visit with Dr. Hopper as below.      4. Diabetes:  -Continue current regimen, followed by PCP  -Consider SGLT2 inhibitor for cardiovascular risk reductions in the future.      5. Interstitial Lung disease:  -Continue follow up and testing with Pulmonary, PFT's scheduled as below.     6. Stage III CKD:  -CTM renal function closely and renal dose medications.     Patient will follow up with Dr. Edgard Hopper as scheduled below or earlier if needed. Encouraged patient to contact our office should any questions or concerns arise in the mean time. Both patient and his son understand and agree with the plan of care.     Future Appointments   Date Time Provider Department Center   1/29/2021  4:30 PM Celina Hopper M.D. [facility] None   2/17/2021  9:00 AM PFT-[id number] [facility] None   2/17/2021 10:45 AM SPIROMETRY/6MW [facility] None   2/18/2021  9:50 AM Chyna Campuzano M.D. [facility] None

## 2021-01-13 NOTE — PATIENT INSTRUCTIONS
BP goal is 120-140/60-80.  HR should be between 60-90/100.  Check blood pressure once per day 4-5 days/week.

## 2021-01-14 PROBLEM — I95.0 IDIOPATHIC HYPOTENSION: Status: ACTIVE | Noted: 2021-01-14

## 2021-01-14 PROBLEM — E11.9 DM II (DIABETES MELLITUS, TYPE II), CONTROLLED (HCC): Status: ACTIVE | Noted: 2021-01-14

## 2021-01-14 PROBLEM — I50.9 ACUTE HEART FAILURE (HCC): Status: RESOLVED | Noted: 2020-05-23 | Resolved: 2021-01-14

## 2021-01-14 PROBLEM — I44.1 MOBITZ TYPE 2 SECOND DEGREE HEART BLOCK: Status: ACTIVE | Noted: 2021-01-14

## 2021-01-14 ASSESSMENT — ENCOUNTER SYMPTOMS
CLAUDICATION: 0
DIZZINESS: 0
WEAKNESS: 0
PND: 0
ORTHOPNEA: 0
WEIGHT LOSS: 0
SHORTNESS OF BREATH: 1
PALPITATIONS: 0

## 2021-01-15 DIAGNOSIS — Z23 NEED FOR VACCINATION: ICD-10-CM

## 2021-01-19 PROBLEM — R55 SYNCOPE: Status: ACTIVE | Noted: 2021-01-01

## 2021-01-19 PROBLEM — E83.42 HYPOMAGNESEMIA: Status: ACTIVE | Noted: 2021-01-01

## 2021-01-19 NOTE — ASSESSMENT & PLAN NOTE
-Hemoglobin A1c was 6.9 on 11/4/2020.   -Patient takes Metformin and Januvia at home.    -Continue sliding scale insulin with Accu-Cheks and hypoglycemia protocol for now.  Monitor

## 2021-01-19 NOTE — DISCHARGE PLANNING
"  Assessment copied from last admission. Patient is on service with [facility] for home oxygen        LSW met with pt at bedside and confirmed information on face sheet.     Pt uses home O2 and baseline is 3L. Pt denies using any other DME. PCP is Rashmi Cedeno and has an appt on 11/10. Pt is also followed by community Health Worker, Pamela Granados who will be providing assistance to pt upon d/c. Pt lives with his son and son's family. Pt's son Juan Jose, drives him to appointments. Pt reports some difficulty with medication management and states \"no le ajustan (he runs out of medicine)\".   Care Transition Team Assessment     Information Source  Orientation : Oriented x 4  Information Given By: (Chart review and pt)  Informant's Name: (Chart Review)  Who is responsible for making decisions for patient? : Patient           Elopement Risk  Legal Hold: No  Ambulatory or Self Mobile in Wheelchair: Yes  Disoriented: No  Psychiatric Symptoms: None  History of Wandering: No  Elopement this Admit: No  Vocalizing Wanting to Leave: No  Displays Behaviors, Body Language Wanting to Leave: No-Not at Risk for Elopement  Elopement Risk: Not at Risk for Elopement     Interdisciplinary Discharge Planning  Patient or legal guardian wants to designate a caregiver: No     Discharge Preparedness  What is your plan after discharge?: Uncertain - pending medical team collaboration  What are your discharge supports?: Child  Prior Functional Level: Ambulatory, Independent with Activities of Daily Living, Needs Assist with Medication Management  Difficulity with ADLs: None  Difficulity with IADLs: Managing medication  Difficulity with IADL Comments: Pt states that he runs out of his medication and needs help     Functional Assesment  Prior Functional Level: Ambulatory, Independent with Activities of Daily Living, Needs Assist with Medication Management     Finances  Financial Barriers to Discharge: No  Prescription Coverage: Yes     Vision / " Hearing Impairment  Vision Impairment : Yes  Right Eye Vision: Impaired, Wears Glasses  Left Eye Vision: Impaired, Wears Glasses  Hearing Impairment : No           Advance Directive  Advance Directive?: None     Domestic Abuse  Have you ever been the victim of abuse or violence?: No  Physical Abuse or Sexual Abuse: No  Verbal Abuse or Emotional Abuse: No  Possible Abuse/Neglect Reported to:: Not Applicable     Psychological Assessment  History of Substance Abuse: None  History of Psychiatric Problems: No  Non-compliant with Treatment: No  Newly Diagnosed Illness: No     Discharge Risks or Barriers  Discharge risks or barriers?: No

## 2021-01-19 NOTE — PROGRESS NOTES
Patient up to rr with SBA.   Orthostatic BP completed upon return to his room. Informed NP, Mariam, of results.

## 2021-01-19 NOTE — CARE PLAN
Problem: Safety  Goal: Will remain free from injury  Outcome: PROGRESSING AS EXPECTED  Goal: Will remain free from falls  Outcome: PROGRESSING AS EXPECTED     Problem: Respiratory:  Goal: Respiratory status will improve  Outcome: PROGRESSING AS EXPECTED     Problem: Mobility  Goal: Risk for activity intolerance will decrease  Outcome: PROGRESSING AS EXPECTED

## 2021-01-19 NOTE — ED NOTES
Med Rec completed per patient's family and home pharmacy   Allergies reviewed  No ORAL antibiotics in last 14 days

## 2021-01-19 NOTE — ED NOTES
Assumed pt care. erp to bedside and c collar removed. Pt updated to poc and admission. covid swab collected and sent.

## 2021-01-19 NOTE — H&P
Hospital Medicine History & Physical Note    Date of Service  1/19/2021    Primary Care Physician  Rashmi Cedeno P.A.-C.    Consultants  N/A    Code Status  Full Code    Chief Complaint  Chief Complaint   Patient presents with   • T-5000 GLF   • Neck Pain   • ALOC       History of Presenting Illness  70 y.o. Wolof-only speaking male who presented 1/19/2021 with syncopal episode.  Per report patient has a history of interstitial lung disease (uses 3LPM nasal cannula),  diabetes, orthostatic hypotension, CAD (cardiac cath in 11/2020).  He has had multiple admissions for syncopal episodes, and was recently started on midodrine. He reportedly went to the bathroom without his oxygen, by the time he came out he felt dizzy and had a syncopal episode.     On admission, troponin was 161.  Blood glucose was 130.  Chest x-ray showed pulmonary edema/infiltrates which were present in previous exam.  CT head was unremarkable.  CT C-spine showed multilevel degenerative changes without acute trauma/injury.  CT T-spine showed no acute traumatic injury, and reported bilateral lower lobe infiltrates.    Echocardiogram from today shows LVEF of about 60% with normal right ventricular size and systolic function.    Review of Systems  Review of Systems   Constitutional: Negative.    HENT: Negative.    Eyes: Negative.    Respiratory: Negative.    Cardiovascular: Negative.    Gastrointestinal: Negative.    Genitourinary: Negative.    Musculoskeletal: Negative.    Skin: Negative.    Neurological: Negative.    Endo/Heme/Allergies: Negative.    Psychiatric/Behavioral: Negative.        Past Medical History   has a past medical history of Diabetes (HCC) and Pulmonary fibrosis (HCC).    Surgical History   has a past surgical history that includes other abdominal surgery and other.     Family History  family history includes Diabetes in his brother, father, and mother.     Social History   reports that he quit smoking about 26 years ago. His  smoking use included cigarettes. He has a 80.00 pack-year smoking history. He has never used smokeless tobacco. He reports current alcohol use. He reports that he does not use drugs.    Allergies  No Known Allergies    Medications  Prior to Admission Medications   Prescriptions Last Dose Informant Patient Reported? Taking?   Blood Glucose Meter Kit NOT TAKIN at NOT TAKING Patient's Home Pharmacy No No   Sig: Test blood sugar as recommended by provider.blood glucose monitoring kit. BID   Patient not taking: Reported on 1/19/2021   Blood Glucose Test Strips NOT TAKING at NOT TAKING Patient's Home Pharmacy No No   Sig: One test strip BID   Patient not taking: Reported on 1/19/2021   Lancets (ONETOUCH DELICA PLUS [id number]) Misc NOT TAKING at NOT TAKING Patient's Home Pharmacy No No   Sig: USE TO TEST TWICE DAILY   Patient not taking: Reported on 1/19/2021   SITagliptin (JANUVIA) 25 MG Tab 1/18/2021 at AM Patient's Home Pharmacy No No   Sig: Take 1 Tab by mouth every day.   albuterol 108 (90 Base) MCG/ACT Aero Soln inhalation aerosol 1/18/2021 at K Patient's Home Pharmacy No No   Sig: Inhale 2 Puffs by mouth every 6 hours as needed for Shortness of Breath.   aspirin 81 MG EC tablet 1/18/2021 at AM Patient's Home Pharmacy No No   Sig: Take 1 Tab by mouth every day.   atorvastatin (LIPITOR) 40 MG Tab 1/18/2021 at PM Patient's Home Pharmacy No No   Sig: Take 1 Tab by mouth every evening.   budesonide-formoterol (SYMBICORT) 160-4.5 MCG/ACT Aerosol 1/18/2021 at PM Patient's Home Pharmacy Yes Yes   Sig: Inhale 2 Puffs 2 Times a Day.   glipiZIDE (GLUCOTROL) 5 MG Tab 1/18/2021 at AM Patient's Home Pharmacy Yes No   Sig: Take 2.5 mg by mouth every morning.   metformin (GLUCOPHAGE) 1000 MG tablet 1/18/2021 at PM Patient's Home Pharmacy Yes No   Sig: Take 1,000 mg by mouth 2 Times a Day.   midodrine (PROAMATINE) 2.5 MG Tab NOT TAKING at NOT TAKING Patient's Home Pharmacy No No   Sig: Take 1 Tab by mouth 3 times a day, with  meals.   Patient not taking: Reported on 1/19/2021   midodrine (PROAMATINE) 5 MG Tab 1/18/2021 at PM Patient's Home Pharmacy Yes Yes   Sig: Take 5 mg by mouth 3 times a day.   tiotropium (SPIRIVA HANDIHALER) 18 MCG Cap 1/18/2021 at UNK Patient's Home Pharmacy No No   Sig: Inhale 1 Cap by mouth every day.      Facility-Administered Medications: None       Physical Exam  Temp:  [35.9 °C (96.7 °F)-36.9 °C (98.4 °F)] 36.9 °C (98.4 °F)  Pulse:  [59-86] 70  Resp:  [12-22] 20  BP: ()/(33-72) 132/59  SpO2:  [93 %-100 %] 96 %    Physical Exam  Constitutional:       General: He is not in acute distress.     Appearance: Normal appearance. He is not ill-appearing.   HENT:      Head: Normocephalic and atraumatic.      Nose: Nose normal.      Mouth/Throat:      Mouth: Mucous membranes are moist.   Eyes:      Extraocular Movements: Extraocular movements intact.      Pupils: Pupils are equal, round, and reactive to light.   Neck:      Musculoskeletal: Normal range of motion.   Cardiovascular:      Rate and Rhythm: Normal rate.   Abdominal:      Palpations: Abdomen is soft.   Musculoskeletal:      Right lower leg: No edema.      Left lower leg: No edema.   Skin:     General: Skin is warm.   Neurological:      General: No focal deficit present.      Mental Status: He is alert.   Psychiatric:         Mood and Affect: Mood normal.         Laboratory:  Recent Labs     01/19/21  0545   WBC 10.3   RBC 3.16*   HEMOGLOBIN 9.7*   HEMATOCRIT 30.1*   MCV 95.3   MCH 30.7   MCHC 32.2*   RDW 50.6*   PLATELETCT 135*   MPV 13.1*     Recent Labs     01/19/21  0545   SODIUM 137   POTASSIUM 3.9   CHLORIDE 105   CO2 19*   GLUCOSE 121*   BUN 25*   CREATININE 0.96   CALCIUM 8.0*     Recent Labs     01/19/21  0545   ALTSGPT 33   ASTSGOT 25   ALKPHOSPHAT 66   TBILIRUBIN 0.5   GLUCOSE 121*         No results for input(s): NTPROBNP in the last 72 hours.      Recent Labs     01/19/21  0545 01/19/21  0800 01/19/21  1131   TROPONINT 161* 161* 185*        Imaging:  EC-ECHOCARDIOGRAM COMPLETE W/O CONT   Final Result      CT-TSPINE W/O PLUS RECONS   Final Result         1.  No acute traumatic bony injury of the thoracic spine.   2.  Small bilateral pleural effusions.   3.  Bilateral lower lobe infiltrates   4.  Atherosclerosis      CT-HEAD W/O   Final Result         1.  No acute intracranial abnormality is identified, there are nonspecific white matter changes, commonly associated with small vessel ischemic disease.  Associated mild cerebral atrophy is noted.   2.  Atherosclerosis.      CT-CSPINE WITHOUT PLUS RECONS   Final Result         1.  Multilevel degenerative changes of the cervical spine limit diagnostic sensitivity of this examination, otherwise no acute traumatic bony injury of the cervical spine is apparent.   2.  Atherosclerosis      DX-CHEST-PORTABLE (1 VIEW)   Final Result         1.  Pulmonary edema and/or infiltrates are identified, which are stable since the prior exam.   2.  Cardiomegaly            Assessment/Plan:  I anticipate this patient is appropriate for observation status at this time.    * Syncope  Assessment & Plan  History of recurrent syncopal episodes. Prior work up revealed orthostatic hypotension, patient is on midodrine.  Patient also uses 3 LPM nasal cannula oxygen, he had gone to the bathroom without oxygen, after which his symptoms occurred.  Troponin was elevated, we will follow troponin levels and monitor on telemetry.    Diabetes mellitus type II, uncontrolled (HCC)- (present on admission)  Assessment & Plan  Hemoglobin A1c was 6.9 on 11/4/2020.  Patient takes Metformin and Januvia at home.  Continue sliding scale insulin with Accu-Cheks and hypoglycemia protocol for now.  Monitor    Interstitial lung disease (HCC)- (present on admission)  Assessment & Plan  Stable.  Chest x-ray showed pulmonary edema/infiltrates which were present on previous exam.  Procalcitonin was negative.  Continue inhalers.  Monitor    Elevated  troponin  Assessment & Plan  Troponin is higher than baseline, likely due to hypoxia. History of CAD, underwent cardiac cath in 11/2020. Follow troponin levels. On telemetry.     DVT prophylaxis: Lovenox

## 2021-01-19 NOTE — ASSESSMENT & PLAN NOTE
-History of recurrent syncopal episodes. Prior work up revealed orthostatic hypotension, patient is on midodrine.  Patient also uses 3 LPM nasal cannula oxygen, he had gone to the bathroom without oxygen, after which his symptoms occurred.  Troponin was elevated, we will follow troponin levels and monitor on telemetry.  -Cardiology consulted - Dr. Heath - pending recommendations

## 2021-01-19 NOTE — ASSESSMENT & PLAN NOTE
-Troponin is higher than baseline, likely due to hypoxia. History of CAD, underwent cardiac cath in 11/2020.   -Follow troponin levels. - trending up  - On telemetry.

## 2021-01-19 NOTE — PROGRESS NOTES
Patient arrived to [facility] via gurney with tele monitor and DORA Conway. Patient became disoriented upon arrival. Slurred speech was noted, unable to follow commands or get out of gurney. Son at bedside and states not normal and he was fine in ER. Patient requires oxygen at home (3L) -noted patient to be on room air, immediately placed patient on 10 L n/c-patient became A&Ox4 after 30 seconds of having the oxygen placed back on. Oxygen saturation mid 80s when vitals taken. FSBS 130. All other vitals stable. Patient currently sating 94% on 5L n/c while eating. Son states this is exactly what happened when he used the restroom at home without his oxygen. Educated patient and son on importance of keeping oxygen on at all time.

## 2021-01-19 NOTE — ED NOTES
Lab called with critical result of trop 161 at 0642. Critical lab result read back to DORA Tabares.   Dr. Nuñez notified of critical lab result at 0643.  Critical lab result read back by Dr. Nuñez.

## 2021-01-19 NOTE — ED PROVIDER NOTES
"CHIEF COMPLAINT  Chief Complaint   Patient presents with   • T-5000 GLF   • Neck Pain   • ALOC       HPI  Chase Harris is a 70 y.o. male who presents to the emergency department by ambulance from home with son at bedside following episode of altered mental status.  Patient was found down on the ground after getting up early this morning to use the restroom.  It is unknown whether he tripped over his oxygen cord or had a syncopal episode.  Patient believes he passed out.  Son states that he was unresponsive for up to a minute and then somewhat disoriented with heavy breathing until EMS arrived.  Denies chest pain, palpitations or shortness of breath.  Patient complains of mid back pain from the fall.  No extremity weakness or paresthesias.  No abdominal pain.  No vomiting or diarrhea.  No cough or congestion.  Denies recent illness.    Patient does have \"a lung problem,\" but quit smoking 20 years ago.  Wears 3 L of oxygen at home around-the-clock, son states that he normally does not wear oxygen to the bathroom and sometimes quite winded because of this.    Patient has returned to baseline at this time.    REVIEW OF SYSTEMS  See HPI for further details. All other systems are negative.     PAST MEDICAL HISTORY   has a past medical history of Diabetes (HCC) and Pulmonary fibrosis (HCC).    SOCIAL HISTORY  Social History     Tobacco Use   • Smoking status: Former Smoker     Packs/day: 2.00     Years: 40.00     Pack years: 80.00     Types: Cigarettes     Quit date:      Years since quittin.0   • Smokeless tobacco: Never Used   Substance and Sexual Activity   • Alcohol use: Yes     Frequency: Monthly or less   • Drug use: Never   • Sexual activity: Not on file       SURGICAL HISTORY   has a past surgical history that includes other abdominal surgery and other.    CURRENT MEDICATIONS  Home Medications     Reviewed by Marii Villaseñor (Pharmacy Tech) on 21 at 0949  Med List Status: Complete " "  Medication Last Dose Status   albuterol 108 (90 Base) MCG/ACT Aero Soln inhalation aerosol 1/18/2021 Active   aspirin 81 MG EC tablet 1/18/2021 Active   atorvastatin (LIPITOR) 40 MG Tab 1/18/2021 Active   Blood Glucose Meter Kit NOT TAKIN Active   Blood Glucose Test Strips NOT TAKING Active   budesonide-formoterol (SYMBICORT) 160-4.5 MCG/ACT Aerosol 1/18/2021 Active   glipiZIDE (GLUCOTROL) 5 MG Tab 1/18/2021 Active   Lancets (ONETOUCH DELICA PLUS [id number]) Misc NOT TAKING Active   metformin (GLUCOPHAGE) 1000 MG tablet 1/18/2021 Active   midodrine (PROAMATINE) 2.5 MG Tab NOT TAKING Active   midodrine (PROAMATINE) 5 MG Tab 1/18/2021 Active   SITagliptin (JANUVIA) 25 MG Tab 1/18/2021 Active   tiotropium (SPIRIVA HANDIHALER) 18 MCG Cap 1/18/2021 Active                ALLERGIES  No Known Allergies    PHYSICAL EXAM  VITAL SIGNS: BP (!) 162/70   Pulse 66   Temp 35.9 °C (96.7 °F) (Temporal)   Resp 18   Ht 1.62 m (5' 3.78\")   Wt 64.4 kg (141 lb 15.6 oz)   SpO2 94%   BMI 24.54 kg/m²   Pulse ox interpretation: I interpret this pulse ox as normal.  Constitutional: Alert in no apparent distress.  HENT: Normocephalic, atraumatic, no cephalohematoma. Bilateral external ears normal, Nose normal.  Dry mucous membranes.  No oral trauma  Eyes: Pupils are equal and reactive, Conjunctiva normal.   Neck: Diffuse midline tenderness to palpation.  No step-off.  Cervical collar remains in place.  Lymphatic: No lymphadenopathy noted.   Cardiovascular: Regular rate and rhythm. Distal pulses intact.  No peripheral edema.  Thorax & Lungs: Normal breath sounds.  No wheezing/rales/ronchi. No increased work of breathing, clipped speech or retractions.  No chest wall tenderness, crepitus  Abdomen: Soft, non-distended, non-tender to palpation. No palpable or pulsatile masses. No peritoneal signs. No CVA tenderness.  Rectal: Normal LINA.  Brown stool, guaiac negative.  Skin: Warm, Dry, No erythema, No rash.   Musculoskeletal: Diffuse midline " thoracic discomfort, no step-offs.  No abrasions.  Good range of motion in all major joints. No major deformities noted.   Neurologic: Alert and oriented x4.  Belarusian-speaking but with use of son () speech is clear and cohesive.  Cranial nerves II through XII intact bilaterally.  5 out of 5 strength in 4 extremities.  Straight leg raise intact bilaterally.  Psychiatric: Affect normal, Judgment normal, Mood normal.       DIAGNOSTIC STUDIES / PROCEDURES    LABS  Results for orders placed or performed during the hospital encounter of 01/19/21   CBC w/ Differential   Result Value Ref Range    WBC 10.3 4.8 - 10.8 K/uL    RBC 3.16 (L) 4.70 - 6.10 M/uL    Hemoglobin 9.7 (L) 14.0 - 18.0 g/dL    Hematocrit 30.1 (L) 42.0 - 52.0 %    MCV 95.3 81.4 - 97.8 fL    MCH 30.7 27.0 - 33.0 pg    MCHC 32.2 (L) 33.7 - 35.3 g/dL    RDW 50.6 (H) 35.9 - 50.0 fL    Platelet Count 135 (L) 164 - 446 K/uL    MPV 13.1 (H) 9.0 - 12.9 fL    Neutrophils-Polys 72.40 (H) 44.00 - 72.00 %    Lymphocytes 13.50 (L) 22.00 - 41.00 %    Monocytes 9.70 0.00 - 13.40 %    Eosinophils 2.40 0.00 - 6.90 %    Basophils 0.90 0.00 - 1.80 %    Immature Granulocytes 1.10 (H) 0.00 - 0.90 %    Nucleated RBC 0.00 /100 WBC    Neutrophils (Absolute) 7.49 (H) 1.82 - 7.42 K/uL    Lymphs (Absolute) 1.39 1.00 - 4.80 K/uL    Monos (Absolute) 1.00 (H) 0.00 - 0.85 K/uL    Eos (Absolute) 0.25 0.00 - 0.51 K/uL    Baso (Absolute) 0.09 0.00 - 0.12 K/uL    Immature Granulocytes (abs) 0.11 0.00 - 0.11 K/uL    NRBC (Absolute) 0.00 K/uL   Complete Metabolic Panel (CMP)   Result Value Ref Range    Sodium 137 135 - 145 mmol/L    Potassium 3.9 3.6 - 5.5 mmol/L    Chloride 105 96 - 112 mmol/L    Co2 19 (L) 20 - 33 mmol/L    Anion Gap 13.0 7.0 - 16.0    Glucose 121 (H) 65 - 99 mg/dL    Bun 25 (H) 8 - 22 mg/dL    Creatinine 0.96 0.50 - 1.40 mg/dL    Calcium 8.0 (L) 8.5 - 10.5 mg/dL    AST(SGOT) 25 12 - 45 U/L    ALT(SGPT) 33 2 - 50 U/L    Alkaline Phosphatase 66 30 - 99 U/L    Total  Bilirubin 0.5 0.1 - 1.5 mg/dL    Albumin 3.1 (L) 3.2 - 4.9 g/dL    Total Protein 6.4 6.0 - 8.2 g/dL    Globulin 3.3 1.9 - 3.5 g/dL    A-G Ratio 0.9 g/dL   Troponin STAT   Result Value Ref Range    Troponin T 161 (H) 6 - 19 ng/L   Magnesium   Result Value Ref Range    Magnesium 1.3 (L) 1.5 - 2.5 mg/dL   ESTIMATED GFR   Result Value Ref Range    GFR If African American >60 >60 mL/min/1.73 m 2    GFR If Non African American >60 >60 mL/min/1.73 m 2   COV-2, FLU A/B, AND RSV BY PCR (2-4 HOURS CEPHEID): Collect NP swab in VTM    Specimen: Respirate   Result Value Ref Range    Influenza virus A RNA Negative Negative    Influenza virus B, PCR Negative Negative    RSV, PCR Negative Negative    SARS-CoV-2 by PCR NotDetected     SARS-CoV-2 Source NP Swab    TROPONIN   Result Value Ref Range    Troponin T 161 (H) 6 - 19 ng/L   ACCU-CHEK GLUCOSE   Result Value Ref Range    Glucose - Accu-Ck 130 (H) 65 - 99 mg/dL   EKG   Result Value Ref Range    Report       [facility] Emergency Dept.    Test Date:  2021  Pt Name:    BREANNE BOOTH                Department: ER  MRN:        [id number]                      Room:       [facility]  Gender:     Male                         Technician: [id number]  :        1950                   Requested By:TYLER RANDOLPH  Order #:    [id number]                    Reading MD: TYLER RANDOLPH, DO    Measurements  Intervals                                Axis  Rate:       66                           P:          49  ME:         248                          QRS:        20  QRSD:       82                           T:          -23  QT:         420  QTc:        441    Interpretive Statements  SINUS RHYTHM  FIRST DEGREE AV BLOCK  NONSPECIFIC T ABNORMALITIES, INFERIOR LEADS  Compared to ECG 2020 13:52:06  First degree AV block now present  Sinus bradycardia no longer present  Possible ischemia no longer present  T-wave abnormality still present  Electronically Signed On 2021  10:53:47 P ST by TYLER RANDOLPH,            RADIOLOGY  CT-TSPINE W/O PLUS RECONS   Final Result         1.  No acute traumatic bony injury of the thoracic spine.   2.  Small bilateral pleural effusions.   3.  Bilateral lower lobe infiltrates   4.  Atherosclerosis      CT-HEAD W/O   Final Result         1.  No acute intracranial abnormality is identified, there are nonspecific white matter changes, commonly associated with small vessel ischemic disease.  Associated mild cerebral atrophy is noted.   2.  Atherosclerosis.      CT-CSPINE WITHOUT PLUS RECONS   Final Result         1.  Multilevel degenerative changes of the cervical spine limit diagnostic sensitivity of this examination, otherwise no acute traumatic bony injury of the cervical spine is apparent.   2.  Atherosclerosis      DX-CHEST-PORTABLE (1 VIEW)   Final Result         1.  Pulmonary edema and/or infiltrates are identified, which are stable since the prior exam.   2.  Cardiomegaly      EC-ECHOCARDIOGRAM COMPLETE W/ CONT    (Results Pending)       COURSE & MEDICAL DECISION MAKING  Nursing notes and vital signs were reviewed. (See chart for details)  The patients records were reviewed, history was obtained from the patient and his son;    ED evaluation for altered mental status with fall versus syncope is concerning for syncope primarily given patient's presentation.  He does get hypoxic quite easily without his baseline supplemental oxygen, unknown if this was contributory.  He denies any chest pain otherwise.  Troponin is elevated but EKG is within normal limits and unchanged from previous.  No evidence for ischemia.  Continues telemetry without ectopy or arrhythmia.  Will trend before heparin initiated.  Chest x-ray concerning for pulmonary edema although patient is not clinically fluid overloaded.  Cannot exclude bilateral infiltrates, however patient does not appear infectious nor septic.  Covid added.  As well as procalcitonin before antibiotic  initiation.  Macrocytic anemia, new over the last few months but guaiac negative.  Denies other bleeding.  No history for GI bleed.  Clinical dehydration but without electrolyte abnormality.  Normal renal function.  Furthermore, CT head unrevealing, he is neurologically intact and nonfocal.  CT C-spine thoracic spine without evidence for fracture or subluxation, suspect strain or contusion.  No traumatic injury.  Patient will be hospitalized for further evaluation and treatment.  He and his son are aware the findings and agreeable to the disposition and plan.    0708 -hospitalist is aware of the patient agreeable to consultation.    FINAL IMPRESSION  (R41.82) Altered mental status, unspecified altered mental status type  (R55) Syncope, unspecified syncope type  (R77.8) Elevated troponin  (D64.9) Normocytic anemia  (S29.012A) Strain of thoracic spine      Electronically signed by: Sharonda Nuñez D.O., 1/19/2021 10:46 AM      This dictation was created using voice recognition software. The accuracy of the dictation is limited to the abilities of the software. I expect there may be some errors of grammar and possibly content. The nursing notes were reviewed and certain aspects of this information were incorporated into this note.

## 2021-01-19 NOTE — PROGRESS NOTES
Assessment completed. Patient Chinese speaking, able to communicate with this RN. Pt A&Ox4. Respirations are even and unlabored on 3L n/c. Pt reports mid chest pain at this time, 5/10 and tolerable. Monitors applied, call light and belongings within reach. POC updated (echo, labs, PT/OT). Blood collected, labeled and sent to lab. Pt educated on room and call light, pt verbalized understanding. Communication board updated. Needs met.

## 2021-01-19 NOTE — ED TRIAGE NOTES
"Chief Complaint   Patient presents with   • T-5000 GLF   • Neck Pain   • ALOC       /55   Pulse 71   Temp 36.2 °C (97.1 °F) (Temporal)   Resp 16   Ht 1.549 m (5' 1\")   Wt 59 kg (130 lb)   SpO2 95%   BMI 24.56 kg/m²     Pt arrives via EMS after a ground level fall, states he was dizzy and fell, States neck pain, C-collar in place from EMS. Pt had positive LOC. family states 30 secs. Pt is Georgian speaking only. Son at bedside.  IV in place.   "

## 2021-01-19 NOTE — ASSESSMENT & PLAN NOTE
-Stable.    -Chest x-ray showed pulmonary edema/infiltrates which were present on previous exam.    -Procalcitonin was negative.    -Continue inhalers.    -Monitor

## 2021-01-20 NOTE — PROGRESS NOTES
Last troponin resulted at 223 up from 181.  Pt denies chest pain.  MD Marks made aware. No new interventions at this time     Last Troponin resulted at 273, MD made aware, no new interventions at this time

## 2021-01-20 NOTE — PROGRESS NOTES
Received call from monitor room that patient is now showing 2nd degree type 1 block on the monitor.  MD notified.

## 2021-01-20 NOTE — THERAPY
Occupational Therapy   Initial Evaluation     Patient Name: Chase Harris  Age:  70 y.o., Sex:  male  Medical Record #: [id number]  Today's Date: 1/20/2021     Precautions  Precautions: Fall Risk    Assessment  Patient is 70 y.o. male admitted for GLF after syncopal episode when removed O2 to go to BR found to have ALOC. Ongoing cardiac workup; RN reports currently no intervention planned. In house  used. PMHx of syncopal episodes, interstitial lung disease, uncontrolled DMII, orthostatic HoTN, and CAD. Educated pt on energy conservation and home safety as would like to assist w/IADLs at home. Edu on safety and importance of wearing O2 during ADLs; unclear of carryover of education. Patient will not be actively followed for occupational therapy services at this time, however may be seen if requested by physician for 1 more visit within 30 days to address any discharge or equipment needs.     Plan    Recommend Occupational Therapy d/c needs only     DC Equipment Recommendations: Tub / Shower Seat  Discharge Recommendations: Recommend home health for continued occupational therapy services      Objective     01/20/21 0928   Prior Living Situation   Prior Services Intermittent Physical Support for ADL Per Family   Housing / Facility 1 Story House   Steps Into Home 1   Bathroom Set up Bathtub / Shower Combination   Equipment Owned Oxygen   Lives with - Patient's Self Care Capacity Adult Children   Comments Reports lives with his son who assists with IADLs at baseline. Son works 10hrs/day   Prior Level of ADL Function   Self Feeding Independent   Grooming / Hygiene Independent   Bathing Independent   Dressing Independent   Toileting Independent   Prior Level of IADL Function   Medication Management Requires Assist   Laundry Dependent   Kitchen Mobility Requires Assist   Finances Requires Assist   Home Management Requires Assist   Shopping Dependent   Prior Level Of Mobility Independent With Device in Home    Vitals   O2 (LPM) 5   O2 Delivery Device Silicone Nasal Cannula   Pain 0 - 10 Group   Therapist Pain Assessment Post Activity Pain Same as Prior to Activity;During Activity;Nurse Notified  (no c/o pain)   Cognition    Cognition / Consciousness X   Safety Awareness Impaired   Comments pleasant and cooperative. decreased safety awareness w/removing O2 to go to the BR. Req v/cs for safety with O2 tubing during session   Passive ROM Upper Body   Passive ROM Upper Body WDL   Active ROM Upper Body   Active ROM Upper Body  WDL   Strength Upper Body   Upper Body Strength  WDL   Balance Assessment   Sitting Balance (Static) Good   Sitting Balance (Dynamic) Fair +   Standing Balance (Static) Fair   Standing Balance (Dynamic) Fair   Weight Shift Sitting Good   Weight Shift Standing Fair   Comments w/o AD   Bed Mobility    Scooting Supervised   Comments found and left seated EOB    ADL Assessment   Grooming Supervision  (wiping face and removing glasses)   Lower Body Dressing Supervision   Toileting Supervision   Functional Mobility   Sit to Stand Supervised   Bed, Chair, Wheelchair Transfer Supervised   Toilet Transfers Supervised   Transfer Method Stand Step   Mobility w/o AD. req extra time and use of slippers d/t neuropathy   Visual Perception   Comments glasses at baseline; no changes   Edema / Skin Assessment   Edema / Skin  Not Assessed   Activity Tolerance   Sitting in Chair 2 min on toilet   Sitting Edge of Bed 12+ min found and left    Standing 5 min   Anticipated Discharge Equipment and Recommendations   DC Equipment Recommendations Tub / Shower Seat   Discharge Recommendations Recommend home health for continued occupational therapy services

## 2021-01-20 NOTE — PROGRESS NOTES
Community Health Worker Intake    • Social determinates of health intake completed.   • Identified barriers to: transportation at times, food insecurity.  • Contact information provided to Chase Harris   • Accepted Meds-To-Beds.   • Inpatient assessment completed.    SHAI Granados spoke with pt at bedside to introduce CCM/GSC services. Pt accepted GSC services and referral has been sent. For transportation to medical appts, pt has sons who can take him but are not always available. CHW provided [facility]'s transportation service information. He can have food insecurity and would like to utilize food bank after d/c. Pt currently lives with his son Chase but would like find a senior living option. Pt has a good support system consisting of his sons and other family members. Pt feels confident in managing his health.     Plan: Mail [facility] flyer and list of senior living options, provide food bag, f/u after d/c to identify any additional needs.

## 2021-01-20 NOTE — PROGRESS NOTES
Hospital Medicine Daily Progress Note    Date of Service  1/20/2021    Chief Complaint  70 y.o. male admitted 1/19/2021 with syncope    Hospital Course  Mr. Harris is a 70-year-old Nigerien-speaking male with a PMHx of interstitial lung disease and uses 3L NC O2 at p.m., DM type II, orthostatic hypotension, CAD with a recent cardiac catheterization on 11/2020, admitted on 1/19/2021 due to a syncopal episode.  Patient has had multiple admissions due to syncopal episodes in the past and was recently started on midodrine.  Patient reports that he went to the bathroom without his oxygen, and felt dizzy, leading to syncopal episode.  Upon admission in the ER, patient's troponin was at 161, blood glucose at 130, chest x-ray noted pulmonary edema/infiltrate which were present in the previous exam.  CT of the head was unremarkable, CT of the C-spine showed multilevel degenerative changes without any acute trauma or injury, CT of the T-spine showed no acute traumatic injury, but reported bilateral lower lobe infiltrates.  An echocardiogram was obtained which noted LVEF approximately 60% with normal right ventricular size and systolic function.  During this hospital stay, it was complicated due to multiple episodes of chest pain and EKG changes, along with increasing levels of troponin.  Cardiology consulted for further recommendations.    Interval Problem Update  -Patient seen and examined.  Utilized  services to communicate with patient.  Patient reports mild chest tenderness, but denies any significant pain or discomfort.  Patient given an update in plan of care.  -POC: Monitor any cardiac changes; consulted cardiology, Dr. Garcia -I appreciate his recommendations; latest EKG changes noted at 1516 with sinus bradycardia prolonged OR interval, first-degree heart block; noted on telemetry monitoring at 1235 of second-degree type I heart block.  -Lab work: Reviewed; unremarkable  -VSS at this time; sinus  bradycardia  -Ordered: CBC and BMP for a.m.    Consultants/Specialty  Cardiology - Dr. Heath    Code Status  Full Code    Disposition  TBD    Review of Systems  Review of Systems   Constitutional: Negative.  Negative for chills and fever.   HENT: Negative.    Eyes: Negative.    Respiratory: Negative.    Cardiovascular: Positive for chest pain.   Gastrointestinal: Negative.    Genitourinary: Negative.    Musculoskeletal: Positive for falls and myalgias.   Skin: Negative.    Neurological: Positive for weakness.   Endo/Heme/Allergies: Negative.    Psychiatric/Behavioral: Negative.         Physical Exam  Temp:  [36.4 °C (97.6 °F)-37 °C (98.6 °F)] 36.4 °C (97.6 °F)  Pulse:  [54-70] 63  Resp:  [18-20] 20  BP: (130-154)/(56-86) 150/68  SpO2:  [95 %-100 %] 98 %    Physical Exam  Vitals signs and nursing note reviewed.   HENT:      Head: Normocephalic.      Nose: Nose normal.      Mouth/Throat:      Mouth: Mucous membranes are moist.   Eyes:      Pupils: Pupils are equal, round, and reactive to light.   Neck:      Musculoskeletal: Normal range of motion.   Cardiovascular:      Rate and Rhythm: Bradycardia present.      Heart sounds: Normal heart sounds.   Pulmonary:      Effort: Pulmonary effort is normal.   Abdominal:      General: Bowel sounds are normal.      Palpations: Abdomen is soft.   Musculoskeletal: Normal range of motion.         General: Tenderness and signs of injury present.   Skin:     General: Skin is dry.      Capillary Refill: Capillary refill takes 2 to 3 seconds.   Neurological:      Mental Status: He is alert. Mental status is at baseline.         Fluids    Intake/Output Summary (Last 24 hours) at 1/20/2021 1516  Last data filed at 1/20/2021 0818  Gross per 24 hour   Intake --   Output 200 ml   Net -200 ml       Laboratory  Recent Labs     01/19/21  0545 01/20/21  0300   WBC 10.3 8.2   RBC 3.16* 3.15*   HEMOGLOBIN 9.7* 9.6*   HEMATOCRIT 30.1* 29.7*   MCV 95.3 94.3   MCH 30.7 30.5   MCHC 32.2* 32.3*   RDW  50.6* 50.3*   PLATELETCT 135* 143*   MPV 13.1* 12.9     Recent Labs     01/19/21  0545 01/20/21  0300   SODIUM 137 132*   POTASSIUM 3.9 3.9   CHLORIDE 105 100   CO2 19* 23   GLUCOSE 121* 144*   BUN 25* 24*   CREATININE 0.96 1.07   CALCIUM 8.0* 8.9                   Imaging  EC-ECHOCARDIOGRAM COMPLETE W/O CONT   Final Result      CT-TSPINE W/O PLUS RECONS   Final Result         1.  No acute traumatic bony injury of the thoracic spine.   2.  Small bilateral pleural effusions.   3.  Bilateral lower lobe infiltrates   4.  Atherosclerosis      CT-HEAD W/O   Final Result         1.  No acute intracranial abnormality is identified, there are nonspecific white matter changes, commonly associated with small vessel ischemic disease.  Associated mild cerebral atrophy is noted.   2.  Atherosclerosis.      CT-CSPINE WITHOUT PLUS RECONS   Final Result         1.  Multilevel degenerative changes of the cervical spine limit diagnostic sensitivity of this examination, otherwise no acute traumatic bony injury of the cervical spine is apparent.   2.  Atherosclerosis      DX-CHEST-PORTABLE (1 VIEW)   Final Result         1.  Pulmonary edema and/or infiltrates are identified, which are stable since the prior exam.   2.  Cardiomegaly      CT-CTA CHEST PULMONARY ARTERY W/ RECONS    (Results Pending)        Assessment/Plan  * Syncope  Assessment & Plan  -History of recurrent syncopal episodes. Prior work up revealed orthostatic hypotension, patient is on midodrine.  Patient also uses 3 LPM nasal cannula oxygen, he had gone to the bathroom without oxygen, after which his symptoms occurred.  Troponin was elevated, we will follow troponin levels and monitor on telemetry.  -Cardiology consulted - Dr. Heath - pending recommendations    Diabetes mellitus type II, uncontrolled (HCC)- (present on admission)  Assessment & Plan  -Hemoglobin A1c was 6.9 on 11/4/2020.   -Patient takes Metformin and Januvia at home.    -Continue sliding scale insulin  with Accu-Cheks and hypoglycemia protocol for now.  Monitor    Interstitial lung disease (HCC)- (present on admission)  Assessment & Plan  -Stable.    -Chest x-ray showed pulmonary edema/infiltrates which were present on previous exam.    -Procalcitonin was negative.    -Continue inhalers.    -Monitor    Elevated troponin  Assessment & Plan  -Troponin is higher than baseline, likely due to hypoxia. History of CAD, underwent cardiac cath in 11/2020.   -Follow troponin levels. - trending up  - On telemetry.        VTE prophylaxis: Lovenox    ====================================================================================  Please note that this dictation was created using voice recognition software. I have made every reasonable attempt to correct obvious errors, but there may be errors of grammar and possibly content that I did not discover before finalizing the note.    Electronically signed by:  CHUCKIE Baron, MSN, APRN, FNP-C  Hospitalist Services  [facility]  [phone]  [email or web address]  01/20/21    8005

## 2021-01-21 NOTE — THERAPY
Contact Note     Patient Name: Chase Harris  Age:  70 y.o., Sex:  male  Medical Record #: [id number]  Today's Date: 1/20/2021     consult received; awaiting CT for PE rule out and cardiac cath in AM; will see post procedures and testing as available/appropriate

## 2021-01-21 NOTE — CONSULTS
Cardiology Consult Note:    Reinaldo Heath M.D.  Date & Time note created:    1/20/2021   4:26 PM     Referring MD:  Dr. Gibson    Patient ID:   Name:             Chase Harris   YOB: 1950  Age:                 70 y.o.  male   MRN:               [id number]                                                             Reason for Consult:      Pos trop    History of Present Illness:    70-year-old male with a chart diagnosis of interstitial lung disease has been in his normal state of health.  He has been hypotensive and started on midodrine.  He does report some shortness of breath but no mireya chest pain.  He started developing lightheadedness and near syncope.  Because of this he was brought in for admission.  He has been found to have a positive troponin which is increasing.  He does not have any significant chest pain or chest pressure.  He reports no previous cardiac history.  Is never had bypass surgery.  He is currently hemodynamically stable with no chest pain.  As part of his evaluation he did not have a CTA.    Review of Systems:      Constitutional: Denies fevers, Denies weight changes  Eyes: Denies changes in vision, no eye pain  Ears/Nose/Throat/Mouth: Denies nasal congestion or sore throat   Cardiovascular: no chest pain, no palpitations   Respiratory: + shortness of breath , Denies cough  Gastrointestinal/Hepatic: Denies abdominal pain, nausea, vomiting, diarrhea, constipation or GI bleeding   Genitourinary: Denies dysuria or frequency  Musculoskeletal/Rheum: Denies  joint pain and swelling, no edema  Skin: Denies rash  Neurological: Denies headache, confusion, memory loss or focal weakness/parasthesias  Psychiatric: denies mood disorder   Endocrine: Luba thyroid problems  Heme/Oncology/Lymph Nodes: Denies enlarged lymph nodes, denies brusing or known bleeding disorder  All other systems were reviewed and are negative (AMA/CMS criteria)                Past Medical History:  "  Past Medical History:   Diagnosis Date   • Diabetes (HCC)    • Pulmonary fibrosis (HCC)     wears 2.5L O2 at home 24/7     Active Hospital Problems    Diagnosis   • Syncope [R55]     Priority: High   • Diabetes mellitus type II, uncontrolled (HCC) [E11.65]     Priority: High   • Elevated troponin [R77.8]     Priority: Low   • Hypomagnesemia [E83.42]   • Type 2 diabetes mellitus (HCC) [E11.9]   • Interstitial lung disease (HCC) [J84.9]       Past Surgical History:  Past Surgical History:   Procedure Laterality Date   • OTHER      Kidney stones   • OTHER ABDOMINAL SURGERY      \"from an ulcer that burst\"       Hospital Medications:  Current Facility-Administered Medications   Medication Dose   • albuterol inhaler 2 Puff  2 Puff   • aspirin EC (ECOTRIN) tablet 81 mg  81 mg   • atorvastatin (LIPITOR) tablet 40 mg  40 mg   • glycopyrrolate (Seebri) 15.6 mcg inhalation capsule 1 Cap  1 Cap   • senna-docusate (PERICOLACE or SENOKOT S) 8.6-50 MG per tablet 2 Tab  2 Tab    And   • polyethylene glycol/lytes (MIRALAX) PACKET 1 Packet  1 Packet    And   • magnesium hydroxide (MILK OF MAGNESIA) suspension 30 mL  30 mL    And   • bisacodyl (DULCOLAX) suppository 10 mg  10 mg   • enoxaparin (LOVENOX) inj 40 mg  40 mg   • acetaminophen (Tylenol) tablet 650 mg  650 mg   • ondansetron (ZOFRAN) syringe/vial injection 4 mg  4 mg   • ondansetron (ZOFRAN ODT) dispertab 4 mg  4 mg   • insulin regular (HumuLIN R,NovoLIN R) injection  1-6 Units    And   • glucose 4 g chewable tablet 16 g  16 g    And   • dextrose 50% (D50W) injection 50 mL  50 mL   • melatonin tablet 5 mg  5 mg   • midodrine (PROAMATINE) tablet 5 mg  5 mg         Current Outpatient Medications:  Medications Prior to Admission   Medication Sig Dispense Refill Last Dose   • budesonide-formoterol (SYMBICORT) 160-4.5 MCG/ACT Aerosol Inhale 2 Puffs 2 Times a Day.   1/18/2021 at PM   • midodrine (PROAMATINE) 5 MG Tab Take 5 mg by mouth 3 times a day.   1/18/2021 at PM   • " midodrine (PROAMATINE) 2.5 MG Tab Take 1 Tab by mouth 3 times a day, with meals. (Patient not taking: Reported on 1/19/2021) 90 Tab 0 NOT TAKING at NOT TAKING   • aspirin 81 MG EC tablet Take 1 Tab by mouth every day. 30 Tab 3 1/18/2021 at AM   • atorvastatin (LIPITOR) 40 MG Tab Take 1 Tab by mouth every evening. 30 Tab 3 1/18/2021 at PM   • glipiZIDE (GLUCOTROL) 5 MG Tab Take 2.5 mg by mouth every morning.   1/18/2021 at AM   • Lancets (ONETOUCH DELICA PLUS IUDMSX26E) Misc USE TO TEST TWICE DAILY (Patient not taking: Reported on 1/19/2021) 180 Each 3 NOT TAKING at NOT TAKING   • Blood Glucose Meter Kit Test blood sugar as recommended by provider.blood glucose monitoring kit. BID (Patient not taking: Reported on 1/19/2021) 1 Kit 0 NOT TAKIN at NOT TAKING   • Blood Glucose Test Strips One test strip BID (Patient not taking: Reported on 1/19/2021) 100 Strip 3 NOT TAKING at NOT TAKING   • metformin (GLUCOPHAGE) 1000 MG tablet Take 1,000 mg by mouth 2 Times a Day.   1/18/2021 at PM   • SITagliptin (JANUVIA) 25 MG Tab Take 1 Tab by mouth every day. 30 Tab 3 1/18/2021 at AM   • tiotropium (SPIRIVA HANDIHALER) 18 MCG Cap Inhale 1 Cap by mouth every day. 30 Cap 3 1/18/2021 at UNK   • albuterol 108 (90 Base) MCG/ACT Aero Soln inhalation aerosol Inhale 2 Puffs by mouth every 6 hours as needed for Shortness of Breath. 8.5 g 0 1/18/2021 at UNK       Medication Allergy:  No Known Allergies    Family History:  Family History   Problem Relation Age of Onset   • Diabetes Mother    • Diabetes Father    • Diabetes Brother        Social History:  Social History     Socioeconomic History   • Marital status: Single     Spouse name: Not on file   • Number of children: Not on file   • Years of education: Not on file   • Highest education level: Not on file   Occupational History   • Not on file   Social Needs   • Financial resource strain: Hard   • Food insecurity     Worry: Often true     Inability: Often true   • Transportation needs     " Medical: No     Non-medical: No   Tobacco Use   • Smoking status: Former Smoker     Packs/day: 2.00     Years: 40.00     Pack years: 80.00     Types: Cigarettes     Quit date:      Years since quittin.0   • Smokeless tobacco: Never Used   Substance and Sexual Activity   • Alcohol use: Yes     Frequency: Monthly or less   • Drug use: Never   • Sexual activity: Not on file   Lifestyle   • Physical activity     Days per week: Not on file     Minutes per session: Not on file   • Stress: Not on file   Relationships   • Social connections     Talks on phone: Not on file     Gets together: Not on file     Attends [employer] service: Not on file     Active member of club or organization: Not on file     Attends meetings of clubs or organizations: Not on file     Relationship status: Not on file   • Intimate partner violence     Fear of current or ex partner: Not on file     Emotionally abused: Not on file     Physically abused: Not on file     Forced sexual activity: Not on file   Other Topics Concern   • Not on file   Social History Narrative   • Not on file         Physical Exam:  Vitals/ General Appearance:   Weight/BMI: Body mass index is 24.54 kg/m².  BP (!) 170/74   Pulse (!) 51   Temp 36.5 °C (97.7 °F) (Temporal)   Resp 18   Ht 1.62 m (5' 3.78\")   Wt 64.4 kg (141 lb 15.6 oz)   SpO2 98%   Vitals:    21 0412 21 0818 21 1250 21 1615   BP: 135/86 130/56 150/68 (!) 170/74   Pulse: (!) 54 61 63 (!) 51   Resp:  18   Temp: 36.9 °C (98.5 °F) 36.7 °C (98 °F) 36.4 °C (97.6 °F) 36.5 °C (97.7 °F)   TempSrc: Temporal Temporal Temporal Temporal   SpO2: 100% 97% 98% 98%   Weight:       Height:         Oxygen Therapy:  Pulse Oximetry: 98 %, O2 (LPM): 2, O2 Delivery Device: Nasal Cannula    Constitutional:   Well developed, Well nourished, No acute distress  HENMT:  Normocephalic, Atraumatic, Oropharynx moist mucous membranes, No oral exudates, Nose normal.  No thyromegaly.  Eyes:  EOMI, " Conjunctiva normal, No discharge.  Neck:  Normal range of motion, No cervical tenderness,  no JVD.  Cardiovascular:  Normal heart rate, Normal rhythm, No murmurs, No rubs, No gallops.   Extremitites with intact distal pulses, no cyanosis, or edema.  Lungs:  Normal breath sounds, breath sounds clear to auscultation bilaterally,  no crackles, no wheezing.   Abdomen: Bowel sounds normal, Soft, No tenderness, No guarding, No rebound, No masses, No hepatosplenomegaly.  Skin: Warm, Dry, No erythema, No rash, no induration.  Neurologic: Alert & oriented x 3, No focal deficits noted, cranial nerves II through X are grossly intact.  Psychiatric: Affect normal, Judgment normal, Mood normal.      MDM (Data Review):     Records reviewed and summarized in current documentation    Lab Data Review:  Recent Results (from the past 24 hour(s))   ACCU-CHEK GLUCOSE    Collection Time: 01/19/21  5:17 PM   Result Value Ref Range    Glucose - Accu-Ck 226 (H) 65 - 99 mg/dL   TROPONIN    Collection Time: 01/19/21  5:22 PM   Result Value Ref Range    Troponin T 181 (H) 6 - 19 ng/L   ACCU-CHEK GLUCOSE    Collection Time: 01/19/21  8:04 PM   Result Value Ref Range    Glucose - Accu-Ck 183 (H) 65 - 99 mg/dL   TROPONIN    Collection Time: 01/19/21  9:26 PM   Result Value Ref Range    Troponin T 223 (H) 6 - 19 ng/L   TROPONIN    Collection Time: 01/20/21  1:00 AM   Result Value Ref Range    Troponin T 273 (H) 6 - 19 ng/L   CBC without Differential    Collection Time: 01/20/21  3:00 AM   Result Value Ref Range    WBC 8.2 4.8 - 10.8 K/uL    RBC 3.15 (L) 4.70 - 6.10 M/uL    Hemoglobin 9.6 (L) 14.0 - 18.0 g/dL    Hematocrit 29.7 (L) 42.0 - 52.0 %    MCV 94.3 81.4 - 97.8 fL    MCH 30.5 27.0 - 33.0 pg    MCHC 32.3 (L) 33.7 - 35.3 g/dL    RDW 50.3 (H) 35.9 - 50.0 fL    Platelet Count 143 (L) 164 - 446 K/uL    MPV 12.9 9.0 - 12.9 fL   Basic Metabolic Panel (BMP)    Collection Time: 01/20/21  3:00 AM   Result Value Ref Range    Sodium 132 (L) 135 - 145  mmol/L    Potassium 3.9 3.6 - 5.5 mmol/L    Chloride 100 96 - 112 mmol/L    Co2 23 20 - 33 mmol/L    Glucose 144 (H) 65 - 99 mg/dL    Bun 24 (H) 8 - 22 mg/dL    Creatinine 1.07 0.50 - 1.40 mg/dL    Calcium 8.9 8.5 - 10.5 mg/dL    Anion Gap 9.0 7.0 - 16.0   Magnesium    Collection Time: 21  3:00 AM   Result Value Ref Range    Magnesium 2.0 1.5 - 2.5 mg/dL   ESTIMATED GFR    Collection Time: 21  3:00 AM   Result Value Ref Range    GFR If African American >60 >60 mL/min/1.73 m 2    GFR If Non African American >60 >60 mL/min/1.73 m 2   ACCU-CHEK GLUCOSE    Collection Time: 21  7:40 AM   Result Value Ref Range    Glucose - Accu-Ck 117 (H) 65 - 99 mg/dL   EKG    Collection Time: 21  9:21 AM   Result Value Ref Range    Report       [facility]    Test Date:  2021  Pt Name:    BREANNE BOOTH                Department: [facility]  MRN:        [id number]                      Room:       T214  Gender:     Male                         Technician: RUBY  :        1950                   Requested By:OLIVIA JACKSON  Order #:    [id number]                    Reading MD: Cinthia Monique MD    Measurements  Intervals                                Axis  Rate:       55                           P:          48  CT:         232                          QRS:        -1  QRSD:       82                           T:          -23  QT:         436  QTc:        417    Interpretive Statements  SINUS BRADYCARDIA  FIRST DEGREE AV BLOCK  ABNORMAL T, CONSIDER ISCHEMIA, INFERIOR LEADS  Compared to ECG 2021 06:12:17  No significant change noted  Electronically Signed On 2021 9:42:28 PST by Cinthia Monique MD     ACCU-CHEK GLUCOSE    Collection Time: 21 12:26 PM   Result Value Ref Range    Glucose - Accu-Ck 226 (H) 65 - 99 mg/dL   TROPONIN    Collection Time: 21  3:00 PM   Result Value Ref Range    Troponin T 234 (H) 6 - 19 ng/L   EKG    Collection Time: 21  3:16 PM   Result Value Ref  Range    Report       [facility]    Test Date:  2021  Pt Name:    BREANNE BOOTH                Department: CPU  MRN:        [id number]                      Room:       T214  Gender:     Male                         Technician: MANUEL  :        1950                   Requested By:OLIVIA JACKSON  Order #:    [id number]                    Reading MD: Cinthia Monique MD    Measurements  Intervals                                Axis  Rate:       56                           P:          44  NE:         238                          QRS:        1  QRSD:       85                           T:          -23  QT:         434  QTc:        419    Interpretive Statements  SINUS BRADYCARDIA  1ST DEGREE AV BLOCK  NONSPECIFIC T ABNORMALITIES, INFERIOR LEADS  Compared to ECG 2021 09:21:10  No significant change noted  Electronically Signed On 2021 15:26:46 PST by Cinthia Monique MD         Imaging/Procedures Review:    Chest Xray:  Reviewed    EKG:   Dated 2021 personally viewed interpreted myself showing sinus bradycardia with a rate of 56 and nonspecific ST segment changes.    ECHO: Dated 2021 personally viewed interpreted by myself and outlined as below.  CONCLUSIONS  Left ventricular systolic function is normal; ejection fraction is   visually estimated to be 60%.  Normal right ventricular size and systolic function.  Mild tricuspid regurgitation.  Normal pericardium without effusion.    MDM (Assessment and Plan):     Active Hospital Problems    Diagnosis   • Syncope [R55]     Priority: High   • Diabetes mellitus type II, uncontrolled (HCC) [E11.65]     Priority: High   • Elevated troponin [R77.8]     Priority: Low   • Hypomagnesemia [E83.42]   • Type 2 diabetes mellitus (HCC) [E11.9]   • Interstitial lung disease (HCC) [J84.9]     70-year-old male with type 2 diabetes exertional shortness of breath and near syncope in the setting of interstitial lung disease.  I would like him to get a CT  PE study to rule out a a PE.  Should that be negative we will send him for cardiac catheterization in the morning.  I discussed with him the risk benefits alternatives of pursuing with cardiac catheterization and he agrees to proceed.

## 2021-01-21 NOTE — PROGRESS NOTES
Pt for procedure was npo but pt was given breakfast by error,informed cathlab,procedure postponed,apologised to pt and family, Mary reducated patient regarding procedure and to be npo for 6 hrs.

## 2021-01-21 NOTE — PROGRESS NOTES
Cardiology Follow Up Progress Note    Date of Service  1/21/2021    Attending Physician  [facility]    Presented with recurrent syncopal episode    History of interstitial lung disease, on 3 L of oxygen, diabetes, orthostatic hypotension, on midodrine, reportedly  walking to the bathroom without oxygen experiencing dizziness and susequently suffering a syncopal episode.        Interim Events    No overnight cardiac events  Monitor rhythm: Sinus bradycardia, rate high 50s  Denies chest discomfort  We will keep n.p.o. for coronary angiography this afternoon  Sodium 131  Potassium, creatinine stable  Hypertensive overnight  Current /64  Midodrine was on hold      Review of Systems  Review of Systems   Respiratory: Negative for apnea, cough, choking, chest tightness, shortness of breath, wheezing and stridor.    Cardiovascular: Negative for chest pain and leg swelling.        Utilized a  to communicate with the patient.    Vital signs in last 24 hours  Temp:  [36.4 °C (97.5 °F)-37 °C (98.6 °F)] 36.8 °C (98.3 °F)  Pulse:  [51-75] 57  Resp:  [18-20] 18  BP: (124-188)/(57-79) 170/71  SpO2:  [94 %-99 %] 98 %    Physical Exam  Physical Exam  Cardiovascular:      Rate and Rhythm: Bradycardia present.      Pulses: Normal pulses.   Pulmonary:      Effort: Pulmonary effort is normal.   Abdominal:      General: Abdomen is flat.   Skin:     General: Skin is warm.   Neurological:      General: No focal deficit present.      Mental Status: He is alert.   Psychiatric:         Mood and Affect: Mood normal.         Lab Review  Lab Results   Component Value Date/Time    WBC 8.8 01/21/2021 05:08 AM    RBC 3.13 (L) 01/21/2021 05:08 AM    HEMOGLOBIN 9.8 (L) 01/21/2021 05:08 AM    HEMATOCRIT 29.6 (L) 01/21/2021 05:08 AM    MCV 94.6 01/21/2021 05:08 AM    MCH 31.3 01/21/2021 05:08 AM    MCHC 33.1 (L) 01/21/2021 05:08 AM    MPV 12.5 01/21/2021 05:08 AM      Lab Results   Component Value Date/Time    SODIUM 131  (L) 01/21/2021 05:08 AM    POTASSIUM 3.7 01/21/2021 05:08 AM    CHLORIDE 97 01/21/2021 05:08 AM    CO2 23 01/21/2021 05:08 AM    GLUCOSE 144 (H) 01/21/2021 05:08 AM    BUN 23 (H) 01/21/2021 05:08 AM    CREATININE 1.04 01/21/2021 05:08 AM      Lab Results   Component Value Date/Time    ASTSGOT 25 01/19/2021 05:45 AM    ALTSGPT 33 01/19/2021 05:45 AM     Lab Results   Component Value Date/Time    CHOLSTRLTOT 129 11/05/2020 03:21 AM    LDL 74 11/05/2020 03:21 AM    HDL 32 (A) 11/05/2020 03:21 AM    TRIGLYCERIDE 113 11/05/2020 03:21 AM    TROPONINT 263 (H) 01/21/2021 05:08 AM       No results for input(s): NTPROBNP in the last 72 hours.    Cardiac Imaging and Procedures Review  EKG: Sinus bradycardia    Echocardiogram:    1/19/2021  Left ventricular systolic function is normal; ejection fraction is   visually estimated to be 60%.  Normal right ventricular size and systolic function.  Mild tricuspid regurgitation.  Normal pericardium without effusion.        Cardiac Catheterization:     11/4/2020 diffuse CAD  Reduced LVEF 30%  60 to 70% proximal LAD, 50 to 60% D2, 70 to 80% mid left circumflex, ostial OM 70% stenosis,, RCA proximal concentric 90% stenosis, patent diffuse 80% distal concentric 90%, small caliber posterolateral branch with mid concentric 90% stenosis  Recommendations for medical therapy and aggressive hypertensive management.    Imaging  Chest X-Ray: 1.  Pulmonary edema and/or infiltrates are identified, which are stable since the prior exam.  2.  Cardiomegaly     Stress Test: Not applicable    Assessment/Plan    Recurrent syncope  -Multiple admissions for syncopal episodes  -Recently started on midodrine  -Hypertensive overnight  -Midodrine held  -No arrhythmia noted  -Bradycardia, rate high 50s    Dyspnea  -Likely angina equivalent  -Elevated troponin  -Plan for repeat left heart cath this afternoon  -Negative CT PE  -Echo reassuring    Interstitial lung disease  -Continue inhalers  -Continue O2    Type  2 diabetes  -A1c 6.9 on 11/5/2020  -On Metformin and Januvia    Follow-up on left heart cath  Cardiology will follow along        Thank you for allowing me to participate in the care of this patient.      Please contact me with any questions.    CECILIA Tavarez.   Cardiologist, [facility]  [phone]

## 2021-01-21 NOTE — PROGRESS NOTES
Hospital Medicine Daily Progress Note    Date of Service  1/21/2021    Chief Complaint  70 y.o. male admitted 1/19/2021 with syncope    Hospital Course  Mr. Harris is a 70-year-old Northern Irish-speaking male with a PMHx of interstitial lung disease and uses 3L NC O2 at p.m., DM type II, orthostatic hypotension, CAD with a recent cardiac catheterization on 11/2020, admitted on 1/19/2021 due to a syncopal episode.  Patient has had multiple admissions due to syncopal episodes in the past and was recently started on midodrine.  Patient reports that he went to the bathroom without his oxygen, and felt dizzy, leading to syncopal episode.  Upon admission in the ER, patient's troponin was at 161, blood glucose at 130, chest x-ray noted pulmonary edema/infiltrate which were present in the previous exam.  CT of the head was unremarkable, CT of the C-spine showed multilevel degenerative changes without any acute trauma or injury, CT of the T-spine showed no acute traumatic injury, but reported bilateral lower lobe infiltrates.  An echocardiogram was obtained which noted LVEF approximately 60% with normal right ventricular size and systolic function.  During this hospital stay, it was complicated due to multiple episodes of chest pain and EKG changes, along with increasing levels of troponin.  Cardiology consulted for further recommendations.    Interval Problem Update  -Patient seen and examined.  Utilized  services to communicate with patient.  Patient denies any pain or discomfort at this time.  Patient unfortunately ate breakfast despite being told of cardiac intervention today.  Patient given an update in plan of care.  -POC: Monitor any cardiac changes; cardiology following -anticipated cardiac catheterization this afternoon -pending results from catheterization; patient anticipated to stay overnight after cath.  -Lab work: Reviewed; unremarkable  -VSS at this time; sinus bradycardia  -Ordered: CBC and BMP for  van    Consultants/Specialty  Cardiology - Dr. Heath    Code Status  Full Code    Disposition  TBD    Review of Systems  Review of Systems   Constitutional: Negative.  Negative for chills and fever.   HENT: Negative.    Eyes: Negative.    Respiratory: Negative.    Cardiovascular: Positive for chest pain.   Gastrointestinal: Negative.    Genitourinary: Negative.    Musculoskeletal: Positive for falls and myalgias.   Skin: Negative.    Neurological: Positive for weakness.   Endo/Heme/Allergies: Negative.    Psychiatric/Behavioral: Negative.         Physical Exam  Temp:  [36.4 °C (97.5 °F)-37 °C (98.6 °F)] 36.8 °C (98.3 °F)  Pulse:  [51-75] 57  Resp:  [18-20] 18  BP: (124-188)/(57-79) 170/71  SpO2:  [94 %-99 %] 98 %    Physical Exam  Vitals signs and nursing note reviewed.   HENT:      Head: Normocephalic.      Nose: Nose normal.      Mouth/Throat:      Mouth: Mucous membranes are moist.   Eyes:      Pupils: Pupils are equal, round, and reactive to light.   Neck:      Musculoskeletal: Normal range of motion.   Cardiovascular:      Rate and Rhythm: Bradycardia present.      Heart sounds: Normal heart sounds.   Pulmonary:      Effort: Pulmonary effort is normal.   Abdominal:      General: Bowel sounds are normal.      Palpations: Abdomen is soft.   Musculoskeletal: Normal range of motion.         General: Tenderness and signs of injury present.   Skin:     General: Skin is dry.      Capillary Refill: Capillary refill takes 2 to 3 seconds.   Neurological:      Mental Status: He is alert. Mental status is at baseline.         Fluids    Intake/Output Summary (Last 24 hours) at 1/21/2021 1154  Last data filed at 1/21/2021 0055  Gross per 24 hour   Intake --   Output 1150 ml   Net -1150 ml       Laboratory  Recent Labs     01/19/21  0545 01/20/21  0300 01/21/21  0508   WBC 10.3 8.2 8.8   RBC 3.16* 3.15* 3.13*   HEMOGLOBIN 9.7* 9.6* 9.8*   HEMATOCRIT 30.1* 29.7* 29.6*   MCV 95.3 94.3 94.6   MCH 30.7 30.5 31.3   MCHC 32.2*  32.3* 33.1*   RDW 50.6* 50.3* 49.7   PLATELETCT 135* 143* 147*   MPV 13.1* 12.9 12.5     Recent Labs     01/19/21  0545 01/20/21  0300 01/21/21  0508   SODIUM 137 132* 131*   POTASSIUM 3.9 3.9 3.7   CHLORIDE 105 100 97   CO2 19* 23 23   GLUCOSE 121* 144* 144*   BUN 25* 24* 23*   CREATININE 0.96 1.07 1.04   CALCIUM 8.0* 8.9 8.7     Recent Labs     01/21/21  0508   APTT 35.2   INR 1.10               Imaging  CT-CTA CHEST PULMONARY ARTERY W/ RECONS   Final Result      1.  Respiratory motion degraded study. No large central emboli seen.      2.  Again seen extensive bilateral interstitial septal thickening and pulmonary fibrosis, somewhat progressed from comparison.      3.  Interval worsening in mediastinal and bilateral hilar adenopathy.      4.  Atherosclerotic vascular calcification.      5.  Splenomegaly.      EC-ECHOCARDIOGRAM COMPLETE W/O CONT   Final Result      CT-TSPINE W/O PLUS RECONS   Final Result         1.  No acute traumatic bony injury of the thoracic spine.   2.  Small bilateral pleural effusions.   3.  Bilateral lower lobe infiltrates   4.  Atherosclerosis      CT-HEAD W/O   Final Result         1.  No acute intracranial abnormality is identified, there are nonspecific white matter changes, commonly associated with small vessel ischemic disease.  Associated mild cerebral atrophy is noted.   2.  Atherosclerosis.      CT-CSPINE WITHOUT PLUS RECONS   Final Result         1.  Multilevel degenerative changes of the cervical spine limit diagnostic sensitivity of this examination, otherwise no acute traumatic bony injury of the cervical spine is apparent.   2.  Atherosclerosis      DX-CHEST-PORTABLE (1 VIEW)   Final Result         1.  Pulmonary edema and/or infiltrates are identified, which are stable since the prior exam.   2.  Cardiomegaly      CL-RIGHT AND LEFT HEART CATHETERIZATION    (Results Pending)        Assessment/Plan  * Syncope  Assessment & Plan  -History of recurrent syncopal episodes. Prior  work up revealed orthostatic hypotension, patient is on midodrine.  Patient also uses 3 LPM nasal cannula oxygen, he had gone to the bathroom without oxygen, after which his symptoms occurred.  Troponin was elevated, we will follow troponin levels and monitor on telemetry.  -Cardiology consulted - Dr. Heath - pending recommendations    Diabetes mellitus type II, uncontrolled (HCC)- (present on admission)  Assessment & Plan  -Hemoglobin A1c was 6.9 on 11/4/2020.   -Patient takes Metformin and Januvia at home.    -Continue sliding scale insulin with Accu-Cheks and hypoglycemia protocol for now.  Monitor    Interstitial lung disease (HCC)- (present on admission)  Assessment & Plan  -Stable.    -Chest x-ray showed pulmonary edema/infiltrates which were present on previous exam.    -Procalcitonin was negative.    -Continue inhalers.    -Monitor    Elevated troponin  Assessment & Plan  -Troponin is higher than baseline, likely due to hypoxia. History of CAD, underwent cardiac cath in 11/2020.   -Follow troponin levels. - trending up  - On telemetry.        VTE prophylaxis: Lovenox    ====================================================================================  Please note that this dictation was created using voice recognition software. I have made every reasonable attempt to correct obvious errors, but there may be errors of grammar and possibly content that I did not discover before finalizing the note.    Electronically signed by:  CHUCKIE Baron, MSN, APRN, FNP-C  Hospitalist Services  [facility]  [phone]  [email or web address]  01/21/21     7499

## 2021-01-21 NOTE — PROGRESS NOTES
Pt awake,a&ox4,Libyan speaking,pt kept npo for cathlab procedure,on ivf.blood sugar level wnl.No c/o dizziness.

## 2021-01-22 PROBLEM — R55 SYNCOPE: Status: RESOLVED | Noted: 2021-01-01 | Resolved: 2021-01-01

## 2021-01-22 PROBLEM — E83.42 HYPOMAGNESEMIA: Status: RESOLVED | Noted: 2021-01-01 | Resolved: 2021-01-01

## 2021-01-22 NOTE — PROGRESS NOTES
Pt in bed awake,a&ox4,Italian speaking,pt ambulated  With oxygen 3l/nc , after15 steps pt became sob and dizzy,pt was helped to sit on wheelchair and back to bed.poc explained with the help of  CatalinaUpdated to MD.

## 2021-01-22 NOTE — PROGRESS NOTES
Pt's son is here visiting, and would like an update on his father's status.  Pt was supposed to go to the Cath Lab at 4:00, but I called them, and due to STEMI's and other emergencies, his time slot kept getting bumped.  Cath Lab said to go ahead and feed the patient, and they would reschedule him for late morning tomorrow. Pt NPO at midnight.

## 2021-01-22 NOTE — PROGRESS NOTES
Cardiology Follow Up Progress Note    Date of Service  1/22/2021    Attending Physician  [facility]    Presented with recurrent syncopal episode    History of interstitial lung disease, on 3 L of oxygen, diabetes, orthostatic hypotension, on midodrine, reportedly  walking to the bathroom without oxygen experiencing dizziness and susequently suffering a syncopal episode.        Interim Events    No overnight cardiac events  Monitor rhythm: Sinus bradycardia, rate high 50s  Denies chest discomfort  Sodium 132  K 3.9  Creatinine 0.80  Hypertensive overnight   /67  On midodrine (held for high blood pressure)      Review of Systems  Review of Systems   Respiratory: Negative for apnea, cough, choking, chest tightness, shortness of breath, wheezing and stridor.    Cardiovascular: Negative for chest pain and leg swelling.        Utilized a  to communicate with the patient.    Vital signs in last 24 hours  Temp:  [36.3 °C (97.3 °F)-37.6 °C (99.6 °F)] 36.8 °C (98.2 °F)  Pulse:  [58-82] 63  Resp:  [14-33] 21  BP: (139-189)/(63-82) 152/72  SpO2:  [92 %-99 %] 99 %    Physical Exam  Physical Exam  Cardiovascular:      Rate and Rhythm: Bradycardia present.      Pulses: Normal pulses.   Pulmonary:      Effort: Pulmonary effort is normal.   Abdominal:      General: Abdomen is flat.   Skin:     General: Skin is warm.   Neurological:      General: No focal deficit present.      Mental Status: He is alert.   Psychiatric:         Mood and Affect: Mood normal.         Lab Review  Lab Results   Component Value Date/Time    WBC 8.1 01/22/2021 06:00 AM    RBC 3.10 (L) 01/22/2021 06:00 AM    HEMOGLOBIN 9.6 (L) 01/22/2021 06:00 AM    HEMATOCRIT 29.4 (L) 01/22/2021 06:00 AM    MCV 94.8 01/22/2021 06:00 AM    MCH 31.0 01/22/2021 06:00 AM    MCHC 32.7 (L) 01/22/2021 06:00 AM    MPV 13.6 (H) 01/22/2021 06:00 AM      Lab Results   Component Value Date/Time    SODIUM 132 (L) 01/22/2021 09:00 AM    POTASSIUM 3.9  01/22/2021 09:00 AM    CHLORIDE 102 01/22/2021 09:00 AM    CO2 26 01/22/2021 09:00 AM    GLUCOSE 155 (H) 01/22/2021 09:00 AM    BUN 20 01/22/2021 09:00 AM    CREATININE 0.80 01/22/2021 09:00 AM      Lab Results   Component Value Date/Time    ASTSGOT 25 01/19/2021 05:45 AM    ALTSGPT 33 01/19/2021 05:45 AM     Lab Results   Component Value Date/Time    CHOLSTRLTOT 129 11/05/2020 03:21 AM    LDL 74 11/05/2020 03:21 AM    HDL 32 (A) 11/05/2020 03:21 AM    TRIGLYCERIDE 113 11/05/2020 03:21 AM    TROPONINT 263 (H) 01/21/2021 05:08 AM       No results for input(s): NTPROBNP in the last 72 hours.    Cardiac Imaging and Procedures Review  EKG: Sinus bradycardia    Echocardiogram:    1/19/2021  Left ventricular systolic function is normal; ejection fraction is   visually estimated to be 60%.  Normal right ventricular size and systolic function.  Mild tricuspid regurgitation.  Normal pericardium without effusion.        Cardiac Catheterization:     11/4/2020 diffuse CAD  Reduced LVEF 30%  60 to 70% proximal LAD, 50 to 60% D2, 70 to 80% mid left circumflex, ostial OM 70% stenosis,, RCA proximal concentric 90% stenosis, patent diffuse 80% distal concentric 90%, small caliber posterolateral branch with mid concentric 90% stenosis  Recommendations for medical therapy and aggressive hypertensive management.    Imaging  Chest X-Ray: 1.  Pulmonary edema and/or infiltrates are identified, which are stable since the prior exam.  2.  Cardiomegaly     Stress Test: Not applicable    Assessment/Plan    Syncope  -S/p coronary angiography 11/4/2020 revealed diffuse CAD, medical therapy.  -Left heart cath canceled this afternoon secondary to recent coronary angiography in November showing diffuse MVCAD.  -Continue with aspirin, atorvastatin.    Interstitial lung disease  -On 3 L, exerted this PM O2 sat dropped to 87%, dyspneic and weak.  Needs to be evaluated for higher oxygen requirement at home.  -Likely needs a PT evaluation and home  health.  -Negative CT PE  -Echo reassuring,, LVEF 60%, no s&s of heart failure, weight 127#, dry weight 130#.    Type 2 diabetes  -A1c 6.9 on 11/5/2020  -On Metformin and Januvia        No further cardiac work-up  Follow-up appoint with cardiology office, will arrange.    Thank you for allowing me to participate in the care of this patient.      Please contact me with any questions.    CECILIA Tavarez.   Cardiologist, [facility]  [phone]

## 2021-01-22 NOTE — PROGRESS NOTES
Report received from DORA Villeda.  Patient sitting up in bed waiting to go to the Cath Lab. Assumed care of patient, and agree with previous RN's assessment. Patient A & O  X 4.  No apparent signs of distress.  Safety precautions in place.  Patient educated to call for assistance.  Will continue to monitor.

## 2021-01-22 NOTE — DISCHARGE SUMMARY
Discharge Summary    CHIEF COMPLAINT ON ADMISSION  Chief Complaint   Patient presents with   • T-5000 GLF   • Neck Pain   • ALOC       Reason for Admission  EMS     Admission Date  1/19/2021    CODE STATUS  Full Code    HPI & HOSPITAL COURSE  Mr. Harris is a 70-year-old Polish-speaking male with a PMHx of interstitial lung disease and uses 3L NC O2 at p.m., DM type II, orthostatic hypotension, CAD with a recent cardiac catheterization on 11/2020, admitted on 1/19/2021 due to a syncopal episode.  Patient has had multiple admissions due to syncopal episodes in the past and was recently started on midodrine.  Patient reports that he went to the bathroom without his oxygen, and felt dizzy, leading to syncopal episode.  Upon admission in the ER, patient's troponin was at 161, blood glucose at 130, chest x-ray noted pulmonary edema/infiltrate which were present in the previous exam.  CT of the head was unremarkable, CT of the C-spine showed multilevel degenerative changes without any acute trauma or injury, CT of the T-spine showed no acute traumatic injury, but reported bilateral lower lobe infiltrates.  An echocardiogram was obtained which noted LVEF approximately 60% with normal right ventricular size and systolic function.  During this hospital stay, it was complicated due to multiple episodes of chest pain and EKG changes, along with increasing levels of troponin.  Cardiology consulted for further recommendations.     During this hospital stay, patient was followed by cardiology of which a cardiac catheterization was attempted.  Interventional cardiologist did not pursue as patient had a catheterization in November 2020 of which noted multivessel disease, and RCA stenosis.  Patient to be managed medically by the cardiology team.  Patient has recommended to follow-up with cardiology after discharge and with PCP.  All questions and concerns answered prior to being discharged.  Patient discharged home.      Therefore,  he is discharged in good and stable condition to home with close outpatient follow-up.    The patient recovered much more quickly than anticipated on admission.    Discharge Date  01/22/21      FOLLOW UP ITEMS POST DISCHARGE  Please call [phone] to schedule PCP appointment for patient.    Required specialty appointments include:       Discharge Instructions per [employer]  -Follow-up with PCP  -Follow-up with cardiology      DIET: Cardiac    ACTIVITY: As tolerated    DIAGNOSIS: syncope    Return to ER if symptoms persist, chest pain, shortness of breath, palpitation, numbness, tingling, weakness, and HIGH fever.      DISCHARGE DIAGNOSES  Principal Problem (Resolved):    Syncope POA: Unknown  Active Problems:    Diabetes mellitus type II, uncontrolled (HCC) POA: Yes    Interstitial lung disease (HCC) POA: Yes    Type 2 diabetes mellitus (HCC) POA: Unknown    Elevated troponin POA: Unknown  Resolved Problems:    Hypomagnesemia POA: Unknown      FOLLOW UP  Future Appointments   Date Time Provider Department Center   1/29/2021  4:30 PM Celina Hopper M.D. [facility] None   2/17/2021  9:00 AM PFT-RM3 [facility] None   2/17/2021 10:45 AM SPIROMETRY/6MW [facility] None   2/18/2021  9:50 AM Chyna Campuzano M.D. [facility] None     Rashmi Cedeno P.A.-C.  [address] [facility] [id number] [phone]    Schedule an appointment as soon as possible for a visit in 1 week      Reinaldo Heath M.D.  [address]  [address]  [facility] [id number] [phone]    Schedule an appointment as soon as possible for a visit in 1 week      Rashmi Cedeno P.A.-C.  [address] [facility] [id number] [phone]    Schedule an appointment as soon as possible for a visit in 1 week        MEDICATIONS ON DISCHARGE     Medication List      CHANGE how you take these medications      Instructions   midodrine 2.5 MG Tabs  What changed: Another medication with the same name was removed. Continue taking this medication, and follow the  directions you see here.  Commonly known as: PROAMATINE   Take 1 Tab by mouth 3 times a day, with meals.  Dose: 2.5 mg        CONTINUE taking these medications      Instructions   albuterol 108 (90 Base) MCG/ACT Aers inhalation aerosol   Inhale 2 Puffs by mouth every 6 hours as needed for Shortness of Breath.  Dose: 2 Puff     aspirin 81 MG EC tablet   Take 1 Tab by mouth every day.  Dose: 81 mg     atorvastatin 40 MG Tabs  Commonly known as: LIPITOR   Take 1 Tab by mouth every evening.  Dose: 40 mg     * Blood Glucose Meter Kit   Doctor's comments: Or per formulary preference.DMII  Test blood sugar as recommended by provider.blood glucose monitoring kit. BID     * Blood Glucose Test Strips   Doctor's comments: Or per formulary preference. DM II  One test strip BID     budesonide-formoterol 160-4.5 MCG/ACT Aero  Commonly known as: SYMBICORT   Inhale 2 Puffs 2 Times a Day.  Dose: 2 Puff     glipiZIDE 5 MG Tabs  Commonly known as: GLUCOTROL   Take 2.5 mg by mouth every morning.  Dose: 2.5 mg     metformin 1000 MG tablet  Commonly known as: GLUCOPHAGE   Take 1,000 mg by mouth 2 Times a Day.  Dose: 1,000 mg     OneTouch Delica Plus [id number] Misc   Doctor's comments: **Patient requests 90 days supply**  USE TO TEST TWICE DAILY     SITagliptin 25 MG Tabs  Commonly known as: Januvia   Take 1 Tab by mouth every day.  Dose: 25 mg     Spiriva HandiHaler 18 MCG Caps  Generic drug: tiotropium   Inhale 1 Cap by mouth every day.  Dose: 18 mcg         * This list has 2 medication(s) that are the same as other medications prescribed for you. Read the directions carefully, and ask your doctor or other care provider to review them with you.                Allergies  No Known Allergies    DIET  Orders Placed This Encounter   Procedures   • Diet Order Diet: Cardiac; Second Modifier: (optional): Consistent CHO (Diabetic)     Standing Status:   Standing     Number of Occurrences:   1     Order Specific Question:   Diet:     Answer:    Cardiac [6]     Order Specific Question:   Second Modifier: (optional)     Answer:   Consistent CHO (Diabetic) [4]       ACTIVITY  As tolerated.  Weight bearing as tolerated    CONSULTATIONS  Cardiology    PROCEDURES  NONE    IMAGING  CT-CTA CHEST PULMONARY ARTERY W/ RECONS   Final Result      1.  Respiratory motion degraded study. No large central emboli seen.      2.  Again seen extensive bilateral interstitial septal thickening and pulmonary fibrosis, somewhat progressed from comparison.      3.  Interval worsening in mediastinal and bilateral hilar adenopathy.      4.  Atherosclerotic vascular calcification.      5.  Splenomegaly.      EC-ECHOCARDIOGRAM COMPLETE W/O CONT   Final Result      CT-TSPINE W/O PLUS RECONS   Final Result         1.  No acute traumatic bony injury of the thoracic spine.   2.  Small bilateral pleural effusions.   3.  Bilateral lower lobe infiltrates   4.  Atherosclerosis      CT-HEAD W/O   Final Result         1.  No acute intracranial abnormality is identified, there are nonspecific white matter changes, commonly associated with small vessel ischemic disease.  Associated mild cerebral atrophy is noted.   2.  Atherosclerosis.      CT-CSPINE WITHOUT PLUS RECONS   Final Result         1.  Multilevel degenerative changes of the cervical spine limit diagnostic sensitivity of this examination, otherwise no acute traumatic bony injury of the cervical spine is apparent.   2.  Atherosclerosis      DX-CHEST-PORTABLE (1 VIEW)   Final Result         1.  Pulmonary edema and/or infiltrates are identified, which are stable since the prior exam.   2.  Cardiomegaly      CL-RIGHT AND LEFT HEART CATHETERIZATION    (Results Pending)         LABORATORY  Lab Results   Component Value Date    SODIUM 132 (L) 01/22/2021    POTASSIUM 3.9 01/22/2021    CHLORIDE 102 01/22/2021    CO2 26 01/22/2021    GLUCOSE 155 (H) 01/22/2021    BUN 20 01/22/2021    CREATININE 0.80 01/22/2021        Lab Results   Component Value  Date    WBC 8.1 01/22/2021    HEMOGLOBIN 9.6 (L) 01/22/2021    HEMATOCRIT 29.4 (L) 01/22/2021    PLATELETCT 157 (L) 01/22/2021        Total time of the discharge process exceeds 36 minutes.  ====================================================================================  Please note that this dictation was created using voice recognition software. I have made every reasonable attempt to correct obvious errors, but there may be errors of grammar and possibly content that I did not discover before finalizing the note.    Electronically signed by:  CHUCKIE Baron, MSN, APRN, FNP-C  Hospitalist Services  [facility]  [phone]  [email or web address]  01/22/21     [id number]

## 2021-01-23 NOTE — PROGRESS NOTES
Pt a&ox4,no c/o pain,pt not in distress,discharge instructions &prescriptions given,pt able to follow instructions,questions answered,discharge without any events.educated oxygen monitoring with pulse ox and fall prevention.

## 2021-01-25 PROBLEM — I25.10 CAD (CORONARY ARTERY DISEASE): Status: ACTIVE | Noted: 2021-01-01

## 2021-01-25 PROBLEM — R73.9 HYPERGLYCEMIA: Status: ACTIVE | Noted: 2021-01-01

## 2021-01-25 PROBLEM — J96.21 ACUTE ON CHRONIC RESPIRATORY FAILURE WITH HYPOXIA (HCC): Status: ACTIVE | Noted: 2021-01-01

## 2021-01-25 NOTE — PROGRESS NOTES
Internal Medicine - Daily Progress Note     Date of Service: 1/25/2021  Primary Team: Green Team  Attending: Dr. Rajni Justin M.D.  Senior Resident: Dr. Amy Basilio  Intern: Fidel Cruz M.D.  Contact: [phone]      Previous 24 Hours  No acute events overnight.  This morning, patient said he is feeling well and near his baseline.  Patient said he usually disconnects his oxygen to eat but seemed unaware that he frequently forgets to reconnect it.  Says home glucose measurement was 450s yesterday after having lasagna.  He denied any recent fevers, chills, cough, shortness of breath, wheezes, nausea or vomiting.    10:40 AM, called patient's son, Juan Jose [phone], who told me since patient was recently discharged, he has passed out about 8 times.  Says he finds the patient slumped over on his sofa with oxygen detached but found him with oxygen attached this morning and difficult to arouse so he brought him to the hospital.  He manages the patient's medications and said the patient received midodrine x3 yesterday.  Patient snores at night and is a breathes via mouth.      Consultants/Specialty:  Pulmonary - Dr. Duarte      Review of Systems   Review of Systems   Constitutional: Negative for chills and fever.   Respiratory: Negative for shortness of breath.    Cardiovascular: Negative for chest pain and leg swelling.   Gastrointestinal: Negative for abdominal pain, nausea and vomiting.   Genitourinary: Negative for dysuria and urgency.   Musculoskeletal: Negative for falls.   Neurological: Positive for dizziness and loss of consciousness.       Vitals   Temp:  [36.3 °C (97.4 °F)-36.8 °C (98.2 °F)] 36.3 °C (97.4 °F)  Pulse:  [] 61  Resp:  [16-40] 18  BP: (142-170)/(46-79) 155/79  SpO2:  [47 %-100 %] 100 %        Physical Exam  Physical Exam  Vitals signs reviewed.   Constitutional:       Appearance: He is normal weight.   HENT:      Head: Normocephalic and atraumatic.      Mouth/Throat:      Pharynx:  Oropharynx is clear.   Eyes:      Extraocular Movements: Extraocular movements intact.      Pupils: Pupils are equal, round, and reactive to light.   Neck:      Musculoskeletal: Neck supple.   Cardiovascular:      Rate and Rhythm: Normal rate and regular rhythm.      Heart sounds: Normal heart sounds.   Pulmonary:      Comments: Bilateral, fine crackles greater at bases  Abdominal:      General: Bowel sounds are normal.      Palpations: Abdomen is soft.   Musculoskeletal: Normal range of motion.         General: No swelling or tenderness.   Skin:     General: Skin is warm and dry.   Neurological:      General: No focal deficit present.      Mental Status: He is oriented to person, place, and time.         Labs  Labs reviewed. Relevant findings below.  Recent Labs     01/25/21  0236   HEMOGLOBIN 9.9*   HEMATOCRIT 30.7*   MCV 96.2   RDW 53.1*   PLATELETCT 137*     Recent Labs     01/25/21  0236   WBC 9.4   NEUTS 6.27   LYMPHS 1.61   EOS 0.34     Recent Labs     01/22/21  0900 01/25/21  0236   SODIUM 132* 133*   POTASSIUM 3.9 4.7   CHLORIDE 102 96   CO2 26 23   BUN 20 25*   CREATININE 0.80 1.12   GLUCOSE 155* 372*   CALCIUM 8.6 9.1   MAGNESIUM  --  1.6   IFAFRICA >60 >60   IFNOTAFR >60 >60     Recent Labs     01/25/21  0236   ASTSGOT 29   ALTSGPT 43   ALKPHOSPHAT 99   TBILIRUBIN 0.5   ALBUMIN 3.8   TOTPROTEIN 7.7     Recent Labs     01/25/21  0236   PROTHROMBTM 14.3   INR 1.08     Recent Labs     01/22/21  1227   POCGLUCOSE 112*     Recent Labs     01/25/21  0236   TROPONINT 58*   NTPROBNP 2489*       Imaging   I have reviewed the relevant diagnostic imaging.    Microbiology  I have reviewed the relevant microbiology.      Problem Representation  Chase Harris is a 70 y.o. male with a PMHx including IPF, insulin-independent type II DM, CAD, orthostatic syncopal episodes admitted on 1/25/2021 after syncopal episode. Unclear why he is syncopizing; underlying heart disease vs autodysfxn from diabetes. Also with acute  on chronic hypoxemic respiratory failure. Pulmonary medicine consulted and believes acute on chronic IPF less likely. CT chest noncon unrevealing. Hold off on steroids for now unless oxygenation decompensates significantly. PT for orthostatic training. Anticipate DC tomorrow.    Assessment and Plan  * Acute on chronic respiratory failure with hypoxia (HCC)  Assessment & Plan  Secondary to pulmonary edema vs worsening of IPF.  CT chest noncon: pulmonary fibrosis unchanged from previous, negative for ptx or pna  Continue with oxygen supplementation to maintain SPO2 above 90%.   Patient following up with pulmonary as outpatient scheduled for high-resolution CT scan and pulmonary function test.  Currently being treated for interstitial lung disease with bronchodilators and oxygen dependent on 2 to 3 L.  Telemetry monitoring  Pulmonary consulted  Respiratory panel PCR pending    Postural dizziness with presyncope- (present on admission)  Assessment & Plan  Patient says at home, he becomes dizzy sometimes syncopizing after standing up  Unclear etiology. Secondary to heart disease vs possible autonomic dysfxn from diabetes although A1c only 6.8.  Recent echo: LVEF 60%.  Normal right ventricular size and systolic function.  Mild tricuspid regurgitation.  Normal pericardium without effusion.    Orthostatics pending  PT    Interstitial lung disease (HCC)- (present on admission)  Assessment & Plan  Patient has a history of interstitial lung disease follows up with pulmonary outpatient  Continue with oxygen supplementation to maintain spO2>90%  Continue with bronchodilators Symbicort, albuterol, Spiriva   COVID-19 and flu negative.  Respiratory panel pcr.    Diabetes mellitus type II, uncontrolled (HCC)  Assessment & Plan  Patient is on Januvia and Metformin outpatient  Continue with insulin sliding scale with hypoglycemia protocol and Accu-Checks.   Last A1c 6.9    CAD (coronary artery disease)  Assessment & Plan  Cardiac cath  on 11/2020 showed diffuse small vessel disease in not a candidate for intervention during last admission.   No current chest pain.  Continue with aspirin 81 mg daily and Atorvastatin 40 mg daily     Hyperglycemia  Assessment & Plan  Glucose ~350 this morning.  -see DM2 A&P for more detail    Elevated troponin- (present on admission)  Assessment & Plan  Likely residual that is downtrending from recent admission vs type 2 STEMI.  EKG nonischemic.  At this time patient does not have any chest pain.        Medications  Scheduled:    •  aspirin, 81 mg, Oral, DAILY    •  atorvastatin, 40 mg, Oral, Q EVENING    •  enoxaparin, 40 mg, Subcutaneous, DAILY    •  insulin regular, 3-15 Units, Subcutaneous, Q6HRS    •  budesonide-formoterol, 2 Puff, Inhalation, BID    •  ipratropium-albuterol, 3 mL, Nebulization, Q6HRS (RT)      PRN:  acetaminophen, albuterol, Notify **AND** glucose **AND** dextrose 50%    Current Outpatient Medications   Medication Instructions   • albuterol 108 (90 Base) MCG/ACT Aero Soln inhalation aerosol 2 Puffs, Inhalation, EVERY 6 HOURS PRN   • aspirin 81 mg, Oral, DAILY   • atorvastatin (LIPITOR) 40 mg, Oral, EVERY EVENING   • Blood Glucose Meter Kit Test blood sugar as recommended by provider.blood glucose monitoring kit. BID   • Blood Glucose Test Strips One test strip BID   • budesonide-formoterol (SYMBICORT) 160-4.5 MCG/ACT Aerosol 2 Puffs, Inhalation, 2 TIMES DAILY   • glipiZIDE (GLUCOTROL) 2.5 mg, Oral, EVERY MORNING   • Lancets (ONETOUCH DELICA PLUS [id number]) Misc USE TO TEST TWICE DAILY   • metformin (GLUCOPHAGE) 1,000 mg, Oral, 2 TIMES DAILY   • midodrine (PROAMATINE) 2.5 mg, Oral, 3 TIMES DAILY WITH MEALS   • SITagliptin (JANUVIA) 25 mg, Oral, DAILY   • tiotropium (SPIRIVA HANDIHALER) 18 MCG Cap 1 Cap, Inhalation, DAILY       [facility] is currently operating under crisis standards of care due to the COVID-19 pandemic.    Fidel Cruz M.D.  Internal Medicine PGY-1

## 2021-01-25 NOTE — PROGRESS NOTES
2 RN skin check complete w/ RN Agustin  Devices in place- Nasal Cannula.  Skin assessed under devices.  Confirmed pressure ulcers found on -NA.  New potential pressure ulcers noted on -NA.   Wound consult placed -NA.    Skin intact/fragile. Bilateral ears red/blanching. Sacrum red/blanching. Bilateral heels dry/calloused.  No signs of skin breakdown    The following interventions in place- Pillows in place for support/positioning. Patient encouraged to turn side to side frequently. Moisturizer at bedside.

## 2021-01-25 NOTE — ED TRIAGE NOTES
Chase Harris    Chief Complaint   Patient presents with   • ALOC     PT went non verbal periodically at home per family.   • Hypoxemia     PT was 70% on home 2L nasal cannula       Vitals:    01/25/21 0229   BP: 146/46   Pulse: 78   Resp: 18   Temp: 36.8 °C (98.2 °F)   SpO2: 100%

## 2021-01-25 NOTE — DISCHARGE PLANNING
Medical Social Work    Referral: Acute Medical Pt    Intervention: Pt is a 70 y.o. male JUDITH GUO from home  [id number] [place]  for altered level of consciouness. Pt is Chase Harris ( 1950). Per facesheet pts son is Marty Harris [phone].     Plan: MSW to follow as needed

## 2021-01-25 NOTE — ASSESSMENT & PLAN NOTE
Patient is on Januvia and Metformin outpatient  Continue with insulin sliding scale with hypoglycemia protocol and Accu-Checks.   Last A1c 6.9

## 2021-01-25 NOTE — ED PROVIDER NOTES
ED Provider Note    CHIEF COMPLAINT  Chief Complaint   Patient presents with   • ALOC     PT went non verbal periodically at home per family.   • Hypoxemia     PT was 70% on home 2L nasal cannula       [facility]    Primary care provider: Rashmi Cedeno P.A.-C.  Means of arrival: EMS  History obtained from: Medics and patient and son  History limited by: Nothing    Chase Harris is a 70 y.o. male who presents with hypoxia.  Family found him minimally responsive for several minutes this made him nervous so they called 911.  Medics found the patient to be hypoxic to the 50s, he is chronically on 2 to 3 L supplemental oxygen via nasal cannula.  He was actually admitted to this hospital last week, the same paramedic crew saw him last week and tonight and they said the last week's presentation was quite similar.  While hospitalized he was found to have elevated troponins, medically maximized no catheterization done.  Long history of pulmonary fibrosis.  When the patient was put on extra additional oxygen up to 6 L via nasal cannula with paramedics he had a completely normal mental status.  He is complaint free on arrival.  No history of seizures or syncope.  No speech changes or focal weakness.  Had increased work of breathing for medics that got improved with higher supplemental oxygen.    REVIEW OF SYSTEMS  Constitutional: Negative for fever or chills.  Positive for transient episode of decreased responsiveness.  HENT: Negative for rhinorrhea or sore throat.    Eyes: Negative for vision changes or discharge.   Respiratory: Negative for cough but positive for shortness of breath.    Cardiovascular: Negative for chest pain or palpitations.   Gastrointestinal: Negative for nausea, vomiting, or abdominal pain.   Genitourinary: Negative for dysuria or flank pain.   Musculoskeletal: Negative for back pain or joint pain.   Skin: Negative for itching or rash.   Neurological: Negative for sensory or motor changes.   See HPI for  "further details. All other systems are negative.     PAST MEDICAL HISTORY   has a past medical history of Diabetes (HCC) and Pulmonary fibrosis (HCC).    PAST FAMILY HISTORY  Family History   Problem Relation Age of Onset   • Diabetes Mother    • Diabetes Father    • Diabetes Brother        SOCIAL HISTORY  Social History     Tobacco Use   • Smoking status: Former Smoker     Packs/day: 2.00     Years: 40.00     Pack years: 80.00     Types: Cigarettes     Quit date:      Years since quittin.0   • Smokeless tobacco: Never Used   Substance and Sexual Activity   • Alcohol use: Yes     Frequency: Monthly or less   • Drug use: Never   • Sexual activity: Not on file       SURGICAL HISTORY   has a past surgical history that includes other abdominal surgery and other.    CURRENT MEDICATIONS  Midodrine, aspirin, atorvastatin, glipizide, Metformin, Spiriva, albuterol    ALLERGIES  No Known Allergies    PHYSICAL EXAM  VITAL SIGNS: BP (!) 162/71   Pulse 60   Temp 36.8 °C (98.2 °F) (Temporal)   Resp 16   Ht 1.626 m (5' 4\")   Wt 59 kg (130 lb)   SpO2 100%   BMI 22.31 kg/m²    Pulse ox interpretation: On supplemental oxygen via nasal cannula, 6 L, I interpret this pulse ox as normal.  Constitutional: Chronically ill-appearing sitting up.  HEENT: Normocephalic, atraumatic. Posterior pharynx clear, mucous membranes dry.  Eyes:  EOMI. Normal sclerae.  Pale conjunctivae.  Neck: Supple, nontender.  Chest/Pulmonary: Very diminished to ausculation bilaterally, fine crackles bilaterally.  Cardiovascular: Regular rate and rhythm, subtle systolic murmur.  Symmetric radial pulses.  Abdomen: Soft, nontender; no rebound, guarding, or masses.  Back: No CVA or midline tenderness.   Musculoskeletal: No deformity or edema.  Neuro: Clear speech, normal coordination, cranial nerves II-XII grossly intact, no focal asymmetry or sensory deficits.  Alert and oriented x4.  NIH stroke scale 0.  Psych: Normal mood and affect.  Skin: No " rashes, warm and dry.      DIAGNOSTIC STUDIES / PROCEDURES    LABS & EKG  Results for orders placed or performed during the hospital encounter of 01/25/21   CBC WITH DIFFERENTIAL   Result Value Ref Range    WBC 9.4 4.8 - 10.8 K/uL    RBC 3.19 (L) 4.70 - 6.10 M/uL    Hemoglobin 9.9 (L) 14.0 - 18.0 g/dL    Hematocrit 30.7 (L) 42.0 - 52.0 %    MCV 96.2 81.4 - 97.8 fL    MCH 31.0 27.0 - 33.0 pg    MCHC 32.2 (L) 33.7 - 35.3 g/dL    RDW 53.1 (H) 35.9 - 50.0 fL    Platelet Count 137 (L) 164 - 446 K/uL    MPV 13.0 (H) 9.0 - 12.9 fL    Neutrophils-Polys 66.40 44.00 - 72.00 %    Lymphocytes 17.10 (L) 22.00 - 41.00 %    Monocytes 10.60 0.00 - 13.40 %    Eosinophils 3.60 0.00 - 6.90 %    Basophils 0.80 0.00 - 1.80 %    Immature Granulocytes 1.50 (H) 0.00 - 0.90 %    Nucleated RBC 0.00 /100 WBC    Neutrophils (Absolute) 6.27 1.82 - 7.42 K/uL    Lymphs (Absolute) 1.61 1.00 - 4.80 K/uL    Monos (Absolute) 1.00 (H) 0.00 - 0.85 K/uL    Eos (Absolute) 0.34 0.00 - 0.51 K/uL    Baso (Absolute) 0.08 0.00 - 0.12 K/uL    Immature Granulocytes (abs) 0.14 (H) 0.00 - 0.11 K/uL    NRBC (Absolute) 0.00 K/uL   COMP METABOLIC PANEL   Result Value Ref Range    Sodium 133 (L) 135 - 145 mmol/L    Potassium 4.7 3.6 - 5.5 mmol/L    Chloride 96 96 - 112 mmol/L    Co2 23 20 - 33 mmol/L    Anion Gap 14.0 7.0 - 16.0    Glucose 372 (H) 65 - 99 mg/dL    Bun 25 (H) 8 - 22 mg/dL    Creatinine 1.12 0.50 - 1.40 mg/dL    Calcium 9.1 8.5 - 10.5 mg/dL    AST(SGOT) 29 12 - 45 U/L    ALT(SGPT) 43 2 - 50 U/L    Alkaline Phosphatase 99 30 - 99 U/L    Total Bilirubin 0.5 0.1 - 1.5 mg/dL    Albumin 3.8 3.2 - 4.9 g/dL    Total Protein 7.7 6.0 - 8.2 g/dL    Globulin 3.9 (H) 1.9 - 3.5 g/dL    A-G Ratio 1.0 g/dL   LACTIC ACID   Result Value Ref Range    Lactic Acid 2.0 0.5 - 2.0 mmol/L   proBrain Natriuretic Peptide, NT   Result Value Ref Range    NT-proBNP 2489 (H) 0 - 125 pg/mL   CORTISOL   Result Value Ref Range    Cortisol 21.9 0.0 - 23.0 ug/dL   MAGNESIUM   Result  Value Ref Range    Magnesium 1.6 1.5 - 2.5 mg/dL   TROPONIN   Result Value Ref Range    Troponin T 58 (H) 6 - 19 ng/L   PROTHROMBIN TIME   Result Value Ref Range    PT 14.3 12.0 - 14.6 sec    INR 1.08 0.87 - 1.13   COV-2, FLU A/B, AND RSV BY PCR (2-4 HOURS CEPHEID): Collect NP swab in VTM    Specimen: Respirate   Result Value Ref Range    Influenza virus A RNA Negative Negative    Influenza virus B, PCR Negative Negative    RSV, PCR Negative Negative    SARS-CoV-2 by PCR NotDetected     SARS-CoV-2 Source NP Swab    COD - Adult (Type and Screen)   Result Value Ref Range    ABO Grouping Only O     Rh Grouping Only POS     Antibody Screen-Cod NEG    ESTIMATED GFR   Result Value Ref Range    GFR If African American >60 >60 mL/min/1.73 m 2    GFR If Non African American >60 >60 mL/min/1.73 m 2   EKG   Result Value Ref Range    Report       [facility] Emergency Dept.    Test Date:  2021  Pt Name:    BREANNE BOOTH                Department: ER  MRN:        [id number]                      Room:       [facility]  Gender:     Male                         Technician: [id number]  :        1950                   Requested By:GERALD JOHNSON  Order #:    [id number]                    Reading MD: Gerald Johnson MD    Measurements  Intervals                                Axis  Rate:       63                           P:          39  VA:         220                          QRS:        7  QRSD:       80                           T:          1  QT:         416  QTc:        426    Interpretive Statements  SINUS RHYTHM  FIRST DEGREE AV BLOCK  Compared to ECG 2021 15:16:51  Sinus bradycardia no longer present  T-wave abnormality no longer present  No STEMI  Electronically Signed On 2021 4:09:32 PST by Gerald Johnson MD         RADIOLOGY  DX-CHEST-PORTABLE (1 VIEW)   Final Result      Ongoing diffuse bilateral interstitial opacities, suggesting mild pulmonary edema. This appearance is minimally  improved compared with 1/19.      CT-HEAD W/O   Final Result      No acute intracranial abnormality. Mild atrophy, unchanged.               INTERPRETING [facility]  [facility] [place] [address]          COURSE & MEDICAL DECISION MAKING    This is a 70 y.o. male who presents with an episode of decreased responsiveness found to be hypoxic for paramedics, improved with supplemental oxygen.    Differential Diagnosis includes but is not limited to:  Respiratory failure, hypoperfusion, infection, CHF, syncope, dysrhythmia, seizure    ED Course:  70-year-old male with many medical problems coming in with an episode of decreased responsiveness found to be very hypoxic.  Improving with oxygen.  Given current pandemic plan Covid screen, x-ray, and thorough lab and EKG work-up.  Patient has a normal neurologic examination for me, he has absolutely no deficits and an NIH stroke scale of 0 this is unlikely acute ischemic stroke.  However, elderly and altered for a time so I will at least obtain a head CT.  Given NIH stroke scale of 0 he would not be a candidate for alteplase/TPA.    Work-up today reassuring troponin slightly elevated but lower than levels done last week.  White count normal no fevers doubt serious infection.  Electrolytes stable aside from hyperglycemia no severe acidosis.  BNP elevated but again better than prior.  Chest x-ray stable.  Rapid Covid testing negative.  Patient requiring supplemental oxygen, plan admission and close cardiopulmonary monitoring.  Hospitalist Dr. Blackwood was consulted, he will kindly hospitalize the patient for further work-up and treatment.    Medications   dexamethasone pf (DECADRON) injection 10 mg (has no administration in time range)   aspirin EC (ECOTRIN) tablet 81 mg (has no administration in time range)   atorvastatin (LIPITOR) tablet 40 mg (has no administration in time range)   acetaminophen (Tylenol) tablet 650 mg (has no administration in time range)   albuterol inhaler 2  Puff (has no administration in time range)   enoxaparin (LOVENOX) inj 40 mg (has no administration in time range)       FINAL IMPRESSION  1. Acute on chronic respiratory failure with hypoxia (HCC)    2. Transient alteration of awareness    3. Elevated troponin    4. Abnormal chest x-ray    5. Pulmonary fibrosis (HCC)    6. Uncontrolled type 2 diabetes mellitus with hyperglycemia (HCC)        -ADMIT-       Pertinent Labs & Imaging studies reviewed and verified by myself, as well as nursing notes and the patient's past medical, family, and social histories (See chart for details).    Portions of this record were made with voice recognition software.  Despite my review, spelling/grammar/context errors may still remain.  Interpretation of this chart should be taken in this context.    Electronically signed by Gerald Tavera M.D. on 1/25/2021 at 4:14 AM.

## 2021-01-25 NOTE — ASSESSMENT & PLAN NOTE
Cardiac cath on 11/2020 showed diffuse small vessel disease in not a candidate for intervention during last admission.   No current chest pain.  Continue with aspirin 81 mg daily and Atorvastatin 40 mg daily

## 2021-01-25 NOTE — ASSESSMENT & PLAN NOTE
Likely residual that is downtrending from recent admission vs type 2 STEMI.  EKG nonischemic.  At this time patient does not have any chest pain.

## 2021-01-25 NOTE — ED NOTES
PT became very restless shortly after arriving. PT O2 saturation dropped to approx 48% on 6L nasal cannula.   ERP was paged and currently at bedside.

## 2021-01-25 NOTE — ASSESSMENT & PLAN NOTE
Patient says at home, he becomes dizzy sometimes syncopizing after standing up  Unclear etiology. Secondary to heart disease vs possible autonomic dysfxn from diabetes although A1c only 6.8.  Recent echo: LVEF 60%.  Normal right ventricular size and systolic function.  Mild tricuspid regurgitation.  Normal pericardium without effusion.    Provided orthostatics/dizziness education to patient with ipad . He expressed understanding and was able to repeat back to me.  Orthostatics negative  PT

## 2021-01-25 NOTE — H&P
Hospital Medicine History & Physical Note    Date of Service  1/25/2021    Primary Care Physician  Rashmi Cedeno P.A.-C.    Consultants  Pulmonary     Code Status  Full Code    Chief Complaint  Chief Complaint   Patient presents with   • ALOC     PT went non verbal periodically at home per family.   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  Son states that he has frequently been having recurrent episodes like this and often sees that his father has his oxygen off.  He has even tried to anchor the nasal cannula with strips of tape.  However this time he states he had his oxygen on.  He denied any chest pain. Upon EMS, he was found to be saturating 50% with altered level mental status.  Patient improved in the ED now requiring 6 to 7 L of oxygen supplementation via nasal cannula.  Of note patient was hospitalized last week with similar presentation.  During that admission he had elevated troponin's, cardiology was consulted who deemed given his small vessels he would not be a good candidate for cardiac catheterization.  He was maximized on medical therapy for CAD.  As per sonJuan Jose, who is bedside he states that he is scheduled for pulmonary function test and high-resolution CAT scan on February 17.     Hospitalist medicine called for admission and further management.        Review of Systems  Review of Systems   Respiratory: Positive for shortness of breath.    All other systems reviewed and are negative.      Past Medical History   has a past medical history of Diabetes (HCC) and Pulmonary fibrosis (HCC).    Surgical History   has a past  surgical history that includes other abdominal surgery and other.     Family History  family history includes Diabetes in his brother, father, and mother.     Social History   reports that he quit smoking about 26 years ago. His smoking use included cigarettes. He has a 80.00 pack-year smoking history. He has never used smokeless tobacco. He reports current alcohol use. He reports that he does not use drugs.    Allergies  No Known Allergies    Medications  Prior to Admission Medications   Prescriptions Last Dose Informant Patient Reported? Taking?   Blood Glucose Meter Kit  Patient's Home Pharmacy No No   Sig: Test blood sugar as recommended by provider.blood glucose monitoring kit. BID   Patient not taking: Reported on 1/19/2021   Blood Glucose Test Strips  Patient's Home Pharmacy No No   Sig: One test strip BID   Patient not taking: Reported on 1/19/2021   Lancets (ONETOUCH DELICA PLUS [id number]) [facility]  Patient's Home Pharmacy No No   Sig: USE TO TEST TWICE DAILY   Patient not taking: Reported on 1/19/2021   SITagliptin (JANUVIA) 25 MG Tab  Patient's Home Pharmacy No No   Sig: Take 1 Tab by mouth every day.   albuterol 108 (90 Base) MCG/ACT Aero Soln inhalation aerosol  Patient's Home Pharmacy No No   Sig: Inhale 2 Puffs by mouth every 6 hours as needed for Shortness of Breath.   aspirin 81 MG EC tablet  Patient's Home Pharmacy No No   Sig: Take 1 Tab by mouth every day.   atorvastatin (LIPITOR) 40 MG Tab  Patient's Home Pharmacy No No   Sig: Take 1 Tab by mouth every evening.   budesonide-formoterol (SYMBICORT) 160-4.5 MCG/ACT Aerosol  Patient's Home Pharmacy Yes No   Sig: Inhale 2 Puffs 2 Times a Day.   glipiZIDE (GLUCOTROL) 5 MG Tab  Patient's Home Pharmacy Yes No   Sig: Take 2.5 mg by mouth every morning.   metformin (GLUCOPHAGE) 1000 MG tablet  Patient's Home Pharmacy Yes No   Sig: Take 1,000 mg by mouth 2 Times a Day.   midodrine (PROAMATINE) 2.5 MG Tab  Patient's Home Pharmacy No No   Sig: Take 1 Tab by  mouth 3 times a day, with meals.   Patient not taking: Reported on 1/19/2021   tiotropium (SPIRIVA HANDIHALER) 18 MCG Cap  Patient's Home Pharmacy No No   Sig: Inhale 1 Cap by mouth every day.      Facility-Administered Medications: None       Physical Exam  Temp:  [36.8 °C (98.2 °F)] 36.8 °C (98.2 °F)  Pulse:  [] 60  Resp:  [16-40] 16  BP: (142-162)/(46-71) 162/71  SpO2:  [47 %-100 %] 100 %    Physical Exam  Vitals signs reviewed.   Constitutional:       Appearance: He is normal weight.   HENT:      Head: Normocephalic and atraumatic.      Mouth/Throat:      Pharynx: Oropharynx is clear.   Eyes:      Extraocular Movements: Extraocular movements intact.      Pupils: Pupils are equal, round, and reactive to light.   Neck:      Musculoskeletal: Neck supple.   Cardiovascular:      Rate and Rhythm: Regular rhythm.      Heart sounds: Normal heart sounds.      Comments: Borderline bradycardic  Pulmonary:      Breath sounds: Rales (Bilateral ) present.   Abdominal:      General: Bowel sounds are normal.      Palpations: Abdomen is soft.   Musculoskeletal: Normal range of motion.         General: No swelling or tenderness.   Skin:     General: Skin is warm and dry.   Neurological:      General: No focal deficit present.      Mental Status: He is oriented to person, place, and time.         Laboratory:  Recent Labs     01/22/21  0600 01/25/21  0236   WBC 8.1 9.4   RBC 3.10* 3.19*   HEMOGLOBIN 9.6* 9.9*   HEMATOCRIT 29.4* 30.7*   MCV 94.8 96.2   MCH 31.0 31.0   MCHC 32.7* 32.2*   RDW 50.3* 53.1*   PLATELETCT 157* 137*   MPV 13.6* 13.0*     Recent Labs     01/22/21  0900 01/25/21  0236   SODIUM 132* 133*   POTASSIUM 3.9 4.7   CHLORIDE 102 96   CO2 26 23   GLUCOSE 155* 372*   BUN 20 25*   CREATININE 0.80 1.12   CALCIUM 8.6 9.1     Recent Labs     01/22/21  0900 01/25/21  0236   ALTSGPT  --  43   ASTSGOT  --  29   ALKPHOSPHAT  --  99   TBILIRUBIN  --  0.5   GLUCOSE 155* 372*     Recent Labs     01/25/21  0236   INR 1.08      Recent Labs     21  0236   NTPROBNP 2489*         Recent Labs     21  0236   TROPONINT 58*       Imaging:  DX-CHEST-PORTABLE (1 VIEW)   Final Result      Ongoing diffuse bilateral interstitial opacities, suggesting mild pulmonary edema. This appearance is minimally improved compared with .      CT-HEAD W/O   Final Result      No acute intracranial abnormality. Mild atrophy, unchanged.               INTERPRETING LOCATION:  [address] [place] [address], [facility]        Results for orders placed or performed during the hospital encounter of 21   EKG   Result Value Ref Range    Report       [facility] Emergency Dept.    Test Date:  2021  Pt Name:    BREANNE BOOTH                Department: ER  MRN:        [id number]                      Room:       [facility]  Gender:     Male                         Technician: [id number]  :        1950                   Requested By:GERALD JOHNSON  Order #:    [id number]                    Reading MD: Gerald Johnson MD    Measurements  Intervals                                Axis  Rate:       63                           P:          39  ID:         220                          QRS:        7  QRSD:       80                           T:          1  QT:         416  QTc:        426    Interpretive Statements  SINUS RHYTHM  FIRST DEGREE AV BLOCK  Compared to ECG 2021 15:16:51  Sinus bradycardia no longer present  T-wave abnormality no longer present  No STEMI  Electronically Signed On 2021 4:09:32 PST by Gerald Johnson MD           Assessment/Plan:  I anticipate this patient will require at least two midnights for appropriate medical management, necessitating inpatient admission.    * Acute on chronic respiratory failure with hypoxia (HCC)  Assessment & Plan  Continue with oxygen supplementation to maintain SPO2 above 90%.   Patient following up with pulmonary as outpatient scheduled for high-resolution CT scan and pulmonary function  test.  Currently being treated for interstitial lung disease with bronchodilators and oxygen dependent on 2 to 3 L.  Telemetry monitoring  Pulmonary consult in am     Interstitial lung disease (HCC)- (present on admission)  Assessment & Plan  Patient has a history of interstitial lung disease follows up with pulmonary outpatient  Continue with oxygen supplementation to maintain spO2>90%  Continue with bronchodilators Symbicort, albuterol, Spiriva     Diabetes mellitus type II, uncontrolled (HCC)  Assessment & Plan  Patient is on Januvia and Metformin outpatient  Continue with insulin sliding scale with hypoglycemia protocol and Accu-Checks.   Last A1c 6.9    CAD (coronary artery disease)  Assessment & Plan  Cardiac cath on 11/2020 showed diffuse small vessel disease in not a candidate for intervention during last admission.   Continue with aspirin 81 mg daily and Atorvastatin 40 mg daily   Serial troponin/EKG every 6 hours         Elevated troponin- (present on admission)  Assessment & Plan  Elevated troponin most likely secondary to hypoxia.  This is lower than during his last admission however given his diffuse small vessel coronary disease we will continue to trend and monitor on telemetry.  At this time patient does not have any chest pain.  Serial troponin and EKG.    VTE: Lovenox     Please note that this dictation was created using voice recognition software. I have made every reasonable attempt to correct obvious errors, but I expect that there are errors of grammar and possibly context that I did not discover before finalizing the note.     This patient was seen under COVID 19 pandemic disaster response conditions.  During a disaster, the provisions of care is subject to the Crisis Standard of Care

## 2021-01-25 NOTE — ASSESSMENT & PLAN NOTE
Secondary to pulmonary edema vs worsening of IPF.  CT chest noncon: pulmonary fibrosis unchanged from previous, negative for ptx or pna  Continue with oxygen supplementation to maintain SPO2 above 90%.   Patient following up with pulmonary as outpatient scheduled for high-resolution CT scan and pulmonary function test.  Currently being treated for interstitial lung disease with bronchodilators and oxygen dependent on 2 to 3 L.  Telemetry monitoring  Pulmonary consulted  Respiratory panel PCR negative

## 2021-01-25 NOTE — ED NOTES
Patient resting in bed. No acute distress.    No complaints at this time.    Fall and safety precautions maintained.    Hourly rounding in progress.

## 2021-01-25 NOTE — ASSESSMENT & PLAN NOTE
Patient has a history of interstitial lung disease follows up with pulmonary outpatient  Continue with oxygen supplementation to maintain spO2>90%  Continue with bronchodilators Symbicort, albuterol, Spiriva   COVID-19 and flu negative.  Respiratory panel pcr negative  Patient to followup with outpatient pulm who may start him on antifibrotics

## 2021-01-26 NOTE — PROGRESS NOTES
Internal Medicine - Daily Progress Note     Date of Service: 1/26/2021  Primary Team: Green Team  Attending: Dr. Rajni Justin M.D.  Senior Resident: Dr. Amy Basilio  Intern: Fidel Cruz M.D.  Contact: [phone]      Previous 24 Hours  No acute events overnight.  Patient denied any SOB this morning.  Feels breathing is near his baseline.  No subjective fevers, chills, or cough.  No dizziness or syncopal episodes.      Consultants/Specialty:  Pulmonary - Dr. Duarte    Review of Systems   Review of Systems   Constitutional: Negative for chills and fever.   Respiratory: Negative for shortness of breath.    Cardiovascular: Negative for chest pain and leg swelling.   Gastrointestinal: Negative for abdominal pain, nausea and vomiting.   Genitourinary: Negative for dysuria and urgency.   Musculoskeletal: Negative for falls.   Neurological: Positive for dizziness and loss of consciousness.   All other systems reviewed and are negative.      Vitals   Temp:  [36.4 °C (97.5 °F)-36.9 °C (98.4 °F)] 36.9 °C (98.4 °F)  Pulse:  [62-93] 66  Resp:  [15-20] 16  BP: (143-166)/(55-80) 149/64  SpO2:  [94 %-99 %] 96 %        Physical Exam  Physical Exam  Vitals signs reviewed.   Constitutional:       Appearance: He is normal weight.   HENT:      Head: Normocephalic and atraumatic.      Mouth/Throat:      Pharynx: Oropharynx is clear.   Eyes:      Extraocular Movements: Extraocular movements intact.      Pupils: Pupils are equal, round, and reactive to light.   Neck:      Musculoskeletal: Neck supple.   Cardiovascular:      Rate and Rhythm: Normal rate and regular rhythm.      Heart sounds: Normal heart sounds.   Pulmonary:      Comments: Bilateral, fine, velcro-like crackles greater at bases  Abdominal:      General: Bowel sounds are normal.      Palpations: Abdomen is soft.   Musculoskeletal: Normal range of motion.         General: No swelling or tenderness.   Skin:     General: Skin is warm and dry.   Neurological:      General:  No focal deficit present.      Mental Status: He is oriented to person, place, and time.         Labs  Labs reviewed. Relevant findings below.  Recent Labs     01/25/21  0236 01/26/21  0038   HEMOGLOBIN 9.9* 9.4*   HEMATOCRIT 30.7* 29.6*   MCV 96.2 96.4   RDW 53.1* 52.5*   PLATELETCT 137* 158*     Recent Labs     01/25/21  0236 01/26/21  0038   WBC 9.4 8.6   NEUTS 6.27 5.40   LYMPHS 1.61 1.91   EOS 0.34 0.26     Recent Labs     01/25/21  0236 01/26/21  0038   SODIUM 133* 138   POTASSIUM 4.7 5.1   CHLORIDE 96 98   CO2 23 30   BUN 25* 19   CREATININE 1.12 1.09   GLUCOSE 372* 256*   CALCIUM 9.1 9.8   MAGNESIUM 1.6  --    IFAFRICA >60 >60   IFNOTAFR >60 >60     Recent Labs     01/25/21  0236 01/26/21  0038   ASTSGOT 29 30   ALTSGPT 43 45   ALKPHOSPHAT 99 77   TBILIRUBIN 0.5 0.5   ALBUMIN 3.8 3.8   TOTPROTEIN 7.7 7.6     Recent Labs     01/25/21  0236   PROTHROMBTM 14.3   INR 1.08     Recent Labs     01/25/21  0930 01/25/21  2353 01/26/21  0557   POCGLUCOSE  --  282* 133*   HBA1C 6.8*  --   --    AVGLUC 148  --   --      Recent Labs     01/25/21 0236   TROPONINT 58*   NTPROBNP 2489*         Imaging   I have reviewed the relevant diagnostic imaging.    Microbiology  I have reviewed the relevant microbiology.      Problem Representation  Chase Harris is a 70 y.o. male with a PMHx including IPF, insulin-independent type II DM, CAD, orthostatic syncopal episodes admitted on 1/25/2021 after syncopal episode. Unclear why he is syncopizing; underlying heart disease vs autodysfxn from diabetes vs hypoxemia. Also with acute on chronic hypoxemic respiratory failure. Pulmonary medicine consulted and believes acute on chronic IPF less likely. Resp pathogen panel PCR negative. CT chest noncon unrevealing. Since admission, patient's symptoms have resolved. He is on baseline oxygen. Anticipate DC today.    Assessment and Plan  * Acute on chronic respiratory failure with hypoxia (HCC)  Assessment & Plan  Secondary to pulmonary  edema vs worsening of IPF.  CT chest noncon: pulmonary fibrosis unchanged from previous, negative for ptx or pna  Continue with oxygen supplementation to maintain SPO2 above 90%.   Patient following up with pulmonary as outpatient scheduled for high-resolution CT scan and pulmonary function test.  Currently being treated for interstitial lung disease with bronchodilators and oxygen dependent on 2 to 3 L.  Telemetry monitoring  Pulmonary consulted  Respiratory panel PCR negative    Postural dizziness with presyncope- (present on admission)  Assessment & Plan  Patient says at home, he becomes dizzy sometimes syncopizing after standing up  Unclear etiology. Secondary to heart disease vs possible autonomic dysfxn from diabetes although A1c only 6.8.  Recent echo: LVEF 60%.  Normal right ventricular size and systolic function.  Mild tricuspid regurgitation.  Normal pericardium without effusion.    Provided orthostatics/dizziness education to patient with ipad . He expressed understanding and was able to repeat back to me.  Orthostatics negative  PT    Interstitial lung disease (HCC)- (present on admission)  Assessment & Plan  Patient has a history of interstitial lung disease follows up with pulmonary outpatient  Continue with oxygen supplementation to maintain spO2>90%  Continue with bronchodilators Symbicort, albuterol, Spiriva   COVID-19 and flu negative.  Respiratory panel pcr negative  Patient to followup with outpatient pulm who may start him on antifibrotics    Diabetes mellitus type II, uncontrolled (HCC)  Assessment & Plan  Patient is on Januvia and Metformin outpatient  Continue with insulin sliding scale with hypoglycemia protocol and Accu-Checks.   Last A1c 6.9    CAD (coronary artery disease)  Assessment & Plan  Cardiac cath on 11/2020 showed diffuse small vessel disease in not a candidate for intervention during last admission.   No current chest pain.  Continue with aspirin 81 mg daily and  Atorvastatin 40 mg daily     Hyperglycemia  Assessment & Plan  Glucose normalized.  -see DM2 A&P for more detail    Elevated troponin- (present on admission)  Assessment & Plan  Likely residual that is downtrending from recent admission vs type 2 STEMI.  EKG nonischemic.  At this time patient does not have any chest pain.        Medications  Scheduled:    •  aspirin, 81 mg, Oral, DAILY    •  atorvastatin, 40 mg, Oral, Q EVENING    •  enoxaparin, 40 mg, Subcutaneous, DAILY    •  insulin regular, 3-15 Units, Subcutaneous, Q6HRS    •  budesonide-formoterol, 2 Puff, Inhalation, BID    •  ipratropium-albuterol, 3 mL, Nebulization, Q6HRS (RT)      PRN:  acetaminophen, albuterol, Notify **AND** glucose **AND** dextrose 50%, Respiratory Therapy Consult    Current Outpatient Medications   Medication Instructions   • albuterol 108 (90 Base) MCG/ACT Aero Soln inhalation aerosol 2 Puffs, Inhalation, EVERY 6 HOURS PRN   • aspirin 81 mg, Oral, DAILY   • atorvastatin (LIPITOR) 40 mg, Oral, EVERY EVENING   • Blood Glucose Meter Kit Test blood sugar as recommended by provider.blood glucose monitoring kit. BID   • Blood Glucose Test Strips One test strip BID   • budesonide-formoterol (SYMBICORT) 160-4.5 MCG/ACT Aerosol 2 Puffs, Inhalation, 2 TIMES DAILY   • glipiZIDE (GLUCOTROL) 2.5 mg, Oral, EVERY MORNING   • Lancets (ONETOUCH DELICA PLUS [id number]) Misc USE TO TEST TWICE DAILY   • metformin (GLUCOPHAGE) 1,000 mg, Oral, 2 TIMES DAILY   • midodrine (PROAMATINE) 2.5 mg, Oral, 3 TIMES DAILY WITH MEALS   • SITagliptin (JANUVIA) 25 mg, Oral, DAILY   • tiotropium (SPIRIVA HANDIHALER) 18 MCG Cap 1 Cap, Inhalation, DAILY       [facility] is currently operating under crisis standards of care due to the COVID-19 pandemic.    Fidel Cruz M.D.  Internal Medicine PGY-1

## 2021-01-26 NOTE — PROGRESS NOTES
Bedside report received from day shift RN. Assumed care at 1900. Pt is A&Ox4. Pt is in bed with son at bedside. Pt denies pain at this time. Pt was updated on plan of care. Pt has call light, personal belongings, and bedside table within reach. Bed is in the lowest position. Will continue to monitor

## 2021-01-26 NOTE — DISCHARGE PLANNING
Anticipated Discharge Disposition: home with possible new oxygen needs    Action: Lisa WILD, verified with [facility] that patient is currently ordered to have 2 liters continuous oxygen.  Nurse is reporting that patient oxygen needs increased. A new home oxygen eval needs to be charted and new orders sent to [facility] if needed. [facility] did say a tank can be released from bunker here. RN CM informed MD that new orders and face to face need to be placed once eval charted. Choice for [facility] faxed to [facility].    Barriers to Discharge: oxygen needs    Plan: Case coordination to f/u for new oxygen orders

## 2021-01-26 NOTE — DISCHARGE INSTRUCTIONS
Discharge Instructions    Discharged to home by car with relative. Discharged via walking, hospital escort: Yes.  Special equipment needed: Oxygen    Be sure to schedule a follow-up appointment with your primary care doctor or any specialists as instructed.     Discharge Plan:   Diet Plan: Discussed  Activity Level: Discussed  Confirmed Follow up Appointment: Patient to Call and Schedule Appointment  Confirmed Symptoms Management: Discussed  Medication Reconciliation Updated: Yes  Influenza Vaccine Indication: Not indicated: Previously immunized this influenza season and > 8 years of age    I understand that a diet low in cholesterol, fat, and sodium is recommended for good health. Unless I have been given specific instructions below for another diet, I accept this instruction as my diet prescription.   Other diet: consistent carbohydrate    Special Instructions: None    · Is patient discharged on Warfarin / Coumadin?   No     Depression / Suicide Risk    As you are discharged from this [facility] facility, it is important to learn how to keep safe from harming yourself.    Recognize the warning signs:  · Abrupt changes in personality, positive or negative- including increase in energy   · Giving away possessions  · Change in eating patterns- significant weight changes-  positive or negative  · Change in sleeping patterns- unable to sleep or sleeping all the time   · Unwillingness or inability to communicate  · Depression  · Unusual sadness, discouragement and loneliness  · Talk of wanting to die  · Neglect of personal appearance   · Rebelliousness- reckless behavior  · Withdrawal from people/activities they love  · Confusion- inability to concentrate     If you or a loved one observes any of these behaviors or has concerns about self-harm, here's what you can do:  · Talk about it- your feelings and reasons for harming yourself  · Remove any means that you might use to hurt yourself (examples: pills, rope,  extension cords, firearm)  · Get professional help from the community (Mental Health, Substance Abuse, psychological counseling)  · Do not be alone:Call your Safe Contact- someone whom you trust who will be there for you.  · Call your local CRISIS HOTLINE 204-5316 or 159-980-1361  · Call your local Children's Mobile Crisis Response Team [place] (754) 715-3759 or www.Sweeten  · Call the toll free National Suicide Prevention Hotlines   · National Suicide Prevention Lifeline 939-416-APZZ (4696)  · National [employer] Line Network 800-SUICIDE (684-9377)    Please wait for 1-2 mins when going from seated/laying to standing position before you start walking.   Please wear your oxygen nasal cannula as prescribed.

## 2021-01-26 NOTE — PROGRESS NOTES
Pulmonary Progress Note    Date of admission  1/25/2021    Chief Complaint  70 y.o. male admitted 1/25/2021 with interstitial lung disease and syncope.    Hospital Course  Chase Harris is a very pleasant 70 y.o. male who presented 1/25/2021 with a history of interstitial lung disease suspected IPF who was found down by his family in a unresponsive state.  He has a history of recurrent presyncope and syncope that has been worked up as an outpatient.  He is followed by Dr. Campuzano closely in the pulmonary clinic, under consideration for possible antifibrotic therapy.      Upon arrival to the emergency room he was found to have an increased supplemental oxygen requirements.  Normally uses 2 L at rest, 5 L with exertion, was requiring 6 L at rest in the ER.  SARS-CoV-2, flu, RSV were negative.  Procalcitonin < 0.05.  BNP 2400, previously has vacillated between 2-3k.  Recent TTE and heart catheterization noted.       CT chest was performed showing mild mosaicism of the lung parenchyma, no significant interlobular septal thickening, bibasilar predominant fine honeycombing and bronchiectasis.     Since admission his supplemental oxygen requirements of aggressively normalized to baseline levels.  Currently he reports no respiratory complaints.    01/26: patient comfortable in bed, no acute distress, currently on 2 lpm of supplemental oxygen, ambulating well to rest room, denied any dizziness/presyncope.    Interval Problem Update  Reviewed last 24 hour events:  As above    Review of Systems  Review of Systems   Constitutional: Negative for chills and fever.   Respiratory: Positive for shortness of breath. Negative for cough and hemoptysis.         Shortness of breath improving.   Cardiovascular: Negative for chest pain, palpitations and leg swelling.   Neurological: Negative for dizziness.        Vital Signs for last 24 hours   Temp:  [36.4 °C (97.5 °F)-36.9 °C (98.4 °F)] 36.9 °C (98.4 °F)  Pulse:  [62-93] 66  Resp:   [15-20] 16  BP: (143-166)/(55-80) 149/64  SpO2:  [94 %-99 %] 96 %    Physical Exam   Physical Exam  Constitutional:       Appearance: Normal appearance.   HENT:      Head: Normocephalic and atraumatic.      Mouth/Throat:      Mouth: Mucous membranes are moist.   Neck:      Musculoskeletal: Normal range of motion.   Cardiovascular:      Rate and Rhythm: Normal rate and regular rhythm.      Pulses: Normal pulses.      Heart sounds: No murmur.   Pulmonary:      Breath sounds: Rales present.      Comments: B/l basal rales appreciated  Abdominal:      Palpations: Abdomen is soft.   Musculoskeletal: Normal range of motion.         General: No swelling or tenderness.   Skin:     Capillary Refill: Capillary refill takes less than 2 seconds.   Neurological:      Mental Status: He is alert and oriented to person, place, and time.           Medications  Current Facility-Administered Medications   Medication Dose Route Frequency Provider Last Rate Last Admin   • midodrine (PROAMATINE) tablet 2.5 mg  2.5 mg Oral TID WITH MEALS Fidel Cruz M.D.       • aspirin EC (ECOTRIN) tablet 81 mg  81 mg Oral DAILY Holden Blackwood M.D.   81 mg at 01/26/21 0613   • atorvastatin (LIPITOR) tablet 40 mg  40 mg Oral Q EVENING Holden Blackwood M.D.   40 mg at 01/25/21 1820   • acetaminophen (Tylenol) tablet 650 mg  650 mg Oral Q6HRS PRN Holden Blackwood M.D.       • albuterol inhaler 2 Puff  2 Puff Inhalation Q6HRS PRN (RT) Holden Blackwood M.D.       • enoxaparin (LOVENOX) inj 40 mg  40 mg Subcutaneous DAILY Holden Blackwood M.D.   40 mg at 01/26/21 0554   • glucose 4 g chewable tablet 16 g  16 g Oral Q15 MIN PRN Holden Blackwood M.D.        And   • dextrose 50% (D50W) injection 50 mL  50 mL Intravenous Q15 MIN PRN Holden Blackwood M.D.       • insulin regular (HumuLIN R,NovoLIN R) injection  3-15 Units Subcutaneous Q6HRS Holden Blackwood M.D.   Stopped at 01/26/21 0600   • budesonide-formoterol (SYMBICORT) 160-4.5 MCG/ACT inhaler 2 Puff  2 Puff  Inhalation BID Holden Blackwood M.D.   2 Puff at 01/26/21 0553   • ipratropium-albuterol (DUONEB) nebulizer solution  3 mL Nebulization Q6HRS (RT) Amy Basilio M.D.   3 mL at 01/26/21 0734   • Respiratory Therapy Consult   Nebulization Continuous RT Rajni Justin M.D.           Fluids    Intake/Output Summary (Last 24 hours) at 1/26/2021 0740  Last data filed at 1/25/2021 0900  Gross per 24 hour   Intake 240 ml   Output --   Net 240 ml       Laboratory          Recent Labs     01/25/21  0236 01/26/21  0038   SODIUM 133* 138   POTASSIUM 4.7 5.1   CHLORIDE 96 98   CO2 23 30   BUN 25* 19   CREATININE 1.12 1.09   MAGNESIUM 1.6  --    CALCIUM 9.1 9.8     Recent Labs     01/25/21  0236 01/26/21  0038   ALTSGPT 43 45   ASTSGOT 29 30   ALKPHOSPHAT 99 77   TBILIRUBIN 0.5 0.5   GLUCOSE 372* 256*     Recent Labs     01/25/21  0236 01/26/21  0038   WBC 9.4 8.6   NEUTSPOLYS 66.40 63.30   LYMPHOCYTES 17.10* 22.30   MONOCYTES 10.60 9.10   EOSINOPHILS 3.60 3.00   BASOPHILS 0.80 0.90   ASTSGOT 29 30   ALTSGPT 43 45   ALKPHOSPHAT 99 77   TBILIRUBIN 0.5 0.5     Recent Labs     01/25/21  0236 01/26/21  0038   RBC 3.19* 3.07*   HEMOGLOBIN 9.9* 9.4*   HEMATOCRIT 30.7* 29.6*   PLATELETCT 137* 158*   PROTHROMBTM 14.3  --    INR 1.08  --      Imaging  CT:    Reviewed    Assessment/Plan  * Acute on chronic respiratory failure with hypoxia (HCC)  Assessment & Plan  Improving  Baseline 2LPM at rest, 5LPM with exertion  Patient at baseline this morning, ambulating well to restroom without any dizziness/presycope  -patient appears at baseline, recommend outpatient follow up with Dr Campuzano, scheduled 2/18/21.    Interstitial lung disease (HCC)- (present on admission)  Assessment & Plan  Suspected IPF  CT reviewed, fine ground glass opacities and mosacism  Respiratory panel pending  +240ml I/O in last 24 hrs, recommend diuresis  -strict Input/output monitoring.     VTE:  Lovenox  Ulcer: Not Indicated  Lines: None    I have performed a physical  exam and reviewed and updated ROS and Plan today (1/26/2021). In review of yesterday's note (1/25/2021), there are no changes except as documented above.     Discussed patient condition and risk of morbidity and/or mortality with Hospitalist, RN, [facility] resident and Patient    The patient was seen and examined with the resident. I personally reviewed all elements of the history and repeated the physical exam as appropriate, and the note below accurately reflects my own examination, review of clinical data, assessment and recommendations for 1/26/2021.  I am actively involved in the care of this patient. Any corrections or additions to the resident note are edited in my note above.    This note was generated using voice recognition software which has a chance of producing errors of grammar and content.  I have made every reasonable attempt to find and correct any errors, but it should be expected that some may not be found prior to finalization of this note.  __________  Mathieu Duarte MD  Pulmonary and Critical Care Medicine  [facility]

## 2021-01-26 NOTE — ASSESSMENT & PLAN NOTE
Suspected IPF  CT reviewed, fine ground glass opacities and mosacism  Respiratory panel pending  +240ml I/O in last 24 hrs, recommend diuresis  -strict Input/output monitoring.

## 2021-01-26 NOTE — THERAPY
Physical Therapy   Initial Evaluation     Patient Name: Chase Harris  Age:  70 y.o., Sex:  male  Medical Record #: [id number]  Today's Date: 1/26/2021     Precautions: Fall Risk    Assessment  Patient is 70 y.o. male with a diagnosis of Syncope.  Patient demonstrates baseline level of functional mobility and has supportive son at home that assists as needed.  Patient will need supplemental O2 at home, as patient desaturated to 88% after low intensity exercising for 1 minute and 45 seconds, in sitting patient continued to desaturate to 82%.  Taking 3 minutes to return to 92% on 6L of O2.  At rest patient is at 95% on 3L.  Likely will need to increase O2 during activity to safely ambulate.  Recommend continued ambulation with nursing staff. PT will not follow.     01/26/21 1050   Precautions   Precautions Fall Risk   Comments desaturation   Prior Living Situation   Prior Services None   Housing / Facility 1 Story House   Steps Into Home 0   Steps In Home 0   Equipment Owned Front-Wheel Walker   Lives with - Patient's Self Care Capacity Adult Children  (son)   Comments son assists prn   Prior Level of Functional Mobility   Bed Mobility Independent   Transfer Status Required Assist   Ambulation Required Assist   Distance Ambulation (Feet) 50   Assistive Devices Used Front-Wheel Walker   Stairs Required Assist   History of Falls   History of Falls Yes   Date of Last Fall   (4 due to syncope)   Cognition    Cognition / Consciousness WDL   Level of Consciousness Alert   Passive ROM Lower Body   Passive ROM Lower Body WDL   Active ROM Lower Body    Active ROM Lower Body  WDL   Strength Lower Body   Lower Body Strength  WDL   Sensation Lower Body   Lower Extremity Sensation   WDL   Balance Assessment   Sitting Balance (Static) Fair +   Sitting Balance (Dynamic) Fair +   Standing Balance (Static) Fair   Standing Balance (Dynamic) Fair   Weight Shift Sitting Fair   Weight Shift Standing Fair   Gait Analysis   Gait Level Of  Assist Supervised   Assistive Device Front Wheel Walker   Distance (Feet) 25   # of Times Distance was Traveled 1   Weight Bearing Status fwb   Comments desaturates   Bed Mobility    Supine to Sit Supervised   Sit to Supine Supervised   Scooting Supervised   Functional Mobility   Sit to Stand Supervised   How much difficulty does the patient currently have...   Turning over in bed (including adjusting bedclothes, sheets and blankets)? 4   Sitting down on and standing up from a chair with arms (e.g., wheelchair, bedside commode, etc.) 4   Moving from lying on back to sitting on the side of the bed? 4   How much help from another person does the patient currently need...   Moving to and from a bed to a chair (including a wheelchair)? 4   Need to walk in a hospital room? 4   Climbing 3-5 steps with a railing? 4   6 clicks Mobility Score 24   Education Group   Education Provided Role of Physical Therapist;Gait Training   Role of Physical Therapist Patient Response Patient;Acceptance;Explanation;Demonstration;Verbal Demonstration;Action Demonstration   Gait Training Patient Response Patient;Acceptance;Explanation;Demonstration;Verbal Demonstration;Action Demonstration   Additional Comments patient educates on desaturation   Anticipated Discharge Equipment and Recommendations   DC Equipment Recommendations None     Plan    Recommend Physical Therapy for Evaluation only     DC Equipment Recommendations: None  Discharge Recommendations: Anticipate that the patient will have no further physical therapy needs after discharge from the hospital.

## 2021-01-26 NOTE — CARE PLAN
Problem: Communication  Goal: The ability to communicate needs accurately and effectively will improve  Outcome: MET     Problem: Safety  Goal: Will remain free from injury  Outcome: MET  Goal: Will remain free from falls  Outcome: MET     Problem: Infection  Goal: Will remain free from infection  Outcome: MET     Problem: Venous Thromboembolism (VTW)/Deep Vein Thrombosis (DVT) Prevention:  Goal: Patient will participate in Venous Thrombosis (VTE)/Deep Vein Thrombosis (DVT)Prevention Measures  Outcome: MET     Problem: Bowel/Gastric:  Goal: Normal bowel function is maintained or improved  Outcome: MET  Goal: Will not experience complications related to bowel motility  Outcome: MET     Problem: Knowledge Deficit  Goal: Knowledge of disease process/condition, treatment plan, diagnostic tests, and medications will improve  Outcome: MET  Goal: Knowledge of the prescribed therapeutic regimen will improve  Outcome: MET     Problem: Discharge Barriers/Planning  Goal: Patient's continuum of care needs will be met  Outcome: MET     Problem: Respiratory:  Goal: Respiratory status will improve  Outcome: MET

## 2021-01-26 NOTE — PROGRESS NOTES
Community Health Worker Intake    • Social determinates of health intake completed.   • Identified barriers to: transportation, food insecurity.   • Contact information provided to Chase Harrsi   • Scheduled Food Delivery: Once discharged  • Accepted Meds-To-Beds.  • Inpatient assessment completed.    CHSINDI Granados spoke with pt at bedside to reintroduce CCM/GSC services. Pt accepted GSC services and referral has been sent. Pt was given RTC access packet during last admission and he has not had time to fill it out yet but will reach out for any assistance. Pt usually has one of his sons take him to appts but can have trouble with their availability sometimes. He was still interested in food bank and will be added to the delivery list after he is discharged. Other than this he mentioned not needing additional resources/services at this time.     Plan: Provide food bag, f/u after d/c to identify any additional needs.

## 2021-01-26 NOTE — CARE PLAN
Problem: Communication  Goal: The ability to communicate needs accurately and effectively will improve  Outcome: PROGRESSING AS EXPECTED  Note: Pt's whiteboard is updated, pt has been updated on POC, all questions have been answered at this time       Problem: Safety  Goal: Will remain free from falls  Outcome: PROGRESSING AS EXPECTED  Note: Bed locked in lowest position. Treaded socks in use. Call light and belongings within reach. Pt educated to call for assistance. Pt verbalized understanding. Hourly rounding in place.

## 2021-01-26 NOTE — CONSULTS
Pulmonary Consultation    Date of consult: 1/25/2021    Referring Physician  Rajni Justin M.D.    Reason for Consultation  Interstitial lung disease, acute on chronic hypoxic respiratory failure    History of Presenting Illness   services were used in the patient's primary language of Luxembourgish.     Name or Number: Rajni Justin M.D.    Mode of interpretation: in person  Content of Interpretation:  All aspects of H&P    Chase Harris is a very pleasant 70 y.o. male who presented 1/25/2021 with a history of interstitial lung disease suspected IPF who was found down by his family in a unresponsive state.  He has a history of recurrent presyncope and syncope that has been worked up as an outpatient.  He is followed by Dr. Campuzano closely in the pulmonary clinic, under consideration for possible antifibrotic therapy.     Upon arrival to the emergency room he was found to have an increased supplemental oxygen requirements.  Normally uses 2 L at rest, 5 L with exertion, was requiring 6 L at rest in the ER.  SARS-CoV-2, flu, RSV were negative.  Procalcitonin < 0.05.  BNP 2400, previously has vacillated between 2-3k.  Recent TTE and heart catheterization noted.      CT chest was performed showing mild mosaicism of the lung parenchyma, no significant interlobular septal thickening, bibasilar predominant fine honeycombing and bronchiectasis.    Since admission his supplemental oxygen requirements of aggressively normalized to baseline levels.  Currently he reports no respiratory complaints.    Code Status  Full Code    Review of Systems  Review of Systems   Respiratory: Positive for cough and shortness of breath (at baseline).    Neurological: Positive for loss of consciousness.   All other systems reviewed and are negative.    Past Medical History   has a past medical history of Diabetes (HCC) and Pulmonary fibrosis (HCC). He also has no past medical history of Cough, Painful breathing, Shortness of  breath, Sputum production, or Wheezing.    Surgical History   has a past surgical history that includes other abdominal surgery and other.    Family History  family history includes Diabetes in his brother, father, and mother.    Social History   reports that he quit smoking about 26 years ago. His smoking use included cigarettes. He has a 80.00 pack-year smoking history. He has never used smokeless tobacco. He reports current alcohol use. He reports that he does not use drugs.    Medications  Home Medications    **Home medications have not yet been reviewed for this encounter**       Current Facility-Administered Medications   Medication Dose Route Frequency Provider Last Rate Last Admin   • aspirin EC (ECOTRIN) tablet 81 mg  81 mg Oral DAILY Holden Blackwood M.D.   81 mg at 01/25/21 0625   • atorvastatin (LIPITOR) tablet 40 mg  40 mg Oral Q EVENING Holden Blackwood M.D.       • acetaminophen (Tylenol) tablet 650 mg  650 mg Oral Q6HRS PRN Hodlen Blackwood M.D.       • albuterol inhaler 2 Puff  2 Puff Inhalation Q6HRS PRN (RT) Holden Blackwood M.D.       • enoxaparin (LOVENOX) inj 40 mg  40 mg Subcutaneous DAILY Holden Blackwood M.D.   40 mg at 01/25/21 0625   • glucose 4 g chewable tablet 16 g  16 g Oral Q15 MIN PRN Holden Blackwood M.D.        And   • dextrose 50% (D50W) injection 50 mL  50 mL Intravenous Q15 MIN PRN Holden Blackwood M.D.       • insulin regular (HumuLIN R,NovoLIN R) injection  3-15 Units Subcutaneous Q6HRS Holden Blackwood M.D.   9 Units at 01/25/21 1208   • budesonide-formoterol (SYMBICORT) 160-4.5 MCG/ACT inhaler 2 Puff  2 Puff Inhalation BID Holden Blackwood M.D.   Stopped at 01/25/21 0600   • ipratropium-albuterol (DUONEB) nebulizer solution  3 mL Nebulization Q6HRS (RT) Amy Basilio M.D.   3 mL at 01/25/21 1411   • Respiratory Therapy Consult   Nebulization Continuous RT Rajni A Nhan, M.D.           Allergies  No Known Allergies    Vital Signs last 24 hours  Temp:  [36.3 °C (97.4 °F)-36.8 °C (98.2  °F)] 36.6 °C (97.8 °F)  Pulse:  [] 73  Resp:  [16-40] 18  BP: (142-170)/(46-80) 154/67  SpO2:  [47 %-100 %] 94 %    Physical Exam  Physical Exam  Constitutional:       General: He is not in acute distress.     Appearance: He is well-developed. He is not diaphoretic.   HENT:      Nose: Nose normal.      Mouth/Throat:      Pharynx: No oropharyngeal exudate.   Eyes:      General: No scleral icterus.        Right eye: No discharge.         Left eye: No discharge.      Conjunctiva/sclera: Conjunctivae normal.      Pupils: Pupils are equal, round, and reactive to light.   Neck:      Musculoskeletal: Neck supple.      Thyroid: No thyromegaly.      Vascular: No JVD.      Trachea: No tracheal deviation.   Cardiovascular:      Rate and Rhythm: Normal rate and regular rhythm.      Heart sounds: Normal heart sounds. No murmur.   Pulmonary:      Effort: Pulmonary effort is normal. No respiratory distress.      Breath sounds: No stridor. Rales (bilateral bases) present. No wheezing.   Abdominal:      General: There is no distension.      Palpations: Abdomen is soft.      Tenderness: There is no abdominal tenderness. There is no guarding.   Musculoskeletal: Normal range of motion.         General: No tenderness.   Lymphadenopathy:      Cervical: No cervical adenopathy.   Skin:     General: Skin is warm and dry.      Capillary Refill: Capillary refill takes less than 2 seconds.      Coloration: Skin is not pale.      Findings: No erythema.   Neurological:      Mental Status: He is alert and oriented to person, place, and time.      Sensory: No sensory deficit.      Motor: No abnormal muscle tone.      Coordination: Coordination normal.      Deep Tendon Reflexes: Reflexes normal.   Psychiatric:         Behavior: Behavior normal.         Thought Content: Thought content normal.         Judgment: Judgment normal.       Fluids    Intake/Output Summary (Last 24 hours) at 1/25/2021 1750  Last data filed at 1/25/2021 0900  Gross per  24 hour   Intake 240 ml   Output --   Net 240 ml     Laboratory  Recent Results (from the past 48 hour(s))   CBC WITH DIFFERENTIAL    Collection Time: 01/25/21  2:36 AM   Result Value Ref Range    WBC 9.4 4.8 - 10.8 K/uL    RBC 3.19 (L) 4.70 - 6.10 M/uL    Hemoglobin 9.9 (L) 14.0 - 18.0 g/dL    Hematocrit 30.7 (L) 42.0 - 52.0 %    MCV 96.2 81.4 - 97.8 fL    MCH 31.0 27.0 - 33.0 pg    MCHC 32.2 (L) 33.7 - 35.3 g/dL    RDW 53.1 (H) 35.9 - 50.0 fL    Platelet Count 137 (L) 164 - 446 K/uL    MPV 13.0 (H) 9.0 - 12.9 fL    Neutrophils-Polys 66.40 44.00 - 72.00 %    Lymphocytes 17.10 (L) 22.00 - 41.00 %    Monocytes 10.60 0.00 - 13.40 %    Eosinophils 3.60 0.00 - 6.90 %    Basophils 0.80 0.00 - 1.80 %    Immature Granulocytes 1.50 (H) 0.00 - 0.90 %    Nucleated RBC 0.00 /100 WBC    Neutrophils (Absolute) 6.27 1.82 - 7.42 K/uL    Lymphs (Absolute) 1.61 1.00 - 4.80 K/uL    Monos (Absolute) 1.00 (H) 0.00 - 0.85 K/uL    Eos (Absolute) 0.34 0.00 - 0.51 K/uL    Baso (Absolute) 0.08 0.00 - 0.12 K/uL    Immature Granulocytes (abs) 0.14 (H) 0.00 - 0.11 K/uL    NRBC (Absolute) 0.00 K/uL   COMP METABOLIC PANEL    Collection Time: 01/25/21  2:36 AM   Result Value Ref Range    Sodium 133 (L) 135 - 145 mmol/L    Potassium 4.7 3.6 - 5.5 mmol/L    Chloride 96 96 - 112 mmol/L    Co2 23 20 - 33 mmol/L    Anion Gap 14.0 7.0 - 16.0    Glucose 372 (H) 65 - 99 mg/dL    Bun 25 (H) 8 - 22 mg/dL    Creatinine 1.12 0.50 - 1.40 mg/dL    Calcium 9.1 8.5 - 10.5 mg/dL    AST(SGOT) 29 12 - 45 U/L    ALT(SGPT) 43 2 - 50 U/L    Alkaline Phosphatase 99 30 - 99 U/L    Total Bilirubin 0.5 0.1 - 1.5 mg/dL    Albumin 3.8 3.2 - 4.9 g/dL    Total Protein 7.7 6.0 - 8.2 g/dL    Globulin 3.9 (H) 1.9 - 3.5 g/dL    A-G Ratio 1.0 g/dL   LACTIC ACID    Collection Time: 01/25/21  2:36 AM   Result Value Ref Range    Lactic Acid 2.0 0.5 - 2.0 mmol/L   proBrain Natriuretic Peptide, NT    Collection Time: 01/25/21  2:36 AM   Result Value Ref Range    NT-proBNP 2489 (H) 0 -  125 pg/mL   CORTISOL    Collection Time: 21  2:36 AM   Result Value Ref Range    Cortisol 21.9 0.0 - 23.0 ug/dL   MAGNESIUM    Collection Time: 21  2:36 AM   Result Value Ref Range    Magnesium 1.6 1.5 - 2.5 mg/dL   TROPONIN    Collection Time: 21  2:36 AM   Result Value Ref Range    Troponin T 58 (H) 6 - 19 ng/L   PROTHROMBIN TIME    Collection Time: 21  2:36 AM   Result Value Ref Range    PT 14.3 12.0 - 14.6 sec    INR 1.08 0.87 - 1.13   COD - Adult (Type and Screen)    Collection Time: 21  2:36 AM   Result Value Ref Range    ABO Grouping Only O     Rh Grouping Only POS     Antibody Screen-Cod NEG    PROCALCITONIN    Collection Time: 21  2:36 AM   Result Value Ref Range    Procalcitonin <0.05 <0.25 ng/mL   ESTIMATED GFR    Collection Time: 21  2:36 AM   Result Value Ref Range    GFR If African American >60 >60 mL/min/1.73 m 2    GFR If Non African American >60 >60 mL/min/1.73 m 2   COV-2, FLU A/B, AND RSV BY PCR (2-4 HOURS CEPHEID): Collect NP swab in VTM    Collection Time: 21  2:56 AM    Specimen: Respirate   Result Value Ref Range    Influenza virus A RNA Negative Negative    Influenza virus B, PCR Negative Negative    RSV, PCR Negative Negative    SARS-CoV-2 by PCR NotDetected     SARS-CoV-2 Source NP Swab    EKG    Collection Time: 21  3:17 AM   Result Value Ref Range    Report       [facility] Emergency Dept.    Test Date:  2021  Pt Name:    BREANNE BOOTH                Department: ER  MRN:        [id number]                      Room:       [facility]  Gender:     Male                         Technician: [id number]  :        1950                   Requested By:GERALD JOHNSON  Order #:    [id number]                    Reading MD: Gerald Johnson MD    Measurements  Intervals                                Axis  Rate:       63                           P:          39  CA:         220                          QRS:        7  QRSD:        80                           T:          1  QT:         416  QTc:        426    Interpretive Statements  SINUS RHYTHM  FIRST DEGREE AV BLOCK  Compared to ECG 01/20/2021 15:16:51  Sinus bradycardia no longer present  T-wave abnormality no longer present  No STEMI  Electronically Signed On 1- 4:09:32 PST by Gerald Tavera MD     ABO Rh Confirm    Collection Time: 01/25/21  6:19 AM   Result Value Ref Range    ABO Rh Confirm O POS    LACTIC ACID    Collection Time: 01/25/21  9:30 AM   Result Value Ref Range    Lactic Acid 2.3 (H) 0.5 - 2.0 mmol/L   HEMOGLOBIN A1C    Collection Time: 01/25/21  9:30 AM   Result Value Ref Range    Glycohemoglobin 6.8 (H) 0.0 - 5.6 %    Est Avg Glucose 148 mg/dL     Imaging  CT-CHEST (THORAX) W/O   Final Result      1.  Pulmonary fibrotic changes again seen, similar to prior exam.   2.  No pneumothorax or lobar pneumonia.   3.  Stable mild mediastinal adenopathy.               DX-CHEST-PORTABLE (1 VIEW)   Final Result      Ongoing diffuse bilateral interstitial opacities, suggesting mild pulmonary edema. This appearance is minimally improved compared with 1/19.      CT-HEAD W/O   Final Result      No acute intracranial abnormality. Mild atrophy, unchanged.               INTERPRETING LOCATION:  [address] [place] [address], [facility], [id number]        Assessment/Plan    * Acute on chronic respiratory failure with hypoxia (HCC)  Assessment & Plan  Improving  Baseline 2LPM at rest, 5LPM with exertion  Quickly returning to baseline levels    Interstitial lung disease (HCC)- (present on admission)  Assessment & Plan  Suspected IPF  CT reviewed, fine ground glass opacities and mosacism  Check resp PCR  Risks/benefits of empiric steroids for ILD flare discussed with Dr Justin, agree with holding off for now, trial gentle diuresis    Discussed patient condition and risk of morbidity and/or mortality with Hospitalist, RN, RT and Patient .      This note was generated using voice recognition software which has a  chance of producing errors of grammar and content.  I have made every reasonable attempt to find and correct any errors, but it should be expected that some may not be found prior to finalization of this note.  __________  Mathieu Duarte MD  Pulmonary and Critical Care Medicine  [facility]

## 2021-01-26 NOTE — ASSESSMENT & PLAN NOTE
Improving  Baseline 2LPM at rest, 5LPM with exertion  Patient at baseline this morning, ambulating well to restroom without any dizziness/presycope  -patient appears at baseline, recommend outpatient follow up with Dr Campuzano, scheduled 2/18/21.

## 2021-01-26 NOTE — DISCHARGE SUMMARY
Discharge Summary    Date of Admission: 1/25/2021  Date of Discharge: 1/26/2021  Discharging Attending: Rajni Justin M.D.   Discharging Senior Resident: Dr. Basilio  Discharging Intern: Dr. Cruz    CHIEF COMPLAINT ON ADMISSION  Chief Complaint   Patient presents with   • ALOC     PT went non verbal periodically at home per family.   • Hypoxemia     PT was 70% on home 2L nasal cannula       Reason for Admission  Acute on chronic hypoxic respiratory failure    Admission Date  1/25/2021    CODE STATUS  Full Code    HPI & HOSPITAL COURSE  70-year-old man with past medical history of interstitial lung disease (home oxygen 2 to 3 L at rest, 5 to 6 L on exertion), orthostatic hypotension on midodrine, coronary artery disease not amenable to procedural intervention, non-insulin-dependent type 2 diabetes mellitus presented with presyncopal symptoms in setting of acute on chronic hypoxic respiratory failure.  Of note, patient had been recently discharged on 1/22/2021 with similar presentation.  Work-up at that time had shown absence of pulmonary embolism per CTA pulmonary arteries, elevation of troponin without EKG changes but with periodic chest pain (in setting of known coronary artery disease without feasible procedural intervention options), absence of acute changes on CT head/CT cervical/CT thoracic.  Echo had shown ejection fraction 60%, normal diastolic function, mild tricuspid regurgitation.  The patient was managed conservatively.    On this admission, labs significant for no leukocytosis, stable anemia, no hypoglycemia, no significant electrolyte derangement or renal or hepatic dysfunction, no elevation of procalcitonin, negative COVID-19 test, normal cortisol.  Troponin downtrending compared to values on prior admission, with stable EKG.  CT head again without acute process.  Orthostatic vitals negative.  Scheduled DuoNebs initiated.  Pulmonary consulted, who recommended CT thorax without contrast, as well as  respiratory pathogen panel.  CT thorax without contrast showed stable pulmonary fibrosis, fine groundglass opacities and mosaicism, no pneumothorax or lobar pneumonia or other acute process.  Respiratory pathogen panel returned negative.  Pulmonary recommended hold on steroids, trial diuresis, continue breathing treatments.  Patient improved to baseline oxygen requirement with aforementioned medication regimen.  Pulmonary agreed with discharge home with close follow-up with pulmonary on 2/18/2021.          Therefore, he is discharged in fair and stable condition to home with close outpatient follow-up.    The patient recovered much more quickly than anticipated on admission.    Discharge Date  1/26/2021    FOLLOW UP ITEMS POST DISCHARGE  Follow up with pulmonary for continued management of interstitial lung disease    DISCHARGE DIAGNOSES  Principal Problem:    Acute on chronic respiratory failure with hypoxia (HCC) POA: Unknown  Active Problems:    Interstitial lung disease (HCC) POA: Yes    Postural dizziness with presyncope POA: Yes    CAD (coronary artery disease) POA: Unknown    Hyperglycemia POA: Unknown    Elevated troponin POA: Yes  Resolved Problems:    * No resolved hospital problems. *      FOLLOW UP  Future Appointments   Date Time Provider Department Center   1/29/2021  4:30 PM Celina Hopper M.D. [facility] None   2/17/2021  9:00 AM PFT-[id number] [facility] None   2/17/2021 10:45 AM SPIROMETRY/6MW [facility] None   2/18/2021  9:50 AM Chyna Campuzano M.D. [facility] None     [facility]  [address] [facility] [address]  [phone]    Schedule an appointment as soon as possible for a visit in 1 week  If symptoms worsen      MEDICATIONS ON DISCHARGE     Medication List      CONTINUE taking these medications      Instructions   albuterol 108 (90 Base) MCG/ACT Aers inhalation aerosol   Inhale 2 Puffs by mouth every 6 hours as needed for Shortness of Breath.  Dose: 2 Puff     aspirin 81 MG EC tablet   Take 1 Tab by mouth  every day.  Dose: 81 mg     atorvastatin 40 MG Tabs  Commonly known as: LIPITOR   Take 1 Tab by mouth every evening.  Dose: 40 mg     * Blood Glucose Meter Kit   Doctor's comments: Or per formulary preference.DMII  Test blood sugar as recommended by provider.blood glucose monitoring kit. BID     * Blood Glucose Test Strips   Doctor's comments: Or per formulary preference. DM II  One test strip BID     budesonide-formoterol 160-4.5 MCG/ACT Aero  Commonly known as: SYMBICORT   Inhale 2 Puffs 2 Times a Day.  Dose: 2 Puff     glipiZIDE 5 MG Tabs  Commonly known as: GLUCOTROL   Take 2.5 mg by mouth every morning.  Dose: 2.5 mg     metformin 1000 MG tablet  Commonly known as: GLUCOPHAGE   Take 1,000 mg by mouth 2 Times a Day.  Dose: 1,000 mg     midodrine 2.5 MG Tabs  Commonly known as: PROAMATINE   Take 1 Tab by mouth 3 times a day, with meals.  Dose: 2.5 mg     OneTouch Delica Plus [id number] Misc   Doctor's comments: **Patient requests 90 days supply**  USE TO TEST TWICE DAILY     SITagliptin 25 MG Tabs  Commonly known as: Januvia   Take 1 Tab by mouth every day.  Dose: 25 mg     Spiriva HandiHaler 18 MCG Caps  Generic drug: tiotropium   Inhale 1 Cap by mouth every day.  Dose: 18 mcg         * This list has 2 medication(s) that are the same as other medications prescribed for you. Read the directions carefully, and ask your doctor or other care provider to review them with you.                Allergies  No Known Allergies    DIET  Orders Placed This Encounter   Procedures   • Diet Order Diet: Cardiac; Second Modifier: (optional): Consistent CHO (Diabetic)     Standing Status:   Standing     Number of Occurrences:   1     Order Specific Question:   Diet:     Answer:   Cardiac [6]     Order Specific Question:   Second Modifier: (optional)     Answer:   Consistent CHO (Diabetic) [4]       ACTIVITY  As tolerated.  Weight bearing as tolerated    CONSULTATIONS  Dr. Duarte with Pulmonology consulted.  Treatment options were  discussed and plan of care agreed upon.

## 2021-01-27 NOTE — RESPIRATORY CARE
OXYGEN EDUCATION by CLINICAL COPD EDUCATOR  1/26/2021 at 4:50 PM by Kusum Blue, RRT      Patient seen for oxygen education. Discussed how to turn the tank on and adjust liter flow, safety considerations, and importance of using oxygen as prescribed. Pt verbalized understanding and demonstrated how to properly turn tank on and adjust liter flow. No further questions at this time.

## 2021-01-27 NOTE — FACE TO FACE
"Face to Face Note  -  Durable Medical Equipment    Amy Basilio M.D. - NPI: [id number]  I certify that this patient is under my care and that they had a durable medical equipment(DME)face to face encounter by myself that meets the physician DME face-to-face encounter requirements with this patient on:    Date of encounter:   Patient:                    MRN:                       YOB: 2022  Chase Harris  [id number]  1950     The encounter with the patient was in whole, or in part, for the following medical condition, which is the primary reason for durable medical equipment:  Other - Interstitial lung disease    I certify that, based on my findings, the following durable medical equipment is medically necessary:  Oxygen.    HOME O2 Saturation Measurements:(Values must be present for Home Oxygen orders)  Room air sat at rest: 70  Room air sat with amb: 70  With liters of O2: 3, O2 sat at rest with O2: 80  With Liters of O2: 5, O2 sat with amb with O2 : 95  Is the patient mobile?: Yes    My Clinical findings support the need for the above equipment due to:  Hypoxia    Supporting Symptoms: The patient requires supplemental oxygen, as the following interventions have been tried with limited or no improvement: \"Bronchodilators and/or steroid inhalers, \"Ambulation with oximetry and \"Incentive spirometry    If patient feels more short of breath, they can go up to 6 liters per minute and contact healthcare provider.  "

## 2021-01-30 NOTE — PROGRESS NOTES
"Chief Complaint   Patient presents with   • Coronary Artery Disease     F/V Dx: Mobitz type 2 second degree heart block   • Bradycardia     F/V Dx: Abnormal chest x-ray       Subjective:   Chase Harris is a 70 y.o. male who presents today for cardiac care due to established diffuse coronary arterial disease without intervention, hypertension, hyperlipidemia and diabetes.  Patient also has current issue of pulmonary fibrosis.  He is on chronic oxygen for that.  He did have a coronary angiogram in November 2020 which showed diffuse coronary tear disease without intervention.    Patient is feeling better these days. Does get winded upon walking up inclines or for distance. No symptoms at rest or with daily living activities.    CT showed interitsial lungs disease.    I have independently interpreted and reviewed echocardiogram's actual images with patient which showed normal left ventricular systolic function. No wall motion abnormality. No evidence of pulmonary hypertension. No significant valvular disease.      Past Medical History:   Diagnosis Date   • Diabetes (HCC)    • Pulmonary fibrosis (HCC)     wears 2.5L O2 at home 24/7     Past Surgical History:   Procedure Laterality Date   • OTHER      Kidney stones   • OTHER ABDOMINAL SURGERY      \"from an ulcer that burst\"     Family History   Problem Relation Age of Onset   • Diabetes Mother    • Diabetes Father    • Diabetes Brother      Social History     Socioeconomic History   • Marital status: Single     Spouse name: Not on file   • Number of children: Not on file   • Years of education: Not on file   • Highest education level: Not on file   Occupational History   • Not on file   Social Needs   • Financial resource strain: Hard   • Food insecurity     Worry: Often true     Inability: Often true   • Transportation needs     Medical: No     Non-medical: No   Tobacco Use   • Smoking status: Former Smoker     Packs/day: 2.00     Years: 40.00     Pack years: 80.00 "     Types: Cigarettes     Quit date:      Years since quittin.0   • Smokeless tobacco: Never Used   Substance and Sexual Activity   • Alcohol use: Not Currently     Frequency: Monthly or less   • Drug use: Never   • Sexual activity: Not on file   Lifestyle   • Physical activity     Days per week: Not on file     Minutes per session: Not on file   • Stress: Not on file   Relationships   • Social connections     Talks on phone: Not on file     Gets together: Not on file     Attends [employer]ism service: Not on file     Active member of club or organization: Not on file     Attends meetings of clubs or organizations: Not on file     Relationship status: Not on file   • Intimate partner violence     Fear of current or ex partner: Not on file     Emotionally abused: Not on file     Physically abused: Not on file     Forced sexual activity: Not on file   Other Topics Concern   • Not on file   Social History Narrative   • Not on file     No Known Allergies  Outpatient Encounter Medications as of 2021   Medication Sig Dispense Refill   • losartan (COZAAR) 25 MG Tab Take 1 Tab by mouth every day. 30 Tab 11   • Empagliflozin (JARDIANCE) 10 MG Tab Take 10 mg by mouth every day. 30 Tab 11   • midodrine (PROAMATINE) 2.5 MG Tab Take 1 Tab by mouth 2 times a day. 60 Tab 3   • budesonide-formoterol (SYMBICORT) 160-4.5 MCG/ACT Aerosol Inhale 2 Puffs 2 Times a Day.     • aspirin 81 MG EC tablet Take 1 Tab by mouth every day. 30 Tab 3   • atorvastatin (LIPITOR) 40 MG Tab Take 1 Tab by mouth every evening. 30 Tab 3   • glipiZIDE (GLUCOTROL) 5 MG Tab Take 2.5 mg by mouth every morning.     • Lancets (ONETOUCH DELICA PLUS [id number]) Misc USE TO TEST TWICE DAILY 180 Each 3   • Blood Glucose Meter Kit Test blood sugar as recommended by provider.blood glucose monitoring kit. BID 1 Kit 0   • Blood Glucose Test Strips One test strip  Strip 3   • metformin (GLUCOPHAGE) 1000 MG tablet Take 1,000 mg by mouth 2 Times a Day.    "  • SITagliptin (JANUVIA) 25 MG Tab Take 1 Tab by mouth every day. 30 Tab 3   • tiotropium (SPIRIVA HANDIHALER) 18 MCG Cap Inhale 1 Cap by mouth every day. 30 Cap 3   • albuterol 108 (90 Base) MCG/ACT Aero Soln inhalation aerosol Inhale 2 Puffs by mouth every 6 hours as needed for Shortness of Breath. 8.5 g 0   • [DISCONTINUED] midodrine (PROAMATINE) 2.5 MG Tab Take 1 Tab by mouth 3 times a day, with meals. 90 Tab 0     No facility-administered encounter medications on file as of 1/29/2021.      Review of Systems   Constitutional: Negative for diaphoresis and fever.   HENT: Negative for nosebleeds.    Eyes: Negative for blurred vision and double vision.   Respiratory: Positive for shortness of breath. Negative for cough.    Cardiovascular: Negative for chest pain and palpitations.   Gastrointestinal: Negative for abdominal pain.   Genitourinary: Negative for dysuria and frequency.   Musculoskeletal: Negative for falls and myalgias.   Skin: Negative for rash.   Neurological: Negative for dizziness, sensory change and headaches.   Endo/Heme/Allergies: Does not bruise/bleed easily.   Psychiatric/Behavioral: Negative for depression and memory loss.        Objective:   /80 (BP Location: Left arm, Patient Position: Sitting, BP Cuff Size: Adult)   Pulse 64   Resp 16   Ht 1.6 m (5' 3\")   Wt 59.7 kg (131 lb 9.6 oz)   SpO2 99%   BMI 23.31 kg/m²     Physical Exam   Constitutional: He is oriented to person, place, and time. No distress.   HENT:   Head: Normocephalic and atraumatic.   Right Ear: External ear normal.   Left Ear: External ear normal.   Eyes: Right eye exhibits no discharge. Left eye exhibits no discharge.   Neck: No JVD present. No thyromegaly present.   Cardiovascular: Normal rate, regular rhythm, normal heart sounds and intact distal pulses. Exam reveals no gallop and no friction rub.   No murmur heard.  Ausculation was not performed to minimize the risk of COVID spread during current pandemic. This " complies with Medicare policies and guidelines.     Pulmonary/Chest: Breath sounds normal. No respiratory distress.   Distant breath sounds due to copd     Abdominal: Bowel sounds are normal. He exhibits no distension. There is no abdominal tenderness.   Musculoskeletal:         General: No tenderness or edema.   Neurological: He is alert and oriented to person, place, and time. No cranial nerve deficit.   Skin: Skin is warm and dry. He is not diaphoretic.   Psychiatric: He has a normal mood and affect. His behavior is normal.   Nursing note and vitals reviewed.      Assessment:     1. Coronary artery disease involving native coronary artery of native heart without angina pectoris  Basic Metabolic Panel   2. HTN (hypertension), malignant  Basic Metabolic Panel   3. Type 2 diabetes mellitus with hyperglycemia, without long-term current use of insulin (HCC)  Basic Metabolic Panel   4. High risk medication use  Basic Metabolic Panel    REFERRAL TO PHARMACOTHERAPY SERVICE       Medical Decision Making:  Today's Assessment / Status / Plan:   Today, based on physical examination findings, patient is euvolemic. No JVD, lungs are clear to auscultation, no pitting edema in bilateral lower extremities, no ascites.    Dry weight is 131 lbs.    Main issues for shortness of breath are pulmonary related based on CT and clinical evidence.    We will optimize medical regimen for established coronary to disease to reduce future events.    I will add losartan 25 mg p.o. once a day to his regimen.    No beta-blockers due to bradycardia.    In terms of patient's co-morbiditie such as established cardiovascular disease and type II diabetes mellitus, based on recent trial, patient is indicated to be placed on Empagliflozin for mortality reduction benefit. Based on recent data, such therapy has a relative risk reduction of 38% and absolute risk reduction of 2.2% for cardiovascular death. I will start patient on Jardiance 10 mg once a day.  I discussed with patient about potential benefits and risks. Patient understands that this is an adjunct to current regimen of sugar lowering agents and cardiac agents.    Unclear reason / indication for midodrine therapy, will reduce to 2.5 mg bid.    Continue a atorvastatin 40 million p.o. once a day, ASA 81 mg daily.    Optimize DM with PMD.    No further cardiac work up at this time.

## 2021-02-17 NOTE — PROCEDURES
Test done on O2 @ 2LPM. O2 was increased to 4 LPM in minute 3.Pt walked 840' or 62%.     6-minute walk test completed on February 17, 2021 showed resting saturations 98% on 2 L oxygen.  Patient dropped to 86% at 2 minutes, supplemental oxygen was increased to 4 L which improved saturations but did reach 88% again on 4 L at the 6-minute [place].  Recovery post walk to 94% with heart rate response appropriate.  Exercise tolerance reduced, 62% of predicted distance was achieved

## 2021-02-17 NOTE — PROCEDURES
Technician: AZIZA Wolf    Technician Comment:  Good patient effort & cooperation.  The results of this test meet the ATS/ERS standards for acceptability & reproducibility.  Test was performed on the [employer] Plethysmograph-Elite DX system.  Predicted values were GLI-2012 for spirometry, GLI-2017 for DLCO, ITS for Lung Volumes.  The DLCO was uncorrected for Hgb.  A bronchodilator of Ventolin HFA -2puffs via spacer administered.  DLCO performed during dilation period.    Interpretation:  Lung function testing was completed on February 17, 2021.  Patient had evidence of moderate restriction, total lung capacity is 2.9 L, 55% predicted.  Reduced oxygen transfer is noted at 49%, consistent with clinical diagnosis of interstitial lung disease.  Spirometry shows increased mid flows consistent with the restrictive pattern and FEV1 is 1.8 L, 75% predicted with the FEV1/FVC ratio increased to 95% again consistent with restriction.  Flow volume loop confirms the restrictive process with good effort noted

## 2021-02-18 NOTE — PROGRESS NOTES
No chief complaint on file.        HPI: This patient is a 70 y.o. male whom is followed in our clinic for ILD c/b chronic hypoxic respiratory failure last seen by me on 20.   The patient's past medical history significant for type 2 diabetes previously requiring insulin therapy, hypertension currently well controlled, peptic ulcer disease status post severe GI bleed roughly 1 year ago in [place], nephrolithiasis.  The patient is a former tobacco user with roughly 40-pack-year history and quit 25 years ago.  The pt is retired from work as a . Pt was referred to me after hospital admssion for SOB during which a cTA showed e/o pulmonary fibrosis. CT from [place] in 2019 showed similar, chronic changes. He was started on supplemental O2 and CTD screening serologies during hospital stay were neg. He has had recurrent issues with syncope and actually underwent catheterization in November with diffuse multi-vessel CAD not thought to be a candidate for PCI. He has been managed medically however was admitted again twice since our last visit for presumed anginal equivalent. PFTs show restrictive pattern with reduced DLCO and HRCT chest showed UIP pattern. He desaturates significantly with activity needing up to 5 LPM. The pt has normal LVEF based on TTE from  with normal RV sz and function and normal RVSP at 20 mmHg.     Past Medical History:   Diagnosis Date   • Diabetes (HCC)    • Pulmonary fibrosis (HCC)     wears 2.5L O2 at home        Social History     Socioeconomic History   • Marital status: Single     Spouse name: Not on file   • Number of children: Not on file   • Years of education: Not on file   • Highest education level: Not on file   Occupational History   • Not on file   Tobacco Use   • Smoking status: Former Smoker     Packs/day: 2.00     Years: 40.00     Pack years: 80.00     Types: Cigarettes     Quit date:      Years since quittin.1   • Smokeless tobacco: Never Used    Substance and Sexual Activity   • Alcohol use: Not Currently   • Drug use: Never   • Sexual activity: Not on file   Other Topics Concern   • Not on file   Social History Narrative   • Not on file     Social Determinants of Health     Financial Resource Strain: High Risk   • Difficulty of Paying Living Expenses: Hard   Food Insecurity: Food Insecurity Present   • Worried About Running Out of Food in the Last Year: Often true   • Ran Out of Food in the Last Year: Often true   Transportation Needs: No Transportation Needs   • Lack of Transportation (Medical): No   • Lack of Transportation (Non-Medical): No   Physical Activity:    • Days of Exercise per Week:    • Minutes of Exercise per Session:    Stress:    • Feeling of Stress :    Social Connections:    • Frequency of Communication with Friends and Family:    • Frequency of Social Gatherings with Friends and Family:    • Attends Anabaptist Services:    • Active Member of Clubs or Organizations:    • Attends Club or Organization Meetings:    • Marital Status:    Intimate Partner Violence:    • Fear of Current or Ex-Partner:    • Emotionally Abused:    • Physically Abused:    • Sexually Abused:        Family History   Problem Relation Age of Onset   • Diabetes Mother    • Diabetes Father    • Diabetes Brother        Current Outpatient Medications on File Prior to Visit   Medication Sig Dispense Refill   • losartan (COZAAR) 25 MG Tab Take 1 Tab by mouth every day. 30 Tab 11   • Empagliflozin (JARDIANCE) 10 MG Tab Take 10 mg by mouth every day. 30 Tab 11   • midodrine (PROAMATINE) 2.5 MG Tab Take 1 Tab by mouth 2 times a day. 60 Tab 3   • budesonide-formoterol (SYMBICORT) 160-4.5 MCG/ACT Aerosol Inhale 2 Puffs 2 Times a Day.     • aspirin 81 MG EC tablet Take 1 Tab by mouth every day. 30 Tab 3   • atorvastatin (LIPITOR) 40 MG Tab Take 1 Tab by mouth every evening. 30 Tab 3   • glipiZIDE (GLUCOTROL) 5 MG Tab Take 2.5 mg by mouth every morning.     • Lancets (ONETOUCH  DELICA PLUS [id number]) Misc USE TO TEST TWICE DAILY 180 Each 3   • Blood Glucose Meter Kit Test blood sugar as recommended by provider.blood glucose monitoring kit. BID 1 Kit 0   • Blood Glucose Test Strips One test strip  Strip 3   • metformin (GLUCOPHAGE) 1000 MG tablet Take 1,000 mg by mouth 2 Times a Day.     • SITagliptin (JANUVIA) 25 MG Tab Take 1 Tab by mouth every day. 30 Tab 3   • tiotropium (SPIRIVA HANDIHALER) 18 MCG Cap Inhale 1 Cap by mouth every day. 30 Cap 3   • albuterol 108 (90 Base) MCG/ACT Aero Soln inhalation aerosol Inhale 2 Puffs by mouth every 6 hours as needed for Shortness of Breath. 8.5 g 0     No current facility-administered medications on file prior to visit.       Patient has no known allergies.      ROS:   Review of Systems   Constitutional: Negative for chills, diaphoresis, fever, malaise/fatigue and weight loss.   HENT: Negative for congestion, ear discharge, ear pain, hearing loss, nosebleeds, sinus pain, sore throat and tinnitus.    Eyes: Negative for blurred vision, double vision, photophobia, pain, discharge and redness.   Respiratory: Positive for shortness of breath. Negative for cough, hemoptysis, sputum production, wheezing and stridor.    Cardiovascular: Negative for chest pain, palpitations, orthopnea, claudication, leg swelling and PND.   Gastrointestinal: Negative for abdominal pain, constipation, diarrhea, heartburn, nausea and vomiting.   Genitourinary: Negative for dysuria and urgency.   Musculoskeletal: Negative for back pain, falls, joint pain, myalgias and neck pain.   Skin: Negative for itching and rash.   Neurological: Positive for loss of consciousness. Negative for dizziness, tremors, speech change, focal weakness, weakness and headaches.   Endo/Heme/Allergies: Negative for environmental allergies.   Psychiatric/Behavioral: Negative for depression.       Wt 59 kg (130 lb)   Physical Exam  Vitals reviewed.   Constitutional:       General: He is not in  acute distress.     Appearance: Normal appearance. He is normal weight.   HENT:      Head: Normocephalic and atraumatic.      Right Ear: External ear normal.      Left Ear: External ear normal.      Nose: Nose normal. No congestion.      Mouth/Throat:      Mouth: Mucous membranes are moist.      Pharynx: Oropharynx is clear. No oropharyngeal exudate.   Eyes:      General: No scleral icterus.     Extraocular Movements: Extraocular movements intact.      Conjunctiva/sclera: Conjunctivae normal.      Pupils: Pupils are equal, round, and reactive to light.   Cardiovascular:      Rate and Rhythm: Normal rate and regular rhythm.      Heart sounds: Normal heart sounds. No murmur. No gallop.    Pulmonary:      Effort: Pulmonary effort is normal.      Comments: Restricted air movement bilaterally at the bases with early, dry inspiratory crackles  Abdominal:      Palpations: Abdomen is soft.   Musculoskeletal:         General: Normal range of motion.      Cervical back: Normal range of motion and neck supple.      Right lower leg: No edema.      Left lower leg: No edema.   Skin:     General: Skin is warm and dry.      Findings: No rash.   Neurological:      Mental Status: He is alert and oriented to person, place, and time.      Cranial Nerves: No cranial nerve deficit.   Psychiatric:         Mood and Affect: Mood normal.         Behavior: Behavior normal.         PFTs as reviewed by me personally: As per HPI    Imaging as reviewed by me personally: As per HPI    Assessment:  1. ILD (interstitial lung disease) ([facility])  REFERRAL TO PULMONARY REHAB   2. Chronic respiratory failure with hypoxia ([facility])  Overnight Oximetry   3. Postural dizziness with presyncope         Plan:  1. This is most likely IPF but cannot completely r/o exposure related lung disease. Condition is chronic and likely progressive. Complicating this is his CAD. I discussed anti-fibrotic therapy with son and patient today and explained that I am hesitant due to  repeat hospital stays, syncope, orthostasis and potential side of effects of these medications. I do think we should employ all supportive measures with supplemental O2 and pulmonary rehab for now and see how he does.   2. Chronic and 2/2 #1. His O2 requirements are signficant.  This may be in part related to his CAD however pulmonary disease is likely progressive. See above. Have referred to pulmonary rehab and will ensure adequate O2 on 4L at night as pt may need high flow O2 concentrator.  3. This is ? Anginal equivalent vs. Hypoxia related. I suspect a combination of the two. Any drop in O2 due to lung disease will likely have effects of cardiac ischemia given obstructive CAD.  Try and maintain SaO2 90% or greater at all times.   Return in about 3 months (around 5/18/2021) for IPF.

## 2021-03-03 NOTE — PROCEDURES
This is an overnight oximetry study performed on March 3, 2021 on 4 L of supplemental oxygen for duration of 5 hours and 5 minutes.  Basal SPO2 was 98%.  No significant desaturation.

## 2021-03-03 NOTE — TELEPHONE ENCOUNTER
Pt no showed CHF visit with the pharmacist  Rescheduled 3/18 as requested  Maritza Dorantes, Clinical Pharmacist, CDE, CACP

## 2021-03-15 NOTE — PROGRESS NOTES
"Chief Complaint   Patient presents with   • Coronary Artery Disease     Coronary artery disease involving native coronary artery of native heart without angina pectoris   Of note, this visit was done through telemedicine with video on demand through epic system.  This visit complies with Medicare guidelines during this current COVID-19 pandemic.    This encounter was conducted via [employer].  Verbal consent was obtained. Patient's identity was verified.      Subjective:   Chase Harris is a 70 y.o. male who presents today for cardiac care due to established diffuse coronary arterial disease without intervention, hypertension, hyperlipidemia and diabetes.  Patient also has current issue of pulmonary fibrosis.  He is on chronic oxygen for that.  He did have a coronary angiogram in November 2020 which showed diffuse coronary tear disease without intervention.    Patient is feeling better these days. Does get winded upon walking up inclines or for distance. No symptoms at rest or with daily living activities.    CT showed interitsial lungs disease.    I have independently interpreted and reviewed echocardiogram's actual images with patient which showed normal left ventricular systolic function. No wall motion abnormality. No evidence of pulmonary hypertension. No significant valvular disease.        Past Medical History:   Diagnosis Date   • Diabetes (HCC)    • Pulmonary fibrosis (HCC)     wears 2.5L O2 at home 24/7     Past Surgical History:   Procedure Laterality Date   • OTHER      Kidney stones   • OTHER ABDOMINAL SURGERY      \"from an ulcer that burst\"     Family History   Problem Relation Age of Onset   • Diabetes Mother    • Diabetes Father    • Diabetes Brother      Social History     Socioeconomic History   • Marital status:      Spouse name: Not on file   • Number of children: Not on file   • Years of education: Not on file   • Highest education level: Not on file   Occupational History   • Not on " file   Tobacco Use   • Smoking status: Former Smoker     Packs/day: 2.00     Years: 40.00     Pack years: 80.00     Types: Cigarettes     Quit date:      Years since quittin.2   • Smokeless tobacco: Never Used   Substance and Sexual Activity   • Alcohol use: Not Currently   • Drug use: Never   • Sexual activity: Not on file   Other Topics Concern   • Not on file   Social History Narrative   • Not on file     Social Determinants of Health     Financial Resource Strain: High Risk   • Difficulty of Paying Living Expenses: Hard   Food Insecurity: Food Insecurity Present   • Worried About Running Out of Food in the Last Year: Often true   • Ran Out of Food in the Last Year: Often true   Transportation Needs: No Transportation Needs   • Lack of Transportation (Medical): No   • Lack of Transportation (Non-Medical): No   Physical Activity:    • Days of Exercise per Week:    • Minutes of Exercise per Session:    Stress:    • Feeling of Stress :    Social Connections:    • Frequency of Communication with Friends and Family:    • Frequency of Social Gatherings with Friends and Family:    • Attends [employer] Services:    • Active Member of Clubs or Organizations:    • Attends Club or Organization Meetings:    • Marital Status:    Intimate Partner Violence:    • Fear of Current or Ex-Partner:    • Emotionally Abused:    • Physically Abused:    • Sexually Abused:      No Known Allergies  Outpatient Encounter Medications as of 3/15/2021   Medication Sig Dispense Refill   • losartan (COZAAR) 25 MG Tab Take 1 Tab by mouth every day. 30 Tab 11   • Empagliflozin (JARDIANCE) 10 MG Tab Take 10 mg by mouth every day. 30 Tab 11   • budesonide-formoterol (SYMBICORT) 160-4.5 MCG/ACT Aerosol Inhale 2 Puffs 2 Times a Day.     • aspirin 81 MG EC tablet Take 1 Tab by mouth every day. 30 Tab 3   • atorvastatin (LIPITOR) 40 MG Tab Take 1 Tab by mouth every evening. 30 Tab 3   • glipiZIDE (GLUCOTROL) 5 MG Tab Take 2.5 mg by mouth every  "morning.     • Lancets (ONETOUCH DELICA PLUS [id number]) Misc USE TO TEST TWICE DAILY 180 Each 3   • Blood Glucose Meter Kit Test blood sugar as recommended by provider.blood glucose monitoring kit. BID 1 Kit 0   • Blood Glucose Test Strips One test strip  Strip 3   • metformin (GLUCOPHAGE) 1000 MG tablet Take 1,000 mg by mouth 2 Times a Day.     • SITagliptin (JANUVIA) 25 MG Tab Take 1 Tab by mouth every day. 30 Tab 3   • tiotropium (SPIRIVA HANDIHALER) 18 MCG Cap Inhale 1 Cap by mouth every day. 30 Cap 3   • albuterol 108 (90 Base) MCG/ACT Aero Soln inhalation aerosol Inhale 2 Puffs by mouth every 6 hours as needed for Shortness of Breath. 8.5 g 0   • [DISCONTINUED] midodrine (PROAMATINE) 2.5 MG Tab Take 1 Tab by mouth 2 times a day. 60 Tab 3     No facility-administered encounter medications on file as of 3/15/2021.     Review of Systems   Constitutional: Negative for diaphoresis and fever.   HENT: Negative for nosebleeds.    Eyes: Negative for blurred vision and double vision.   Respiratory: Positive for shortness of breath. Negative for cough.    Cardiovascular: Negative for chest pain and palpitations.   Gastrointestinal: Negative for abdominal pain.   Genitourinary: Negative for dysuria and frequency.   Musculoskeletal: Negative for falls and myalgias.   Skin: Negative for rash.   Neurological: Negative for dizziness, sensory change and headaches.   Endo/Heme/Allergies: Does not bruise/bleed easily.   Psychiatric/Behavioral: Negative for depression and memory loss.        Objective:   /63 (BP Location: Left arm, Patient Position: Sitting, BP Cuff Size: Adult)   Pulse 64   Ht 1.575 m (5' 2\")   Wt 54.4 kg (120 lb)   SpO2 100%   BMI 21.95 kg/m²     Physical Exam  Constitutional:   Oxygen dependent, no acute distress  Heart: no pitting edema in BLE.  Lungs: no breathing distress, not tachypneic  Abdomen: no distention  Neurology: no signs of focal deficits.  Mentation is alert.    Assessment: "     1. Coronary artery disease involving native coronary artery of native heart without angina pectoris     2. HTN (hypertension), malignant     3. Type 2 diabetes mellitus with hyperglycemia, without long-term current use of insulin (HCC)     4. Stage 3 chronic kidney disease, unspecified whether stage 3a or 3b CKD         Medical Decision Making:  Today's Assessment / Status / Plan:   Today, based on physical examination findings, patient is euvolemic. No JVD, lungs are clear to auscultation, no pitting edema in bilateral lower extremities, no ascites.    Dry weight is 120 lbs. Lost 10 lbs.    Main issues for shortness of breath are pulmonary related based on CT and clinical evidence.    We will optimize medical regimen for established coronary to disease to reduce future events.    I will continue losartan 25 mg p.o. once a day to his regimen.    No beta-blockers due to bradycardia.    In terms of patient's co-morbiditie such as established cardiovascular disease and type II diabetes mellitus, based on recent trial, patient is indicated to be placed on Empagliflozin for mortality reduction benefit. Based on recent data, such therapy has a relative risk reduction of 38% and absolute risk reduction of 2.2% for cardiovascular death. I will continue patient on Jardiance 10 mg once a day. I discussed with patient about potential benefits and risks. Patient understands that this is an adjunct to current regimen of sugar lowering agents and cardiac agents.    Unclear reason / indication for midodrine therapy, will stop it for now.    Continue a atorvastatin 40 mg p.o. once a day, ASA 81 mg daily.    Optimize DM with PMD.    No further cardiac work up at this time.

## 2021-03-18 NOTE — PATIENT INSTRUCTIONS
Start weighing yourself every day and taking blood pressure - record the readings in your log to bring to the next appt    Start decreasing carbs (no more/less rice, beans, tortilla, noodles). Increase meats and veggies    Watch your dietary salt - don't exceed more than 2 grams per day    Bring all of your medicines to your next appt

## 2021-03-18 NOTE — NON-PROVIDER
CHF Pharmacotherapy visit - Visit  Date of Service: 21  Informed written consent was given on: 3/18/21    Time in: 3:10 pm  Time out: 3:49 pm    Chase Harris is here for CHF and DM      HPI  Pertinent Interval History since last visit:   Baseline visit    Most recent EF:  60% (2021)    Changes to laboratory values since last visit:  Baseline visit    Current CHF Medications - including dose: PT IS NOT AWARE OF HIS MEDICATIONS AT THIS TIME - presents w/ his son. Counseled them to bring all medicines to the next appt.  Entresto or ACE/ARB: Losartan 25 mg once daily  Beta blocker: N/a  Diuretic: N/a  Aldosterone antagonist: N/a    Changes to weight since last visit:   Pt does not weight daily. Pt will start to weigh daily. Provided weight and BP log today in clinic    Home BP:  130-140/80-90's    Current Adherence to CHF Therapies:  Complete - Will very occasionally forget his doses 2/2 not being home    SOCIAL HISTORY  Social History     Tobacco Use   Smoking Status Former Smoker   • Packs/day: 2.00   • Years: 40.00   • Pack years: 80.00   • Types: Cigarettes   • Quit date:    • Years since quittin.2   Smokeless Tobacco Never Used      Change in weight: Since last appt (which was virtual) pt's reported weight was 120 lbs. Pt's weight today is ~12 lbs higher. Suspect this is 2/2 difference in scales.  Exercise habits: no regular exercise program - Pt w/ O2 nasal cannula  Diet: Veggies, chicken, fruit, spaghetti, rice, beans, tortilla. Pt drinks ~1 can of diet soda daily, water, coffee     DATA REVIEW  Most Recent CMP Panel:   Lab Results   Component Value Date    SODIUM 138 2021    POTASSIUM 5.1 2021    CHLORIDE 98 2021    CO2 30 2021    ANION 10.0 2021    GLUCOSE 256 (H) 2021    BUN 19 2021    CREATININE 1.09 2021    CALCIUM 9.8 2021    ASTSGOT 30 2021    ALTSGPT 45 2021    ALKPHOSPHAT 77 2021    TBILIRUBIN 0.5 2021     ALBUMIN 3.8 2021    ALBUMIN 3.62 (L) 2020    AGRATIO 1.0 2021    TSHULTRASEN 1.570 2020       Renal function:  · CrCl: 47 mL/min  · GFR: > 60 mL/min     Other Pertinent Blood Work:      Ref. Range 2021 09:30   Glycohemoglobin Latest Ref Range: 0.0 - 5.6 % 6.8 (H)   Estim. Avg Glu Latest Units: mg/dL 148        Ref. Range 2020 03:21   Cholesterol,Tot Latest Ref Range: 100 - 199 mg/dL 129   Triglycerides Latest Ref Range: 0 - 149 mg/dL 113   HDL Latest Ref Range: >=40 mg/dL 32 (A)   LDL Latest Ref Range: <100 mg/dL 74     Up to date on PPSV23: No - Pt in the middle of COVID vaccine series. Will defer until this is completed.    Recent Imaging Studies:    None since last visit    ASSESSMENT AND PLAN    Lifestyle Recommendations From Today's Visit:   Eating Plan: Concentrate on  Low simple carb and low Na. Counseled pt NTE 2 g of Na per day  Exercise: Increase as tolerated  Recommendations for Other Cardiovascular Risk Factors, [place] all that apply:   Diabetes/Impaired Fasting Glucose- See below   -Basic physiology of DMII was explained to patient as well as microvascular/macrovascular complications. The importance of increasing physical activity to improve diabetes control was discussed with the patient. Patient was also educated on changing diet and making better choices to help control blood sugar.  -SMFB, 130, max 140  -A1c: 6.8% (21)  -Meds:  · Jardiance 10 mg once daily  · Metformin 1000 mg BID  · Sitaglipitin 25 mg once daily  · Glipizide 2.5 mg once daily    Resulting CHF medications today (None today - PharmD would like to see medications pt is actually taking along w/ home BP and wt recordings)  Entresto or ACE/ARB: Losartan 25 mg once daily  Beta blocker: N/a  Diuretic: N/a  Aldosterone antagonist: N/a    Other:  · Pt counseled to start weighing daily and monitoring BP as well. Provided log for them to present at f/u.  · Majority of today's visit was spent  discussing pathophysiology of CHF and DM. Pt educated on lifestyle measures to help control these disease states.   · Upon f/u will discuss implemented lifestyle modifications, complete medication reconciliation, and titrate meds accordingly.  · Pt presented with his son that is not his primary caregiver. Pt's son that is his primary caregiver will be present at f/u to help better assess pt hx and medication profile/adherence.      Studies Ordered at Todays Visit:  None   Blood Work Ordered At Today's visit:   None  Follow-Up:   2 weeks    Omkar Giron, DavidD    CC:  EMETERIO [email or web address].EVA.  No ref. provider found    Medications NOT reconciled  xFlow sheets updated

## 2021-04-14 NOTE — PROGRESS NOTES
Pt no showed his CHF visit  Yesterday.  Left VM message to reschedule using translation services.  Maritza Dorantes, Clinical Pharmacist, CDE, CACP

## 2021-05-13 NOTE — PROGRESS NOTES
Pt has no showed last three CHF visits with the pharmacist.  Will await patient contact.    CC Dr. Edgard Hopper, MD Maritza Dorantes, Clinical Pharmacist, CDE, CACP

## 2021-06-27 PROBLEM — D64.9 ANEMIA: Status: ACTIVE | Noted: 2021-01-01

## 2021-06-27 PROBLEM — I21.4 NSTEMI (NON-ST ELEVATED MYOCARDIAL INFARCTION) (HCC): Status: ACTIVE | Noted: 2021-01-01

## 2021-06-27 NOTE — H&P
Hospital Medicine History & Physical Note    Date of Service  6/27/2021    Primary Care Physician  Rashmi Cedeno P.A.-C.    Consultants  Cardiology    Code Status  Full Code    Chief Complaint  Chief Complaint   Patient presents with   • Chest Pain       History of Presenting Illness  70 y.o. male who presented 6/27/2021 with history of diabetes pulmonary fibrosis dyslipidemia and nonobstructive CAD noted on angiogram in November 2020 presented to the emergency department for evaluation of chest pain for the past 4 to 5 days.  Pain is moderate pressure-like associated with dyspnea.  No specific exacerbating or relieving factors.  Pain radiates to both shoulders.  It has improved since he has been in the emergency department and is currently mild.  He denies any loss of consciousness.  No diaphoresis.  He has been compliant with his medical therapy.  He is on baseline home oxygen 3 to 4 L.  On his angiogram in November he was noted to have an EF of 30% follow-up echocardiogram revealed EF of 60%.  He was evaluated by EP service for nocturnal heart block with bradycardia  and no pacemaker was recommended.          Review of Systems  Review of Systems   All other systems reviewed and are negative.      Past Medical History   has a past medical history of Diabetes (HCC) and Pulmonary fibrosis (HCC).    Surgical History   has a past surgical history that includes other abdominal surgery and other.     Family History  family history includes Diabetes in his brother, father, and mother.     Social History   reports that he quit smoking about 26 years ago. His smoking use included cigarettes. He has a 80.00 pack-year smoking history. He has never used smokeless tobacco. He reports previous alcohol use. He reports that he does not use drugs.    Allergies  No Known Allergies    Medications  Prior to Admission Medications   Prescriptions Last Dose Informant Patient Reported? Taking?   Blood Glucose Meter Kit  Patient's Home  Pharmacy No No   Sig: Test blood sugar as recommended by provider.blood glucose monitoring kit. BID   Blood Glucose Test Strips  Patient's Home Pharmacy No No   Sig: One test strip BID   Empagliflozin (JARDIANCE) 10 MG Tab   No No   Sig: Take 10 mg by mouth every day.   Lancets (ONETOUCH DELICA PLUS [id number]) [facility] [place] No No   Sig: USE TO TEST TWICE DAILY   SITagliptin (JANUVIA) 25 MG Tab  Patient's Home Pharmacy No No   Sig: Take 1 Tab by mouth every day.   albuterol 108 (90 Base) MCG/ACT Aero Soln inhalation aerosol  Patient's Home Pharmacy No No   Sig: Inhale 2 Puffs by mouth every 6 hours as needed for Shortness of Breath.   aspirin 81 MG EC tablet  Patient's Home Pharmacy No No   Sig: Take 1 Tab by mouth every day.   atorvastatin (LIPITOR) 40 MG Tab   No No   Sig: Take 1 tablet by mouth every evening.   budesonide-formoterol (SYMBICORT) 160-4.5 MCG/ACT Aerosol  Patient's Home Pharmacy Yes No   Sig: Inhale 2 Puffs 2 Times a Day.   glipiZIDE (GLUCOTROL) 5 MG Tab  Patient's Home Pharmacy Yes No   Sig: Take 2.5 mg by mouth every morning.   losartan (COZAAR) 25 MG Tab   No No   Sig: Take 1 tablet by mouth every day.   metformin (GLUCOPHAGE) 1000 MG tablet  Patient's Home Pharmacy Yes No   Sig: Take 1,000 mg by mouth 2 Times a Day.   tiotropium (SPIRIVA HANDIHALER) 18 MCG Cap  Patient's Home Pharmacy No No   Sig: Inhale 1 Cap by mouth every day.      Facility-Administered Medications: None       Physical Exam  Temp:  [36.9 °C (98.4 °F)] 36.9 °C (98.4 °F)  Pulse:  [62-67] 64  Resp:  [18-36] 20  BP: ()/(51-63) 130/63  SpO2:  [78 %-100 %] 100 %    Physical Exam  Vitals and nursing note reviewed.   Constitutional:       Appearance: He is well-developed. He is not diaphoretic.   HENT:      Head: Normocephalic and atraumatic.      Mouth/Throat:      Pharynx: No oropharyngeal exudate.   Eyes:      General: No scleral icterus.        Right eye: No discharge.         Left eye: No discharge.       Conjunctiva/sclera: Conjunctivae normal.      Pupils: Pupils are equal, round, and reactive to light.   Neck:      Vascular: No JVD.      Trachea: No tracheal deviation.   Cardiovascular:      Rate and Rhythm: Normal rate and regular rhythm.      Heart sounds: No murmur heard.   No friction rub. No gallop.    Pulmonary:      Effort: Pulmonary effort is normal. No respiratory distress.      Breath sounds: No stridor. Rales present. No wheezing.   Chest:      Chest wall: No tenderness.   Abdominal:      General: Bowel sounds are normal. There is no distension.      Palpations: Abdomen is soft.      Tenderness: There is no abdominal tenderness. There is no rebound.   Musculoskeletal:         General: No tenderness.      Cervical back: Neck supple.   Skin:     General: Skin is warm and dry.      Nails: There is no clubbing.   Neurological:      Mental Status: He is alert and oriented to person, place, and time.      Cranial Nerves: No cranial nerve deficit.      Motor: No abnormal muscle tone.   Psychiatric:         Behavior: Behavior normal.         Laboratory:  Recent Labs     06/27/21  0652   WBC 11.9*   RBC 3.26*   HEMOGLOBIN 9.9*   HEMATOCRIT 31.6*   MCV 96.9   MCH 30.4   MCHC 31.3*   RDW 48.6   PLATELETCT 97*   MPV 13.0*     Recent Labs     06/27/21  0603   SODIUM 138   POTASSIUM 4.7   CHLORIDE 102   CO2 24   GLUCOSE 250*   BUN 37*   CREATININE 1.72*   CALCIUM 9.3     Recent Labs     06/27/21  0603   ALTSGPT 26   ASTSGOT 41   ALKPHOSPHAT 62   TBILIRUBIN 0.4   GLUCOSE 250*         No results for input(s): NTPROBNP in the last 72 hours.      Recent Labs     06/27/21  0603   TROPONINT 980*       Imaging:  DX-CHEST-PORTABLE (1 VIEW)   Final Result      Increased interstitial markings are consistent with chronic interstitial disease. Superimposed infection cannot be excluded.        Angiogram nov 2020  1. Diffuse coronary artery disease.  2. Reduced left ventricular systolic function with ejection fraction of 30%,    diaphragmatic hypokinesis.  3. Elevated left ventricular end-diastolic pressure      Assessment/Plan:  I anticipate this patient will require at least two midnights for appropriate medical management, necessitating inpatient admission.    * NSTEMI (non-ST elevated myocardial infarction) ([facility])  Assessment & Plan  Patient will be admitted and monitored on telemetry    Continue with aspirin and atorvastatin  Given MIRIAM will hold losartan  We will start him on heparin drip  Trend troponin  Check echocardiogram  Dr. Kaur from cardiology has been consulted and recommended initiation of anticoagulation and will evaluate the patient for possible angiogram tomorrow  Given history of heart block we will hold off on beta-blockers at this time    Anemia  Assessment & Plan  Chronic  Monitor H&H with anticoagulation    Chronic respiratory failure with hypoxia ([facility])- (present on admission)  Assessment & Plan  Continue oxygen and titrate as needed to keep sats greater than 90%    Interstitial lung disease ([facility])- (present on admission)  Assessment & Plan  Continue oxygen and home inhalers  RT protocol    MIRIAM (acute kidney injury) ([facility])  Assessment & Plan  In the setting of stage III chronic kidney disease    Gentle hydration and monitor renal function electrolytes  If not improved further work-up  Avoid nephrotoxic agents    Diabetes mellitus type II, uncontrolled ([facility])- (present on admission)  Assessment & Plan  With hyperglycemia    We will start him on insulin sliding scale monitor CBGs  Hold oral hypoglycemic agents for now      Plan of care reviewed with the patient    Review of systems and plan of care discussion was performed with assistance of  via video conference

## 2021-06-27 NOTE — ED NOTES
Pt medicated per MAR.   Blood drawn for new orders for heparin.  Covid swabs collected and sent to lab.

## 2021-06-27 NOTE — ED NOTES
"Subjective:       Virginia Arshad is a 28 y.o. female here for evaluation of a cough. Onset of symptoms was 5 days ago. Symptoms have been gradually worsening since that time. The cough is productive of green sputum and is aggravated by reclining position. Associated symptoms include: change in voice. Patient does not have a history of asthma. Patient does not have a history of environmental allergens. Patient has traveled recently - [place]. Patient does not have a history of smoking. Patient has not had a previous chest x-ray. Patient has not had a PPD done.  Tried - hot tea, theraflu, cough drops, tessalon perles - not working.     Review of Systems  Pertinent items are noted in HPI.      Objective:      Oxygen saturation 98% on room air    BP 90/62 (BP Location: Right arm, Patient Position: Sitting, BP Method: Medium (Manual))   Pulse 81   Temp 98.2 °F (36.8 °C) (Oral)   Resp 20   Ht 5' 7" (1.702 m)   Wt 63.3 kg (139 lb 8.8 oz)   LMP 08/13/2017   SpO2 99%   BMI 21.86 kg/m²     General Appearance:    Alert, cooperative, no distress, appears stated age   Head:    Normocephalic, without obvious abnormality, atraumatic.  +loss of voice   Eyes:    PERRL, conjunctiva/corneas clear, EOM's intact   Ears:    Normal TM's and external ear canals, both ears   Nose:   Nares normal, septum midline, mucosa normal, no drainage    or sinus tenderness   Throat:   Lips, mucosa, and tongue normal; teeth and gums normal   Neck:   Supple, symmetrical, trachea midline, no adenopathy;          Lungs:     Clear to auscultation bilaterally, respirations unlabored        Heart:    Regular rate and rhythm, S1 and S2 normal, no murmur, rub   or gallop                                               Assessment:      URI with Post Nasal Drip      Plan:      Explained lack of efficacy of antibiotics in viral disease.  Antitussives per medication orders.  Avoid exposure to tobacco smoke and fumes.  Call if shortness of breath worsens, " Tele RN at bedside for transfer of pt to [facility].  Report given, all belongings with pt.    blood in sputum, change in character of cough, development of fever or chills, inability to maintain nutrition and hydration. Avoid exposure to tobacco smoke and fumes.     Over the counter remedies:   -Increase your water intake to 64-80 oz daily   -Nasal Saline spray (Over the counter [place] spray or [place]) 2 sprays each nostril  -2-3 times a day for nasal congestion OR Flonase nasal spray   -Tylenol 500 mg 2 tablets or Ibuprofen 200 mg 2 tablets every 4-6 hours as needed for fever, headaches, sore throat, ear pain, bodyaches, and/or nasal/sinus inflammation   -Warm salt water gargles with 1/2 cup water and 1 tablespoon salt every 4 hours   -Warm tea with honey and lemon   -Follow up with PCP in 1 week if symptoms do not resolve. Patient was told to alert clinic if she starts having f/c, or if symptoms persist for more than 10 days total.   Consider treatment for CAP due to lupus.

## 2021-06-27 NOTE — ED PROVIDER NOTES
COVID-19 positive test (U07.1, COVID-19) with Acute Respiratory Distress Syndrome (ARDS) (J80, ARDS)  (If respiratory failure or sepsis present, add as separate assessment)    ED Provider Note    ED Provider Note    Primary care provider: Rashmi Cedeno P.A.-C.  Means of arrival: EMS  History obtained from: patient  History limited by: None this is weight and staff works here in the 18-year-old    CHIEF COMPLAINT  Chief Complaint   Patient presents with   • Chest Pain       HPI  Chase Harris is a 70 y.o. male who presents to the Emergency Department via EMS.  Apparently, had a syncopal episode at home and was noted to be hypotensive.  Patient complains of about an hour or so of mid chest pain with pain to his bilateral shoulders.  He has mild shortness of breath.  He denies any nausea, vomiting or diarrhea.  No problems with urination.  No fever.  He reports a mild cough.  He has a history of diabetes but denies hypertension.  He does not smoke or use drugs.    REVIEW OF SYSTEMS  Review of Systems   Constitutional: Negative for fever.   HENT: Negative for congestion.    Respiratory: Positive for cough and shortness of breath.    Cardiovascular: Positive for chest pain. Negative for leg swelling.   Gastrointestinal: Negative for abdominal pain, nausea and vomiting.   Genitourinary: Negative for dysuria.   Musculoskeletal: Negative for back pain.   Neurological: Positive for loss of consciousness. Negative for headaches.       PAST MEDICAL HISTORY   has a past medical history of Diabetes (HCC) and Pulmonary fibrosis (HCC).    SURGICAL HISTORY   has a past surgical history that includes other abdominal surgery and other.    SOCIAL HISTORY  Social History     Tobacco Use   • Smoking status: Former Smoker     Packs/day: 2.00     Years: 40.00     Pack years: 80.00     Types: Cigarettes     Quit date:      Years since quittin.5   • Smokeless tobacco: Never Used   Vaping Use   • Vaping Use: Never used  "  Substance Use Topics   • Alcohol use: Not Currently   • Drug use: Never      Social History     Substance and Sexual Activity   Drug Use Never       FAMILY HISTORY  Family History   Problem Relation Age of Onset   • Diabetes Mother    • Diabetes Father    • Diabetes Brother        CURRENT MEDICATIONS  Home Medications     Reviewed by Marii Lopez (Pharmacy Tech) on 06/27/21 at 0750  Med List Status: Complete   Medication Last Dose Status   aspirin 81 MG EC tablet unknown Active   atorvastatin (LIPITOR) 40 MG Tab unknown Active   Blood Glucose Meter Kit supply Active   Blood Glucose Test Strips supply Active   budesonide-formoterol (SYMBICORT) 160-4.5 MCG/ACT Aerosol unknown Active   Empagliflozin (JARDIANCE) 10 MG Tab unknown Active   Lancets (ONETOUCH DELICA PLUS [id number]) Misc supply Active   losartan (COZAAR) 25 MG Tab unknown Active   metformin (GLUCOPHAGE) 1000 MG tablet unknown Active   SITagliptin (JANUVIA) 25 MG Tab unknown Active   tiotropium (SPIRIVA HANDIHALER) 18 MCG Cap unknown Active                ALLERGIES  No Known Allergies    PHYSICAL EXAM  VITAL SIGNS: /59   Pulse 65   Temp 36.2 °C (97.2 °F)   Resp 16   Ht 1.6 m (5' 2.99\")   Wt 72.6 kg (160 lb)   SpO2 100%   BMI 28.35 kg/m²   Vitals reviewed.  Constitutional: Patient is oriented to person, place, and time. Appears well-developed and well-nourished. No distress.    Head: Normocephalic and atraumatic.   Ears: Normal external ears bilaterally.   Mouth/Throat: Oropharynx is clear and moist.  Eyes: Conjunctivae are normal. Pupils are equal, round, and reactive to light.   Neck: Normal range of motion. Neck supple.  Cardiovascular: Normal rate, regular rhythm and normal heart sounds. Normal peripheral pulses.  Pulmonary/Chest: Effort normal and breath sounds normal. No respiratory distress, no wheezes, rhonchi, or rales. No chest wall tenderness.  Abdominal: Soft. Bowel sounds are normal. There is no tenderness. No rebound or " guarding, or peritoneal signs.   Musculoskeletal: No edema and no tenderness.   Lymphadenopathy: No cervical adenopathy.   Neurological: No focal deficits.   Skin: Skin is warm and dry. No erythema. No pallor.   Psychiatric: Patient has a normal mood and affect.     LABS  Results for orders placed or performed during the hospital encounter of 06/27/21   Complete Metabolic Panel (CMP)   Result Value Ref Range    Sodium 138 135 - 145 mmol/L    Potassium 4.7 3.6 - 5.5 mmol/L    Chloride 102 96 - 112 mmol/L    Co2 24 20 - 33 mmol/L    Anion Gap 12.0 7.0 - 16.0    Glucose 250 (H) 65 - 99 mg/dL    Bun 37 (H) 8 - 22 mg/dL    Creatinine 1.72 (H) 0.50 - 1.40 mg/dL    Calcium 9.3 8.5 - 10.5 mg/dL    AST(SGOT) 41 12 - 45 U/L    ALT(SGPT) 26 2 - 50 U/L    Alkaline Phosphatase 62 30 - 99 U/L    Total Bilirubin 0.4 0.1 - 1.5 mg/dL    Albumin 3.4 3.2 - 4.9 g/dL    Total Protein 7.4 6.0 - 8.2 g/dL    Globulin 4.0 (H) 1.9 - 3.5 g/dL    A-G Ratio 0.9 g/dL   Troponin   Result Value Ref Range    Troponin T 980 (H) 6 - 19 ng/L   ESTIMATED GFR   Result Value Ref Range    GFR If  48 (A) >60 mL/min/1.73 m 2    GFR If Non  39 (A) >60 mL/min/1.73 m 2   CBC WITH DIFFERENTIAL   Result Value Ref Range    WBC 11.9 (H) 4.8 - 10.8 K/uL    RBC 3.26 (L) 4.70 - 6.10 M/uL    Hemoglobin 9.9 (L) 14.0 - 18.0 g/dL    Hematocrit 31.6 (L) 42.0 - 52.0 %    MCV 96.9 81.4 - 97.8 fL    MCH 30.4 27.0 - 33.0 pg    MCHC 31.3 (L) 33.7 - 35.3 g/dL    RDW 48.6 35.9 - 50.0 fL    Platelet Count 97 (L) 164 - 446 K/uL    MPV 13.0 (H) 9.0 - 12.9 fL    Neutrophils-Polys 78.90 (H) 44.00 - 72.00 %    Lymphocytes 11.40 (L) 22.00 - 41.00 %    Monocytes 7.20 0.00 - 13.40 %    Eosinophils 1.20 0.00 - 6.90 %    Basophils 0.50 0.00 - 1.80 %    Immature Granulocytes 0.80 0.00 - 0.90 %    Nucleated RBC 0.00 /100 WBC    Neutrophils (Absolute) 9.35 (H) 1.82 - 7.42 K/uL    Lymphs (Absolute) 1.35 1.00 - 4.80 K/uL    Monos (Absolute) 0.85 0.00 - 0.85 K/uL     Eos (Absolute) 0.14 0.00 - 0.51 K/uL    Baso (Absolute) 0.06 0.00 - 0.12 K/uL    Immature Granulocytes (abs) 0.10 0.00 - 0.11 K/uL    NRBC (Absolute) 0.00 K/uL   SARS-COV Antigen JUAQUIN: Collect dry nasal swab AND NP swab in VTM   Result Value Ref Range    SARS-CoV-2 Source Nasal Swab     SARS-COV ANTIGEN JUAQUIN NotDetected Not-Detected   SARS-CoV-2 PCR (24 hour In-House): Collect NP swab in VTM    Specimen: Nasopharyngeal; Respirate   Result Value Ref Range    SARS-CoV-2 Source NP Swab    aPTT   Result Value Ref Range    APTT 35.8 24.7 - 36.0 sec   Prothrombin Time   Result Value Ref Range    PT 15.1 (H) 12.0 - 14.6 sec    INR 1.23 (H) 0.87 - 1.13   Heparin Xa (Unfractionated)   Result Value Ref Range    Heparin Xa (UFH) <0.10 IU/mL   Troponin - STAT Once   Result Value Ref Range    Troponin T 926 (H) 6 - 19 ng/L   EKG   Result Value Ref Range    Report       [facility] Emergency Dept.    Test Date:  2021  Pt Name:    BREANNE BOOTH                Department: ER  MRN:        [id number]                      Room:        12  Gender:     Male                         Technician: [id number]  :        [id number]                   Requested By:ER TRIAGE PROTOCOL  Order #:    [id number]                    Reading MD: MEDARDO LINDSAY, DO    Measurements  Intervals                                Axis  Rate:       60                           P:  MI:                                      QRS:        -2  QRSD:       86                           T:          -42  QT:         412  QTc:        412    Interpretive Statements  ACCELERATED JUNCTIONAL ESCAPE RHYTHM  INFERIOR INFARCT, AGE INDETERMINATE  REPOL ABNRM SUGGESTS ISCHEMIA, ANTERIOR LEADS  BASELINE WANDER IN LEAD(S) V2  Compared to ECG 2021 03:17:08  Junctional rhythm now present  Accelerated junctional rhythm now present  Myocardial infarct finding now present  Early repolariz ation now present  Possible ischemia now present  Sinus rhythm no longer  present  First degree AV block no longer present  Electronically Signed On 6- 6:45:55 PDT by MEDARDO LINDSAY,      POCT glucose device results   Result Value Ref Range    Glucose - Accu-Ck 165 (H) 65 - 99 mg/dL       All labs reviewed by me.    EKG Interpretation  Interpreted by me    RADIOLOGY  DX-CHEST-PORTABLE (1 VIEW)   Final Result      Increased interstitial markings are consistent with chronic interstitial disease. Superimposed infection cannot be excluded.      EC-ECHOCARDIOGRAM COMPLETE W/O CONT    (Results Pending)     The radiologist's interpretation of all radiological studies have been reviewed by me.    COURSE & MEDICAL DECISION MAKING  Pertinent Labs & Imaging studies reviewed. (See chart for details)    Obtained and reviewed past medical records. Patient's last encounter in the hospital was an admission in January of this year patient was seen for ALOC and hypoxia.  Not noted to be on anticoagulation other than baby aspirin at the time.  Troponin was elevated previously at 58 to that was in January of this year.    6:46 AM - Patient seen and examined at bedside.  This is a pleasant 70-year-old male.  He is overall well-appearing.  He does not appear to be in any distress.  His vital signs have normalized upon arrival here in the ED.  He is still experiencing chest pain.  Chest pain protocol initiated.  EKG does not show acute ST segment elevation.    6:45 AM notified by nursing staff, the patient has a critical troponin of 980.  Cardiology paged.  I have reviewed the patient's EKG, no evidence of ST elevation.  There is some ST depression.  White blood cell count is elevated 11.9.  H&H 9.9 and 31 which appears to be his baseline.  Chemistry shows an elevated glucose of 250 with a creatinine of 1.7, which is new from prior values, most recently in January 1 0.09.  Chest x-ray shows increased interstitial markings consistent with chronic disease.    7 AM patient's reevaluated the bedside.  I was  told by the nursing staff that he was hypotensive but currently his blood pressure is 130 systolic.  He is having continued pain but he does not appear to be in any distress.  Vital signs are normal.  Of advised him of his abnormal labs await cardiology response.    0730AM D/W Dr. Lentz.  Per further review of the patient's records.  He did have a cath in Nov, which showed diffuse dz no intervention was undertaken at that time.    7:36 AM discussed with cardiology, Dr. Kaur, who recommends that we treat this patient medically as an NSTEMI.  She will contact the cath team regarding left heart cath possibly tomorrow.  Will update hospitalist on this plan of care.    Patient is admitted in stable but guarded condition.    FINAL IMPRESSION  1. NSTEMI (non-ST elevated myocardial infarction) (HCC)    2. Acute kidney injury (HCC)    3. Hyperglycemia    4. History of pulmonary fibrosis

## 2021-06-27 NOTE — ED NOTES
Tech from Lab called with critical result of Troponin of 980 at 0641. Critical lab result read back to .   Dr. Conner notified of critical lab result at 0644.  Critical lab result read back by Dr. Conner.    Cardiology consulted per ERP. Unit clerk to call.

## 2021-06-27 NOTE — ASSESSMENT & PLAN NOTE
In the setting of stage III chronic kidney disease    Gentle hydration and monitor renal function electrolytes  If not improved further work-up  Avoid nephrotoxic agents

## 2021-06-27 NOTE — ASSESSMENT & PLAN NOTE
Patient will be admitted and monitored on telemetry    Continue with aspirin and atorvastatin  Given MIRIAM will hold losartan  We will start him on heparin drip  Trend troponin  Check echocardiogram  Dr. Kaur from cardiology has been consulted and recommended initiation of anticoagulation and will evaluate the patient for possible angiogram tomorrow  Given history of heart block we will hold off on beta-blockers at this time

## 2021-06-27 NOTE — ED NOTES
Med Rec completed: per Interview with pt, phone call to sonMarty, and phone call to home pharmacy.     No ORAL antibiotics in last 30 days    Preferred Pharmacy: [facility] [address] P: [phone]        Pt confirmed following allergies:  No Known Allergies     Pt's home medications:   Medication Sig   • Empagliflozin (JARDIANCE) 10 MG Tab Take 10 mg by mouth every day.   • losartan (COZAAR) 25 MG Tab Take 1 tablet by mouth every day.   • atorvastatin (LIPITOR) 40 MG Tab Take 1 tablet by mouth every evening.   • budesonide-formoterol (SYMBICORT) 160-4.5 MCG/ACT Aerosol Inhale 2 Puffs 2 Times a Day.   • aspirin 81 MG EC tablet Take 1 Tab by mouth every day.   • metformin (GLUCOPHAGE) 1000 MG tablet Take 1,000 mg by mouth 2 Times a Day.   • SITagliptin (JANUVIA) 25 MG Tab Take 1 Tab by mouth every day.   • tiotropium (SPIRIVA HANDIHALER) 18 MCG Cap Inhale 1 Cap by mouth every day.       Removed medications:   [DISCONTINUED] glipiZIDE (GLUCOTROL) 5 MG Tab Take 2.5 mg by mouth every morning. Not reported by home pharmacy      [DISCONTINUED] albuterol 108 (90 Base) MCG/ACT Aero Soln inhalation aerosol Inhale 2 Puffs by mouth every 6 hours as needed for Shortness of Breath. Not reported by home pharmacy

## 2021-06-27 NOTE — ED TRIAGE NOTES
"Chief Complaint   Patient presents with   • Chest Pain       BIB EMS for substernal chest and back pain of 7/10, pt on monitor and in gown, labs drawn and sent. Pt on 3L NC, baseline 3-4 for cystic fibrosis. Pt felt as though he was more short of breathe than normal PTA, however now is resting comfortable.      /56   Pulse 63   Temp 36.9 °C (98.4 °F)   Resp 20   Ht 1.6 m (5' 2.99\")   Wt 72.6 kg (160 lb)   SpO2 97%   BMI 28.35 kg/m²   "

## 2021-06-27 NOTE — CONSULTS
"Reason for Consult:  Asked by Dr Gracie Conner D.O. to see this patient with NSTEMI  Patient's PCP: Rashmi Cedeno P.A.-C.    CC:   Chief Complaint   Patient presents with   • Chest Pain       HPI:  Chase Harris is a 70-year-old man with history of known three-vessel CAD (medically managed, last coronary angiogram in November 2020), diabetes, and interstitial lung disease who presented after an episode of syncope at home and found to be hypotensive.  Cardiology is consulted for NSTEMI.     services were used in the patient's primary language of Greenlandic.     Name or Number: [id number]  Mode of interpretation: iPad    Content of Interpretation:      The patient reports that prior to syncope, he had chest pain for about 10 to 15 minutes. His son was with him when this happened.  According to son, the patient appeared weak and was initially awake but looked like \"he wasn't there.\" When he got on the toilet seat, he clenched to the door and became unconscious.  The patient was brought to the couch and EMS was called.  The patient was conscious again by the time EMS arrived.      The patient is currently symptom-free. He denies any chest pain or shortness of breath.  Denies any orthopnea, PND, or leg swelling.  No palpitations.    Medications / Drug list prior to admission:  No current facility-administered medications on file prior to encounter.     Current Outpatient Medications on File Prior to Encounter   Medication Sig Dispense Refill   • Empagliflozin (JARDIANCE) 10 MG Tab Take 10 mg by mouth every day. 90 tablet 4   • losartan (COZAAR) 25 MG Tab Take 1 tablet by mouth every day. 90 tablet 4   • atorvastatin (LIPITOR) 40 MG Tab Take 1 tablet by mouth every evening. 90 tablet 4   • budesonide-formoterol (SYMBICORT) 160-4.5 MCG/ACT Aerosol Inhale 2 Puffs 2 Times a Day.     • aspirin 81 MG EC tablet Take 1 Tab by mouth every day. 30 Tab 3   • Lancets (ONETOUCH DELICA PLUS [id number]) Misc USE TO " TEST TWICE DAILY 180 Each 3   • Blood Glucose Meter Kit Test blood sugar as recommended by provider.blood glucose monitoring kit. BID 1 Kit 0   • Blood Glucose Test Strips One test strip  Strip 3   • metformin (GLUCOPHAGE) 1000 MG tablet Take 1,000 mg by mouth 2 Times a Day.     • SITagliptin (JANUVIA) 25 MG Tab Take 1 Tab by mouth every day. 30 Tab 3   • tiotropium (SPIRIVA HANDIHALER) 18 MCG Cap Inhale 1 Cap by mouth every day. 30 Cap 3       Current list of administered Medications:    Current Facility-Administered Medications:   •  heparin infusion 25,000 units in 500 mL 0.45% NACL, 0-30 Units/kg/hr, Intravenous, Continuous, Dick Caballero M.D., Last Rate: 17.4 mL/hr at 06/27/21 0745, 12 Units/kg/hr at 06/27/21 0745  •  heparin injection 2,000 Units, 2,000 Units, Intravenous, PRN, Dick Caballero M.D.  •  atorvastatin (LIPITOR) tablet 40 mg, 40 mg, Oral, Q EVENING, Dick Caballero M.D.  •  budesonide-formoterol (SYMBICORT) 160-4.5 MCG/ACT inhaler 2 Puff, 2 Puff, Inhalation, BID (RT), Dick Caballero M.D., 2 Puff at 06/27/21 1050  •  senna-docusate (PERICOLACE or SENOKOT S) 8.6-50 MG per tablet 2 tablet, 2 tablet, Oral, BID, 2 tablet at 06/27/21 1050 **AND** polyethylene glycol/lytes (MIRALAX) PACKET 1 Packet, 1 Packet, Oral, QDAY PRN **AND** magnesium hydroxide (MILK OF MAGNESIA) suspension 30 mL, 30 mL, Oral, QDAY PRN **AND** bisacodyl (DULCOLAX) suppository 10 mg, 10 mg, Rectal, QDAY PRN, Dick Caballero M.D.  •  lactated ringers infusion, , Intravenous, Continuous, Dick Caballero M.D., Last Rate: 50 mL/hr at 06/27/21 1049, New Bag at 06/27/21 1049  •  [START ON 6/28/2021] aspirin (ASA) tablet 325 mg, 325 mg, Oral, DAILY **OR** [START ON 6/28/2021] aspirin (ASA) chewable tab 324 mg, 324 mg, Oral, DAILY **OR** [START ON 6/28/2021] aspirin (ASA) suppository 300 mg, 300 mg, Rectal, DAILY, Dick Caballero M.D.  •  ondansetron (ZOFRAN) syringe/vial injection 4 mg, 4 mg,  "Intravenous, Q4HRS PRN, Dick Caballero M.D.  •  ondansetron (ZOFRAN ODT) dispertab 4 mg, 4 mg, Oral, Q4HRS PRN, Dick Caballero M.D.  •  insulin regular (HumuLIN R,NovoLIN R) injection, 2-9 Units, Subcutaneous, 4X/DAY ACHS, 2 Units at 21 1136 **AND** POC blood glucose manual result, , , Q AC AND BEDTIME(S) **AND** NOTIFY MD and PharmD, , , Once **AND** glucose 4 g chewable tablet 16 g, 16 g, Oral, Q15 MIN PRN **AND** dextrose 50% (D50W) injection 50 mL, 50 mL, Intravenous, Q15 MIN PRN, Dick Caballero M.D.  •  tiotropium (Spiriva Respimat) 2.5 mcg/Act inhalation spray 5 mcg, 5 mcg, Inhalation, QDAILY (RT), Dick Caballero M.D., 5 mcg at 21 1049    Past Medical History:   Diagnosis Date   • Diabetes (HCC)    • Pulmonary fibrosis (HCC)     wears 2.5L O2 at home        Past Surgical History:   Procedure Laterality Date   • OTHER      Kidney stones   • OTHER ABDOMINAL SURGERY      \"from an ulcer that burst\"       Family History   Problem Relation Age of Onset   • Diabetes Mother    • Diabetes Father    • Diabetes Brother      Patient family history was personally reviewed, no pertinent family history to current presentation    Social History     Tobacco Use   • Smoking status: Former Smoker     Packs/day: 2.00     Years: 40.00     Pack years: 80.00     Types: Cigarettes     Quit date:      Years since quittin.5   • Smokeless tobacco: Never Used   Vaping Use   • Vaping Use: Never used   Substance Use Topics   • Alcohol use: Not Currently   • Drug use: Never       ALLERGIES:  No Known Allergies    Review of systems:  A complete review of symptoms was reviewed with patient. This is reviewed in H&P and PMH. ALL OTHERS reviewed and negative    Physical exam:  Patient Vitals for the past 24 hrs:   BP Temp Pulse Resp SpO2 Height Weight   21 0700 130/63 -- 64 20 100 % -- --   21 0630 (!) 88/51 -- 62 (!) 36 95 % -- --   21 0622 114/56 -- 67 18 99 % -- --   21 " "0616 -- -- -- -- (!) 78 % -- --   21 0603 114/56 36.9 °C (98.4 °F) 63 20 97 % -- --   21 0600 -- -- -- -- -- 1.6 m (5' 2.99\") 72.6 kg (160 lb)     General: No acute distress.   EYES: no jaundice  HEENT: OP clear   Neck: No bruits No JVD.   CVS:  RRR. S1 + S2. No M/R/G  Resp: CTAB. No wheezing or crackles/rhonchi.  Abdomen: Soft, NT, ND,  Skin: Grossly nothing acute no obvious rashes  Neurological: Alert, Moves all extremities, no cranial nerve defects on limited exam  Extremities: Pulse 2+ in b/l LE. No edema. No cyanosis.       Data:  Laboratory studies personally reviewed by me:  Recent Results (from the past 24 hour(s))   EKG    Collection Time: 21  5:41 AM   Result Value Ref Range    Report       [facility]    Test Date:  2021  Pt Name:    BREANNE BOOTH                Department: ER  MRN:        [id number]                      Room:       [facility]  Gender:     Male                         Technician: [id number]  :        1950                   Requested By:ER TRIAGE PROTOCOL  Order #:    [id number]                    Reading MD: MEDARDO LINDSAY DO    Measurements  Intervals                                Axis  Rate:       60                           P:  WA:                                      QRS:        -2  QRSD:       86                           T:          -42  QT:         412  QTc:        412    Interpretive Statements  ACCELERATED JUNCTIONAL ESCAPE RHYTHM  INFERIOR INFARCT, AGE INDETERMINATE  REPOL ABNRM SUGGESTS ISCHEMIA, ANTERIOR LEADS  BASELINE WANDER IN LEAD(S) V2  Compared to ECG 2021 03:17:08  Junctional rhythm now present  Accelerated junctional rhythm now present  Myocardial infarct finding now present  Early repolariz ation now present  Possible ischemia now present  Sinus rhythm no longer present  First degree AV block no longer present  Electronically Signed On 2021 6:45:55 PDT by MEDARDO LINDSAY DO     Complete Metabolic Panel " (CMP)    Collection Time: 06/27/21  6:03 AM   Result Value Ref Range    Sodium 138 135 - 145 mmol/L    Potassium 4.7 3.6 - 5.5 mmol/L    Chloride 102 96 - 112 mmol/L    Co2 24 20 - 33 mmol/L    Anion Gap 12.0 7.0 - 16.0    Glucose 250 (H) 65 - 99 mg/dL    Bun 37 (H) 8 - 22 mg/dL    Creatinine 1.72 (H) 0.50 - 1.40 mg/dL    Calcium 9.3 8.5 - 10.5 mg/dL    AST(SGOT) 41 12 - 45 U/L    ALT(SGPT) 26 2 - 50 U/L    Alkaline Phosphatase 62 30 - 99 U/L    Total Bilirubin 0.4 0.1 - 1.5 mg/dL    Albumin 3.4 3.2 - 4.9 g/dL    Total Protein 7.4 6.0 - 8.2 g/dL    Globulin 4.0 (H) 1.9 - 3.5 g/dL    A-G Ratio 0.9 g/dL   Troponin    Collection Time: 06/27/21  6:03 AM   Result Value Ref Range    Troponin T 980 (H) 6 - 19 ng/L   ESTIMATED GFR    Collection Time: 06/27/21  6:03 AM   Result Value Ref Range    GFR If  48 (A) >60 mL/min/1.73 m 2    GFR If Non  39 (A) >60 mL/min/1.73 m 2   CBC WITH DIFFERENTIAL    Collection Time: 06/27/21  6:52 AM   Result Value Ref Range    WBC 11.9 (H) 4.8 - 10.8 K/uL    RBC 3.26 (L) 4.70 - 6.10 M/uL    Hemoglobin 9.9 (L) 14.0 - 18.0 g/dL    Hematocrit 31.6 (L) 42.0 - 52.0 %    MCV 96.9 81.4 - 97.8 fL    MCH 30.4 27.0 - 33.0 pg    MCHC 31.3 (L) 33.7 - 35.3 g/dL    RDW 48.6 35.9 - 50.0 fL    Platelet Count 97 (L) 164 - 446 K/uL    MPV 13.0 (H) 9.0 - 12.9 fL    Neutrophils-Polys 78.90 (H) 44.00 - 72.00 %    Lymphocytes 11.40 (L) 22.00 - 41.00 %    Monocytes 7.20 0.00 - 13.40 %    Eosinophils 1.20 0.00 - 6.90 %    Basophils 0.50 0.00 - 1.80 %    Immature Granulocytes 0.80 0.00 - 0.90 %    Nucleated RBC 0.00 /100 WBC    Neutrophils (Absolute) 9.35 (H) 1.82 - 7.42 K/uL    Lymphs (Absolute) 1.35 1.00 - 4.80 K/uL    Monos (Absolute) 0.85 0.00 - 0.85 K/uL    Eos (Absolute) 0.14 0.00 - 0.51 K/uL    Baso (Absolute) 0.06 0.00 - 0.12 K/uL    Immature Granulocytes (abs) 0.10 0.00 - 0.11 K/uL    NRBC (Absolute) 0.00 K/uL   aPTT    Collection Time: 06/27/21  7:30 AM   Result Value Ref  Range    APTT 35.8 24.7 - 36.0 sec   Prothrombin Time    Collection Time: 21  7:30 AM   Result Value Ref Range    PT 15.1 (H) 12.0 - 14.6 sec    INR 1.23 (H) 0.87 - 1.13   Heparin Xa (Unfractionated)    Collection Time: 21  7:30 AM   Result Value Ref Range    Heparin Xa (UFH) <0.10 IU/mL   SARS-COV Antigen JUAQUIN: Collect dry nasal swab AND NP swab in VTM    Collection Time: 21  7:32 AM   Result Value Ref Range    SARS-CoV-2 Source Nasal Swab     SARS-COV ANTIGEN JUAQUIN NotDetected Not-Detected   SARS-CoV-2 PCR (24 hour In-House): Collect NP swab in VTM    Collection Time: 21  7:32 AM    Specimen: Nasopharyngeal; Respirate   Result Value Ref Range    SARS-CoV-2 Source NP Swab    Troponin - STAT Once    Collection Time: 21  8:50 AM   Result Value Ref Range    Troponin T 926 (H) 6 - 19 ng/L   POCT glucose device results    Collection Time: 21 10:53 AM   Result Value Ref Range    Glucose - Accu-Ck 165 (H) 65 - 99 mg/dL   EKG in four (4) hours    Collection Time: 21 12:27 PM   Result Value Ref Range    Report       [facility]    Test Date:  2021  Pt Name:    BREANNE BOOTH                Department:   MRN:        [id number]                      Room:       [facility]  Gender:     Male                         Technician: DIO  :        1950                   Requested By:MOUNA BARLOW  Order #:    [id number]                    Reading MD:    Measurements  Intervals                                Axis  Rate:       76                           P:          43  ID:         [id number]                          QRS:        3  QRSD:       84                           T:          -16  QT:         400  QTc:        450    Interpretive Statements  SINUS RHYTHM  FIRST DEGREE AV BLOCK  BORDERLINE T ABNORMALITIES, INFERIOR LEADS  Compared to ECG 2021 05:41:33  First degree AV block now present  T-wave abnormality now present  Junctional rhythm no longer present  Accelerated  junctional rhythm no longer present  Myocardial infarct finding no longer present  Early repolarization no longer present   Possible ischemia no longer present         Imaging:  DX-CHEST-PORTABLE (1 VIEW)   Final Result      Increased interstitial markings are consistent with chronic interstitial disease. Superimposed infection cannot be excluded.      EC-ECHOCARDIOGRAM COMPLETE W/O CONT    (Results Pending)           EKG 6/27/2021: personally reviewed by me Accelerated junctional rhythm, inferior infarct, minimal ST depression anterior leads      Echocardiogram 1/19/2021  CONCLUSIONS  Left ventricular systolic function is normal; ejection fraction is   visually estimated to be 60%.  Normal right ventricular size and systolic function.  Mild tricuspid regurgitation.  Normal pericardium without effusion.     Compared to the images of the prior study done on 05/23/20, estimated   pulmonary pressure has improved from 40mmHg to 20mmHg.    Coronary angiogram 6/27/2021  PROCEDURES:  1. Left heart catheterization.  2. Coronary angiography.  3. Left ventriculogram.  4. Monitored conscious sedation.     PREPROCEDURE DIAGNOSIS:  Syncope.     POSTPROCEDURE DIAGNOSES:  1. Diffuse coronary artery disease.  2. Reduced left ventricular systolic function with ejection fraction of 30%,   diaphragmatic hypokinesis.  3. Elevated left ventricular end-diastolic pressure.     INDICATION:  The patient is a 70-year-old male with past medical history   significant for hypertension and interstitial lung disease.  The patient has   been admitted for recurrent syncope and was scheduled for cardiac   catheterization.     DESCRIPTION OF PROCEDURE:  After informed consent was signed by the   patient, the patient was brought to the cardiac catheterization laboratory.  He   was prepped and draped in the usual sterile manner.  The right wrist area was   anesthetized with 2% Xylocaine.  Multiple attempts were performed to access   the right radial  artery; however, this was unsuccessful under ultrasound due   to heavy calcified atherosclerosis.  The right inguinal area was anesthetized   with 2% Xylocaine.  A 4-English sheath was inserted into the right femoral   artery using the modified Seldinger technique under ultrasound guidance.    A 4-English pigtail catheter was positioned into the left ventricle.  Left angiography   was performed.  This was exchanged for a 4-English JL4 catheter, which   was positioned into the left main coronary artery.  Coronary angiography  was performed.  This catheter was exchanged for a 3DRC catheter, which was   positioned into the right coronary artery.  Coronary angiography was performed.    The patient tolerated the procedure well.  At the end of procedure, all catheters   and sheaths were removed.  The patient was transferred back to telemetry in   stable condition.     HEMODYNAMIC DATA:  Hemodynamic data shows aortic pressures of 180/80 mmHg  with mean of 120 mmHg and /0 with LVEDP of 20 mmHg.     AORTIC VALVE:  There was no significant gradient noted.     LEFT VENTRICULOGRAM:  A 10 mL of contrast were delivered for 2 seconds.    Ejection fraction was estimated to be 30%.  There was diaphragmatic   hypokinesis noted.     ANGIOGRAM:  1. Left main coronary artery:  Left main coronary artery is a moderate-length   small-caliber vessel with luminal irregularities of 10-20%.  2. Left anterior descending artery:  Left anterior descending artery is a long   small-caliber vessel, which is diffusely stenosed, proximal portion being   60-70%.  There are small-caliber bifurcating first diagonal and small-caliber   second diagonal branch noted with diffuse stenosis of 50-60%.  3. Left circumflex artery:  Left circumflex artery is a nondominant   moderate-caliber vessel with concentric 70-80% in the midportion.  There is a   moderate-caliber first obtuse marginal branch originating at the area of the   stenosis, the ostial proximal  concentric 70% stenosis and a small distal   obtuse marginal branch noted free of disease.  4. Right coronary artery:  Right coronary artery is a dominant small-caliber   vessel with proximal concentric 90%, mid diffuse 80%, distal concentric 90%   and small-caliber posterior descending artery noted free of disease.    Small-caliber posterolateral branch with mid concentric 90%.     IMPRESSION:  1. Diffuse coronary artery disease.  2. Reduced left ventricular systolic function with ejection fraction of 30%,   diaphragmatic hypokinesis.  3. Elevated left ventricular end-diastolic pressure.     RECOMMENDATIONS:  Recommend medical therapy for coronary artery disease,   aggressive hypertensive management and consider alternative causes for   recurrent syncope.     SEDATION TIME: The patient's sedation was managed by myself with continuous   face to face time with the patient for 15 minutes from 15:50 to 16:05.       All pertinent features of laboratory and imaging reviewed including primary images where applicable      Principal Problem:    NSTEMI (non-ST elevated myocardial infarction) ([facility]) POA: Unknown  Active Problems:    Diabetes mellitus type II, uncontrolled ([facility]) POA: Yes    MIRIAM (acute kidney injury) ([facility]) POA: Unknown    Interstitial lung disease ([facility]) POA: Yes    Chronic respiratory failure with hypoxia ([facility]) POA: Yes    Anemia POA: Unknown  Resolved Problems:    * No resolved hospital problems. *      Assessment / Plan:    Chest pain  NSTEMI  Patient with known three-vessel CAD that is medically treated.  -Start heparin drip  -Continue aspirin and atorvastatin 40 mg daily  -Discussed with interventional regarding possible repeat coronary angiogram tomorrow.  -NPO after midnight for possible LHC tomorrow if MIRIAM improves  -Obtain echocardiogram to assess LVEF and any structural abnormalities    Syncope  Unclear etiology. Concerning for arrhythmia given CAD history  -Obtain echocardiogram to assess LVEF and any  structural abnormalities  -Monitor on telemetry for arrhythmia.  Will need event monitor on discharge if no significant findings while in patient    MIRIAM  Unclear etiology.  Does not appear volume overloaded on exam  -Echocardiogram as above.       I personally discussed his case with  Dr Gracie Conner D.O.    Future Appointments   Date Time Provider Department Center   9/21/2021  9:30 AM Celina Hopper M.D. [facility] None       It is my pleasure to participate in the care of Mr. Harris.  Please do not hesitate to contact me with questions or concerns.    Silva Kaur MD   Cardiologist [facility]    6/27/2021    Please note that this dictation was created using voice recognition software. There may be errors I did not discover before finalizing the note.

## 2021-06-27 NOTE — ASSESSMENT & PLAN NOTE
With hyperglycemia    We will start him on insulin sliding scale monitor CBGs  Hold oral hypoglycemic agents for now

## 2021-06-28 NOTE — THERAPY
Missed Therapy     Patient Name: Chase Harris  Age:  70 y.o., Sex:  male  Medical Record #: [id number]  Today's Date: 6/28/2021 06/28/21 0710   Interdisciplinary Plan of Care Collaboration   Collaboration Comments PT cardiac rehab eval order received. Pending definitive cardiac POC, will defer eval until this is established and pt is appropriate to participate.

## 2021-06-28 NOTE — RESPIRATORY CARE
"   COPD EDUCATION by COPD CLINICAL EDUCATOR  6/28/2021 at 6:14 AM by Yarely Lott, RRT     Patient reviewed by COPD education team. Patient does not have a history or diagnosis of COPD, PFT's 2/17/2021 show she has restrictive disease which correlates with her ILD and she is a non-smoker.  Therefore does not qualify for the COPD program.      COPD Assessment  COPD Clinical Specialists ONLY  COPD Education Initiated: No--Quick Screen:  Per PFT 2/17/2021 pt does not have COPD, she has restricitive disease which correlates with her ILD. She is a non-smoker)  Is this a COPD exacerbation patient?: No    Meds to Beds        MY COPD ACTION PLAN     It is recommended that patients and physicians /healthcare providers complete this action plan together. This plan should be discussed at each physician visit and updated as needed.    The green, yellow and red zones show groups of symptoms of COPD. This list of symptoms is not comprehensive, and you may experience other symptoms. In the \"Actions\" column, your healthcare provider has recommended actions for you to take based on your symptoms.    Patient Name: Chase Harris   YOB: 1950   Last Updated on:     Green Zone:  I am doing well today Actions   •  Usual activitiy and exercise level •  Take daily medications   •  Usual amounts of cough and phlegm/mucus •  Use oxygen as prescribed   •  Sleep well at night •  Continue regular exercise/diet plan   •  Appetite is good •  At all times avoid cigarette smoke, inhaled irritants     Daily Medications (these medications are taken every day):   Budesonide-Formoterol Fumarate (Symbicort)  Tiotropium Bromide Monohydrate (Spiriva) 2 Puffs  1 Puff Twice daily  Once daily     Additional Information:  Use Spacer device  Please rinse mouth after using Symbicort     Yellow Zone:  I am having a bad day or a COPD flare Actions   •  More breathless than usual •  Continue daily medications   •  I have less energy for my " "daily activities •  Use quick relief inhaler as ordered   •  Increased or thicker phlegm/mucus •  Use oxygen as prescribed   •  Using quick relief inhaler/nebulizer more often •  Get plenty of rest   •  Swelling of ankles more than usual •  Use pursed lip breathing   •  More coughing than usual •  At all times avoid cigarette smoke, inhaled irritants   •  I feel like I have a \"chest cold\"   •  Poor sleep and my symptoms woke me up   •  My appetite is not good   •  My medicine is not helping    •  Call provider immediately if symptoms don’t improve     Continue daily medications, add rescue medications:   Albuterol 2 Puffs Every 4 hours PRN       Medications to be used during a flare up, (as Discussed with Provider):              Red Zone:  I need urgent medical care Actions   •  Severe shortness of breath even at rest •  Call 911 or seek medical care immediately   •  Not able to do any activity because of breathing    •  Fever or shaking chills    •  Feeling confused or very drowsy     •  Chest pains    •  Coughing up blood              "

## 2021-06-28 NOTE — PROGRESS NOTES
[facility] [place] [facility] MEDICINE PROGRESS NOTE     Attending:   Mary Fiore MD     Resident:   Radames Mccarty MD    PATIENT:   Chase Harris; [id number]; 1950    ID:   70-year-old male with a past medical history notable for diabetes, pulmonary fibrosis, dyslipidemia, and nonobstructive CAD noted on angiogram in November 2020 was evaluated at [facility] for a 4 to 5-day history of chest pain.  He is on 3 to 4 L of oxygen at home.  Labs in the ER were notable for a troponin of 980, CR of 1.72 (baseline at 1-1.1), hyperglycemia 250, and leukocytosis of 11.9.  EKG showed first-degree block but no ST changes.  Cardiology was consulted and recommended starting the patient on heparin drip.  Potential repeat coronary angiogram today.    SUBJECTIVE:   No acute events overnight, patient denies any current chest pain or SOB. Denies any current complaints.     OBJECTIVE:  Vitals:    06/27/21 1914 06/27/21 2139 06/27/21 2325 06/28/21 0348   BP: (!) 167/81  135/64 155/70   Pulse: 70 67 64 62   Resp: 16 17 16 16   Temp: 36.8 °C (98.2 °F)  36.5 °C (97.7 °F) 36.4 °C (97.6 °F)   TempSrc: Temporal  Temporal Temporal   SpO2: 100% 97% 99% 100%   Weight: 58.8 kg (129 lb 10.1 oz)      Height:           Intake/Output Summary (Last 24 hours) at 6/28/2021 0612  Last data filed at 6/27/2021 2039  Gross per 24 hour   Intake --   Output 1400 ml   Net -1400 ml       PHYSICAL EXAM:  General: No acute distress, afebrile, resting comfortably, conversational   HEENT: NC/AT. EOMI.   Cardiovascular: RRR without murmurs, rubs, heaves. Normal capillary refill   Respiratory: CTAB, no tachypnea or retractions   Abdomen: normal bowel sounds, soft, nontender, nondistended, no masses, no organomegaly   EXT:  VELAZCO, no edema  Skin: No erythema/lesions   Neuro: Non-focal, alert and orientated     LABS:  Recent Labs     06/27/21  0652 06/27/21  2157   WBC 11.9* 12.4*   RBC 3.26* 3.19*   HEMOGLOBIN 9.9* 9.6*   HEMATOCRIT 31.6* 30.6*   MCV 96.9 95.9   MCH 30.4 30.1   RDW  48.6 47.0   PLATELETCT 97* 131*   MPV 13.0* 13.3*   NEUTSPOLYS 78.90*  --    LYMPHOCYTES 11.40*  --    MONOCYTES 7.20  --    EOSINOPHILS 1.20  --    BASOPHILS 0.50  --      Recent Labs     06/27/21 0603 06/27/21 2157   SODIUM 138 135   POTASSIUM 4.7 3.9   CHLORIDE 102 98   CO2 24 28   BUN 37* 30*   CREATININE 1.72* 1.23   CALCIUM 9.3 9.1   ALBUMIN 3.4 3.4     Estimated GFR/CRCL = Estimated Creatinine Clearance: 44.3 mL/min (by C-G formula based on SCr of 1.23 mg/dL).  Recent Labs     06/27/21  0603 06/27/21  1053 06/27/21  1650 06/27/21 2043 06/27/21 2157   GLUCOSE 250*  --   --   --  182*   POCGLUCOSE  --  165* 184* 194*  --      Recent Labs     06/27/21 0603 06/27/21 0730 06/27/21 2157   ASTSGOT 41  --  55*   ALTSGPT 26  --  26   TBILIRUBIN 0.4  --  0.4   ALKPHOSPHAT 62  --  68   GLOBULIN 4.0*  --  3.9*   INR  --  1.23* 1.15*             Recent Labs     06/27/21 0730 06/27/21 2157   INR 1.23* 1.15*   APTT 35.8 114.9*         IMAGING:  DX-CHEST-PORTABLE (1 VIEW)   Final Result      Increased interstitial markings are consistent with chronic interstitial disease. Superimposed infection cannot be excluded.      EC-ECHOCARDIOGRAM COMPLETE W/O CONT    (Results Pending)   CL-LEFT HEART CATHETERIZATION WITH POSSIBLE INTERVENTION    (Results Pending)       MEDS:  Current Facility-Administered Medications   Medication Last Admin   • heparin infusion 25,000 units in 500 mL 0.45% NACL 16 Units/kg/hr at 06/27/21 2301   • heparin injection 2,000 Units 2,000 Units at 06/27/21 2301   • atorvastatin (LIPITOR) tablet 40 mg 40 mg at 06/27/21 1740   • budesonide-formoterol (SYMBICORT) 160-4.5 MCG/ACT inhaler 2 Puff 2 Puff at 06/27/21 2137   • senna-docusate (PERICOLACE or SENOKOT S) 8.6-50 MG per tablet 2 tablet 2 tablet at 06/28/21 0433    And   • polyethylene glycol/lytes (MIRALAX) PACKET 1 Packet      And   • magnesium hydroxide (MILK OF MAGNESIA) suspension 30 mL      And   • bisacodyl (DULCOLAX) suppository 10 mg     •  lactated ringers infusion New Bag at 06/27/21 1049   • aspirin (ASA) tablet 325 mg 325 mg at 06/28/21 0432    Or   • aspirin (ASA) chewable tab 324 mg      Or   • aspirin (ASA) suppository 300 mg     • ondansetron (ZOFRAN) syringe/vial injection 4 mg     • ondansetron (ZOFRAN ODT) dispertab 4 mg     • insulin regular (HumuLIN R,NovoLIN R) injection 2 Units at 06/27/21 2044    And   • glucose 4 g chewable tablet 16 g      And   • dextrose 50% (D50W) injection 50 mL     • tiotropium (Spiriva Respimat) 2.5 mcg/Act inhalation spray 5 mcg 5 mcg at 06/27/21 1049   • NS infusion     • lidocaine (XYLOCAINE) 1 % injection 0.5 mL     • acetaminophen (Tylenol) tablet 650 mg 650 mg at 06/27/21 2041       ASSESSMENT/PLAN:  70-year-old male with a past medical history notable for diabetes, pulmonary fibrosis, dyslipidemia, and nonobstructive CAD noted on angiogram in November 2020 was evaluated at [facility] for a 4 to 5-day history of chest pain.  He is on 3 to 4 L of oxygen at home.  Labs in the ER were notable for a troponin of 980, CR of 1.72 (baseline at 1-1.1), hyperglycemia 250, and leukocytosis of 11.9.  EKG showed first-degree block but no ST changes.  Cardiology was consulted and recommended starting the patient on heparin drip.  Potential repeat coronary angiogram today.     #NSTEMI  -Patient complained of chest pain for 4 to 5 days  -Pressure-like pain associated with dyspnea  -EKG showed first-degree heart block w no ST changes  -Troponin in the ER was 980-->926-->1161  -Cardiology consulted, recs appreciated  -Previous echo 1/2021 showed an ejection fraction of 60%  Plan:  -Continue aspirin and atorvastatin - transition to 81mg starting tomorrow   -Hold losartan for now given MIRIAM  -Continue heparin drip  -Continue to trend troponin  -Follow-up on echo  -Catheterization rescheduled for tomorrow     #Syncope   -Unclear etiology at this time   -No concerning arrhythmias on monitor overnight   -Follow up with  echo  -Orthostatic vital signs  -Cardiology recommends a holter monitor on discharge if no events discovered while inpatient     #MIRIAM on CKD  -Improving today on repeat, CR 1.23, from 1.73 (baseline 1)   -Continue gentle fluids  -Continue to trend renal function with BMPs  -Avoid nephrotoxic agents    #Interstitial lung diseas  -Patient on home oxygen at 3 to 4 L  -Continue home oxygen and home inhalers  -RT protocol    #Anemia, chronic  -Patient has a chronic normocytic anemia probably secondary to anemia of chronic disease  -Baseline hemoglobin at 10  -Currently in 9.6  -Continue to monitor    #Type 2 diabetes  -Sliding scale insulin  -POC glucose 3 times daily AC  -Hold home Januvia, Metformin, Jardiance    #Thrombocytopenia  -Chronic, baseline   -Repeat at 123, baseline around 140       Lines: PIV  Abx: None   DVT prophylaxis: heparin  Code Status: Full     Radames Mccarty MD   PGY-1 Family Medicine Resident   [facility]

## 2021-06-28 NOTE — CARE PLAN
Problem: Fall Risk  Goal: Patient will remain free from falls  Outcome: Progressing   Patient uses call light appropriately and has fall precautions in place.

## 2021-06-28 NOTE — DIETARY
Nutrition Services: RD attempted heart healthy diet education via  IPAD ( #[id number]); MD in to review care with pt and asked to use IPAD. Left Heart healthy handouts and will follow up as able for verbal education at later time.

## 2021-06-28 NOTE — DISCHARGE PLANNING
Anticipated Discharge Disposition: Home likely    Action: pt pending medical clearance, pt currently on 4 liters O2 nasal cannula.    Barriers to Discharge: medical clearance, O2 needs    Plan: f/u with medical team and pt to discuss dc needs and barriers.

## 2021-06-28 NOTE — PROGRESS NOTES
"       [facility] Cardiology Follow-up Note    Date of Service:    6/28/2021      Name:   Chase Harris   YOB: 1950  Age:   70 y.o.  male   MRN:   [id number]      Chief Complaint: Chest pain, syncope    Attending Provider: Dr. Fiore, [facility] Family medicine    Primary Cardiologist: Dr. Hopper    HPI:  Per Dr Kaur's consult note, \"Chase Harris is a 70-year-old man with history of known three-vessel CAD (medically managed, last coronary angiogram in November 2020), diabetes, and interstitial lung disease who presented after an episode of syncope at home and found to be hypotensive. Cardiology is consulted for NSTEMI\"    Per son, father blacked out in bathroom and had to be helped up.  Patient says this is not the first time he has had syncope.  Usually occurs when he is walking.  Denies any dizziness at other times during the day.  Uses home O2.    He was evaluated by EP service Nov 2020 for nocturnal heart block with bradycardia  and no pacemaker was recommended.    Patient is Tongan-speaking, son at bedside.Interpretor not needed, APRN is fluent in Tongan and a Qualified Medical interpretor    Interim Events:  - Personal Telemetry interpretation: SB-SR 50's-77, rare PVC's  - Overnight events: Pt not getting out of bed per nursing, denies dizziness, denies chest pain  - Vital signs: SBP elevated 130-160's  - labs reviewed: trops remain elevated, creatinine improved      ROS  Constitutional: Denies fatigue  Respiratory:  Denies shortness of breath, no cough.  Cardiovascular: denieschest pain.  denies lower extremity edema.  Denies orthopnea or PND.  : denies polyuria, no dysuria.  GI:  Denies nausea/vomiting.  No abdominal distention.  Neuro:  Denies dizziness, syncope.  Hem/lymph: Denies easy bleeding/bruising.      All other review of systems reviewed and negative.    Past medical, surgical, social, and family history reviewed and unchanged from admission except as noted in HPI.    Medications: " Reviewed in MAR  Current Facility-Administered Medications   Medication Dose Frequency Provider Last Rate Last Admin   • heparin infusion 25,000 units in 500 mL 0.45% NACL  0-30 Units/kg/hr Continuous Dick Caballero M.D. 23.2 mL/hr at 06/28/21 0829 16 Units/kg/hr at 06/28/21 0829   • heparin injection 2,000 Units  2,000 Units PRN Dick Caballero M.D.   2,000 Units at 06/27/21 2301   • atorvastatin (LIPITOR) tablet 40 mg  40 mg Q EVENING Dick Caballero M.D.   40 mg at 06/27/21 1740   • budesonide-formoterol (SYMBICORT) 160-4.5 MCG/ACT inhaler 2 Puff  2 Puff BID (RT) Dick Caballero M.D.   2 Puff at 06/28/21 1019   • senna-docusate (PERICOLACE or SENOKOT S) 8.6-50 MG per tablet 2 tablet  2 tablet BID Dick Caballero M.D.   2 tablet at 06/28/21 0433    And   • polyethylene glycol/lytes (MIRALAX) PACKET 1 Packet  1 Packet QDAY PRN Dick Caballero M.D.        And   • magnesium hydroxide (MILK OF MAGNESIA) suspension 30 mL  30 mL QDAY PRN Dick Caballero M.D.        And   • bisacodyl (DULCOLAX) suppository 10 mg  10 mg QDAY PRN Dick Caballero M.D.       • lactated ringers infusion   Continuous Dick Caballero M.D. 50 mL/hr at 06/28/21 0640 New Bag at 06/28/21 0640   • aspirin (ASA) tablet 325 mg  325 mg DAILY Dick Caballero M.D.   325 mg at 06/28/21 0432    Or   • aspirin (ASA) chewable tab 324 mg  324 mg DAILY Dick Caballero M.D.        Or   • aspirin (ASA) suppository 300 mg  300 mg DAILY Dick Caballero M.D.       • ondansetron (ZOFRAN) syringe/vial injection 4 mg  4 mg Q4HRS PRN Dick Caballero M.D.       • ondansetron (ZOFRAN ODT) dispertab 4 mg  4 mg Q4HRS PRN Dick Caballero M.D.       • insulin regular (HumuLIN R,NovoLIN R) injection  2-9 Units 4X/DAY ACHPARISH Caballero M.D.   2 Units at 06/27/21 2044    And   • glucose 4 g chewable tablet 16 g  16 g Q15 MIN PRN Dick Caballero M.D.        And   • dextrose 50% (D50W) injection  "50 mL  50 mL Q15 MIN PRN Dick Caballero M.D.       • tiotropium (Spiriva Respimat) 2.5 mcg/Act inhalation spray 5 mcg  5 mcg QDAILY (RT) Dick Caballero M.D.   5 mcg at 06/28/21 1019   • NS infusion   Continuous Silva Kaur M.D.       • lidocaine (XYLOCAINE) 1 % injection 0.5 mL  0.5 mL Once PRN Htet SINDI Kaur M.D.       • acetaminophen (Tylenol) tablet 650 mg  650 mg Q4HRS PRN Gautam Flores M.D.   650 mg at 06/27/21 2041   Last reviewed on 6/27/2021  7:50 AM by Marii Lopez    No Known Allergies    Physical Exam  Body mass index is 22.97 kg/m². /68   Pulse 60   Temp 36.5 °C (97.7 °F) (Temporal)   Resp 16   Ht 1.6 m (5' 2.99\")   Wt 58.8 kg (129 lb 10.1 oz)   SpO2 97%    Vitals:    06/27/21 2325 06/28/21 0348 06/28/21 0745 06/28/21 1019   BP: 135/64 155/70 160/68    Pulse: 64 62 (!) 57 60   Resp: 16 16 16 16   Temp: 36.5 °C (97.7 °F) 36.4 °C (97.6 °F) 36.5 °C (97.7 °F)    TempSrc: Temporal Temporal Temporal    SpO2: 99% 100% 100% 97%   Weight:       Height:        Oxygen Therapy:  Pulse Oximetry: 97 %, O2 (LPM): 4, O2 Delivery Device: Silicone Nasal Cannula    General: no acute distress, well appearing  Neck: no JVD, no bruits  Lungs: CTAB, normal effort. no wheezing, rales, or rhonchi  Heart: RRR, normal S1 /S2 no murmur, no rub  EXT: no lower extremity edema, 2+ radial pulses. 2+ pedal pulses.   Abdomen: soft, non tender, non distended  Neurological: No focal deficits, no facial asymmetry.  Normal speech  Psychiatric: Appropriate affect, alert and oriented x 3, Uzbek-speaking  Skin: Warm and dry extremities, no rashes    Labs (personally reviewed):     Lab Results   Component Value Date/Time    SODIUM 139 06/28/2021 06:33 AM    POTASSIUM 3.7 06/28/2021 06:33 AM    CHLORIDE 103 06/28/2021 06:33 AM    CO2 27 06/28/2021 06:33 AM    GLUCOSE 126 (H) 06/28/2021 06:33 AM    BUN 24 (H) 06/28/2021 06:33 AM    CREATININE 1.07 06/28/2021 06:33 AM     Lab Results   Component Value " Date/Time    ALKPHOSPHAT 68 06/27/2021 09:57 PM    ASTSGOT 55 (H) 06/27/2021 09:57 PM    ALTSGPT 26 06/27/2021 09:57 PM    TBILIRUBIN 0.4 06/27/2021 09:57 PM      Lab Results   Component Value Date/Time    CHOLSTRLTOT 87 (L) 06/28/2021 06:33 AM    LDL 28 06/28/2021 06:33 AM    HDL 32 (A) 06/28/2021 06:33 AM    TRIGLYCERIDE 136 06/28/2021 06:33 AM     Results for BREANNE BOOTH (MRN [id number]) as of 6/28/2021 11:12   Ref. Range 6/27/2021 06:03 6/27/2021 08:50 6/27/2021 12:45   Troponin T Latest Ref Range: 6 - 19 ng/L 980 (H) 926 (H) 1161 (H)       Cardiac Imaging and Procedures Review:      EKG 6/27/21: My Personal interpretation reveals SR 76, first degree AV block, KS interval 0.248, no ST changes noted    Echo 1/19/21:  CONCLUSIONS  Left ventricular systolic function is normal; ejection fraction is   visually estimated to be 60%.  Normal right ventricular size and systolic function.  Mild tricuspid regurgitation.  Normal pericardium without effusion.     Compared to the images of the prior study done on 05/23/20, estimated   pulmonary pressure has improved from 40mmHg to 20mmHg.    Stress Test 5/27/2020:  Myocardial Perfusion   Report   NUCLEAR IMAGING INTERPRETATION   No evidence of significant jeopardized viable myocardium or prior myocardial    infarction.   Normal left ventricular size, ejection fraction, and wall motion.   ECG INTERPRETATION   Negative stress ECG for ischemia.    [facility] 11/4/2020  PROCEDURES:  1. Left heart catheterization.  2. Coronary angiography.  3. Left ventriculogram.  4. Monitored conscious sedation.     PREPROCEDURE DIAGNOSIS:  Syncope.     POSTPROCEDURE DIAGNOSES:  1. Diffuse coronary artery disease.  2. Reduced left ventricular systolic function with ejection fraction of 30%,   diaphragmatic hypokinesis.  3. Elevated left ventricular end-diastolic pressure.    IMPRESSION:  1. Diffuse coronary artery disease.  2. Reduced left ventricular systolic function with ejection fraction of  30%,   diaphragmatic hypokinesis.  3. Elevated left ventricular end-diastolic pressure.     RECOMMENDATIONS:  Recommend medical therapy for coronary artery disease,   aggressive hypertensive management and consider alternative causes for   recurrent syncope.    Assessment and Medical Decision Making:  NSTEMI  -Catheterization rescheduled for tomorrow  -Patient continues on heparin drip  -Transition aspirin to 81 mg daily tomorrow, received 325mg this am  -Continue high-dose statin, atorvastatin 40mg  -Metoprolol held given first-degree AV block and syncope at home    MIRIAM  -Improved with hydration  -Okay to proceed with cardiac catheterization given improvement    Syncope   -Etiology unclear  -no concerning arrhythmias on monitor overnight  -Awaiting echo  -We will set up event monitor on discharge if no significant findings while inpatient    Hypertension  -Can consider restarting home losartan given improved kidney function and continued HTN    Thank you for allowing me to participate in this patients care.  Please contact me with any questions or concerns.    ARMEN Roberts.   [facility]  [phone]

## 2021-06-28 NOTE — PROGRESS NOTES
Assumed care of patient at bedside report from day RN. Updated on POC. Patient currently A & O x 4 on 4 L O2  With complaints of acute pain, see MAR.Call light within reach. Whiteboard updated. Fall precautions in place. Bed locked and in lowest position. All questions answered. No other needs indicated at this time.

## 2021-06-28 NOTE — CARE PLAN
The patient is Watcher - Medium risk of patient condition declining or worsening    Shift Goals  Patient Goals: Maintain pain control      Problem: Pain - Standard  Goal: Alleviation of pain or a reduction in pain to the patient’s comfort goal  Outcome: Progressing     Problem: Knowledge Deficit - Standard  Goal: Patient and family/care givers will demonstrate understanding of plan of care, disease process/condition, diagnostic tests and medications  Outcome: Progressing     Problem: Fall Risk  Goal: Patient will remain free from falls  Outcome: Progressing

## 2021-06-28 NOTE — NON-PROVIDER
"  [facility] FAMILY MEDICINE PROGRESS NOTE     Attending: Dr. Mary Fiore M.D.  Senior Resident: Dr. Dean Rene M.D. (PGY-2)  Sonny Resident: Dr. Radames Mccarty M.D. (PGY-1)  PATIENT: Chase Harris; [id number]; 1950    ID: 70 y.o. male admitted for NSTEMI and MIRIAM.    SUBJECTIVE - -   CC: \"I had chest pain.\"  HPI:  Pt was having c/o CP x 1 hour. Pressure-like moderate pain that radiated to both shoulders. No specific relieving or exacerbating factors. Denies SOB or diaphoresis. Associated symptom of syncopal episode prior to ED arrival, while on toilet. Denies injury or fall related to that episode. Pt states that he had been complaint with his outpt medications. Pt denies this happening to him before. Pt is on 3-4L O2 at home for h/o pulmonary fibrosis. Pt had an angiogram in Nov 2020 that noted to have nonobstructive CAD with an EF of 30%; follow-up echocardiogram in Jan 2021 revealed EF of 60%.  He was evaluated by EP service for nocturnal heart block with bradycardia and no pacemaker was recommended.     Today:  No acute events overnight.  Pts son is at bedside. Pt states that he is comfortable to discuss his care and have questions discussed with son.  Pt states that he feels better today compared to yesterday. He denies having any pain.   His only complaints at this time is a HA that started yesterday acutely, and is worse compared to previous headaches and a sore throat.  Pts son said that he gave him Fritos and Coke because he did not know that he was not supposed to eat.    ROS:  Constitutional: Negative for chills, diaphoresis and fever.   HENT: Positive for sore throat.    Respiratory: Negative for cough and shortness of breath.    Cardiovascular: Negative for chest pain and palpitations.   Gastrointestinal: Negative for abdominal pain, blood in stool, constipation, diarrhea, nausea and vomiting.   Genitourinary: Negative for dysuria and hematuria.   Neurological: Positive for headaches. Negative " for tingling and focal weakness.    ED Course:  Chest pain protocol was initiated.  Per pt, CP had improved while in ED.  Critical Troponin of 980 noted. Cardiology followed.  ECG without evidence of ST elevation.  WBC elevated at 11.9. H/H 9.9 and 31, which is his baseline.  BMP w/ elevated glucose of 250 and Cr of 1.7, which is new to baseline.    OBJECTIVE - -   Temp:  [36.2 °C (97.2 °F)-36.8 °C (98.2 °F)] 36.4 °C (97.6 °F)  Pulse:  [62-86] 62  Resp:  [16-17] 16  BP: (115-167)/(58-94) 155/70  SpO2:  [97 %-100 %] 100 %    Intake/Output Summary (Last 24 hours) at 6/28/2021 0712  Last data filed at 6/27/2021 2039  Gross per 24 hour   Intake --   Output 1400 ml   Net -1400 ml     PE:  Vitals signs reviewed.     Constitutional:       General: Pt is awake and sitting in bed.      Appearance: Pt is not ill-appearing or toxic-appearing.   HEENT:      Head: Atraumatic.      Ears: Hearing and external ears normal bilaterally.      Eyes: Lids are normal. EOMI and PERRLA bilaterally. Conjunctivae normal bilaterally.     Nose: No nasal deformity or septal deviation.      Mouth: Mucous membranes are dry. Lips are pink.     Tongue: No lesions. Tongue does not deviate from midline.      Pharynx: Uvula midline. Plaque on right oropharynx. Left tonsillar edema.  Cardiovascular:      Rate and Rhythm: Bradycardic and regular rhythm.      Heart sounds: S1 normal and S2 normal. No overt murmur.      Chest wall: Tenderness to palpation.     Capillary Refill: Capillary refill < 3 seconds.      L hand edema.  Pulmonary:      Effort: Pulmonary effort is normal.      Breath sounds: Expiratory wheezes in all lung fields. No decreased breath sounds, rhonchi, or rales.   Abdominal:      General: Bowel sounds are normal.      Palpations: Abdomen is soft.      Tenderness: There is no abdominal tenderness. There is no guarding or rebound.   Musculoskeletal:     Edema of left hand. No evidence of erythema, warmth, or tenderness.  Neurological:       General: No focal deficit present.      Mental Status: Pt is alert.      CN II-XII grossly intact.  Psychiatric:         Behavior: Cooperative.     LABS:  Recent Labs     21  0652 21   WBC 11.9* 12.4* 10.4   RBC 3.26* 3.19* 3.13*   HEMOGLOBIN 9.9* 9.6* 9.6*   HEMATOCRIT 31.6* 30.6* 29.9*   MCV 96.9 95.9 95.5   MCH 30.4 30.1 30.7   RDW 48.6 47.0 46.5   PLATELETCT 97* 131* 123*   MPV 13.0* 13.3* 12.7   NEUTSPOLYS 78.90*  --   --    LYMPHOCYTES 11.40*  --   --    MONOCYTES 7.20  --   --    EOSINOPHILS 1.20  --   --    BASOPHILS 0.50  --   --      Recent Labs     21   SODIUM 138 135   POTASSIUM 4.7 3.9   CHLORIDE 102 98   CO2 24 28   BUN 37* 30*   CREATININE 1.72* 1.23   CALCIUM 9.3 9.1   ALBUMIN 3.4 3.4     Estimated GFR/CRCL = Estimated Creatinine Clearance: 44.3 mL/min (by C-G formula based on SCr of 1.23 mg/dL).  Recent Labs     21  0603 21  1053 21  1650 213 21   GLUCOSE 250*  --   --   --  182*   POCGLUCOSE  --  165* 184* 194*  --      Recent Labs     21  0621  0730 21   ASTSGOT 41  --  55*   ALTSGPT 26  --  26   TBILIRUBIN 0.4  --  0.4   ALKPHOSPHAT 62  --  68   GLOBULIN 4.0*  --  3.9*   INR  --  1.23* 1.15*     Recent Labs     21   INR 1.23* 1.15*   APTT 35.8 114.9*     MICROBIOLOGY: N/A    IMAGING:  DX-CHEST-PORTABLE (1 VIEW)   Final Result      Increased interstitial markings are consistent with chronic interstitial disease. Superimposed infection cannot be excluded.      EC-ECHOCARDIOGRAM COMPLETE W/O CONT    (Results Pending)   CL-LEFT HEART CATHETERIZATION WITH POSSIBLE INTERVENTION    (Results Pending)     EK21 at 1845.  Rate 60.  ACCELERATED JUNCTIONAL ESCAPE RHYTHM  INFERIOR INFARCT, AGE INDETERMINATE  REPOL ABNRM SUGGESTS ISCHEMIA, ANTERIOR LEADS  BASELINE WANDER IN LEAD(S) V2    MEDS:  Current Facility-Administered Medications   Medication  Last Admin   • heparin infusion 25,000 units in 500 mL 0.45% NACL 16 Units/kg/hr at 06/28/21 0500   • heparin injection 2,000 Units 2,000 Units at 06/27/21 2301   • atorvastatin (LIPITOR) tablet 40 mg 40 mg at 06/27/21 1740   • budesonide-formoterol (SYMBICORT) 160-4.5 MCG/ACT inhaler 2 Puff 2 Puff at 06/27/21 2137   • senna-docusate (PERICOLACE or SENOKOT S) 8.6-50 MG per tablet 2 tablet 2 tablet at 06/28/21 0433    And   • polyethylene glycol/lytes (MIRALAX) PACKET 1 Packet      And   • magnesium hydroxide (MILK OF MAGNESIA) suspension 30 mL      And   • bisacodyl (DULCOLAX) suppository 10 mg     • lactated ringers infusion New Bag at 06/28/21 0640   • aspirin (ASA) tablet 325 mg 325 mg at 06/28/21 0432    Or   • aspirin (ASA) chewable tab 324 mg      Or   • aspirin (ASA) suppository 300 mg     • ondansetron (ZOFRAN) syringe/vial injection 4 mg     • ondansetron (ZOFRAN ODT) dispertab 4 mg     • insulin regular (HumuLIN R,NovoLIN R) injection 2 Units at 06/27/21 2044    And   • glucose 4 g chewable tablet 16 g      And   • dextrose 50% (D50W) injection 50 mL     • tiotropium (Spiriva Respimat) 2.5 mcg/Act inhalation spray 5 mcg 5 mcg at 06/27/21 1049   • NS infusion     • lidocaine (XYLOCAINE) 1 % injection 0.5 mL     • acetaminophen (Tylenol) tablet 650 mg 650 mg at 06/27/21 2041     PROBLEM LIST:  ~NSTEMI  ~MIRIAM  ~DM  ~Dyslipidemia  ~Pulmonary Fibrosis    ASSESSMENT/PLAN - -  Chase Harris is a 70 y.o. male w/ PMH of DM, dyslipidemia, pulmonary fibrosis, and CAD who presented for CP and was admitted for NSTEMI and MIRIAM.    #Chest Pain  #NSTEMI  #Syncope, Resolved  Pt was symptomatic for CP and had critical Troponin of 980 and ECG changes showing accelerated junctional escape rhythm, evidence of inferior infarct, and repolarization abnormalities suggestive of ischemia in the anterior leads, which both were concerning of NSTEMI.   Cardiology, Dr. Kaur, consulted and recommended: heparin drip, daily ASA, daily  Atorvastatin 40mg, order ECHO, and to possibly repeat coronary angiogram today, 6/28/21. INR on 6/27/21 at 2157 is 1.15.  Plan:  ~Pending Echo.  ~Follow Cardiology recommendations:   Continue heparin drip.   Daily 325mg ASA.   Daily 40mg Atorvastatin.   ~Pt not NPO to receive repeat coronary angiogram today.   ~At discharge, order Holter monitor.    #MIRIAM, Improving  Admission labs showed an elevated Cr of 1.72 and BUN of 37 compared to labs on 1/26/21. GFR was at 39, which places pt at CKD, Stage 3B range; however, no progression of labs or prior evidence makes a chronic kidney pathology likely at this time. His labs this AM, 6/28/21 at 0633, show normal Cr at 1.07, improved BUN at 24, and normal GFR greater than 60. Pt also has no overt fluid overloading on exam.   Plan:  ~IV hydration.  ~Monitor CMP for kidney functioning.  ~Avoid nephrotoxic agents and renally dose medications.    #Anemia, Chronic, Unchanged  Pt has had previous lab work that has shown similar anemia since 2020. Pt denies any associated symptoms. Pt denies any blood present in stool or urine or recent issues with bleeding.  Plan:  ~Daily CBC w/ differential.  ~Monitor changes since being anticoagulated.    #DM2, Uncontrolled, Improving  Admission labs showed glucose of 250. Pt has had consistent hyperglycemia on hospital lab work. Most recent glucose of 126 this AM, 6/28/21 at 0633.  Plan:  ~Insulin sliding scale with Humalog.  ~Monitor BGs.  ~BG goal is less than 150.    #Pulmonary Fibrosis, Chronic  Pt presented hypoxic at ED with measure of 78%. Pt was started on 3L O2 via nasal canula and has maintained O2 saturation at % for the remainder of his admission.  Plan:  ~Continue Tiotropium 5mcg daily.  ~Continue Symbicort 2 puff BID.  ~Continue 3L O2, as this is his at home requirement.  ~Maintain O2 saturation above 90%.    #Core Measures   VTE PPx: Heparin 25,000 Units in 500mL of 0.45% NaCl  Abx: N/A  Lines/Tubes: RUE and LUE  PIV  Fluids: LR at 50mL/hr and NS at 50mL/hr  Diet: NPO since 2400 from Cardiac and Diabetic  Code Status: Full    Gautam Askew, MS4  Family Medicine [facility]I

## 2021-06-28 NOTE — CONSULTS
"  REFERRING PHYSICIAN: KELLY Don.     CONSULTING PHYSICIAN: Bradford Coronado DO.    CHIEF COMPLAINT: chest pain    HISTORY OF PRESENT ILLNESS: The patient is a 70 y.o. male with a history of DM, pulmonary fibrosis, dyslipidemia and CAD who presented to the ED with chest pain for the last 4 to 5 days.  Pain is an 7/10, pressure like, mid sternal and he has shortness of breath and radiates to both shoulders.  No other associated signs or symptoms.  No aggravating or alleviating factors.  He is on home oxygen at 3 to 4 L .  In 2020 he had a cath which showed nonobstructive CAD with an EF of 30%.  Today, he is chest pain-free.  He has no new complaints.  Entire history and physical was conducted with a  via iPad.    PAST MEDICAL HISTORY:   Past Medical History:   Diagnosis Date   • Diabetes (HCC)    • Pulmonary fibrosis (HCC)     wears 2.5L O2 at home        PAST SURGICAL HISTORY:   Past Surgical History:   Procedure Laterality Date   • OTHER      Kidney stones   • OTHER ABDOMINAL SURGERY      \"from an ulcer that burst\"       FAMILY HISTORY:   Family History   Problem Relation Age of Onset   • Diabetes Mother    • Diabetes Father    • Diabetes Brother         SOCIAL HISTORY:   Social History     Socioeconomic History   • Marital status:      Spouse name: Not on file   • Number of children: Not on file   • Years of education: Not on file   • Highest education level: Not on file   Occupational History   • Not on file   Tobacco Use   • Smoking status: Former Smoker     Packs/day: 2.00     Years: 40.00     Pack years: 80.00     Types: Cigarettes     Quit date:      Years since quittin.5   • Smokeless tobacco: Never Used   Vaping Use   • Vaping Use: Never used   Substance and Sexual Activity   • Alcohol use: Not Currently   • Drug use: Never   • Sexual activity: Not on file   Other Topics Concern   • Not on file   Social History Narrative   • Not on file     Social " Determinants of Health     Financial Resource Strain:    • Difficulty of Paying Living Expenses:    Food Insecurity:    • Worried About Running Out of Food in the Last Year:    • Ran Out of Food in the Last Year:    Transportation Needs:    • Lack of Transportation (Medical):    • Lack of Transportation (Non-Medical):    Physical Activity:    • Days of Exercise per Week:    • Minutes of Exercise per Session:    Stress:    • Feeling of Stress :    Social Connections:    • Frequency of Communication with Friends and Family:    • Frequency of Social Gatherings with Friends and Family:    • Attends Denominational Services:    • Active Member of Clubs or Organizations:    • Attends Club or Organization Meetings:    • Marital Status:    Intimate Partner Violence:    • Fear of Current or Ex-Partner:    • Emotionally Abused:    • Physically Abused:    • Sexually Abused:        ALLERGIES:   No Known Allergies     CURRENT MEDICATIONS:     Current Facility-Administered Medications:   •  aspirin EC (ECOTRIN) tablet 81 mg, 81 mg, Oral, DAILY, HAZEL Roberts  •  losartan (COZAAR) tablet 25 mg, 25 mg, Oral, DAILY, Radames Mccarty M.D.  •  heparin infusion 25,000 units in 500 mL 0.45% NACL, 0-30 Units/kg/hr, Intravenous, Continuous, Dick Caballero M.D., Last Rate: 23.2 mL/hr at 06/28/21 1231, 16 Units/kg/hr at 06/28/21 1231  •  heparin injection 2,000 Units, 2,000 Units, Intravenous, PRN, Dick Caballero M.D., 2,000 Units at 06/27/21 2301  •  atorvastatin (LIPITOR) tablet 40 mg, 40 mg, Oral, Q EVENING, Dick Caballero M.D., 40 mg at 06/27/21 1740  •  budesonide-formoterol (SYMBICORT) 160-4.5 MCG/ACT inhaler 2 Puff, 2 Puff, Inhalation, BID (RT), Dick Caballero M.D., 2 Puff at 06/28/21 1019  •  senna-docusate (PERICOLACE or SENOKOT S) 8.6-50 MG per tablet 2 tablet, 2 tablet, Oral, BID, 2 tablet at 06/28/21 0433 **AND** polyethylene glycol/lytes (MIRALAX) PACKET 1 Packet, 1 Packet, Oral, QDAY PRN **AND**  magnesium hydroxide (MILK OF MAGNESIA) suspension 30 mL, 30 mL, Oral, QDAY PRN **AND** bisacodyl (DULCOLAX) suppository 10 mg, 10 mg, Rectal, QDAY PRN, Dick Caballero M.D.  •  lactated ringers infusion, , Intravenous, Continuous, Dick Caballero M.D., Last Rate: 50 mL/hr at 06/28/21 0640, New Bag at 06/28/21 0640  •  ondansetron (ZOFRAN) syringe/vial injection 4 mg, 4 mg, Intravenous, Q4HRS PRN, Dick Caballero M.D.  •  ondansetron (ZOFRAN ODT) dispertab 4 mg, 4 mg, Oral, Q4HRS PRN, Dick Caballero M.D.  •  insulin regular (HumuLIN R,NovoLIN R) injection, 2-9 Units, Subcutaneous, 4X/DAY ACHS, 2 Units at 06/27/21 2044 **AND** POC blood glucose manual result, , , Q AC AND BEDTIME(S) **AND** NOTIFY MD and PharmD, , , Once **AND** glucose 4 g chewable tablet 16 g, 16 g, Oral, Q15 MIN PRN **AND** dextrose 50% (D50W) injection 50 mL, 50 mL, Intravenous, Q15 MIN PRN, Dick Caballero M.D.  •  tiotropium (Spiriva Respimat) 2.5 mcg/Act inhalation spray 5 mcg, 5 mcg, Inhalation, QDAILY (RT), Dick Caballero M.D., 5 mcg at 06/28/21 1019  •  NS infusion, , Intravenous, Continuous, Silva Kaur M.D.  •  lidocaine (XYLOCAINE) 1 % injection 0.5 mL, 0.5 mL, Intradermal, Once PRN, Silva Kaur M.D.  •  acetaminophen (Tylenol) tablet 650 mg, 650 mg, Oral, Q4HRS PRN, Gautam Flores M.D., 650 mg at 06/27/21 2041     LABS REVIEWED:  Lab Results   Component Value Date/Time    SODIUM 139 06/28/2021 06:33 AM    POTASSIUM 3.7 06/28/2021 06:33 AM    CHLORIDE 103 06/28/2021 06:33 AM    CO2 27 06/28/2021 06:33 AM    GLUCOSE 126 (H) 06/28/2021 06:33 AM    BUN 24 (H) 06/28/2021 06:33 AM    CREATININE 1.07 06/28/2021 06:33 AM      Lab Results   Component Value Date/Time    PROTHROMBTM 14.4 06/27/2021 09:57 PM    INR 1.15 (H) 06/27/2021 09:57 PM      Lab Results   Component Value Date/Time    WBC 10.4 06/28/2021 06:33 AM    RBC 3.13 (L) 06/28/2021 06:33 AM    HEMOGLOBIN 9.6 (L) 06/28/2021 06:33 AM     HEMATOCRIT 29.9 (L) 06/28/2021 06:33 AM    MCV 95.5 06/28/2021 06:33 AM    MCH 30.7 06/28/2021 06:33 AM    MCHC 32.1 (L) 06/28/2021 06:33 AM    MPV 12.7 06/28/2021 06:33 AM    NEUTSPOLYS 78.90 (H) 06/27/2021 06:52 AM    LYMPHOCYTES 11.40 (L) 06/27/2021 06:52 AM    MONOCYTES 7.20 06/27/2021 06:52 AM    EOSINOPHILS 1.20 06/27/2021 06:52 AM    BASOPHILS 0.50 06/27/2021 06:52 AM        IMAGING REVIEWED AND INTERPRETED:    ECHOCARDIOGRAM:   Left ventricular systolic function is normal; ejection fraction is   visually estimated to be 60%.  Normal right ventricular size and systolic function.  Mild tricuspid regurgitation.  Normal pericardium without effusion.  1/19/2021    ANGIOGRAM: Nov 4, 2020  1. Diffuse coronary artery disease.  2. Reduced left ventricular systolic function with ejection fraction of 30%,   diaphragmatic hypokinesis.  3. Elevated left ventricular end-diastolic pressure.    REVIEW OF SYSTEMS:   Review of Systems   Constitutional: Positive for malaise/fatigue. Negative for chills, diaphoresis, fever and weight loss.   HENT: Negative for congestion, ear pain, hearing loss, nosebleeds, sore throat and tinnitus.    Eyes: Negative for blurred vision, double vision, pain and discharge.   Respiratory: Positive for shortness of breath and wheezing. Negative for cough, hemoptysis, sputum production and stridor.    Cardiovascular: Positive for chest pain. Negative for palpitations, orthopnea and leg swelling.   Gastrointestinal: Negative for abdominal pain, constipation, heartburn, melena, nausea and vomiting.   Genitourinary: Negative for dysuria, flank pain and hematuria.   Musculoskeletal: Positive for joint pain. Negative for myalgias and neck pain.   Skin: Negative for itching and rash.   Neurological: Negative for dizziness, speech change, focal weakness, seizures, weakness and headaches.   Endo/Heme/Allergies: Negative for environmental allergies and polydipsia. Does not bruise/bleed easily.  "  Psychiatric/Behavioral: Negative for depression, hallucinations, substance abuse and suicidal ideas.         PHYSICAL EXAMINATION:   Physical Exam  Constitutional:       General: He is not in acute distress.  HENT:      Head: Normocephalic and atraumatic.      Nose: Nose normal.   Eyes:      Conjunctiva/sclera: Conjunctivae normal.      Pupils: Pupils are equal, round, and reactive to light.   Neck:      Vascular: No JVD.      Trachea: No tracheal deviation.   Cardiovascular:      Rate and Rhythm: Normal rate and regular rhythm.   Pulmonary:      Effort: Pulmonary effort is normal. No respiratory distress.      Breath sounds: Normal breath sounds. No stridor.   Abdominal:      General: Bowel sounds are normal. There is no distension.      Palpations: Abdomen is soft.      Tenderness: There is no abdominal tenderness.   Musculoskeletal:         General: No tenderness. Normal range of motion.      Cervical back: Normal range of motion and neck supple.   Skin:     General: Skin is warm and dry.   Neurological:      Mental Status: He is alert and oriented to person, place, and time.      Coordination: Coordination normal.      Gait: Gait is intact.   Psychiatric:         Mood and Affect: Mood and affect normal.         Cognition and Memory: Memory normal.       /68   Pulse 60   Temp 36.5 °C (97.7 °F) (Temporal)   Resp 16   Ht 1.6 m (5' 2.99\")   Wt 58.8 kg (129 lb 10.1 oz)   SpO2 97%   BMI 22.97 kg/m²        IMPRESSION:  Non-STEMI, multivessel coronary artery disease, acute kidney injury on chronic kidney disease, chronic respiratory failure with hypoxia on home O2, interstitial lung disease, diabetes mellitus type 2 uncontrolled, chronic anemia, symptomatic bradycardia,       PLAN:  I recommend medical management of his coronary artery disease.  I reviewed the case with his cardiologist Dr. Kingston.  On review of his angiogram, his vessels are very small.  I believe that would make complete " revascularization essentially impossible.  He has diffuse disease throughout the vessels that also make complete revascularization sensation very unlikely.  I also have significant concern regarding his ability to tolerate surgery secondary to his interstitial lung disease and chronic respiratory failure.  I believe he be extremely high risk for prolonged intubation, possible tracheostomy, chronic long-term ventilator need.    The procedure, its risks, benefits, potential complications and alternative treatments were discussed with the patient in detail including the risks should he decide not to undergo my recommended treatment. All of his questions were answered to his satisfaction and he is willing to proceed with the operation. The risks include death, stroke,  infection: to include a rare bacterial infection related to the use of the heart/lung machine, jovanny-operative myocardial infarction, dysrhythmias, diaphragmatic paralysis, chest wall paresthesia, tracheostomy, kidney or other organ failure, possible return to the operating room for bleeding, bleeding requiring transfusion with its attendant risks including AIDS or hepatitis, dehiscence of surgical incisions, respiratory complications including the need for prolonged ventilator support, Protamine or other drug reaction, peripheral neuropathy, loss of limb, and miscount of surgical items. The operative mortality risk is approximately 15-20%.     The STS mortality risk score is 10% and the morbidity and mortality risk score is 37.4%. The scores were discussed with patient.      Findings and recommendations have been discussed with the patient’s cardiologist Dr. Kingston.  Thank you for this very challenging consultation and participation in the patient’s care.  I will keep you apprised of all future developments.          Sincerely,       Bradford Coronado DO     I,  Bradford Coronado DO performed a substantial portion of the EM visit face-to-face with the  same patient on the same date of service with CYNDIE Mejias. I was personally involved in reviewing and interpreting the films and conducted elements of the history and physical exam. I performed all of the medical decision making for the patient.     services were used in the patient's primary language of Amharic.      Mode of interpretation: iPad    Content of Interpretation:  History and physical, discussion of coronary disease and proposed treatments along with the risks, benefits, alternatives, and potential complications.        .

## 2021-06-29 NOTE — THERAPY
"Physical Therapy   Initial Evaluation     Patient Name: Chase Harris  Age:  70 y.o., Sex:  male  Medical Record #: [id number]  Today's Date: 6/29/2021     Precautions: Fall Risk, Cardiac Precautions (See Comments)    Assessment  Mr. Harris is a 69 y/o male who presents to acute secondary to NSTEMI. Decision was made for medical management as it was felt revascularization would be \"essentially impossible\" and concern for patient's tolerance to surgical intervention. At time of eval pt reports no chest pain. Provided with cardiac rehab handout in Ukrainian and reviewed including walking program, RPE scale, talk test, and activity recommendations. Pt demonstrated understanding. Able to easily come to EOB where he sat asymptomatic for MMT. Then stood without assist. Took about 5 steps, then stopped talking to therapist and stopped following therapist's instructions. Therapist mod assist brought patient back to bed where he was able to assume the supine position on his own and resumed talking. Stated he became very dizzy and didn't feel well. Noted jerking of extremities. RN notified. Pt has adequate strength to mobilize self, once able to ambulate without dizziness anticipate no long term needs.     Plan    Recommend Physical Therapy 4 times per week until therapy goals are met for the following treatments:  Bed Mobility, Gait Training, Neuro Re-Education / Balance, Therapeutic Activities and Therapeutic Exercises    DC Equipment Recommendations: None  Discharge Recommendations: Recommend home health for continued physical therapy services            Objective       06/29/21 1012   Prior Living Situation   Prior Services None   Housing / Facility 1 Story Apartment / Condo   Steps Into Home 0   Equipment Owned None   Lives with - Patient's Self Care Capacity Adult Children  (reports lives with son)   Prior Level of Functional Mobility   Bed Mobility Independent   Transfer Status Independent   Ambulation Independent "   Distance Ambulation (Feet)   (community ambulator)   Assistive Devices Used None   Cognition    Level of Consciousness Alert   Comments pleasant and motivated   Passive ROM Lower Body   Passive ROM Lower Body WDL   Active ROM Lower Body    Active ROM Lower Body  WDL   Strength Lower Body   Lower Body Strength  WDL   Sensation Lower Body   Lower Extremity Sensation   WDL   Balance Assessment   Sitting Balance (Static) Good   Sitting Balance (Dynamic) Good   Standing Balance (Static) Fair +   Standing Balance (Dynamic) Fair   Weight Shift Sitting Good   Weight Shift Standing Good   Comments no AD   Gait Analysis   Gait Level Of Assist Moderate Assist   Assistive Device   (physical assist )   Distance (Feet) 5   # of Times Distance was Traveled 2   Deviation Other (Comment)   Weight Bearing Status no restrictions   Comments Pt initially took steps without issue, however then became extremely dizzy and had to be extensively assisted back to bed by therapist   Bed Mobility    Supine to Sit Supervised   Sit to Supine Supervised   Scooting Supervised   Functional Mobility   Sit to Stand Supervised   Short Term Goals    Short Term Goal # 1 Pt will ambulate 150ft with no AD and SPV in 6 visits to increase independence

## 2021-06-29 NOTE — DISCHARGE SUMMARY
"  [facility] FAMILY MEDICINE ADULT DISCHARGE SUMMARY     Admit Date:  6/27/2021       Discharge Date:  6/29/2021    Service:   [facility] Family Medicine Inpatient Team  Attending Physician(s):   Mary Fiore MD       Senior Resident(s):   Dean Rene MD   Sonny Resident(s):   Radames Mccarty MD     Primary Care Physician: Rashmi Cedeno PA-C    Discharge Diagnoses:  NSTEMI  Acute kidney injury, resolved  Pulmonary Fibrosis, stable  Coronary artery disease, worsening  Type 2 Diabetes Mellitus, stable    HPI:     Per admission H&P:  \" 70 y.o. male who presented 6/27/2021 with history of diabetes pulmonary fibrosis dyslipidemia and nonobstructive CAD noted on angiogram in November 2020 presented to the emergency department for evaluation of chest pain for the past 4 to 5 days.  Pain is moderate pressure-like associated with dyspnea.  No specific exacerbating or relieving factors.  Pain radiates to both shoulders.  It has improved since he has been in the emergency department and is currently mild.  He denies any loss of consciousness.  No diaphoresis.  He has been compliant with his medical therapy.  He is on baseline home oxygen 3 to 4 L.  On his angiogram in November he was noted to have an EF of 30% follow-up echocardiogram revealed EF of 60%.  He was evaluated by EP service for nocturnal heart block with bradycardia  and no pacemaker was recommended.\"     Problem-based Hospital Course:    #NSTEMI  -EKG in ED showed first-degree heart block w no ST changes  -Troponin in the ER was 980-->926-->1161  -Cardiology consulted and he was admitted to the telemetry floor  -He was evaluated by cardiology for possible PCI, and CT surgery for possible CABG  -It was determined that due to the size of his coronary vessels and him being such a poor surgical candidate, no intervention could be done  -Echocardiogram was performed and showed EF of 60%  -He was started on Plavix, amlodipine, and Isosorbide mononitrate SR  Plan at " "discharge:  -Aspirin 81mg daily  -Atorvastatin 40mg nightly  -Clopidogrel 75mg daily  -Losartan 50mg daily   -Amlodipine 5mg daily  -Isosorbide Mononitrate 30mg daily  -He will follow up with [facility] soon     #Syncope   #Orthostatic hypotension   -No concerning arrhythmias on telemetry monitoring  -Echocardiogram showed no significant abnormalities  -Orthostatic BP were positive (130/60 sitting, 89/45 standing)  Plan at discharge:  -Lifestyle modification including drinking more water, changing positions more slowly, wearing compression stockings  -He will follow up soon with cardiology     #Acute kidney injury, resolved  -Cr on admission 1.72  -Was given gentle fluids  -Cr improved to 1.14 on day of discharge      #Type 2 diabetes  -Home medications were held during his stay  -Sliding scale insulin was given  Plan at discharge:  -Restart home Januvia, Metformin, Jardiance  -Follow up with PCP    Subjective on Day of Discharge:  This morning he is feeling well. He denies any chest pain, shortness of breath, abdominal pain, nausea, or vomiting.    Physical Exam on Day of Discharge:  BP (!) 89/45   Pulse 72   Temp 36.9 °C (98.5 °F) (Temporal)   Resp 18   Ht 1.6 m (5' 2.99\")   Wt 59.4 kg (130 lb 15.3 oz)   SpO2 96%   BMI 23.20 kg/m²   General: No acute distress, afebrile, resting comfortably, conversational   HEENT: NC/AT. EOMI.   Cardiovascular: RRR without murmurs, rubs, heaves. Normal capillary refill   Respiratory: CTAB, no tachypnea or retractions   Abdomen: normal bowel sounds, soft, nontender, nondistended, no masses, no organomegaly   EXT:  VELAZCO, no edema  Skin: No erythema/lesions   Neuro: Non-focal, alert and orientated     Consultants:      Cardiology    Procedures:        None    Labs:  Results for orders placed or performed during the hospital encounter of 06/27/21   Complete Metabolic Panel (CMP)   Result Value Ref Range    Sodium 138 135 - 145 mmol/L    Potassium 4.7 3.6 - 5.5 mmol/L    " Chloride 102 96 - 112 mmol/L    Co2 24 20 - 33 mmol/L    Anion Gap 12.0 7.0 - 16.0    Glucose 250 (H) 65 - 99 mg/dL    Bun 37 (H) 8 - 22 mg/dL    Creatinine 1.72 (H) 0.50 - 1.40 mg/dL    Calcium 9.3 8.5 - 10.5 mg/dL    AST(SGOT) 41 12 - 45 U/L    ALT(SGPT) 26 2 - 50 U/L    Alkaline Phosphatase 62 30 - 99 U/L    Total Bilirubin 0.4 0.1 - 1.5 mg/dL    Albumin 3.4 3.2 - 4.9 g/dL    Total Protein 7.4 6.0 - 8.2 g/dL    Globulin 4.0 (H) 1.9 - 3.5 g/dL    A-G Ratio 0.9 g/dL   Troponin   Result Value Ref Range    Troponin T 980 (H) 6 - 19 ng/L   ESTIMATED GFR   Result Value Ref Range    GFR If  48 (A) >60 mL/min/1.73 m 2    GFR If Non  39 (A) >60 mL/min/1.73 m 2   CBC WITH DIFFERENTIAL   Result Value Ref Range    WBC 11.9 (H) 4.8 - 10.8 K/uL    RBC 3.26 (L) 4.70 - 6.10 M/uL    Hemoglobin 9.9 (L) 14.0 - 18.0 g/dL    Hematocrit 31.6 (L) 42.0 - 52.0 %    MCV 96.9 81.4 - 97.8 fL    MCH 30.4 27.0 - 33.0 pg    MCHC 31.3 (L) 33.7 - 35.3 g/dL    RDW 48.6 35.9 - 50.0 fL    Platelet Count 97 (L) 164 - 446 K/uL    MPV 13.0 (H) 9.0 - 12.9 fL    Neutrophils-Polys 78.90 (H) 44.00 - 72.00 %    Lymphocytes 11.40 (L) 22.00 - 41.00 %    Monocytes 7.20 0.00 - 13.40 %    Eosinophils 1.20 0.00 - 6.90 %    Basophils 0.50 0.00 - 1.80 %    Immature Granulocytes 0.80 0.00 - 0.90 %    Nucleated RBC 0.00 /100 WBC    Neutrophils (Absolute) 9.35 (H) 1.82 - 7.42 K/uL    Lymphs (Absolute) 1.35 1.00 - 4.80 K/uL    Monos (Absolute) 0.85 0.00 - 0.85 K/uL    Eos (Absolute) 0.14 0.00 - 0.51 K/uL    Baso (Absolute) 0.06 0.00 - 0.12 K/uL    Immature Granulocytes (abs) 0.10 0.00 - 0.11 K/uL    NRBC (Absolute) 0.00 K/uL   SARS-COV Antigen JUAQUIN: Collect dry nasal swab AND NP swab in VTM   Result Value Ref Range    SARS-CoV-2 Source Nasal Swab     SARS-COV ANTIGEN JUAQUIN NotDetected Not-Detected   SARS-CoV-2 PCR (24 hour In-House): Collect NP swab in VTM    Specimen: Nasopharyngeal; Respirate   Result Value Ref Range    SARS-CoV-2 Source  NP Swab     SARS-CoV-2 by PCR NotDetected    aPTT   Result Value Ref Range    APTT 35.8 24.7 - 36.0 sec   Prothrombin Time   Result Value Ref Range    PT 15.1 (H) 12.0 - 14.6 sec    INR 1.23 (H) 0.87 - 1.13   Heparin Xa (Unfractionated)   Result Value Ref Range    Heparin Xa (UFH) <0.10 IU/mL   Troponin - STAT Once   Result Value Ref Range    Troponin T 926 (H) 6 - 19 ng/L   Troponin in four (4) hours   Result Value Ref Range    Troponin T 1161 (H) 6 - 19 ng/L   Heparin Xa (Unfractionated)   Result Value Ref Range    Heparin Xa (UFH) 0.21 IU/mL   Heparin Xa (Unfractionated)   Result Value Ref Range    Heparin Xa (UFH) 0.24 IU/mL   Complete Metabolic Panel (CMP)   Result Value Ref Range    Sodium 135 135 - 145 mmol/L    Potassium 3.9 3.6 - 5.5 mmol/L    Chloride 98 96 - 112 mmol/L    Co2 28 20 - 33 mmol/L    Anion Gap 9.0 7.0 - 16.0    Glucose 182 (H) 65 - 99 mg/dL    Bun 30 (H) 8 - 22 mg/dL    Creatinine 1.23 0.50 - 1.40 mg/dL    Calcium 9.1 8.5 - 10.5 mg/dL    AST(SGOT) 55 (H) 12 - 45 U/L    ALT(SGPT) 26 2 - 50 U/L    Alkaline Phosphatase 68 30 - 99 U/L    Total Bilirubin 0.4 0.1 - 1.5 mg/dL    Albumin 3.4 3.2 - 4.9 g/dL    Total Protein 7.3 6.0 - 8.2 g/dL    Globulin 3.9 (H) 1.9 - 3.5 g/dL    A-G Ratio 0.9 g/dL   CBC without Differential   Result Value Ref Range    WBC 12.4 (H) 4.8 - 10.8 K/uL    RBC 3.19 (L) 4.70 - 6.10 M/uL    Hemoglobin 9.6 (L) 14.0 - 18.0 g/dL    Hematocrit 30.6 (L) 42.0 - 52.0 %    MCV 95.9 81.4 - 97.8 fL    MCH 30.1 27.0 - 33.0 pg    MCHC 31.4 (L) 33.7 - 35.3 g/dL    RDW 47.0 35.9 - 50.0 fL    Platelet Count 131 (L) 164 - 446 K/uL    MPV 13.3 (H) 9.0 - 12.9 fL   APTT   Result Value Ref Range    APTT 114.9 (HH) 24.7 - 36.0 sec   Prothrombin Time   Result Value Ref Range    PT 14.4 12.0 - 14.6 sec    INR 1.15 (H) 0.87 - 1.13   ESTIMATED GFR   Result Value Ref Range    GFR If African American >60 >60 mL/min/1.73 m 2    GFR If Non African American 58 (A) >60 mL/min/1.73 m 2   Lipid Profile  (Tomorrow AM)   Result Value Ref Range    Cholesterol,Tot 87 (L) 100 - 199 mg/dL    Triglycerides 136 0 - 149 mg/dL    HDL 32 (A) >=40 mg/dL    LDL 28 <100 mg/dL   CBC without Differential   Result Value Ref Range    WBC 10.4 4.8 - 10.8 K/uL    RBC 3.13 (L) 4.70 - 6.10 M/uL    Hemoglobin 9.6 (L) 14.0 - 18.0 g/dL    Hematocrit 29.9 (L) 42.0 - 52.0 %    MCV 95.5 81.4 - 97.8 fL    MCH 30.7 27.0 - 33.0 pg    MCHC 32.1 (L) 33.7 - 35.3 g/dL    RDW 46.5 35.9 - 50.0 fL    Platelet Count 123 (L) 164 - 446 K/uL    MPV 12.7 9.0 - 12.9 fL   Basic Metabolic Panel (BMP)   Result Value Ref Range    Sodium 139 135 - 145 mmol/L    Potassium 3.7 3.6 - 5.5 mmol/L    Chloride 103 96 - 112 mmol/L    Co2 27 20 - 33 mmol/L    Glucose 126 (H) 65 - 99 mg/dL    Bun 24 (H) 8 - 22 mg/dL    Creatinine 1.07 0.50 - 1.40 mg/dL    Calcium 9.1 8.5 - 10.5 mg/dL    Anion Gap 9.0 7.0 - 16.0   Heparin Xa (Unfractionated)   Result Value Ref Range    Heparin Xa (UFH) 0.47 IU/mL   Heparin Xa (Unfractionated)   Result Value Ref Range    Heparin Xa (UFH) 0.30 IU/mL   ESTIMATED GFR   Result Value Ref Range    GFR If African American >60 >60 mL/min/1.73 m 2    GFR If Non African American >60 >60 mL/min/1.73 m 2   TROPONIN   Result Value Ref Range    Troponin T 1247 (H) 6 - 19 ng/L   CBC WITH DIFFERENTIAL   Result Value Ref Range    WBC 8.7 4.8 - 10.8 K/uL    RBC 3.23 (L) 4.70 - 6.10 M/uL    Hemoglobin 9.8 (L) 14.0 - 18.0 g/dL    Hematocrit 31.5 (L) 42.0 - 52.0 %    MCV 97.5 81.4 - 97.8 fL    MCH 30.3 27.0 - 33.0 pg    MCHC 31.1 (L) 33.7 - 35.3 g/dL    RDW 47.4 35.9 - 50.0 fL    Platelet Count 145 (L) 164 - 446 K/uL    MPV 13.1 (H) 9.0 - 12.9 fL    Neutrophils-Polys 69.20 44.00 - 72.00 %    Lymphocytes 17.20 (L) 22.00 - 41.00 %    Monocytes 8.80 0.00 - 13.40 %    Eosinophils 2.30 0.00 - 6.90 %    Basophils 0.90 0.00 - 1.80 %    Immature Granulocytes 1.60 (H) 0.00 - 0.90 %    Nucleated RBC 0.00 /100 WBC    Neutrophils (Absolute) 5.98 1.82 - 7.42 K/uL    Lymphs  (Absolute) 1.49 1.00 - 4.80 K/uL    Monos (Absolute) 0.76 0.00 - 0.85 K/uL    Eos (Absolute) 0.20 0.00 - 0.51 K/uL    Baso (Absolute) 0.08 0.00 - 0.12 K/uL    Immature Granulocytes (abs) 0.14 (H) 0.00 - 0.11 K/uL    NRBC (Absolute) 0.00 K/uL   Basic Metabolic Panel   Result Value Ref Range    Sodium 140 135 - 145 mmol/L    Potassium 4.1 3.6 - 5.5 mmol/L    Chloride 103 96 - 112 mmol/L    Co2 25 20 - 33 mmol/L    Glucose 184 (H) 65 - 99 mg/dL    Bun 23 (H) 8 - 22 mg/dL    Creatinine 1.14 0.50 - 1.40 mg/dL    Calcium 8.8 8.5 - 10.5 mg/dL    Anion Gap 12.0 7.0 - 16.0   Heparin Xa (Unfractionated)   Result Value Ref Range    Heparin Xa (UFH) 0.29 IU/mL   ESTIMATED GFR   Result Value Ref Range    GFR If African American >60 >60 mL/min/1.73 m 2    GFR If Non African American >60 >60 mL/min/1.73 m 2   EKG   Result Value Ref Range    Report       [facility] Emergency Dept.    Test Date:  2021  Pt Name:    BREANNE BOOTH                Department: ER  MRN:        [id number]                      Room:       RD 12  Gender:     Male                         Technician: [id number]  :        1950                   Requested By:ER TRIAGE PROTOCOL  Order #:    [id number]                    Reading MD: MEDARDO LINDSAY, DO    Measurements  Intervals                                Axis  Rate:       60                           P:  OK:                                      QRS:        -2  QRSD:       86                           T:          -42  QT:         412  QTc:        412    Interpretive Statements  ACCELERATED JUNCTIONAL ESCAPE RHYTHM  INFERIOR INFARCT, AGE INDETERMINATE  REPOL ABNRM SUGGESTS ISCHEMIA, ANTERIOR LEADS  BASELINE WANDER IN LEAD(S) V2  Compared to ECG 2021 03:17:08  Junctional rhythm now present  Accelerated junctional rhythm now present  Myocardial infarct finding now present  Early repolariz ation now present  Possible ischemia now present  Sinus rhythm no longer present  First degree AV  block no longer present  Electronically Signed On 2021 6:45:55 PDT by MEDARDO LINDSAY DO     EKG in four (4) hours   Result Value Ref Range    Report       [facility]    Test Date:  2021  Pt Name:    BREANNE BOOTH                Department: ER  MRN:        [id number]                      Room:       [facility]  Gender:     Male                         Technician: [id number]  :        1950                   Requested By:MOUNA BARLOW  Order #:    [id number]                    Reading MD: Silva Kaur MD    Measurements  Intervals                                Axis  Rate:       76                           P:          43  VT:         248                          QRS:        3  QRSD:       84                           T:          -16  QT:         400  QTc:        450    Interpretive Statements  SINUS RHYTHM  FIRST DEGREE AV BLOCK  BORDERLINE T ABNORMALITIES, INFERIOR LEADS  Compared to ECG 2021 05:41:33  First degree AV block now present  Accelerated junctional rhythm no longer present  Myocardial infarct finding no longer present  Possible ischemia no longer present  Electronically Signed On 2021 18:03:38 PDT by Silva farr MD     POCT glucose device results   Result Value Ref Range    Glucose - Accu-Ck 165 (H) 65 - 99 mg/dL   POCT glucose device results   Result Value Ref Range    Glucose - Accu-Ck 184 (H) 65 - 99 mg/dL   POCT glucose device results   Result Value Ref Range    Glucose - Accu-Ck 194 (H) 65 - 99 mg/dL   POCT glucose device results   Result Value Ref Range    Glucose - Accu-Ck 110 (H) 65 - 99 mg/dL   POCT glucose device results   Result Value Ref Range    Glucose - Accu-Ck 142 (H) 65 - 99 mg/dL   POCT glucose device results   Result Value Ref Range    Glucose - Accu-Ck 245 (H) 65 - 99 mg/dL   POCT glucose device results   Result Value Ref Range    Glucose - Accu-Ck 290 (H) 65 - 99 mg/dL   POCT glucose device results   Result Value Ref Range    Glucose - Accu-Ck 143 (H) 65 - 99  mg/dL   POCT glucose device results   Result Value Ref Range    Glucose - Accu-Ck 137 (H) 65 - 99 mg/dL         Imaging/Testing:      EC-ECHOCARDIOGRAM COMPLETE W/O CONT         DX-CHEST-PORTABLE (1 VIEW)   Final Result      Increased interstitial markings are consistent with chronic interstitial disease. Superimposed infection cannot be excluded.            Discharge Medications:         Harris, Chase Esquivel   Home Medication Instructions ANNITA:[id number]    Printed on:06/29/21 [id number]   Medication Information                      amLODIPine (NORVASC) 5 MG Tab  Take 1 tablet by mouth every day for 60 days.             aspirin 81 MG EC tablet  Take 1 Tab by mouth every day.             atorvastatin (LIPITOR) 40 MG Tab  Take 1 tablet by mouth every evening.             Blood Glucose Meter Kit  Test blood sugar as recommended by provider.blood glucose monitoring kit. BID             Blood Glucose Test Strips  One test strip BID             budesonide-formoterol (SYMBICORT) 160-4.5 MCG/ACT Aerosol  Inhale 2 Puffs 2 Times a Day.             clopidogrel (PLAVIX) 75 MG Tab  Take 1 tablet by mouth every day for 60 days.             Empagliflozin (JARDIANCE) 10 MG Tab  Take 10 mg by mouth every day.             isosorbide mononitrate SR (IMDUR) 30 MG TABLET SR 24 HR  Take 1 tablet by mouth every day for 60 days.             Lancets (ONETOUCH DELICA PLUS [id number]) Misc  USE TO TEST TWICE DAILY             losartan (COZAAR) 25 MG Tab  Take 1 tablet by mouth every day.             metformin (GLUCOPHAGE) 1000 MG tablet  Take 1,000 mg by mouth 2 Times a Day.             SITagliptin (JANUVIA) 25 MG Tab  Take 1 Tab by mouth every day.             tiotropium (SPIRIVA HANDIHALER) 18 MCG Cap  Inhale 1 Cap by mouth every day.                 Disposition:   Discharge  Condition: Guarded  Destination: Home  Services: None    Diet:   Low carbohydrate    Activity:   As tolerated    Instructions:       The patient was instructed to return to the  ER in the event of worsening symptoms. I have counseled the patient on the importance of compliance and the patient has agreed to proceed with all medical recommendations and follow up plan indicated above.   The patient understands that all medications come with benefits and risks. Risks may include permanent injury or death and these risks can be minimized with close reassessment and monitoring.        Please CC: Rashmi Cedeno P.A.-C.    Follow up appointment details :      [facility]    Pending Studies:        None

## 2021-06-29 NOTE — NON-PROVIDER
"  [facility] FAMILY MEDICINE PROGRESS NOTE     Attending: Dr. Mary Fiore M.D.  Senior Resident: Dr. Dean Rene M.D. (PGY-2)  Sonny Resident: Dr. Radames Mccarty M.D. (PGY-1)  PATIENT: Chase Harris; [id number]; 1950     ID: 70 y.o. male admitted for NSTEMI and MIRIAM.     SUBJECTIVE - -   CC: \"I had chest pain.\"  HPI:  O/N Events:  PT to follow after Cardiology.  Case management saying that he could d/c to home once medically cleared.  Cardio and Cardiac Surgery followed.  Per nursing notes, pt denied having pain and was up 2 times last night.  Monitor: SR 60-70 w/ rPVC. Acc junctional.    Per pts son, Juan Jose, pt states that he felt a little bad last night. He reports that he felt dizzy when getting up and like he was going to pass out. He denies LOC or head injury. Possibly one of the events was witnessed by nursing staff.    Pt is still having c/o HA. Pt indicates that the HA is better compared to yesterday, but that he would like ASA for it. Pt still has c/o sore throat as well. Pt has intermittent, dry cough, SOB, and bilateral shoulder joint pain that are chronic and unchanged from his baseline. Pt indicates that he has not ate or drank besides water in hopes for receiving angiogram. Pt states that he got less sleep last night. Pt has urinated and had BM without issues. Pt denies CP, diaphoresis, palpitations, and tachycardia.     ROS:  Constitutional: Negative for fever and chills.   HENT: Positive for sore throat. Negative for ear pain.    Eyes: Negative for blurred vision.   Respiratory: Positive for cough (dry, chronic, intermittent) and shortness of breath (chronic). Negative for hemoptysis.    Cardiovascular: Negative for chest pain and palpitations.   Gastrointestinal: Negative for abdominal pain, constipation, diarrhea, nausea and vomiting.   Genitourinary: Negative for dysuria and hematuria.   Musculoskeletal: Positive for joint pain (bilateral shoulders, chronic).   Neurological: Positive for " syncope and headaches (improving).       OBJECTIVE - -   Temp:  [36.7 °C (98 °F)-37.7 °C (99.8 °F)] 36.9 °C (98.5 °F)  Pulse:  [48-87] 72  Resp:  [16-18] 18  BP: ()/(45-76) 89/45  SpO2:  [96 %-100 %] 96 %    Intake/Output Summary (Last 24 hours) at 6/29/2021 1250  Last data filed at 6/29/2021 1143  Gross per 24 hour   Intake 120 ml   Output 550 ml   Net -430 ml     PE:  Vitals signs reviewed.     Constitutional:       General: Pt is awake and laying in bed.      Appearance: Pt is not ill-appearing or toxic-appearing.   HEENT:      Head: Atraumatic.      Ears: Hearing and external ears normal bilaterally.      Eyes: Lids are normal. Conjunctivae normal bilaterally.     Nose: No nasal deformity or septal deviation. Nasal canula in place w/ 3L O2.  Cardiovascular:      Rate and Rhythm: Regular rate and rhythm.      Heart sounds: S1 normal and S2 normal. No overt murmur.      Chest wall: No tenderness to palpation.     No peripheral edema.  Pulmonary:      Effort: Pulmonary effort is normal.      Breath sounds: Fine crackles in all lung fields. No decreased breath sounds.   Abdominal:      General: Bowel sounds are hypoactive.      Palpations: Abdomen is soft.      Tenderness: There is no abdominal tenderness to palpation. There is no guarding or rebound.   Neurological:      General: No focal deficit present.      Mental Status: Pt is alert and oriented to person, place, time, and president.   Psychiatric:         Behavior: Cooperative.   LABS:  Recent Labs     06/27/21  0652 06/27/21  0652 06/27/21  2157 06/28/21  0633 06/29/21  0351   WBC 11.9*   < > 12.4* 10.4 8.7   RBC 3.26*   < > 3.19* 3.13* 3.23*   HEMOGLOBIN 9.9*   < > 9.6* 9.6* 9.8*   HEMATOCRIT 31.6*   < > 30.6* 29.9* 31.5*   MCV 96.9   < > 95.9 95.5 97.5   MCH 30.4   < > 30.1 30.7 30.3   RDW 48.6   < > 47.0 46.5 47.4   PLATELETCT 97*   < > 131* 123* 145*   MPV 13.0*   < > 13.3* 12.7 13.1*   NEUTSPOLYS 78.90*  --   --   --  69.20   LYMPHOCYTES 11.40*  --    --   --  17.20*   MONOCYTES 7.20  --   --   --  8.80   EOSINOPHILS 1.20  --   --   --  2.30   BASOPHILS 0.50  --   --   --  0.90    < > = values in this interval not displayed.     Recent Labs     06/27/21 0603 06/27/21 0603 06/27/21 2157 06/28/21  0633 06/29/21  0351   SODIUM 138   < > 135 139 140   POTASSIUM 4.7   < > 3.9 3.7 4.1   CHLORIDE 102   < > 98 103 103   CO2 24   < > 28 27 25   BUN 37*   < > 30* 24* 23*   CREATININE 1.72*   < > 1.23 1.07 1.14   CALCIUM 9.3   < > 9.1 9.1 8.8   ALBUMIN 3.4  --  3.4  --   --     < > = values in this interval not displayed.     Estimated GFR/CRCL = Estimated Creatinine Clearance: 47.8 mL/min (by C-G formula based on SCr of 1.14 mg/dL).  Recent Labs     06/27/21 2157 06/28/21 0633 06/28/21  0757 06/28/21  1940 06/29/21  0351 06/29/21  0856 06/29/21  1125   GLUCOSE 182* 126*  --   --  184*  --   --    POCGLUCOSE  --   --    < > 290*  --  143* 137*    < > = values in this interval not displayed.     Recent Labs     06/27/21 0603 06/27/21 0730 06/27/21 2157   ASTSGOT 41  --  55*   ALTSGPT 26  --  26   TBILIRUBIN 0.4  --  0.4   ALKPHOSPHAT 62  --  68   GLOBULIN 4.0*  --  3.9*   INR  --  1.23* 1.15*     Recent Labs     06/27/21 0730 06/27/21 2157   INR 1.23* 1.15*   APTT 35.8 114.9*     MICROBIOLOGY: N/A    IMAGING: Pending results of echo.  EC-ECHOCARDIOGRAM COMPLETE W/O CONT         DX-CHEST-PORTABLE (1 VIEW)   Final Result      Increased interstitial markings are consistent with chronic interstitial disease. Superimposed infection cannot be excluded.        MEDS:  Current Facility-Administered Medications   Medication Last Admin   • LOSARTAN POTASSIUM 50 MG PO TABS     • isosorbide mononitrate SR (IMDUR) tablet 30 mg 30 mg at 06/29/21 0900   • amLODIPine (NORVASC) tablet 5 mg 5 mg at 06/29/21 0900   • aspirin EC (ECOTRIN) tablet 81 mg 81 mg at 06/29/21 0455   • clopidogrel (PLAVIX) tablet 75 mg 75 mg at 06/29/21 0454   • losartan (COZAAR) tablet 50 mg 50 mg at  06/29/21 0454   • atorvastatin (LIPITOR) tablet 40 mg 40 mg at 06/28/21 1725   • budesonide-formoterol (SYMBICORT) 160-4.5 MCG/ACT inhaler 2 Puff 2 Puff at 06/29/21 0807   • senna-docusate (PERICOLACE or SENOKOT S) 8.6-50 MG per tablet 2 tablet 2 tablet at 06/29/21 0455    And   • polyethylene glycol/lytes (MIRALAX) PACKET 1 Packet      And   • magnesium hydroxide (MILK OF MAGNESIA) suspension 30 mL      And   • bisacodyl (DULCOLAX) suppository 10 mg     • ondansetron (ZOFRAN) syringe/vial injection 4 mg     • ondansetron (ZOFRAN ODT) dispertab 4 mg     • insulin regular (HumuLIN R,NovoLIN R) injection 5 Units at 06/28/21 1942    And   • glucose 4 g chewable tablet 16 g      And   • dextrose 50% (D50W) injection 50 mL     • tiotropium (Spiriva Respimat) 2.5 mcg/Act inhalation spray 5 mcg 5 mcg at 06/29/21 0807   • acetaminophen (Tylenol) tablet 650 mg 650 mg at 06/28/21 1943     ASSESSMENT/PLAN - -  Chase Harris is a 70 y.o. male w/ PMH of DM, dyslipidemia, pulmonary fibrosis, and CAD who presented for CP and was admitted for NSTEMI and MIRIAM.     #Chest Pain, Resolved  #NSTEMI  #Syncope  Troponin values have been trending up: 1161 on 6/27/21 at 1245 and recent lab of 1247 on 6/28/21 at 1306.  Cardiology, Dr. Kingston, consulted and findings/recommendations of Troponin peaking at 1200, no arrhythmia noted on tele, and pt is not amendable to PCI or CABG.  Cardiac Surgeon, Dr. Coronado, and does not believe pt is a good candidate for CABG. Discussed risks/benefits w/ pt. Pt agrees.  Plan:  ~Pending results for Echo.  ~Follow Cardiology recommendations:              End Heparin today, 6/29/21.   Added 75mg Plavix.              Switch daily 325mg ASA to 81mg ASA.              Continue daily 40mg Atorvastatin.    Event monitor at discharge.     #MIRIAM, Improving  His labs this AM, 6/29/21 at 0351, show normal Cr at 1.14, improved BUN at 23, and normal GFR greater than 60. Pt also has no overt fluid overloading on exam.    Plan:  ~IV hydration.  ~Monitor CMP for kidney functioning.  ~Avoid nephrotoxic agents and renally dose medications.     #Anemia, Chronic, Unchanged  Pt has had previous lab work that has shown similar anemia since 2020. Pt denies any associated symptoms. Pt denies any blood present in stool or urine or recent issues with bleeding.  Plan:  ~Daily CBC w/ differential.  ~Monitor changes since being anticoagulated.     #DM2, Uncontrolled, Improving  Admission labs showed glucose of 250. Pt has had consistent hyperglycemia on hospital lab work. Most recent glucose of 137 this AM, 6/29/21 at 1125.  Plan:  ~Insulin sliding scale with Humalog.  ~Monitor BGs.  ~BG goal is less than 150.     #Pulmonary Fibrosis, Chronic  Pt on 3L O2 via nasal canula and has maintained O2 saturation at % over last 24 hours.  Plan:  ~Continue Tiotropium 5mcg daily.  ~Continue Symbicort 2 puff BID.  ~Continue 3L O2, as this is his at home requirement.  ~Maintain O2 saturation above 90%.     #Core Measures   VTE PPx: Plavix.  Abx: N/A  Lines/Tubes: RUE and LUE PIV  Fluids: PO  Diet: Cardiac and Diabetic  Code Status: Full     Gautam Askew, MS4  Family Medicine [facility]I

## 2021-06-29 NOTE — FLOWSHEET NOTE
Son here to transport. Pt transported down to car w/ oxygen tank & wheelchair. Son his patient's home oxygen in car.

## 2021-06-29 NOTE — PROGRESS NOTES
Monitor Summary:  Hr: 60-74   Rhythm: SR  Ectopy: rPVC, Acc. Junctional  Measurements: --/.07/.41    As of 0500 pt is back in sinus. Continues to go in and out.

## 2021-06-29 NOTE — CARE PLAN
Problem: Pain - Standard  Goal: Alleviation of pain or a reduction in pain to the patient’s comfort goal  Outcome: Progressing     Problem: Fall Risk  Goal: Patient will remain free from falls  Outcome: Progressing     The patient is Stable - Low risk of patient condition declining or worsening    Shift Goals  Patient Goals: Maintain pain control    Progress made toward(s) clinical / shift goals:  Fall precautions in place. Bed in lowest position. Non-skid socks in place. Personal possessions within reach. Mobility sign on door. Bed-alarm on. Call light within reach. Pt educated regarding fall prevention and states understanding.   Pt's pain will remain under control.     Patient is not progressing towards the following goals:

## 2021-06-29 NOTE — FLOWSHEET NOTE
Patient discharged home with son. All personal belongings collected. IV access removed. Monitor removed, monitor room notified. Discharge instructions discussed. Medications reviewed; pt denies any further questions or concerns. Follow up appointments- scheduled. Awaiting son w/ home oxygen for discharge. Scottish speaking RN went over discharge instructions, med rec, and f/u appointments with patient at bedside.

## 2021-06-29 NOTE — PROGRESS NOTES
"       [facility] Cardiology Follow-up Note    Date of Service:    6/29/2021      Name:   Chase Harris   YOB: 1950  Age:   70 y.o.  male   MRN:   [id number]      Chief Complaint: Chest pain, syncope    Attending Provider: Dr. iFore, [facility]    Primary Cardiologist: Dr. Hopper    HPI:  Per Dr Kaur's consult note, \"Chase Harris is a 70-year-old man with history of known three-vessel CAD (medically managed, last coronary angiogram in November 2020), diabetes, and interstitial lung disease who presented after an episode of syncope at home and found to be hypotensive. Cardiology is consulted for NSTEMI\"    Per son, father blacked out in bathroom and had to be helped up.  Patient says this is not the first time he has had syncope.  Usually occurs when he is walking.  Denies any dizziness at other times during the day.  Uses home O2.    He was evaluated by EP service Nov 2020 for nocturnal heart block with bradycardia  and no pacemaker was recommended.    Patient is [place]-speaking, son at bedside.Interpretor not needed, APRN is fluent in [place] and a Qualified Medical interpretor    Pt consulted by CT surgery yesterday given three-vessel disease.  Dr. Coronado feels he is not a candidate for open heart surgery given his small vessels along with his interstitial lung disease.    Interim Events:  6/29/21  - Personal Telemetry interpretation: SB-SR 50's-77, rare PVC's  - Overnight events: Pt not getting out of bed per nursing, denies dizziness, denies chest pain  - Vital signs: SBP elevated 130-160's  - labs reviewed: trops remain elevated, creatinine improved  6/30/21  - Personal Telemetry interpretation: SR 60-74, HR   - Overnight events: has episode this am of dizziness, CNA to get orthostatic vitals  - Labs reviewed  -Denies chest pain, pressure, palpitations    ROS  Constitutional: Denies fatigue  Respiratory:  Denies shortness of breath, no cough.  Cardiovascular: denieschest pain.  " denies lower extremity edema.  Denies orthopnea or PND.  : denies polyuria, no dysuria.  GI:  Denies nausea/vomiting.  No abdominal distention.  Neuro:  Denies dizziness, syncope.  Hem/lymph: Denies easy bleeding/bruising.      All other review of systems reviewed and negative.    Past medical, surgical, social, and family history reviewed and unchanged from admission except as noted in HPI.    Medications: Reviewed in MAR  Current Facility-Administered Medications   Medication Dose Frequency Provider Last Rate Last Admin   • LOSARTAN POTASSIUM 50 MG PO TABS           • isosorbide mononitrate SR (IMDUR) tablet 30 mg  30 mg Q DAY Reinaldo Kingston M.D.       • amLODIPine (NORVASC) tablet 5 mg  5 mg Q DAY Reinaldo Kingston M.D.       • aspirin EC (ECOTRIN) tablet 81 mg  81 mg DAILY MEGA Roberts.P.R.N.   81 mg at 06/29/21 0455   • clopidogrel (PLAVIX) tablet 75 mg  75 mg DAILY Reinaldo Kingston M.D.   75 mg at 06/29/21 0454   • losartan (COZAAR) tablet 50 mg  50 mg DAILY EDUARDO López-YANN   50 mg at 06/29/21 0454   • atorvastatin (LIPITOR) tablet 40 mg  40 mg Q EVENING Dick Caballero M.D.   40 mg at 06/28/21 1725   • budesonide-formoterol (SYMBICORT) 160-4.5 MCG/ACT inhaler 2 Puff  2 Puff BID (RT) Dick Caballero M.D.   2 Puff at 06/28/21 1943   • senna-docusate (PERICOLACE or SENOKOT S) 8.6-50 MG per tablet 2 tablet  2 tablet BID Dick Caballero M.D.   2 tablet at 06/29/21 0455    And   • polyethylene glycol/lytes (MIRALAX) PACKET 1 Packet  1 Packet QDAY PRN Dick Caballero M.D.        And   • magnesium hydroxide (MILK OF MAGNESIA) suspension 30 mL  30 mL QDAY PRN Dick Caballero M.D.        And   • bisacodyl (DULCOLAX) suppository 10 mg  10 mg QDAY PRN Dick Caballero M.D.       • ondansetron (ZOFRAN) syringe/vial injection 4 mg  4 mg Q4HRS PRN Dick Caballero M.D.       • ondansetron (ZOFRAN ODT) dispertab 4 mg  4 mg Q4HRS PRN Dick Caballero M.D.  "      • insulin regular (HumuLIN R,NovoLIN R) injection  2-9 Units 4X/DAY ACHS Dick Caballero M.D.   5 Units at 06/28/21 1942    And   • glucose 4 g chewable tablet 16 g  16 g Q15 MIN PRN Dick Caballero M.D.        And   • dextrose 50% (D50W) injection 50 mL  50 mL Q15 MIN PRN Dick Caballero M.D.       • tiotropium (Spiriva Respimat) 2.5 mcg/Act inhalation spray 5 mcg  5 mcg QDAILY (RT) Dick Caballero M.D.   5 mcg at 06/28/21 1019   • acetaminophen (Tylenol) tablet 650 mg  650 mg Q4HRS PRN Gautam Flores M.D.   650 mg at 06/28/21 1943   Last reviewed on 6/27/2021  7:50 AM by Jayjay Seay PhT    No Known Allergies    Physical Exam  Body mass index is 23.2 kg/m². /70   Pulse 63   Temp 37.1 °C (98.7 °F) (Temporal)   Resp 18   Ht 1.6 m (5' 2.99\")   Wt 59.4 kg (130 lb 15.3 oz)   SpO2 100%    Vitals:    06/29/21 0300 06/29/21 0400 06/29/21 0457 06/29/21 0737   BP:   152/63 146/70   Pulse: (!) 48 (!) 58 61 63   Resp:   18 18   Temp:   37.7 °C (99.8 °F) 37.1 °C (98.7 °F)   TempSrc:   Temporal Temporal   SpO2:   100% 100%   Weight:       Height:        Oxygen Therapy:  Pulse Oximetry: 100 %, O2 (LPM): 3.5, O2 Delivery Device: Silicone Nasal Cannula    General: no acute distress, well appearing  Neck: no JVD, no bruits  Lungs: CTAB, normal effort. no wheezing, rales, or rhonchi  Heart: RRR, normal S1 /S2 no murmur, no rub  EXT: no lower extremity edema, 2+ radial pulses. 2+ pedal pulses.   Abdomen: soft, non tender, non distended  Neurological: No focal deficits, no facial asymmetry.  Normal speech  Psychiatric: Appropriate affect, alert and oriented x 3, [place]-speaking  Skin: Warm and dry extremities, no rashes    Labs (personally reviewed):     Lab Results   Component Value Date/Time    SODIUM 140 06/29/2021 03:51 AM    POTASSIUM 4.1 06/29/2021 03:51 AM    CHLORIDE 103 06/29/2021 03:51 AM    CO2 25 06/29/2021 03:51 AM    GLUCOSE 184 (H) 06/29/2021 03:51 AM    BUN 23 (H) " 06/29/2021 03:51 AM    CREATININE 1.14 06/29/2021 03:51 AM     Lab Results   Component Value Date/Time    ALKPHOSPHAT 68 06/27/2021 09:57 PM    ASTSGOT 55 (H) 06/27/2021 09:57 PM    ALTSGPT 26 06/27/2021 09:57 PM    TBILIRUBIN 0.4 06/27/2021 09:57 PM      Lab Results   Component Value Date/Time    CHOLSTRLTOT 87 (L) 06/28/2021 06:33 AM    LDL 28 06/28/2021 06:33 AM    HDL 32 (A) 06/28/2021 06:33 AM    TRIGLYCERIDE 136 06/28/2021 06:33 AM     Results for BREANNE BOOTH (MRN [id number]) as of 6/28/2021 11:12   Ref. Range 6/27/2021 06:03 6/27/2021 08:50 6/27/2021 12:45   Troponin T Latest Ref Range: 6 - 19 ng/L 980 (H) 926 (H) 1161 (H)       Cardiac Imaging and Procedures Review:      EKG 6/27/21: My Personal interpretation reveals SR 76, first degree AV block, OK interval 0.248, no ST changes noted    Echo 1/19/21:  CONCLUSIONS  Left ventricular systolic function is normal; ejection fraction is   visually estimated to be 60%.  Normal right ventricular size and systolic function.  Mild tricuspid regurgitation.  Normal pericardium without effusion.     Compared to the images of the prior study done on 05/23/20, estimated   pulmonary pressure has improved from 40mmHg to 20mmHg.    Stress Test 5/27/2020:  Myocardial Perfusion   Report   NUCLEAR IMAGING INTERPRETATION   No evidence of significant jeopardized viable myocardium or prior myocardial    infarction.   Normal left ventricular size, ejection fraction, and wall motion.   ECG INTERPRETATION   Negative stress ECG for ischemia.    [facility] 11/4/2020  PROCEDURES:  1. Left heart catheterization.  2. Coronary angiography.  3. Left ventriculogram.  4. Monitored conscious sedation.     PREPROCEDURE DIAGNOSIS:  Syncope.     POSTPROCEDURE DIAGNOSES:  1. Diffuse coronary artery disease.  2. Reduced left ventricular systolic function with ejection fraction of 30%,   diaphragmatic hypokinesis.  3. Elevated left ventricular end-diastolic pressure.    IMPRESSION:  1. Diffuse coronary  artery disease.  2. Reduced left ventricular systolic function with ejection fraction of 30%,   diaphragmatic hypokinesis.  3. Elevated left ventricular end-diastolic pressure.     RECOMMENDATIONS:  Recommend medical therapy for coronary artery disease,   aggressive hypertensive management and consider alternative causes for   recurrent syncope.    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 2021, awaiting repeat Echo today, then may D/C home if no further dizziness arises and orthostatic vitals are normal    MIRIAM  -Improved with hydration    Syncope   -Etiology unclear  -no concerning arrhythmias on monitor overnight  -Awaiting echo  -We will set up event monitor on discharge if no significant findings while inpatient    Hypertension  - losartan restarted given improved kidney function and continued HTN  - Amlodipine 5mg added  - Imdur 30mg added    Interstitial lung disease  -continue home O2 at all times  -follow with pulmonology as needed  -follow-up with pulmonary rehab, order placed at last pulmonary appt.     Pt may D/C once Echo is read and if no other episodes of dizziness    Future Appointments   Date Time Provider Department Center   7/12/2021  3:00 PM ARMEN Roberts. [facility] None     Thank you for allowing me to participate in this patients care.  Please contact me with any questions or concerns.     ARMEN Roberts.   [facility]  [phone]

## 2021-06-29 NOTE — DISCHARGE INSTRUCTIONS
Lifestyle changes: Drink plenty water; drink little to no alcohol; avoid overheating; elevating the head of your bed; avoiding crossing your legs when sitting; and standing up slowly. Then pause briefly to be sure it's ok for you to start walking.    Compression stockings. Compression stockings and garments or abdominal binders may help reduce the pooling of blood in your legs and reduce the symptoms of orthostatic hypotension.    Discharge Instructions    Discharged to home by car with relative. Discharged via wheelchair, hospital escort: Yes.  Special equipment needed: Oxygen    Be sure to schedule a follow-up appointment with your primary care doctor or any specialists as instructed.     Discharge Plan:   Diet Plan: Discussed  Activity Level: Discussed  Confirmed Follow up Appointment: Appointment Scheduled  Confirmed Symptoms Management: Discussed  Medication Reconciliation Updated: Yes    I understand that a diet low in cholesterol, fat, and sodium is recommended for good health. Unless I have been given specific instructions below for another diet, I accept this instruction as my diet prescription.   Other diet: Cardiac, Diabetic    Special Instructions: Diagnosis:  Acute Coronary Syndrome (ACS) is a diagnosis that encompasses cardiac-related chest pain and heart attack. ACS occurs when the blood flow to the heart muscle is severely reduced or cut off completely due to a slow process called atherosclerosis.  Atherosclerosis is a disease in which the coronary arteries become narrow from a buildup of fat, cholesterol, and other substances that combine to form plaque. If the plaque breaks, a blood clot will form and block the blood flow to the heart muscle. This lack of blood flow can cause damage or death to the heart muscle which is called a heart attack or Myocardial Infarction (MI). There are two different types of MIs:  ST Elevation Myocardial Infarction or STEMI (the most severe type of heart attack) and  Non-ST Elevation Myocardial Infarction or NSTEMI.    Treatment Plan:  · Cardiac Diet  - Low fat, low salt, low cholesterol   · Cardiac Rehab  - Your doctor has ordered you a referral to [facility].  Call [phone] to schedule an appointment.  · Attend my follow-up appointment with my Cardiologist.  · Take my medications as prescribed by my doctor  · Exercise daily  · Lower my bad cholesterol and raise my good cholesterol, lower my blood pressure, Reduce stress and Control my diabetes    Medications:  Certain medications are used to treat ACS.  Remember to always take medications as prescribed and never stop talking medications unless told by your doctor.    You have been prescribed the following medicatons:    Aspirin - Aspirin is used as a blood thinning medication and you will require this medication indefinitely.  Anti-platelet/blood thinner - Your Anti-platelet/Blood thinning medication is called Plavix, and is used in combination with aspirin to prevent clots from forming in your heart and/or around your stent.  Your doctor will determine how long you need to be on this medicine.  Beta-Blocker - Beta-Blocker is used to lower blood pressure and heart rate, and/or helps your heart heal after a heart attack. Yours is on hold due to syncopal episodes & 1st degree heart block  Statin - Statin- atorvastatin is used to lower cholesterol.  ARB- Losartan  is used to lower blood pressure and treat heart failure.    · Is patient discharged on Warfarin / Coumadin?   No     Depression / Suicide Risk    As you are discharged from this [facility] facility, it is important to learn how to keep safe from harming yourself.    Recognize the warning signs:  · Abrupt changes in personality, positive or negative- including increase in energy   · Giving away possessions  · Change in eating patterns- significant weight changes-  positive or negative  · Change in sleeping patterns- unable to sleep or sleeping all the  time   · Unwillingness or inability to communicate  · Depression  · Unusual sadness, discouragement and loneliness  · Talk of wanting to die  · Neglect of personal appearance   · Rebelliousness- reckless behavior  · Withdrawal from people/activities they love  · Confusion- inability to concentrate     If you or a loved one observes any of these behaviors or has concerns about self-harm, here's what you can do:  · Talk about it- your feelings and reasons for harming yourself  · Remove any means that you might use to hurt yourself (examples: pills, rope, extension cords, firearm)  · Get professional help from the community (Mental Health, Substance Abuse, psychological counseling)  · Do not be alone:Call your Safe Contact- someone whom you trust who will be there for you.  · Call your local CRISIS HOTLINE 955-6503 or 658-524-7150  · Call your local Children's Mobile Crisis Response Team [place] (777) 285-0352 or www.Monstrous  · Call the toll free National Suicide Prevention Hotlines   · National Suicide Prevention Lifeline 757-315-JESR (1224)  · [place] [facility] 800-SUICIDE (900-9269)      Síndrome coronario kareem  Acute Coronary Syndrome  El síndrome coronario kareem (SCA) es un problema grave en el cual la joaquin y el oxígeno que llegan al corazón de repente no son suficientes. Puede causar dolor en el pecho o un infarto de miocardio.  Esta afección es shane emergencia médica. Obtenga ayuda de inmediato si observa cualquiera de los síntomas de esta afección.  ¿Cuáles son las causas?  Esta afección puede ser causada por lo siguiente:  · Shane acumulación de grasa y colesterol en las arterias (aterosclerosis). Esta es la causa más frecuente. La acumulación (placa) puede hacer que los vasos sanguíneos del corazón (arterias coronarias) se estrechen o se obstruyan, lo cual reduce el flujo de joaquin al corazón. La placa también se puede desprender y daniel origen a un coágulo, el cual puede obstruir shane  arteria y provocar un infarto de miocardio o accidente cerebrovascular.  · Contracción repentina de los músculos ubicados alrededor de las arterias coronarias (espasmo coronario).  · Ruptura de shane arteria coronaria (disección espontánea de shane arteria coronaria).  · Presión arterial muy baja (hipotensión).  · Latidos cardíacos irregulares (arritmia).  · Otras enfermedades que provocan shane disminución en la cantidad de oxígeno que llega al corazón, lita la anemiaoinsuficiencia respiratoria.  · Consumir cocaína o metanfetamina.  ¿Qué incrementa el riesgo?  Los siguientes factores pueden hacer que sea más propenso a desarrollar esta afección:  · Edad. El riesgo de SCA aumenta a medida que envejece.  · Antecedentes de dolor en el pecho, infarto de miocardio, enfermedad arterial periférica o accidente cerebrovascular.  · Ernie recibido medicamentos de quimioterapia o inmunosupresores.  · Ser hombre.  · Antecedentes familiares de dolor en el pecho, enfermedad cardíaca o accidente cerebrovascular.  · Fumar.  · No hacer suficiente ejercicio.  · Tener sobrepeso.  · Colesterol alto.  · Presión arterial miguelito (hipertensión arterial).  · Diabetes.  · Consumo excesivo de bebidas alcohólicas.  ¿Cuáles son los signos o los síntomas?  Los síntomas frecuentes de esta afección incluyen los siguientes:  · Dolor en el pecho. El dolor puede durar mucho tiempo, o puede parar y luego volver a aparecer (recurrente). Se puede sentir lita lo siguiente:  ? Dolor que aplasta o retuerce.  ? Shane sensación de opresión, presión, distensión o pesadez.  · Dolor de brazo, nick, mandíbula o espalda.  · Acidez estomacal o indigestión.  · Falta de aire.  · Náuseas.  · Sudor frío repentino.  · Desvanecimiento.  · Mareo o desmayo.  · Cansancio (fatiga).  En algunos casos, no hay síntomas.  ¿Cómo se diagnostica?  Esta afección se puede diagnosticar en función de lo siguiente:  · Los antecedentes médicos y síntomas.  · Pruebas de diagnóstico por imágenes,  por ejemplo:  ? Electrocardiograma (ECG). Ceci estudio mide la actividad eléctrica del corazón.  ? Radiografías.  ? Exploración por tomografía computarizada (TC).  ? Angiograma coronario. Para esta prueba, se inyecta un tinte en las arterias del corazón y luego se nkechi radiografías.  ? Estudio por imágenes de perfusión miocárdica. Ceci estudio muestra cómo circula la joaquin por el músculo cardíaco.  · Análisis de joaquin. Estos pueden repetirse en determinados intervalos de tiempo.  · Ergometría.  · Ecocardiograma. Es shane prueba que utiliza ondas sonoras para producir imágenes detalladas del corazón.  ¿Cómo se trata?  El tratamiento de esta afección puede incluir lo siguiente:  · Oxigenoterapia.  · Medicamentos, lita por ejemplo:  ? Medicamentos antiplaquetarios y anticoagulantes, lita la aspirina. Estos ayudan a evitar la formación de coágulos de joaquin.  ? Medicamentos que disuelven los coágulos de joaquin (tratamiento fibrinolítico).  ? Medicamentos para la presión arterial.  ? Nitroglicerina. Ayuda a ensanchar los vasos sanguíneos para mejorar el flujo de joaquin.  ? Analgésicos.  ? Medicamentos para bajar el colesterol.  · Cirugía, lita:  ? Angioplastia coronaria con colocación de un stent. Implica la colocación de shane pequeña pieza de metal que tiene la apariencia de shane gordon o de un resorte en shane arteria coronaria estrecha. Ensancha la arteria y la mantiene abierta.  ? Cirugía de bypass de la arteria coronaria. Implica tristen shane sección de un vaso sanguíneo de shane parte diferente del cuerpo y colocarla en la arteria coronaria obstruida para permitir que la joaquin fluya alrededor de la obstrucción.  · Rehabilitación cardíaca. Se trata de un programa que involucra la realización de ejercicios, educación y psicoterapia para ayudarle a recuperarse.  Siga estas instrucciones en parrish casa:  Comida y bebida  · Siga shane cardiosaludable para el corazón que incluya cereales integrales, frutas y verduras, proteínas magras  y productos lácteos descremados o semidescremados.  · Limite la cantidad de sal (sodio) que come lita se lo haya indicado el médico. Siga las instrucciones del médico sobre cualquier otra restricción en las comidas o bebidas, lita limitar los alimentos con un alto contenido de grasa y azúcares procesados.  · Use métodos de cocción saludables, lita asar, grillar, hervir, hornear, escalfar, cocer al vapor o saltear.  · Trabaje con un nutricionista para seguir un plan de alimentación cardiosaludable.  Medicamentos  · [place] los medicamentos de venta diane y los recetados solamente lita se lo haya indicado el médico.  · No tome los siguientes medicamentos a menos que el médico lo autorice:  ? Suplementos vitamínicos que contienen vitamina A o vitamina E.  ? Antiinflamatorios no esteroideos (TORREY), lita ibuprofeno, naproxeno o celecoxib.  ? Terapia de reemplazo hormonal que contiene estrógeno.  · Si está tomando anticoagulantes, tenga en cuenta lo siguiente:  ? Hable con el médico antes de tristen cualquier medicamento que contenga aspirina o antiinflamatorios no esteroideos (TORREY). Estos medicamentos aumentan el riesgo de tener shane hemorragia peligrosa.  ? [place] los medicamentos exactamente lita se lo indicaron, todos los días a la misma hora.  ? Evite las actividades que podrían causarle lesiones o moretones y siga las instrucciones para evitar las caídas.  ? Use un brazalete de alerta médica y lleve shane tarjeta que incluya los medicamentos que franko.  Actividad  · Siga el programa de rehabilitación cardíaca. Cheri ejercicios lita se lo haya indicado el fisioterapeuta.  · Consulte al médico qué actividades y ejercicios son seguros para usted. Siga las instrucciones con respecto a levantar objetos, conducir o subir escaleras.  Estilo de ranjit  · No consuma ningún producto que contenga nicotina o tabaco, lita cigarrillos, cigarrillos electrónicos y tabaco de mascar. Si necesita ayuda para dejar de fumar, consulte al  médico.  · No lilliana alcohol si el médico se lo prohíbe.  · Si debbie alcohol:  ? Limite la cantidad que consume de 0 a 1 medida por día.  ? Esté atenta a la cantidad de alcohol que hay en las bebidas que franko. En los [place], shane medida equivale a shane botella de cerveza de 12 oz (355 ml), un vaso de vino de 5 oz (148 ml) o un vaso de shane bebida alcohólica de miguelito graduación de 1½ oz (44 ml).  · Mantenga un peso saludable. Si debe perder peso, hable con parrish médico para hacerlo de manera nash.  Instrucciones generales  · Informe a todos los médicos que lo atiendan, incluso al dentista, sobre parrish afección cardíaca. [place] puede afectar los medicamentos o el tratamiento que recibe.  · Controle cualquier otra afección médica que tenga, lita hipertensión o diabetes. Estas afecciones afectan el corazón.  · Preste atención a parrish [place] mental. Podría tener mayor riesgo de depresión.  ? Busque maneras de controlar el estrés.  ? Hable con el médico sobre la detección y el tratamiento para la depresión.  · Mantenga las vacunas al día.  ? Vacúnese contra la gripe (vacuna contra la gripe) todos los años.  ? Aplíquese la vacuna antineumocócica si tiene 65 años o más.  · Si así se le indica, controle parrish presión arterial en casa.  · Concurra a todas las visitas de seguimiento lita se lo haya indicado el médico. [place] es importante.  Comuníquese con un médico si:  · Se siente abrumado o pepper.  · Tiene dificultad para realizar las actividades cotidianas.  Solicite ayuda inmediatamente si:  · Tiene dolor recurrente en el pecho, el nick, el brazo, la mandíbula, el estómago o la espalda, y:  ? Dura más de unos minutos.  ? No se juan al tristen los medicamentosque le recetó el médico.  · Tiene estos síntomas sin causa aparente:  ? Sudoración abundante.  ? Acidez estomacal o indigestión.  ? Náuseas o vómitos.  ? Falta de aire.  ? Dificultad para respirar.  ? Fatiga.  ? Nerviosismo o ansiedad.  ? Debilidad.  ? Diarrea.  ? Heces negras o  joaquin en las heces.  · Mareos o sensación de desvanecimiento repentinos.  · Parrish presión arterial está por encima de 180/120.  · Se desmaya.  · Tiene pensamientos acerca de lastimarse.  Estos síntomas pueden representar un problema grave que constituye shane emergencia. No espere a troy si los síntomas desaparecen. Solicite atención médica de inmediato. Comuníquese con el servicio de emergencias de parrish localidad (911 en los [place]). No conduzca por radha propios medios hasta el hospital.   Resumen  · El síndrome coronario kareem (SCA) ocurre cuando el suministro de joaquin y oxígeno al corazón no es suficiente. Puede causar dolor en el pecho o un infarto de miocardio.  · El síndrome coronario kareem es shane emergencia médica. Obtenga ayuda de inmediato si observa cualquiera de los síntomas de esta afección.  · El tratamiento incluye medicamentos y procedimientos para abrir las arterias obstruidas y restablecer el flujo sanguíneo.  Esta información no tiene lita fin reemplazar el consejo del médico. Asegúrese de hacerle al médico cualquier pregunta que tenga.  Document Released: 03/16/2010 Document Revised: 01/29/2020 Document Reviewed: 01/29/2020  Elsevier Patient Education © 2020 [employer]

## 2021-06-29 NOTE — FLOWSHEET NOTE
Assumed care of patient at bedside report from Katey GARZA RN. Updated on POC. Patient currently A & O x 4; on 3-4LNC baseline; up x2 per report; No complaints of acute pain. Call light within reach. Whiteboard updated. Fall precautions in place. Bed locked and in lowest position. All questions answered. No other needs indicated at this time.

## 2021-06-29 NOTE — PROGRESS NOTES
Received report from DORA Clemente. Reviewed POC, no questions at this time. Call light is within reach, bed is in lowest/locked position.

## 2021-07-01 NOTE — PROGRESS NOTES
"Community Health Worker Intake    • Social determinates of health intake completed.  • Identified barriers to: financial insecurity, transportation.  • Contact information provided to [facility] Selvinalphonso [facility]   • Has PCP appointment scheduled for: Friday, July 9 at 2:00 pm  • Outpatient assessment completed.  • Did the patient receive medications post discharge: Yes    CHW Darwin reached out to pt via TC to f/u after d/c and reintroduce CCM services.CHW spoke with pt's son, Marty, who pt lives with. Marty mentioned picking up all of pt's medications from [facility] and noticed that pt was \"looking a bit pale\". No other side-effects other than that and Marty declined speaking with a nurse or pharmacists at this time but will reach out if needed. He mentioned transportation barriers for pt as Marty cannot always take time off work to take pt. CHW provided info for [facility] and will mail flyers. Marty also mentioned providing for both patient and his six children on his own which can create some financial insecurity at times. CHW will mail [employer] flyer as well. Marty requested that CHW also include basic guidelines for a heart healthy diet. No other needs at this time.     Plan: Mail resources, d/c due to needs met.  "

## 2021-09-02 PROBLEM — R91.1 SOLITARY PULMONARY NODULE: Status: ACTIVE | Noted: 2020-06-23

## 2021-09-02 PROBLEM — J84.115 RESPIRATORY BRONCHIOLITIS INTERSTITIAL LUNG DISEASE (HCC): Status: ACTIVE | Noted: 2020-06-15

## 2021-09-02 PROBLEM — Z87.891 HISTORY OF TOBACCO USE: Status: ACTIVE | Noted: 2021-01-01

## 2021-09-02 NOTE — PROGRESS NOTES
"      Cardiology Clinic Follow-up Note    Date of note:    9/2/2021  Primary Care Provider: Rashmi Cedeno P.A.-C.    Name:             Chase Harris  YOB: 1950  MRN:               [id number]    CC: Hospital follow-up, missed July appointment    Primary Cardiologist: Dr. Hopper    Patient HPI:   Chase Harris is a 71 y.o. male with current medical problems including three-vessel CAD (medically managed, last coronary angiogram in November 2020), NSTEMI in 6/2021, diabetes, bradycardia, nocturnal heart block and interstitial lung disease.    Interim History:  Mr. Harris was last seen during hospital admission on 6/29/2021 by myself and Dr. Kingston.  He was consulted for NSTEMI, with known triple-vessel disease, CTS consulted patient deemed \"not a candidate for open heart surgery given his small vessels along with his interstitial lung disease\".     Today he is accompanied by his son, with continuous oxygen at 3 L,  presenting with current complaints of pain in bilateral lower extremities, exacerbated with walking, accompanied by numbness.  This is keeping him from walking farther distances.  He also does experience dizziness and fatigue sometimes.    He denies palpitations, chest pain, syncopal episodes, orthopnea, PND, lower extremity swelling, and recent weight gain.     Patient endorses medication compliance.  He does not check his home blood pressure, has a wrist cuff.     Interpretor not needed, CYNDIE is fluent in Greek and a Qualified Medical interpretor    Review of systems:  All others systems reviewed and negative except for what is outlined in the above HPI    Past Medical History:   Diagnosis Date   • Diabetes (HCC)    • Diabetes mellitus type II, uncontrolled (HCC) 5/23/2020   • Pulmonary fibrosis (HCC)     wears 2.5L O2 at home 24/7     Past Surgical History:   Procedure Laterality Date   • OTHER      Kidney stones   • OTHER ABDOMINAL SURGERY      \"from an ulcer that burst\"     Family " History   Problem Relation Age of Onset   • Diabetes Mother    • Diabetes Father    • Diabetes Brother      Social History     Socioeconomic History   • Marital status:      Spouse name: Not on file   • Number of children: Not on file   • Years of education: Not on file   • Highest education level: Not on file   Occupational History   • Not on file   Tobacco Use   • Smoking status: Former Smoker     Packs/day: 2.00     Years: 40.00     Pack years: 80.00     Types: Cigarettes     Quit date:      Years since quittin.6   • Smokeless tobacco: Never Used   Vaping Use   • Vaping Use: Never used   Substance and Sexual Activity   • Alcohol use: Not Currently   • Drug use: Never   • Sexual activity: Not on file   Other Topics Concern   • Not on file   Social History Narrative   • Not on file     Social Determinants of Health     Financial Resource Strain: Medium Risk   • Difficulty of Paying Living Expenses: Somewhat hard   Food Insecurity: Food Insecurity Present   • Worried About Running Out of Food in the Last Year: Sometimes true   • Ran Out of Food in the Last Year: Never true   Transportation Needs: Unmet Transportation Needs   • Lack of Transportation (Medical): Yes   • Lack of Transportation (Non-Medical): No   Physical Activity:    • Days of Exercise per Week:    • Minutes of Exercise per Session:    Stress:    • Feeling of Stress :    Social Connections:    • Frequency of Communication with Friends and Family:    • Frequency of Social Gatherings with Friends and Family:    • Attends Confucianism Services:    • Active Member of Clubs or Organizations:    • Attends Club or Organization Meetings:    • Marital Status:    Intimate Partner Violence:    • Fear of Current or Ex-Partner:    • Emotionally Abused:    • Physically Abused:    • Sexually Abused:      Allergies   Allergen Reactions   • Beta Adrenergic Blockers      Heart block      Current Outpatient Medications   Medication Sig Dispense Refill   •  "isosorbide mononitrate SR (IMDUR) 30 MG TABLET SR 24 HR 1 tablet in the morning     • clopidogrel (PLAVIX) 75 MG Tab 1 tablet     • Empagliflozin (JARDIANCE) 10 MG Tab Take 10 mg by mouth every day. 90 tablet 4   • losartan (COZAAR) 25 MG Tab Take 1 tablet by mouth every day. 90 tablet 4   • atorvastatin (LIPITOR) 40 MG Tab Take 1 tablet by mouth every evening. 90 tablet 4   • budesonide-formoterol (SYMBICORT) 160-4.5 MCG/ACT Aerosol Inhale 2 Puffs 2 Times a Day.     • aspirin 81 MG EC tablet Take 1 Tab by mouth every day. 30 Tab 3   • Lancets (ONETOUCH DELICA PLUS [id number]) Misc USE TO TEST TWICE DAILY 180 Each 3   • Blood Glucose Meter Kit Test blood sugar as recommended by provider.blood glucose monitoring kit. BID 1 Kit 0   • Blood Glucose Test Strips One test strip  Strip 3   • metformin (GLUCOPHAGE) 1000 MG tablet Take 1,000 mg by mouth 2 Times a Day.     • tiotropium (SPIRIVA HANDIHALER) 18 MCG Cap Inhale 1 Cap by mouth every day. 30 Cap 3   • SITagliptin (JANUVIA) 25 MG Tab Take 1 Tab by mouth every day. 30 Tab 3     No current facility-administered medications for this visit.       Physical Exam:  Ambulatory Vitals  BP (!) 88/54 (BP Location: Left arm, Patient Position: Sitting, BP Cuff Size: Adult)   Pulse 74   Resp 12   Ht 1.575 m (5' 2\")   Wt 56.4 kg (124 lb 6.4 oz)   SpO2 99%    BP Readings from Last 4 Encounters:   09/02/21 (!) 88/54   06/29/21 (!) 89/45   03/18/21 122/63   03/15/21 139/63       Weight/BMI: Body mass index is 22.75 kg/m².  Wt Readings from Last 4 Encounters:   09/02/21 56.4 kg (124 lb 6.4 oz)   06/29/21 59.4 kg (130 lb 15.3 oz)   03/18/21 60 kg (132 lb 3.2 oz)   03/15/21 54.4 kg (120 lb)     General: No apparent distress. Well nourished. BMI 22.8  Neck: No JVD. No caroid bruits, trachea midline  Lungs: CTAB. Normal effort, without crackles/rhonchi, no wheezing  Heart: RRR. Normal S1/S2, no murmur, no rub. no lower extremity edema. 2+ radial pulses  Ext: No clubbing or " cyanosis.  Abdomen: soft, non tender, non distended, no mireya hepatomegaly.  Neurological: No focal deficits, no facial asymmetry.  Normal speech.  Psychiatric: Appropriate affect, alert and oriented x 4.   Skin: Warm and dry, no rash.    Lab Data Review:  Lab Results   Component Value Date/Time    CHOLSTRLTOT 87 (L) 06/28/2021 06:33 AM    LDL 28 06/28/2021 06:33 AM    HDL 32 (A) 06/28/2021 06:33 AM    TRIGLYCERIDE 136 06/28/2021 06:33 AM       Lab Results   Component Value Date/Time    SODIUM 140 06/29/2021 03:51 AM    POTASSIUM 4.1 06/29/2021 03:51 AM    CHLORIDE 103 06/29/2021 03:51 AM    CO2 25 06/29/2021 03:51 AM    GLUCOSE 184 (H) 06/29/2021 03:51 AM    BUN 23 (H) 06/29/2021 03:51 AM    CREATININE 1.14 06/29/2021 03:51 AM     Lab Results   Component Value Date/Time    ALKPHOSPHAT 68 06/27/2021 09:57 PM    ASTSGOT 55 (H) 06/27/2021 09:57 PM    ALTSGPT 26 06/27/2021 09:57 PM    TBILIRUBIN 0.4 06/27/2021 09:57 PM      Lab Results   Component Value Date/Time    WBC 8.7 06/29/2021 03:51 AM     Cardiac Imaging and Procedures Review:      EKG 6/7/21: My Personal interpretation reveals SR 76, first degree AV block, ID interval 0.248, no ST changes noted    Echo 1/19/21:  CONCLUSIONS  Left ventricular systolic function is normal; ejection fraction is   visually estimated to be 60%.  Normal right ventricular size and systolic function.  Mild tricuspid regurgitation.  Normal pericardium without effusion.     Compared to the images of the prior study done on 05/23/20, estimated   pulmonary pressure has improved from 40mmHg to 20mmHg.     Echo 6/29/21  Left ventricular ejection fraction is visually estimated to be 65%.  Basal to mid inferior and inferior lateral wall hypokinesis.  Trivial aortic sclerosis without stenosis.  Normal estimated right atrial pressure.    Stress Test 5/27/2020:  Myocardial Perfusion   Report   NUCLEAR IMAGING INTERPRETATION   No evidence of significant jeopardized viable myocardium or prior  myocardial    infarction.   Normal left ventricular size, ejection fraction, and wall motion.   ECG INTERPRETATION   Negative stress ECG for ischemia.     [facility] 2020  PROCEDURES:  1. Left heart catheterization.  2. Coronary angiography.  3. Left ventriculogram.  4. Monitored conscious sedation.     PREPROCEDURE DIAGNOSIS:  Syncope.     POSTPROCEDURE DIAGNOSES:  1. Diffuse coronary artery disease.  2. Reduced left ventricular systolic function with ejection fraction of 30%,   diaphragmatic hypokinesis.  3. Elevated left ventricular end-diastolic pressure.     IMPRESSION:  1. Diffuse coronary artery disease.  2. Reduced left ventricular systolic function with ejection fraction of 30%,   diaphragmatic hypokinesis.  3. Elevated left ventricular end-diastolic pressure.     RECOMMENDATIONS:  Recommend medical therapy for coronary artery disease,   aggressive hypertensive management and consider alternative causes for   recurrent syncope.    Assessment and Clinical Decision Makin. Interstitial lung disease (HCC)     2. Atherosclerosis of native artery of both lower extremities with intermittent claudication (HCC)  US-EXTREMITY ARTERY LOWER BILAT W/MARY (COMBO)   3. Coronary artery disease involving native coronary artery of native heart without angina pectoris     4. NSTEMI (non-ST elevated myocardial infarction) ([facility])     5. Type 2 diabetes mellitus with hyperosmolarity without coma, without long-term current use of insulin ([facility])       The following treatment plan was discussed    Interstitial lung disease  -Patient wanting to travel to [place]  -Encouraged follow-up with a pulmonologist  -Continue oxygen as ordered    Atherosclerosis of native artery of both lower extremities with intermittent claudication  -Ultrasound MARY bilateral lower extremities    CAD, NSTEMI  -Known triple-vessel disease, not CABG candidate given lung disease  -Continue Plavix, aspirin, atorvastatin, Imdur  -Stop amlodipine today given low  blood pressure in office  -Follow-up in 1 week with blood pressure record, son will manage communication through [facility]    DM2  -continue jardiance, metformin, januvia  -Follow-up with PCP for further management and monitoring    Plan reviewed in detail with the patient, verbalizes understanding and is in agreement.  Pt is to follow up with myself in 6 months    Encouraged Pt to follow up with us over the phone or electronically using my MyChart as cardiac issues/concerns arise.      PLEASE NOTE: This dictation was created using voice recognition software. I have made every reasonable attempt to correct obvious errors, but I expect that there are errors of grammar and possibly content that I did not discover before finalizing the note.       NICK RobertsRJoanaN.   [facility]  [phone]    Collaborating Physician: Dr. Hopper

## 2021-12-01 NOTE — ED TRIAGE NOTES
"Chief Complaint   Patient presents with   • Dizziness     started last night, felt very faint this AM, c/o weakness, reccomended by HH RN to come to ER for eval   • Hypoxemia     pt becomes in creasingly hypoxic when walking, wears 2.5 o 3L O2 at baseline, has to turn up O2 to 4L w/ activity      /81   Pulse 60   Temp 36.9 °C (98.4 °F) (Temporal)   Resp 18   Ht 1.575 m (5' 2\")   Wt 56.4 kg (124 lb 5.4 oz)   SpO2 100%   BMI 22.74 kg/m²     Pt BIB family for above concern; pt had cardiac pacemaker placed about two weeks ago per family, denies any chest pain at this time     Has this patient been vaccinated for COVID - YES  If not, would they like to be vaccinated while in the ER if eligible?  n/a  Would the patient like to speak with the ERP about the possibility of receiving the COVID vaccine today before making a decision? n/a    "

## 2021-12-02 NOTE — DISCHARGE INSTRUCTIONS
See your doctor soon as possible for recheck.    Return for fever, worsening breathing, chest pain, or any concerns.

## 2021-12-02 NOTE — ED PROVIDER NOTES
ED Provider Note    CHIEF COMPLAINT  Chief Complaint   Patient presents with   • Dizziness     started last night, felt very faint this AM, c/o weakness, reccomended by HH RN to come to ER for eval   • Hypoxemia     pt becomes in creasingly hypoxic when walking, wears 2.5 o 3L O2 at baseline, has to turn up O2 to 4L w/ activity        HPI  Chase Harris is a 71 y.o. male who presents with complaint of dizziness and shortness of breath with exertion.  It is better with rest.  Onset of symptoms yesterday.  He is on chronic oxygen for pulmonary fibrosis.  His family member has a pulse oximeter he checks at home, stating with exertion oxygenation drops below 70.  Currently, on his 2 L of nasal cannula oxygen, he is 99% saturated.  No chest pain either at rest or with exertion.  No abdominal pain, no vomiting or diarrhea.    REVIEW OF SYSTEMS    Constitutional: Dizziness  Respiratory: Pulmonary fibrosis, shortness of breath with exertion  Cardiac: No chest pain, no syncope  Gastrointestinal: No abdominal pain, no vomiting  Musculoskeletal: No acute neck or back pain  Neurologic: Denies headache  Pain: No peripheral edema  Endocrine: Diabetes       All other systems are negative.       PAST MEDICAL HISTORY  Past Medical History:   Diagnosis Date   • Diabetes ([facility])    • Diabetes mellitus type II, uncontrolled ([facility]) 5/23/2020   • Pulmonary fibrosis (HCC)     wears 2.5L O2 at home 24/7       FAMILY HISTORY  Family History   Problem Relation Age of Onset   • Diabetes Mother    • Diabetes Father    • Diabetes Brother        SOCIAL HISTORY  Social History     Socioeconomic History   • Marital status:      Spouse name: Not on file   • Number of children: Not on file   • Years of education: Not on file   • Highest education level: Not on file   Occupational History   • Not on file   Tobacco Use   • Smoking status: Former Smoker     Packs/day: 2.00     Years: 40.00     Pack years: 80.00     Types: Cigarettes     Quit  "date:      Years since quittin.9   • Smokeless tobacco: Never Used   Vaping Use   • Vaping Use: Never used   Substance and Sexual Activity   • Alcohol use: Not Currently   • Drug use: Never   • Sexual activity: Not on file   Other Topics Concern   • Not on file   Social History Narrative   • Not on file     Social Determinants of Health     Financial Resource Strain: Medium Risk   • Difficulty of Paying Living Expenses: Somewhat hard   Food Insecurity: Food Insecurity Present   • Worried About Running Out of Food in the Last Year: Sometimes true   • Ran Out of Food in the Last Year: Never true   Transportation Needs: Unmet Transportation Needs   • Lack of Transportation (Medical): Yes   • Lack of Transportation (Non-Medical): No   Physical Activity:    • Days of Exercise per Week: Not on file   • Minutes of Exercise per Session: Not on file   Stress:    • Feeling of Stress : Not on file   Social Connections:    • Frequency of Communication with Friends and Family: Not on file   • Frequency of Social Gatherings with Friends and Family: Not on file   • Attends Congregational Services: Not on file   • Active Member of Clubs or Organizations: Not on file   • Attends Club or Organization Meetings: Not on file   • Marital Status: Not on file   Intimate Partner Violence:    • Fear of Current or Ex-Partner: Not on file   • Emotionally Abused: Not on file   • Physically Abused: Not on file   • Sexually Abused: Not on file   Housing Stability:    • Unable to Pay for Housing in the Last Year: Not on file   • Number of Places Lived in the Last Year: Not on file   • Unstable Housing in the Last Year: Not on file       SURGICAL HISTORY  Past Surgical History:   Procedure Laterality Date   • OTHER      Kidney stones   • OTHER ABDOMINAL SURGERY      \"from an ulcer that burst\"       CURRENT MEDICATIONS  Home Medications     Reviewed by Elizabeth Ortiz R.N. (Registered Nurse) on 21 at 1550  Med List Status: Not Addressed " "  Medication Last Dose Status   Albuterol Sulfate 108 (90 Base) MCG/ACT AEROSOL POWDER, BREATH ACTIVATED  Active   aspirin 81 MG EC tablet  Active   atorvastatin (LIPITOR) 40 MG Tab  Active   Blood Glucose Meter Kit  Active   Blood Glucose Test Strips  Active   budesonide-formoterol (SYMBICORT) 160-4.5 MCG/ACT Aerosol  Active   clopidogrel (PLAVIX) 75 MG Tab  Active   Empagliflozin (JARDIANCE) 10 MG Tab  Active   Home Care Oxygen  Active   isosorbide mononitrate SR (IMDUR) 30 MG TABLET SR 24 HR  Active   Lancets (ONETOUCH DELICA PLUS [id number]) Misc  Active   losartan (COZAAR) 25 MG Tab  Active   Meclizine HCl 25 MG Chew Tab  Active   metformin (GLUCOPHAGE) 1000 MG tablet  Active   SITagliptin (JANUVIA) 25 MG Tab  Active   tiotropium (SPIRIVA HANDIHALER) 18 MCG Cap  Active                ALLERGIES  Allergies   Allergen Reactions   • Beta Adrenergic Blockers      Heart block        PHYSICAL EXAM  VITAL SIGNS: BP (!) 182/85   Pulse 71   Temp 37.1 °C (98.8 °F) (Temporal)   Resp 16   Ht 1.575 m (5' 2\")   Wt 56.4 kg (124 lb 5.4 oz)   SpO2 98%   BMI 22.74 kg/m²   Constitutional:  Non-toxic appearance.   HENT: No facial swelling, no epistaxis  Eyes: Anicteric, no conjunctivitis.     Cardiovascular: Normal heart rate, Normal rhythm  Pulmonary: Coarse bilateral breath sounds, moderate air movement, basilar crackles.  Gastrointestinal: Soft, No tenderness, No distention or pulsatile mass  Skin: Warm, Dry, No cyanosis.  No peripheral edema  Neurologic: Speech is clear, follows commands, facial expression is symmetrical.  Strength intact.  Sensation intact.  Psychiatric: Affect normal,  Mood normal.  Patient is calm and cooperative  Musculoskeletal: No chest wall tenderness    EKG/Labs  Results for orders placed or performed during the hospital encounter of 12/01/21   CBC WITH DIFFERENTIAL   Result Value Ref Range    WBC 7.0 4.8 - 10.8 K/uL    RBC 3.27 (L) 4.70 - 6.10 M/uL    Hemoglobin 9.8 (L) 14.0 - 18.0 g/dL    " Hematocrit 32.2 (L) 42.0 - 52.0 %    MCV 98.5 (H) 81.4 - 97.8 fL    MCH 30.0 27.0 - 33.0 pg    MCHC 30.4 (L) 33.7 - 35.3 g/dL    RDW 51.9 (H) 35.9 - 50.0 fL    Platelet Count 137 (L) 164 - 446 K/uL    MPV 12.6 9.0 - 12.9 fL    Neutrophils-Polys 55.80 44.00 - 72.00 %    Lymphocytes 28.60 22.00 - 41.00 %    Monocytes 9.40 0.00 - 13.40 %    Eosinophils 4.00 0.00 - 6.90 %    Basophils 1.30 0.00 - 1.80 %    Immature Granulocytes 0.90 0.00 - 0.90 %    Nucleated RBC 0.00 /100 WBC    Neutrophils (Absolute) 3.88 1.82 - 7.42 K/uL    Lymphs (Absolute) 1.99 1.00 - 4.80 K/uL    Monos (Absolute) 0.65 0.00 - 0.85 K/uL    Eos (Absolute) 0.28 0.00 - 0.51 K/uL    Baso (Absolute) 0.09 0.00 - 0.12 K/uL    Immature Granulocytes (abs) 0.06 0.00 - 0.11 K/uL    NRBC (Absolute) 0.00 K/uL   COMP METABOLIC PANEL   Result Value Ref Range    Sodium 138 135 - 145 mmol/L    Potassium 4.5 3.6 - 5.5 mmol/L    Chloride 99 96 - 112 mmol/L    Co2 27 20 - 33 mmol/L    Anion Gap 12.0 7.0 - 16.0    Glucose 161 (H) 65 - 99 mg/dL    Bun 20 8 - 22 mg/dL    Creatinine 1.11 0.50 - 1.40 mg/dL    Calcium 9.0 8.4 - 10.2 mg/dL    AST(SGOT) 22 12 - 45 U/L    ALT(SGPT) 20 2 - 50 U/L    Alkaline Phosphatase 88 30 - 99 U/L    Total Bilirubin 0.5 0.1 - 1.5 mg/dL    Albumin 4.1 3.2 - 4.9 g/dL    Total Protein 7.5 6.0 - 8.2 g/dL    Globulin 3.4 1.9 - 3.5 g/dL    A-G Ratio 1.2 g/dL   TROPONIN   Result Value Ref Range    Troponin T 49 (H) 6 - 19 ng/L   proBrain Natriuretic Peptide, NT   Result Value Ref Range    NT-proBNP 4751 (H) 0 - 125 pg/mL   ESTIMATED GFR   Result Value Ref Range    GFR If African American >60 >60 mL/min/1.73 m 2    GFR If Non African American >60 >60 mL/min/1.73 m 2   EKG   Result Value Ref Range    Report       [facility] Emergency Dept.    Test Date:  2021-12-01  Pt Name:    BREANNE BOOTH                Department: EDS  MRN:        [id number]                      Room:       -OFF THE FLOOR  Gender:     Male                          Technician: [id number]  :        1950                   Requested By:ER TRIAGE PROTOCOL  Order #:    [id number]                    Reading MD: RHONDA BRAXTON MD    Measurements  Intervals                                Axis  Rate:       62                           P:          251  IL:         142                          QRS:        -66  QRSD:       146                          T:          76  QT:         452  QTc:        459    Interpretive Statements  ATRIAL-VENTRICULAR DUAL-PACED RHYTHM  NO FURTHER ANALYSIS ATTEMPTED DUE TO PACED RHYTHM  Compared to ECG 2021 12:27:54  Sinus rhythm no longer present  First degree AV block no longer present  T-wave abnormality no longer present  Electronically Signed On 2021 1:04:25 PST  by RHONDA BRAXTON MD           RADIOLOGY/PROCEDURES  DX-CHEST-PORTABLE (1 VIEW)   Final Result         1.  Pulmonary edema and/or infiltrates.            COURSE & MEDICAL DECISION MAKING  Pertinent Labs & Imaging studies reviewed. (See chart for details)  Differential diagnosis includes worsening pulmonary fibrosis, pneumonia, pneumothorax, CHF.  Patient with increasing dyspnea on exertion, better at rest.  On his usual home oxygen, he is currently 99%.  He states he is asymptomatic at rest.  Etiology of the worsening dyspnea on exertion is unknown.  His pacemaker appears to be functioning well on EKG.  The patient's troponin of 49 is much improved from his previous baselines.  His BNP is slightly elevated compared to previous currently at 5000, this is of unknown significance.  As he is now asymptomatic, I discussed hospitalization versus discharge home with outpatient follow-up.  He states he feels well and would like to be discharged home.  I have also discussed this with the patient's family member who is in agreement.  They are agreeable to returning if worse.  I encouraged him to continue taking his medication as prescribed.    FINAL IMPRESSION     1. Dizziness      2. Dyspnea on exertion     3. Pulmonary fibrosis (HCC)                     Electronically signed by: Edgard Keller M.D., 12/1/2021 9:15 PM

## 2022-01-01 ENCOUNTER — APPOINTMENT (OUTPATIENT)
Dept: RADIOLOGY | Facility: MEDICAL CENTER | Age: 72
DRG: 177 | End: 2022-01-01
Attending: INTERNAL MEDICINE
Payer: MEDICARE

## 2022-01-01 ENCOUNTER — HOSPITAL ENCOUNTER (INPATIENT)
Facility: MEDICAL CENTER | Age: 72
LOS: 4 days | DRG: 177 | End: 2022-01-16
Attending: EMERGENCY MEDICINE | Admitting: STUDENT IN AN ORGANIZED HEALTH CARE EDUCATION/TRAINING PROGRAM
Payer: MEDICARE

## 2022-01-01 ENCOUNTER — APPOINTMENT (OUTPATIENT)
Dept: RADIOLOGY | Facility: MEDICAL CENTER | Age: 72
DRG: 177 | End: 2022-01-01
Attending: STUDENT IN AN ORGANIZED HEALTH CARE EDUCATION/TRAINING PROGRAM
Payer: MEDICARE

## 2022-01-01 VITALS
HEIGHT: 62 IN | DIASTOLIC BLOOD PRESSURE: 117 MMHG | RESPIRATION RATE: 20 BRPM | SYSTOLIC BLOOD PRESSURE: 150 MMHG | OXYGEN SATURATION: 88 % | TEMPERATURE: 98.9 F | WEIGHT: 138.01 LBS | HEART RATE: 86 BPM | BODY MASS INDEX: 25.4 KG/M2

## 2022-01-01 DIAGNOSIS — E87.5 HYPERKALEMIA: ICD-10-CM

## 2022-01-01 DIAGNOSIS — U07.1 COVID-19: ICD-10-CM

## 2022-01-01 DIAGNOSIS — N17.9 AKI (ACUTE KIDNEY INJURY) (HCC): ICD-10-CM

## 2022-01-01 DIAGNOSIS — E86.0 DEHYDRATION: ICD-10-CM

## 2022-01-01 DIAGNOSIS — R42 DIZZINESS: ICD-10-CM

## 2022-01-01 LAB
ALBUMIN SERPL BCP-MCNC: 3.4 G/DL (ref 3.2–4.9)
ALBUMIN SERPL BCP-MCNC: 3.5 G/DL (ref 3.2–4.9)
ALBUMIN SERPL BCP-MCNC: 3.7 G/DL (ref 3.2–4.9)
ALBUMIN/GLOB SERPL: 0.9 G/DL
ALBUMIN/GLOB SERPL: 1.1 G/DL
ALBUMIN/GLOB SERPL: 1.2 G/DL
ALP SERPL-CCNC: 77 U/L (ref 30–99)
ALP SERPL-CCNC: 87 U/L (ref 30–99)
ALP SERPL-CCNC: 91 U/L (ref 30–99)
ALT SERPL-CCNC: 18 U/L (ref 2–50)
ALT SERPL-CCNC: 19 U/L (ref 2–50)
ALT SERPL-CCNC: 73 U/L (ref 2–50)
ANION GAP SERPL CALC-SCNC: 13 MMOL/L (ref 7–16)
ANION GAP SERPL CALC-SCNC: 14 MMOL/L (ref 7–16)
ANION GAP SERPL CALC-SCNC: 14 MMOL/L (ref 7–16)
ANION GAP SERPL CALC-SCNC: 15 MMOL/L (ref 7–16)
ANION GAP SERPL CALC-SCNC: 17 MMOL/L (ref 7–16)
ANION GAP SERPL CALC-SCNC: 17 MMOL/L (ref 7–16)
ANION GAP SERPL CALC-SCNC: 18 MMOL/L (ref 7–16)
APPEARANCE UR: CLEAR
AST SERPL-CCNC: 18 U/L (ref 12–45)
AST SERPL-CCNC: 19 U/L (ref 12–45)
AST SERPL-CCNC: 76 U/L (ref 12–45)
B-OH-BUTYR SERPL-MCNC: 0.15 MMOL/L (ref 0.02–0.27)
BACTERIA #/AREA URNS HPF: NEGATIVE /HPF
BACTERIA UR CULT: NORMAL
BASE EXCESS BLDV CALC-SCNC: -3 MMOL/L
BASOPHILS # BLD AUTO: 0.1 % (ref 0–1.8)
BASOPHILS # BLD AUTO: 0.4 % (ref 0–1.8)
BASOPHILS # BLD AUTO: 1.7 % (ref 0–1.8)
BASOPHILS # BLD: 0.01 K/UL (ref 0–0.12)
BASOPHILS # BLD: 0.02 K/UL (ref 0–0.12)
BASOPHILS # BLD: 0.11 K/UL (ref 0–0.12)
BILIRUB SERPL-MCNC: 0.2 MG/DL (ref 0.1–1.5)
BILIRUB SERPL-MCNC: 0.2 MG/DL (ref 0.1–1.5)
BILIRUB SERPL-MCNC: 0.4 MG/DL (ref 0.1–1.5)
BILIRUB UR QL STRIP.AUTO: NEGATIVE
BODY TEMPERATURE: ABNORMAL CENTIGRADE
BUN SERPL-MCNC: 39 MG/DL (ref 8–22)
BUN SERPL-MCNC: 40 MG/DL (ref 8–22)
BUN SERPL-MCNC: 42 MG/DL (ref 8–22)
BUN SERPL-MCNC: 42 MG/DL (ref 8–22)
BUN SERPL-MCNC: 43 MG/DL (ref 8–22)
BUN SERPL-MCNC: 43 MG/DL (ref 8–22)
BUN SERPL-MCNC: 44 MG/DL (ref 8–22)
BUN SERPL-MCNC: 44 MG/DL (ref 8–22)
BUN SERPL-MCNC: 48 MG/DL (ref 8–22)
CALCIUM SERPL-MCNC: 8 MG/DL (ref 8.5–10.5)
CALCIUM SERPL-MCNC: 8.1 MG/DL (ref 8.5–10.5)
CALCIUM SERPL-MCNC: 8.3 MG/DL (ref 8.5–10.5)
CALCIUM SERPL-MCNC: 8.8 MG/DL (ref 8.5–10.5)
CALCIUM SERPL-MCNC: 8.8 MG/DL (ref 8.5–10.5)
CALCIUM SERPL-MCNC: 9 MG/DL (ref 8.5–10.5)
CALCIUM SERPL-MCNC: 9.4 MG/DL (ref 8.5–10.5)
CALCIUM SERPL-MCNC: 9.5 MG/DL (ref 8.5–10.5)
CALCIUM SERPL-MCNC: 9.7 MG/DL (ref 8.5–10.5)
CHLORIDE SERPL-SCNC: 100 MMOL/L (ref 96–112)
CHLORIDE SERPL-SCNC: 102 MMOL/L (ref 96–112)
CHLORIDE SERPL-SCNC: 104 MMOL/L (ref 96–112)
CHLORIDE SERPL-SCNC: 105 MMOL/L (ref 96–112)
CHLORIDE SERPL-SCNC: 107 MMOL/L (ref 96–112)
CHLORIDE SERPL-SCNC: 98 MMOL/L (ref 96–112)
CHLORIDE SERPL-SCNC: 99 MMOL/L (ref 96–112)
CHOLEST SERPL-MCNC: 55 MG/DL (ref 100–199)
CO2 SERPL-SCNC: 14 MMOL/L (ref 20–33)
CO2 SERPL-SCNC: 16 MMOL/L (ref 20–33)
CO2 SERPL-SCNC: 17 MMOL/L (ref 20–33)
CO2 SERPL-SCNC: 17 MMOL/L (ref 20–33)
CO2 SERPL-SCNC: 18 MMOL/L (ref 20–33)
CO2 SERPL-SCNC: 18 MMOL/L (ref 20–33)
CO2 SERPL-SCNC: 21 MMOL/L (ref 20–33)
CO2 SERPL-SCNC: 22 MMOL/L (ref 20–33)
CO2 SERPL-SCNC: 25 MMOL/L (ref 20–33)
COLOR UR: YELLOW
CREAT SERPL-MCNC: 1.38 MG/DL (ref 0.5–1.4)
CREAT SERPL-MCNC: 1.56 MG/DL (ref 0.5–1.4)
CREAT SERPL-MCNC: 1.6 MG/DL (ref 0.5–1.4)
CREAT SERPL-MCNC: 1.6 MG/DL (ref 0.5–1.4)
CREAT SERPL-MCNC: 1.69 MG/DL (ref 0.5–1.4)
CREAT SERPL-MCNC: 1.69 MG/DL (ref 0.5–1.4)
CREAT SERPL-MCNC: 1.7 MG/DL (ref 0.5–1.4)
CREAT SERPL-MCNC: 1.8 MG/DL (ref 0.5–1.4)
CREAT SERPL-MCNC: 1.8 MG/DL (ref 0.5–1.4)
CREAT UR-MCNC: 90.95 MG/DL
CREAT UR-MCNC: 93.21 MG/DL
CRP SERPL HS-MCNC: 7.14 MG/DL (ref 0–0.75)
D DIMER PPP IA.FEU-MCNC: 1.63 UG/ML (FEU) (ref 0–0.5)
EKG IMPRESSION: NORMAL
EKG IMPRESSION: NORMAL
EOSINOPHIL # BLD AUTO: 0 K/UL (ref 0–0.51)
EOSINOPHIL # BLD AUTO: 0.01 K/UL (ref 0–0.51)
EOSINOPHIL # BLD AUTO: 0.17 K/UL (ref 0–0.51)
EOSINOPHIL NFR BLD: 0 % (ref 0–6.9)
EOSINOPHIL NFR BLD: 0.2 % (ref 0–6.9)
EOSINOPHIL NFR BLD: 2.6 % (ref 0–6.9)
EPI CELLS #/AREA URNS HPF: NEGATIVE /HPF
ERYTHROCYTE [DISTWIDTH] IN BLOOD BY AUTOMATED COUNT: 49.1 FL (ref 35.9–50)
ERYTHROCYTE [DISTWIDTH] IN BLOOD BY AUTOMATED COUNT: 50.9 FL (ref 35.9–50)
ERYTHROCYTE [DISTWIDTH] IN BLOOD BY AUTOMATED COUNT: 51.6 FL (ref 35.9–50)
ERYTHROCYTE [DISTWIDTH] IN BLOOD BY AUTOMATED COUNT: 52.4 FL (ref 35.9–50)
EST. AVERAGE GLUCOSE BLD GHB EST-MCNC: 232 MG/DL
FERRITIN SERPL-MCNC: 134 NG/ML (ref 22–322)
FLUAV RNA SPEC QL NAA+PROBE: NEGATIVE
FLUBV RNA SPEC QL NAA+PROBE: NEGATIVE
GAMMA INTERFERON BACKGROUND BLD IA-ACNC: 0.05 IU/ML
GLOBULIN SER CALC-MCNC: 3 G/DL (ref 1.9–3.5)
GLOBULIN SER CALC-MCNC: 3.1 G/DL (ref 1.9–3.5)
GLOBULIN SER CALC-MCNC: 3.7 G/DL (ref 1.9–3.5)
GLUCOSE BLD-MCNC: 113 MG/DL (ref 65–99)
GLUCOSE BLD-MCNC: 143 MG/DL (ref 65–99)
GLUCOSE BLD-MCNC: 146 MG/DL (ref 65–99)
GLUCOSE BLD-MCNC: 180 MG/DL (ref 65–99)
GLUCOSE BLD-MCNC: 201 MG/DL (ref 65–99)
GLUCOSE BLD-MCNC: 234 MG/DL (ref 65–99)
GLUCOSE BLD-MCNC: 272 MG/DL (ref 65–99)
GLUCOSE BLD-MCNC: 279 MG/DL (ref 65–99)
GLUCOSE BLD-MCNC: 295 MG/DL (ref 65–99)
GLUCOSE BLD-MCNC: 317 MG/DL (ref 65–99)
GLUCOSE BLD-MCNC: 317 MG/DL (ref 65–99)
GLUCOSE BLD-MCNC: 319 MG/DL (ref 65–99)
GLUCOSE BLD-MCNC: 337 MG/DL (ref 65–99)
GLUCOSE BLD-MCNC: 346 MG/DL (ref 65–99)
GLUCOSE BLD-MCNC: 359 MG/DL (ref 65–99)
GLUCOSE BLD-MCNC: 361 MG/DL (ref 65–99)
GLUCOSE SERPL-MCNC: 106 MG/DL (ref 65–99)
GLUCOSE SERPL-MCNC: 125 MG/DL (ref 65–99)
GLUCOSE SERPL-MCNC: 175 MG/DL (ref 65–99)
GLUCOSE SERPL-MCNC: 267 MG/DL (ref 65–99)
GLUCOSE SERPL-MCNC: 278 MG/DL (ref 65–99)
GLUCOSE SERPL-MCNC: 302 MG/DL (ref 65–99)
GLUCOSE SERPL-MCNC: 323 MG/DL (ref 65–99)
GLUCOSE SERPL-MCNC: 349 MG/DL (ref 65–99)
GLUCOSE SERPL-MCNC: 378 MG/DL (ref 65–99)
GLUCOSE UR STRIP.AUTO-MCNC: >=1000 MG/DL
HAV IGM SERPL QL IA: NORMAL
HBA1C MFR BLD: 9.7 % (ref 4–5.6)
HBV CORE IGM SER QL: NORMAL
HBV SURFACE AG SER QL: NORMAL
HCO3 BLDV-SCNC: 24 MMOL/L (ref 24–28)
HCT VFR BLD AUTO: 24.7 % (ref 42–52)
HCT VFR BLD AUTO: 24.9 % (ref 42–52)
HCT VFR BLD AUTO: 25.2 % (ref 42–52)
HCT VFR BLD AUTO: 26.2 % (ref 42–52)
HCV AB SER QL: NORMAL
HDLC SERPL-MCNC: 20 MG/DL
HGB BLD-MCNC: 7.6 G/DL (ref 14–18)
HGB BLD-MCNC: 7.8 G/DL (ref 14–18)
HGB BLD-MCNC: 8.2 G/DL (ref 14–18)
HGB BLD-MCNC: 8.3 G/DL (ref 14–18)
HYALINE CASTS #/AREA URNS LPF: ABNORMAL /LPF
IMM GRANULOCYTES # BLD AUTO: 0.08 K/UL (ref 0–0.11)
IMM GRANULOCYTES # BLD AUTO: 0.13 K/UL (ref 0–0.11)
IMM GRANULOCYTES NFR BLD AUTO: 1.3 % (ref 0–0.9)
IMM GRANULOCYTES NFR BLD AUTO: 1.8 % (ref 0–0.9)
INR PPP: 1.15 (ref 0.87–1.13)
IRON SATN MFR SERPL: 11 % (ref 15–55)
IRON SERPL-MCNC: 22 UG/DL (ref 50–180)
KETONES UR STRIP.AUTO-MCNC: NEGATIVE MG/DL
LACTATE BLD-SCNC: 1.7 MMOL/L (ref 0.5–2)
LACTATE BLD-SCNC: 2.8 MMOL/L (ref 0.5–2)
LACTATE BLD-SCNC: 3.5 MMOL/L (ref 0.5–2)
LACTATE BLD-SCNC: 4.1 MMOL/L (ref 0.5–2)
LACTATE BLD-SCNC: 4.6 MMOL/L (ref 0.5–2)
LACTATE BLD-SCNC: 4.7 MMOL/L (ref 0.5–2)
LACTATE BLD-SCNC: 4.7 MMOL/L (ref 0.5–2)
LACTATE BLD-SCNC: 5.3 MMOL/L (ref 0.5–2)
LACTATE BLD-SCNC: 6.3 MMOL/L (ref 0.5–2)
LDLC SERPL CALC-MCNC: 10 MG/DL
LEUKOCYTE ESTERASE UR QL STRIP.AUTO: NEGATIVE
LYMPHOCYTES # BLD AUTO: 0.35 K/UL (ref 1–4.8)
LYMPHOCYTES # BLD AUTO: 0.53 K/UL (ref 1–4.8)
LYMPHOCYTES # BLD AUTO: 1.2 K/UL (ref 1–4.8)
LYMPHOCYTES NFR BLD: 11.7 % (ref 22–41)
LYMPHOCYTES NFR BLD: 12.4 % (ref 22–41)
LYMPHOCYTES NFR BLD: 5.2 % (ref 22–41)
M TB IFN-G BLD-IMP: ABNORMAL
M TB IFN-G CD4+ BCKGRND COR BLD-ACNC: 0.02 IU/ML
MAGNESIUM SERPL-MCNC: 1.6 MG/DL (ref 1.5–2.5)
MANUAL DIFF BLD: NORMAL
MCH RBC QN AUTO: 29.8 PG (ref 27–33)
MCH RBC QN AUTO: 30.6 PG (ref 27–33)
MCH RBC QN AUTO: 30.6 PG (ref 27–33)
MCH RBC QN AUTO: 30.7 PG (ref 27–33)
MCHC RBC AUTO-ENTMCNC: 30.8 G/DL (ref 33.7–35.3)
MCHC RBC AUTO-ENTMCNC: 31 G/DL (ref 33.7–35.3)
MCHC RBC AUTO-ENTMCNC: 31.3 G/DL (ref 33.7–35.3)
MCHC RBC AUTO-ENTMCNC: 33.3 G/DL (ref 33.7–35.3)
MCV RBC AUTO: 92.2 FL (ref 81.4–97.8)
MCV RBC AUTO: 96.2 FL (ref 81.4–97.8)
MCV RBC AUTO: 97.8 FL (ref 81.4–97.8)
MCV RBC AUTO: 99.6 FL (ref 81.4–97.8)
MICRO URNS: ABNORMAL
MICROALBUMIN UR-MCNC: 4 MG/DL
MICROALBUMIN/CREAT UR: 43 MG/G (ref 0–30)
MITOGEN IGNF BCKGRD COR BLD-ACNC: 0.49 IU/ML
MONOCYTES # BLD AUTO: 0.23 K/UL (ref 0–0.85)
MONOCYTES # BLD AUTO: 0.46 K/UL (ref 0–0.85)
MONOCYTES # BLD AUTO: 0.53 K/UL (ref 0–0.85)
MONOCYTES NFR BLD AUTO: 10.1 % (ref 0–13.4)
MONOCYTES NFR BLD AUTO: 3.5 % (ref 0–13.4)
MONOCYTES NFR BLD AUTO: 5.5 % (ref 0–13.4)
MORPHOLOGY BLD-IMP: NORMAL
MYELOCYTES NFR BLD MANUAL: 0.9 %
NEUTROPHILS # BLD AUTO: 3.44 K/UL (ref 1.82–7.42)
NEUTROPHILS # BLD AUTO: 5.77 K/UL (ref 1.82–7.42)
NEUTROPHILS # BLD AUTO: 7.84 K/UL (ref 1.82–7.42)
NEUTROPHILS NFR BLD: 75.8 % (ref 44–72)
NEUTROPHILS NFR BLD: 80.7 % (ref 44–72)
NEUTROPHILS NFR BLD: 86.1 % (ref 44–72)
NITRITE UR QL STRIP.AUTO: NEGATIVE
NRBC # BLD AUTO: 0 K/UL
NRBC BLD-RTO: 0 /100 WBC
PCO2 BLDV: 48.4 MMHG (ref 41–51)
PH BLDV: 7.31 [PH] (ref 7.31–7.45)
PH UR STRIP.AUTO: 5.5 [PH] (ref 5–8)
PLATELET # BLD AUTO: 105 K/UL (ref 164–446)
PLATELET # BLD AUTO: 118 K/UL (ref 164–446)
PLATELET # BLD AUTO: 127 K/UL (ref 164–446)
PLATELET # BLD AUTO: 89 K/UL (ref 164–446)
PLATELET BLD QL SMEAR: NORMAL
PMV BLD AUTO: 13.3 FL (ref 9–12.9)
PMV BLD AUTO: 13.9 FL (ref 9–12.9)
PMV BLD AUTO: 14 FL (ref 9–12.9)
PMV BLD AUTO: 14.2 FL (ref 9–12.9)
PO2 BLDV: 12.3 MMHG (ref 25–40)
POTASSIUM SERPL-SCNC: 4.3 MMOL/L (ref 3.6–5.5)
POTASSIUM SERPL-SCNC: 4.7 MMOL/L (ref 3.6–5.5)
POTASSIUM SERPL-SCNC: 4.9 MMOL/L (ref 3.6–5.5)
POTASSIUM SERPL-SCNC: 5.2 MMOL/L (ref 3.6–5.5)
POTASSIUM SERPL-SCNC: 5.4 MMOL/L (ref 3.6–5.5)
POTASSIUM SERPL-SCNC: 5.8 MMOL/L (ref 3.6–5.5)
POTASSIUM SERPL-SCNC: 6 MMOL/L (ref 3.6–5.5)
POTASSIUM SERPL-SCNC: 6.2 MMOL/L (ref 3.6–5.5)
POTASSIUM SERPL-SCNC: 6.5 MMOL/L (ref 3.6–5.5)
POTASSIUM SERPL-SCNC: 6.8 MMOL/L (ref 3.6–5.5)
PROCALCITONIN SERPL-MCNC: 0.05 NG/ML
PROCALCITONIN SERPL-MCNC: 0.08 NG/ML
PROT SERPL-MCNC: 6.4 G/DL (ref 6–8.2)
PROT SERPL-MCNC: 6.8 G/DL (ref 6–8.2)
PROT SERPL-MCNC: 7.2 G/DL (ref 6–8.2)
PROT UR QL STRIP: 30 MG/DL
PROT UR-MCNC: 32 MG/DL (ref 0–15)
PROT/CREAT UR: 352 MG/G (ref 15–68)
PROTHROMBIN TIME: 14.4 SEC (ref 12–14.6)
QFT TB2 - NIL TBQ2: 0.03 IU/ML
RBC # BLD AUTO: 2.48 M/UL (ref 4.7–6.1)
RBC # BLD AUTO: 2.62 M/UL (ref 4.7–6.1)
RBC # BLD AUTO: 2.68 M/UL (ref 4.7–6.1)
RBC # BLD AUTO: 2.7 M/UL (ref 4.7–6.1)
RBC # URNS HPF: ABNORMAL /HPF
RBC BLD AUTO: NORMAL
RBC UR QL AUTO: NEGATIVE
RSV RNA SPEC QL NAA+PROBE: NEGATIVE
SAO2 % BLDV: 18.8 %
SARS-COV-2 RNA RESP QL NAA+PROBE: DETECTED
SIGNIFICANT IND 70042: NORMAL
SITE SITE: NORMAL
SODIUM SERPL-SCNC: 134 MMOL/L (ref 135–145)
SODIUM SERPL-SCNC: 135 MMOL/L (ref 135–145)
SODIUM SERPL-SCNC: 136 MMOL/L (ref 135–145)
SODIUM SERPL-SCNC: 137 MMOL/L (ref 135–145)
SODIUM SERPL-SCNC: 138 MMOL/L (ref 135–145)
SODIUM SERPL-SCNC: 138 MMOL/L (ref 135–145)
SODIUM SERPL-SCNC: 139 MMOL/L (ref 135–145)
SOURCE SOURCE: NORMAL
SP GR UR STRIP.AUTO: 1.02
SPECIMEN SOURCE: ABNORMAL
TIBC SERPL-MCNC: 193 UG/DL (ref 250–450)
TRIGL SERPL-MCNC: 125 MG/DL (ref 0–149)
TROPONIN T SERPL-MCNC: 146 NG/L (ref 6–19)
TROPONIN T SERPL-MCNC: 169 NG/L (ref 6–19)
TROPONIN T SERPL-MCNC: 198 NG/L (ref 6–19)
UIBC SERPL-MCNC: 171 UG/DL (ref 110–370)
UROBILINOGEN UR STRIP.AUTO-MCNC: 0.2 MG/DL
VIT B12 SERPL-MCNC: 653 PG/ML (ref 211–911)
WBC # BLD AUTO: 4.5 K/UL (ref 4.8–10.8)
WBC # BLD AUTO: 6.7 K/UL (ref 4.8–10.8)
WBC # BLD AUTO: 7.3 K/UL (ref 4.8–10.8)
WBC # BLD AUTO: 9.7 K/UL (ref 4.8–10.8)
WBC #/AREA URNS HPF: ABNORMAL /HPF

## 2022-01-01 PROCEDURE — 85027 COMPLETE CBC AUTOMATED: CPT

## 2022-01-01 PROCEDURE — 36415 COLL VENOUS BLD VENIPUNCTURE: CPT

## 2022-01-01 PROCEDURE — 96372 THER/PROPH/DIAG INJ SC/IM: CPT

## 2022-01-01 PROCEDURE — 99291 CRITICAL CARE FIRST HOUR: CPT | Performed by: STUDENT IN AN ORGANIZED HEALTH CARE EDUCATION/TRAINING PROGRAM

## 2022-01-01 PROCEDURE — 5A12012 PERFORMANCE OF CARDIAC OUTPUT, SINGLE, MANUAL: ICD-10-PCS | Performed by: STUDENT IN AN ORGANIZED HEALTH CARE EDUCATION/TRAINING PROGRAM

## 2022-01-01 PROCEDURE — 93005 ELECTROCARDIOGRAM TRACING: CPT

## 2022-01-01 PROCEDURE — 700102 HCHG RX REV CODE 250 W/ 637 OVERRIDE(OP): Performed by: STUDENT IN AN ORGANIZED HEALTH CARE EDUCATION/TRAINING PROGRAM

## 2022-01-01 PROCEDURE — A9270 NON-COVERED ITEM OR SERVICE: HCPCS | Performed by: INTERNAL MEDICINE

## 2022-01-01 PROCEDURE — 99232 SBSQ HOSP IP/OBS MODERATE 35: CPT | Performed by: INTERNAL MEDICINE

## 2022-01-01 PROCEDURE — 80074 ACUTE HEPATITIS PANEL: CPT

## 2022-01-01 PROCEDURE — 700102 HCHG RX REV CODE 250 W/ 637 OVERRIDE(OP): Performed by: INTERNAL MEDICINE

## 2022-01-01 PROCEDURE — 87040 BLOOD CULTURE FOR BACTERIA: CPT | Mod: 91

## 2022-01-01 PROCEDURE — 85379 FIBRIN DEGRADATION QUANT: CPT

## 2022-01-01 PROCEDURE — 83540 ASSAY OF IRON: CPT

## 2022-01-01 PROCEDURE — A9270 NON-COVERED ITEM OR SERVICE: HCPCS | Performed by: STUDENT IN AN ORGANIZED HEALTH CARE EDUCATION/TRAINING PROGRAM

## 2022-01-01 PROCEDURE — 80053 COMPREHEN METABOLIC PANEL: CPT

## 2022-01-01 PROCEDURE — 96375 TX/PRO/DX INJ NEW DRUG ADDON: CPT

## 2022-01-01 PROCEDURE — 83550 IRON BINDING TEST: CPT

## 2022-01-01 PROCEDURE — 94640 AIRWAY INHALATION TREATMENT: CPT

## 2022-01-01 PROCEDURE — 85025 COMPLETE CBC W/AUTO DIFF WBC: CPT

## 2022-01-01 PROCEDURE — 700101 HCHG RX REV CODE 250: Performed by: EMERGENCY MEDICINE

## 2022-01-01 PROCEDURE — 84132 ASSAY OF SERUM POTASSIUM: CPT

## 2022-01-01 PROCEDURE — 82607 VITAMIN B-12: CPT

## 2022-01-01 PROCEDURE — 82728 ASSAY OF FERRITIN: CPT

## 2022-01-01 PROCEDURE — 700111 HCHG RX REV CODE 636 W/ 250 OVERRIDE (IP): Performed by: STUDENT IN AN ORGANIZED HEALTH CARE EDUCATION/TRAINING PROGRAM

## 2022-01-01 PROCEDURE — 80048 BASIC METABOLIC PNL TOTAL CA: CPT

## 2022-01-01 PROCEDURE — 82962 GLUCOSE BLOOD TEST: CPT

## 2022-01-01 PROCEDURE — 80061 LIPID PANEL: CPT

## 2022-01-01 PROCEDURE — 82010 KETONE BODYS QUAN: CPT

## 2022-01-01 PROCEDURE — C9803 HOPD COVID-19 SPEC COLLECT: HCPCS | Performed by: EMERGENCY MEDICINE

## 2022-01-01 PROCEDURE — 93005 ELECTROCARDIOGRAM TRACING: CPT | Performed by: INTERNAL MEDICINE

## 2022-01-01 PROCEDURE — 700105 HCHG RX REV CODE 258: Performed by: STUDENT IN AN ORGANIZED HEALTH CARE EDUCATION/TRAINING PROGRAM

## 2022-01-01 PROCEDURE — 700105 HCHG RX REV CODE 258: Performed by: INTERNAL MEDICINE

## 2022-01-01 PROCEDURE — 770020 HCHG ROOM/CARE - TELE (206)

## 2022-01-01 PROCEDURE — 93005 ELECTROCARDIOGRAM TRACING: CPT | Performed by: EMERGENCY MEDICINE

## 2022-01-01 PROCEDURE — 71045 X-RAY EXAM CHEST 1 VIEW: CPT

## 2022-01-01 PROCEDURE — 84145 PROCALCITONIN (PCT): CPT

## 2022-01-01 PROCEDURE — 96376 TX/PRO/DX INJ SAME DRUG ADON: CPT

## 2022-01-01 PROCEDURE — 93010 ELECTROCARDIOGRAM REPORT: CPT | Performed by: INTERNAL MEDICINE

## 2022-01-01 PROCEDURE — 700111 HCHG RX REV CODE 636 W/ 250 OVERRIDE (IP): Performed by: INTERNAL MEDICINE

## 2022-01-01 PROCEDURE — 0241U HCHG SARS-COV-2 COVID-19 NFCT DS RESP RNA 4 TRGT MIC: CPT

## 2022-01-01 PROCEDURE — 82043 UR ALBUMIN QUANTITATIVE: CPT

## 2022-01-01 PROCEDURE — 83605 ASSAY OF LACTIC ACID: CPT | Mod: 91

## 2022-01-01 PROCEDURE — XW0DXM6 INTRODUCTION OF BARICITINIB INTO MOUTH AND PHARYNX, EXTERNAL APPROACH, NEW TECHNOLOGY GROUP 6: ICD-10-PCS | Performed by: INTERNAL MEDICINE

## 2022-01-01 PROCEDURE — 76775 US EXAM ABDO BACK WALL LIM: CPT

## 2022-01-01 PROCEDURE — 99285 EMERGENCY DEPT VISIT HI MDM: CPT

## 2022-01-01 PROCEDURE — 84156 ASSAY OF PROTEIN URINE: CPT

## 2022-01-01 PROCEDURE — 84484 ASSAY OF TROPONIN QUANT: CPT

## 2022-01-01 PROCEDURE — 82962 GLUCOSE BLOOD TEST: CPT | Mod: 91

## 2022-01-01 PROCEDURE — 93970 EXTREMITY STUDY: CPT

## 2022-01-01 PROCEDURE — 99223 1ST HOSP IP/OBS HIGH 75: CPT | Performed by: INTERNAL MEDICINE

## 2022-01-01 PROCEDURE — 86480 TB TEST CELL IMMUN MEASURE: CPT

## 2022-01-01 PROCEDURE — 87086 URINE CULTURE/COLONY COUNT: CPT

## 2022-01-01 PROCEDURE — 81001 URINALYSIS AUTO W/SCOPE: CPT

## 2022-01-01 PROCEDURE — 86140 C-REACTIVE PROTEIN: CPT

## 2022-01-01 PROCEDURE — 85007 BL SMEAR W/DIFF WBC COUNT: CPT

## 2022-01-01 PROCEDURE — 700111 HCHG RX REV CODE 636 W/ 250 OVERRIDE (IP): Performed by: EMERGENCY MEDICINE

## 2022-01-01 PROCEDURE — 93970 EXTREMITY STUDY: CPT | Mod: 26,GZ | Performed by: INTERNAL MEDICINE

## 2022-01-01 PROCEDURE — 700102 HCHG RX REV CODE 250 W/ 637 OVERRIDE(OP): Performed by: EMERGENCY MEDICINE

## 2022-01-01 PROCEDURE — 700105 HCHG RX REV CODE 258: Performed by: EMERGENCY MEDICINE

## 2022-01-01 PROCEDURE — 700111 HCHG RX REV CODE 636 W/ 250 OVERRIDE (IP)

## 2022-01-01 PROCEDURE — 83036 HEMOGLOBIN GLYCOSYLATED A1C: CPT

## 2022-01-01 PROCEDURE — 83605 ASSAY OF LACTIC ACID: CPT

## 2022-01-01 PROCEDURE — 96365 THER/PROPH/DIAG IV INF INIT: CPT

## 2022-01-01 PROCEDURE — 84484 ASSAY OF TROPONIN QUANT: CPT | Mod: 91

## 2022-01-01 PROCEDURE — 83735 ASSAY OF MAGNESIUM: CPT

## 2022-01-01 PROCEDURE — 96367 TX/PROPH/DG ADDL SEQ IV INF: CPT

## 2022-01-01 PROCEDURE — 82803 BLOOD GASES ANY COMBINATION: CPT

## 2022-01-01 PROCEDURE — 85610 PROTHROMBIN TIME: CPT

## 2022-01-01 PROCEDURE — 82570 ASSAY OF URINE CREATININE: CPT | Mod: 91

## 2022-01-01 PROCEDURE — 700101 HCHG RX REV CODE 250: Performed by: INTERNAL MEDICINE

## 2022-01-01 PROCEDURE — 99223 1ST HOSP IP/OBS HIGH 75: CPT | Mod: AI | Performed by: STUDENT IN AN ORGANIZED HEALTH CARE EDUCATION/TRAINING PROGRAM

## 2022-01-01 RX ORDER — ALBUTEROL SULFATE 90 UG/1
2 AEROSOL, METERED RESPIRATORY (INHALATION)
Status: DISCONTINUED | OUTPATIENT
Start: 2022-01-01 | End: 2022-01-01 | Stop reason: HOSPADM

## 2022-01-01 RX ORDER — DEXTROSE MONOHYDRATE 25 G/50ML
50 INJECTION, SOLUTION INTRAVENOUS ONCE
Status: DISCONTINUED | OUTPATIENT
Start: 2022-01-01 | End: 2022-01-01

## 2022-01-01 RX ORDER — DEXAMETHASONE 4 MG/1
6 TABLET ORAL DAILY
Status: DISCONTINUED | OUTPATIENT
Start: 2022-01-01 | End: 2022-01-01

## 2022-01-01 RX ORDER — AMOXICILLIN 250 MG
2 CAPSULE ORAL 2 TIMES DAILY
Status: DISCONTINUED | OUTPATIENT
Start: 2022-01-01 | End: 2022-01-01 | Stop reason: HOSPADM

## 2022-01-01 RX ORDER — CALCIUM GLUCONATE 20 MG/ML
2 INJECTION, SOLUTION INTRAVENOUS ONCE
Status: COMPLETED | OUTPATIENT
Start: 2022-01-01 | End: 2022-01-01

## 2022-01-01 RX ORDER — ONDANSETRON 2 MG/ML
4 INJECTION INTRAMUSCULAR; INTRAVENOUS EVERY 4 HOURS PRN
Status: DISCONTINUED | OUTPATIENT
Start: 2022-01-01 | End: 2022-01-01 | Stop reason: HOSPADM

## 2022-01-01 RX ORDER — ATORVASTATIN CALCIUM 40 MG/1
40 TABLET, FILM COATED ORAL EVERY EVENING
Status: DISCONTINUED | OUTPATIENT
Start: 2022-01-01 | End: 2022-01-01 | Stop reason: HOSPADM

## 2022-01-01 RX ORDER — SODIUM POLYSTYRENE SULFONATE 15 G/60ML
15 SUSPENSION ORAL; RECTAL ONCE
Status: DISCONTINUED | OUTPATIENT
Start: 2022-01-01 | End: 2022-01-01

## 2022-01-01 RX ORDER — FUROSEMIDE 10 MG/ML
40 INJECTION INTRAMUSCULAR; INTRAVENOUS DAILY
Status: DISCONTINUED | OUTPATIENT
Start: 2022-01-01 | End: 2022-01-01

## 2022-01-01 RX ORDER — OMEPRAZOLE 20 MG/1
20 CAPSULE, DELAYED RELEASE ORAL DAILY
Status: DISCONTINUED | OUTPATIENT
Start: 2022-01-01 | End: 2022-01-01 | Stop reason: HOSPADM

## 2022-01-01 RX ORDER — ONDANSETRON 4 MG/1
4 TABLET, ORALLY DISINTEGRATING ORAL EVERY 6 HOURS PRN
Status: DISCONTINUED | OUTPATIENT
Start: 2022-01-01 | End: 2022-01-01

## 2022-01-01 RX ORDER — FUROSEMIDE 10 MG/ML
80 INJECTION INTRAMUSCULAR; INTRAVENOUS ONCE
Status: COMPLETED | OUTPATIENT
Start: 2022-01-01 | End: 2022-01-01

## 2022-01-01 RX ORDER — BUDESONIDE AND FORMOTEROL FUMARATE DIHYDRATE 160; 4.5 UG/1; UG/1
2 AEROSOL RESPIRATORY (INHALATION) 2 TIMES DAILY
Status: DISCONTINUED | OUTPATIENT
Start: 2022-01-01 | End: 2022-01-01 | Stop reason: HOSPADM

## 2022-01-01 RX ORDER — DEXAMETHASONE 4 MG/1
6 TABLET ORAL DAILY
Status: DISCONTINUED | OUTPATIENT
Start: 2022-01-01 | End: 2022-01-01 | Stop reason: HOSPADM

## 2022-01-01 RX ORDER — SODIUM CHLORIDE 9 MG/ML
1000 INJECTION, SOLUTION INTRAVENOUS ONCE
Status: COMPLETED | OUTPATIENT
Start: 2022-01-01 | End: 2022-01-01

## 2022-01-01 RX ORDER — FUROSEMIDE 10 MG/ML
40 INJECTION INTRAMUSCULAR; INTRAVENOUS ONCE
Status: DISCONTINUED | OUTPATIENT
Start: 2022-01-01 | End: 2022-01-01

## 2022-01-01 RX ORDER — ONDANSETRON 2 MG/ML
4 INJECTION INTRAMUSCULAR; INTRAVENOUS EVERY 6 HOURS PRN
Status: DISCONTINUED | OUTPATIENT
Start: 2022-01-01 | End: 2022-01-01

## 2022-01-01 RX ORDER — ONDANSETRON 4 MG/1
4 TABLET, ORALLY DISINTEGRATING ORAL EVERY 4 HOURS PRN
Status: DISCONTINUED | OUTPATIENT
Start: 2022-01-01 | End: 2022-01-01 | Stop reason: HOSPADM

## 2022-01-01 RX ORDER — ACETAMINOPHEN 325 MG/1
325-650 TABLET ORAL EVERY 4 HOURS PRN
COMMUNITY

## 2022-01-01 RX ORDER — LOSARTAN POTASSIUM 25 MG/1
25 TABLET ORAL DAILY
Status: DISCONTINUED | OUTPATIENT
Start: 2022-01-01 | End: 2022-01-01

## 2022-01-01 RX ORDER — HEPARIN SODIUM 5000 [USP'U]/ML
5000 INJECTION, SOLUTION INTRAVENOUS; SUBCUTANEOUS EVERY 8 HOURS
Status: DISCONTINUED | OUTPATIENT
Start: 2022-01-01 | End: 2022-01-01 | Stop reason: HOSPADM

## 2022-01-01 RX ORDER — DEXTROSE MONOHYDRATE 25 G/50ML
50 INJECTION, SOLUTION INTRAVENOUS ONCE
Status: COMPLETED | OUTPATIENT
Start: 2022-01-01 | End: 2022-01-01

## 2022-01-01 RX ORDER — POLYETHYLENE GLYCOL 3350 17 G/17G
1 POWDER, FOR SOLUTION ORAL
Status: DISCONTINUED | OUTPATIENT
Start: 2022-01-01 | End: 2022-01-01 | Stop reason: HOSPADM

## 2022-01-01 RX ORDER — ACETAMINOPHEN 325 MG/1
650 TABLET ORAL EVERY 6 HOURS PRN
Status: DISCONTINUED | OUTPATIENT
Start: 2022-01-01 | End: 2022-01-01 | Stop reason: HOSPADM

## 2022-01-01 RX ORDER — SODIUM POLYSTYRENE SULFONATE 15 G/60ML
15 SUSPENSION ORAL; RECTAL ONCE
Status: COMPLETED | OUTPATIENT
Start: 2022-01-01 | End: 2022-01-01

## 2022-01-01 RX ORDER — SODIUM CHLORIDE 9 MG/ML
INJECTION, SOLUTION INTRAVENOUS CONTINUOUS
Status: DISCONTINUED | OUTPATIENT
Start: 2022-01-01 | End: 2022-01-01

## 2022-01-01 RX ORDER — FUROSEMIDE 40 MG/1
40 TABLET ORAL
Status: DISCONTINUED | OUTPATIENT
Start: 2022-01-01 | End: 2022-01-01 | Stop reason: HOSPADM

## 2022-01-01 RX ORDER — BISACODYL 10 MG
10 SUPPOSITORY, RECTAL RECTAL
Status: DISCONTINUED | OUTPATIENT
Start: 2022-01-01 | End: 2022-01-01 | Stop reason: HOSPADM

## 2022-01-01 RX ORDER — EPINEPHRINE 0.1 MG/ML
SYRINGE (ML) INJECTION
Status: COMPLETED | OUTPATIENT
Start: 2022-01-01 | End: 2022-01-01

## 2022-01-01 RX ORDER — SODIUM BICARBONATE IN D5W 150/1000ML
PLASTIC BAG, INJECTION (ML) INTRAVENOUS CONTINUOUS
Status: DISCONTINUED | OUTPATIENT
Start: 2022-01-01 | End: 2022-01-01

## 2022-01-01 RX ORDER — AMLODIPINE BESYLATE 5 MG/1
5 TABLET ORAL DAILY
COMMUNITY

## 2022-01-01 RX ORDER — GUAIFENESIN 600 MG/1
600 TABLET, EXTENDED RELEASE ORAL 2 TIMES DAILY PRN
Status: DISCONTINUED | OUTPATIENT
Start: 2022-01-01 | End: 2022-01-01 | Stop reason: HOSPADM

## 2022-01-01 RX ORDER — AMLODIPINE BESYLATE 5 MG/1
5 TABLET ORAL DAILY
Status: DISCONTINUED | OUTPATIENT
Start: 2022-01-01 | End: 2022-01-01

## 2022-01-01 RX ORDER — GUAIFENESIN/DEXTROMETHORPHAN 100-10MG/5
10 SYRUP ORAL EVERY 6 HOURS PRN
Status: DISCONTINUED | OUTPATIENT
Start: 2022-01-01 | End: 2022-01-01 | Stop reason: HOSPADM

## 2022-01-01 RX ORDER — DEXTROSE MONOHYDRATE 25 G/50ML
50 INJECTION, SOLUTION INTRAVENOUS
Status: DISCONTINUED | OUTPATIENT
Start: 2022-01-01 | End: 2022-01-01 | Stop reason: HOSPADM

## 2022-01-01 RX ORDER — LABETALOL HYDROCHLORIDE 5 MG/ML
10 INJECTION, SOLUTION INTRAVENOUS EVERY 4 HOURS PRN
Status: DISCONTINUED | OUTPATIENT
Start: 2022-01-01 | End: 2022-01-01 | Stop reason: HOSPADM

## 2022-01-01 RX ORDER — CALCIUM CHLORIDE 100 MG/ML
1 INJECTION INTRAVENOUS; INTRAVENTRICULAR ONCE
Status: DISCONTINUED | OUTPATIENT
Start: 2022-01-01 | End: 2022-01-01

## 2022-01-01 RX ADMIN — SODIUM CHLORIDE 1000 ML: 9 INJECTION, SOLUTION INTRAVENOUS at 08:39

## 2022-01-01 RX ADMIN — ATORVASTATIN CALCIUM 40 MG: 40 TABLET, FILM COATED ORAL at 17:08

## 2022-01-01 RX ADMIN — ACETAMINOPHEN 650 MG: 325 TABLET, FILM COATED ORAL at 08:39

## 2022-01-01 RX ADMIN — TIOTROPIUM BROMIDE INHALATION SPRAY 5 MCG: 3.12 SPRAY, METERED RESPIRATORY (INHALATION) at 06:10

## 2022-01-01 RX ADMIN — ALBUTEROL SULFATE 10 MG: 5 SOLUTION RESPIRATORY (INHALATION) at 21:28

## 2022-01-01 RX ADMIN — TIOTROPIUM BROMIDE INHALATION SPRAY 5 MCG: 3.12 SPRAY, METERED RESPIRATORY (INHALATION) at 05:39

## 2022-01-01 RX ADMIN — DEXTROSE MONOHYDRATE 50 ML: 25 INJECTION, SOLUTION INTRAVENOUS at 20:40

## 2022-01-01 RX ADMIN — OMEPRAZOLE 20 MG: 20 CAPSULE, DELAYED RELEASE ORAL at 05:32

## 2022-01-01 RX ADMIN — CALCIUM CHLORIDE 1000 MG: 100 INJECTION, SOLUTION INTRAVENOUS at 11:11

## 2022-01-01 RX ADMIN — BUDESONIDE AND FORMOTEROL FUMARATE DIHYDRATE 2 PUFF: 160; 4.5 AEROSOL RESPIRATORY (INHALATION) at 05:31

## 2022-01-01 RX ADMIN — SENNOSIDES AND DOCUSATE SODIUM 2 TABLET: 50; 8.6 TABLET ORAL at 16:41

## 2022-01-01 RX ADMIN — SODIUM POLYSTYRENE SULFONATE 15 G: 15 SUSPENSION ORAL; RECTAL at 12:34

## 2022-01-01 RX ADMIN — EPINEPHRINE 1 MG: 0.1 INJECTION, SOLUTION INTRAVENOUS at 00:35

## 2022-01-01 RX ADMIN — HEPARIN SODIUM 5000 UNITS: 5000 INJECTION, SOLUTION INTRAVENOUS; SUBCUTANEOUS at 13:30

## 2022-01-01 RX ADMIN — HEPARIN SODIUM 5000 UNITS: 5000 INJECTION, SOLUTION INTRAVENOUS; SUBCUTANEOUS at 14:38

## 2022-01-01 RX ADMIN — FUROSEMIDE 40 MG: 10 INJECTION, SOLUTION INTRAMUSCULAR; INTRAVENOUS at 13:30

## 2022-01-01 RX ADMIN — HEPARIN SODIUM 5000 UNITS: 5000 INJECTION, SOLUTION INTRAVENOUS; SUBCUTANEOUS at 06:07

## 2022-01-01 RX ADMIN — BARICITINIB 4 MG: 2 TABLET, FILM COATED ORAL at 16:41

## 2022-01-01 RX ADMIN — SODIUM BICARBONATE: 84 INJECTION, SOLUTION INTRAVENOUS at 16:23

## 2022-01-01 RX ADMIN — FUROSEMIDE 40 MG: 10 INJECTION, SOLUTION INTRAMUSCULAR; INTRAVENOUS at 06:07

## 2022-01-01 RX ADMIN — HEPARIN SODIUM 5000 UNITS: 5000 INJECTION, SOLUTION INTRAVENOUS; SUBCUTANEOUS at 22:38

## 2022-01-01 RX ADMIN — HEPARIN SODIUM 5000 UNITS: 5000 INJECTION, SOLUTION INTRAVENOUS; SUBCUTANEOUS at 06:14

## 2022-01-01 RX ADMIN — EPINEPHRINE 1 MG: 0.1 INJECTION, SOLUTION INTRAVENOUS at 00:39

## 2022-01-01 RX ADMIN — SODIUM CHLORIDE: 9 INJECTION, SOLUTION INTRAVENOUS at 09:57

## 2022-01-01 RX ADMIN — FUROSEMIDE 80 MG: 10 INJECTION, SOLUTION INTRAMUSCULAR; INTRAVENOUS at 16:57

## 2022-01-01 RX ADMIN — SODIUM BICARBONATE: 84 INJECTION, SOLUTION INTRAVENOUS at 20:39

## 2022-01-01 RX ADMIN — OMEPRAZOLE 20 MG: 20 CAPSULE, DELAYED RELEASE ORAL at 06:08

## 2022-01-01 RX ADMIN — BUDESONIDE AND FORMOTEROL FUMARATE DIHYDRATE 2 PUFF: 160; 4.5 AEROSOL RESPIRATORY (INHALATION) at 06:08

## 2022-01-01 RX ADMIN — FUROSEMIDE 40 MG: 40 TABLET ORAL at 15:47

## 2022-01-01 RX ADMIN — THIAMINE HYDROCHLORIDE 100 MG: 100 INJECTION, SOLUTION INTRAMUSCULAR; INTRAVENOUS at 06:00

## 2022-01-01 RX ADMIN — ATORVASTATIN CALCIUM 40 MG: 40 TABLET, FILM COATED ORAL at 23:39

## 2022-01-01 RX ADMIN — OMEPRAZOLE 20 MG: 20 CAPSULE, DELAYED RELEASE ORAL at 11:11

## 2022-01-01 RX ADMIN — BUDESONIDE AND FORMOTEROL FUMARATE DIHYDRATE 2 PUFF: 160; 4.5 AEROSOL RESPIRATORY (INHALATION) at 17:09

## 2022-01-01 RX ADMIN — HEPARIN SODIUM 5000 UNITS: 5000 INJECTION, SOLUTION INTRAVENOUS; SUBCUTANEOUS at 22:25

## 2022-01-01 RX ADMIN — BUDESONIDE AND FORMOTEROL FUMARATE DIHYDRATE 2 PUFF: 160; 4.5 AEROSOL RESPIRATORY (INHALATION) at 16:40

## 2022-01-01 RX ADMIN — ATORVASTATIN CALCIUM 40 MG: 40 TABLET, FILM COATED ORAL at 16:41

## 2022-01-01 RX ADMIN — ACETAMINOPHEN 650 MG: 325 TABLET, FILM COATED ORAL at 11:04

## 2022-01-01 RX ADMIN — CALCIUM GLUCONATE 2 G: 20 INJECTION, SOLUTION INTRAVENOUS at 16:23

## 2022-01-01 RX ADMIN — HEPARIN SODIUM 5000 UNITS: 5000 INJECTION, SOLUTION INTRAVENOUS; SUBCUTANEOUS at 13:14

## 2022-01-01 RX ADMIN — CALCIUM GLUCONATE 2 G: 20 INJECTION, SOLUTION INTRAVENOUS at 20:27

## 2022-01-01 RX ADMIN — FUROSEMIDE 40 MG: 40 TABLET ORAL at 08:27

## 2022-01-01 RX ADMIN — BARICITINIB 2 MG: 2 TABLET, FILM COATED ORAL at 17:09

## 2022-01-01 RX ADMIN — BUDESONIDE AND FORMOTEROL FUMARATE DIHYDRATE 2 PUFF: 160; 4.5 AEROSOL RESPIRATORY (INHALATION) at 06:09

## 2022-01-01 RX ADMIN — HEPARIN SODIUM 5000 UNITS: 5000 INJECTION, SOLUTION INTRAVENOUS; SUBCUTANEOUS at 05:32

## 2022-01-01 RX ADMIN — SODIUM CHLORIDE: 9 INJECTION, SOLUTION INTRAVENOUS at 14:33

## 2022-01-01 RX ADMIN — BUDESONIDE AND FORMOTEROL FUMARATE DIHYDRATE 2 PUFF: 160; 4.5 AEROSOL RESPIRATORY (INHALATION) at 23:39

## 2022-01-01 RX ADMIN — HEPARIN SODIUM 5000 UNITS: 5000 INJECTION, SOLUTION INTRAVENOUS; SUBCUTANEOUS at 22:00

## 2022-01-01 RX ADMIN — SODIUM BICARBONATE 50 MEQ: 84 INJECTION, SOLUTION INTRAVENOUS at 00:30

## 2022-01-01 RX ADMIN — THIAMINE HYDROCHLORIDE 100 MG: 100 INJECTION, SOLUTION INTRAMUSCULAR; INTRAVENOUS at 02:27

## 2022-01-01 RX ADMIN — SODIUM CHLORIDE 1000 ML: 9 INJECTION, SOLUTION INTRAVENOUS at 00:45

## 2022-01-01 RX ADMIN — DEXAMETHASONE 6 MG: 4 TABLET ORAL at 23:39

## 2022-01-01 RX ADMIN — EPINEPHRINE 1 MG: 0.1 INJECTION, SOLUTION INTRAVENOUS at 00:31

## 2022-01-01 RX ADMIN — DEXAMETHASONE 6 MG: 4 TABLET ORAL at 06:07

## 2022-01-01 RX ADMIN — SODIUM CHLORIDE 1000 ML: 9 INJECTION, SOLUTION INTRAVENOUS at 20:27

## 2022-01-01 RX ADMIN — SODIUM BICARBONATE 50 MEQ: 84 INJECTION, SOLUTION INTRAVENOUS at 11:07

## 2022-01-01 RX ADMIN — DEXAMETHASONE 6 MG: 4 TABLET ORAL at 05:32

## 2022-01-01 RX ADMIN — TIOTROPIUM BROMIDE INHALATION SPRAY 5 MCG: 3.12 SPRAY, METERED RESPIRATORY (INHALATION) at 06:08

## 2022-01-01 RX ADMIN — INSULIN HUMAN 6.5 UNITS: 100 INJECTION, SOLUTION PARENTERAL at 20:41

## 2022-01-01 RX ADMIN — SENNOSIDES AND DOCUSATE SODIUM 2 TABLET: 50; 8.6 TABLET ORAL at 17:09

## 2022-01-01 RX ADMIN — SODIUM CHLORIDE 1000 ML: 9 INJECTION, SOLUTION INTRAVENOUS at 18:45

## 2022-01-01 RX ADMIN — BUDESONIDE AND FORMOTEROL FUMARATE DIHYDRATE 2 PUFF: 160; 4.5 AEROSOL RESPIRATORY (INHALATION) at 16:41

## 2022-01-01 ASSESSMENT — COPD QUESTIONNAIRES: HAVE YOU SMOKED AT LEAST 100 CIGARETTES IN YOUR ENTIRE LIFE: YES

## 2022-01-01 ASSESSMENT — ENCOUNTER SYMPTOMS
MYALGIAS: 1
SHORTNESS OF BREATH: 0
NERVOUS/ANXIOUS: 1
SENSORY CHANGE: 0
SENSORY CHANGE: 0
WHEEZING: 0
ABDOMINAL PAIN: 0
DIARRHEA: 0
COUGH: 1
NAUSEA: 0
HEADACHES: 0
ABDOMINAL PAIN: 0
COUGH: 1
BACK PAIN: 1
CHILLS: 1
NERVOUS/ANXIOUS: 1
WHEEZING: 0
VOMITING: 0
SHORTNESS OF BREATH: 1
PALPITATIONS: 0
DIZZINESS: 0
WHEEZING: 0
SHORTNESS OF BREATH: 1
FALLS: 1
ABDOMINAL PAIN: 0
COUGH: 1
SHORTNESS OF BREATH: 1
CHILLS: 1
ABDOMINAL PAIN: 0
SHORTNESS OF BREATH: 0
FEVER: 0
SHORTNESS OF BREATH: 1
BACK PAIN: 1
DIARRHEA: 0
BACK PAIN: 1
SORE THROAT: 0
CHILLS: 1
NERVOUS/ANXIOUS: 1
HEADACHES: 0
WEAKNESS: 1
FEVER: 1
BACK PAIN: 1
SPUTUM PRODUCTION: 0
SPUTUM PRODUCTION: 0
SENSORY CHANGE: 0
DIZZINESS: 0
DIARRHEA: 0
VOMITING: 0
DIZZINESS: 1
SORE THROAT: 0
HEARTBURN: 0
VOMITING: 0
NAUSEA: 0
DIZZINESS: 0
NAUSEA: 0
FEVER: 0
HEADACHES: 0
SORE THROAT: 0
NAUSEA: 0
HEMOPTYSIS: 1
FEVER: 0
SPUTUM PRODUCTION: 0
VOMITING: 0
CONSTIPATION: 0
PALPITATIONS: 0
PALPITATIONS: 0

## 2022-01-01 ASSESSMENT — PAIN DESCRIPTION - PAIN TYPE
TYPE: ACUTE PAIN

## 2022-01-01 ASSESSMENT — PATIENT HEALTH QUESTIONNAIRE - PHQ9
1. LITTLE INTEREST OR PLEASURE IN DOING THINGS: NOT AT ALL
1. LITTLE INTEREST OR PLEASURE IN DOING THINGS: NOT AT ALL
SUM OF ALL RESPONSES TO PHQ9 QUESTIONS 1 AND 2: 0
SUM OF ALL RESPONSES TO PHQ9 QUESTIONS 1 AND 2: 0
2. FEELING DOWN, DEPRESSED, IRRITABLE, OR HOPELESS: NOT AT ALL
2. FEELING DOWN, DEPRESSED, IRRITABLE, OR HOPELESS: NOT AT ALL

## 2022-01-01 ASSESSMENT — FIBROSIS 4 INDEX
FIB4 SCORE: 5.04
FIB4 SCORE: 2.59
FIB4 SCORE: 3.62
FIB4 SCORE: 2.73

## 2022-01-01 ASSESSMENT — LIFESTYLE VARIABLES
ON A TYPICAL DAY WHEN YOU DRINK ALCOHOL HOW MANY DRINKS DO YOU HAVE: 0
ALCOHOL_USE: NO
AVERAGE NUMBER OF DAYS PER WEEK YOU HAVE A DRINK CONTAINING ALCOHOL: 0
EVER FELT BAD OR GUILTY ABOUT YOUR DRINKING: NO
HOW MANY TIMES IN THE PAST YEAR HAVE YOU HAD 5 OR MORE DRINKS IN A DAY: 0
TOTAL SCORE: 0
HAVE YOU EVER FELT YOU SHOULD CUT DOWN ON YOUR DRINKING: NO
TOTAL SCORE: 0
TOTAL SCORE: 0
EVER HAD A DRINK FIRST THING IN THE MORNING TO STEADY YOUR NERVES TO GET RID OF A HANGOVER: NO
CONSUMPTION TOTAL: NEGATIVE
HAVE PEOPLE ANNOYED YOU BY CRITICIZING YOUR DRINKING: NO

## 2022-01-12 PROBLEM — U07.1 COVID: Status: ACTIVE | Noted: 2022-01-01

## 2022-01-12 PROBLEM — U07.1 ACUTE RESPIRATORY FAILURE DUE TO COVID-19 (HCC): Status: ACTIVE | Noted: 2022-01-01

## 2022-01-12 PROBLEM — E87.5 HYPERKALEMIA: Status: ACTIVE | Noted: 2022-01-01

## 2022-01-12 PROBLEM — J96.20 ACUTE ON CHRONIC RESPIRATORY FAILURE (HCC): Status: ACTIVE | Noted: 2021-01-01

## 2022-01-12 PROBLEM — J96.00 ACUTE RESPIRATORY FAILURE DUE TO COVID-19 (HCC): Status: ACTIVE | Noted: 2022-01-01

## 2022-01-13 PROBLEM — D61.818 PANCYTOPENIA (HCC): Status: ACTIVE | Noted: 2021-01-01

## 2022-01-13 PROBLEM — E87.20 LACTIC ACIDOSIS: Status: ACTIVE | Noted: 2022-01-01

## 2022-01-13 NOTE — ASSESSMENT & PLAN NOTE
Possible related to COVID versus dehydration  Avoid nephrotoxic medications  Avoid metformin  Continue IV fluid  Renal ultrasound to follow if no improvement  1/15: Cr 1.6, decrease lasix from IV to PO

## 2022-01-13 NOTE — ASSESSMENT & PLAN NOTE
- Duonebs, O2 therapy, and proning as needed  - Decadron 6mg qd  - Holding abx, procalcitonin is normal  - f/u Blood Cultures, Sputum Cx no growth to date  - Check D-dimer slightly elevated.  DVT study is negative  , CRP 7.14  Lactic Acid significantly elevated.  Not sure if related to COVID or MIRIAM or even lactic acidosis from metformin??  -Heparin for DVT prophylaxis

## 2022-01-13 NOTE — ED NOTES
from Lab called with critical result of lactic acid 6.3 at 0814. Critical lab result read back to .   Dr. Jurado notified of critical lab result at 0816.  Critical lab result read back by Dr. Jurado.

## 2022-01-13 NOTE — ED TRIAGE NOTES
Chief Complaint   Patient presents with   • Dizziness     Pt BIB EMS, report that pt has had episodes of feeing dizzy. pt found by fire dept to be hypotensive 76/47 on scene, pt is V-paced/LBBB.  PTA, pt given 200mL NS PTA, BP now 124/70     ED tech at bedside for ekg.

## 2022-01-13 NOTE — RESPIRATORY CARE
COPD EDUCATION by COPD CLINICAL EDUCATOR  1/13/2022 at 6:25 AM by Rajni Fiore RRT     Patient reviewed by COPD education team. Patient does not have a history or diagnosis of COPD and is a non-smoker.  Therefore, patient does not qualify for the COPD program.    Per Patient PFT Lung function testing completed on February 17, 2021.  Patient had evidence of moderate restriction, total lung capacity is 2.9 L, 55% predicted.  Reduced oxygen transfer is noted at 49%, consistent with clinical diagnosis of interstitial lung disease

## 2022-01-13 NOTE — H&P
Hospital Medicine History & Physical Note    Date of Service  1/12/2022    Primary Care Physician  Rashmi Cedeno P.A.-C.    Consultants      Code Status  Full Code    Chief Complaint  Chief Complaint   Patient presents with   • Dizziness     Pt BIB EMS, report that pt has had episodes of feeing dizzy. pt found by fire dept to be hypotensive 76/47 on scene, pt is V-paced/LBBB.  PTA, pt given 200mL NS PTA, BP now 124/70       History of Presenting Illness  Chase Harris is a 71 y.o. male with past medical hx of endarterectomy and pacemaker in past month, worsening sob, on 3L oxygen baseline at home 2/2 hx of pulm fibrosis, DM2,  who presented 1/12/2022 with 24 hour of dizziness and recent falls at home per family. Patient was brought in to the ED by EMS and was found hypotensive at 76/47 on scene. The patient admits to associated symptoms of subjective fever, mild loss of appetite, head pain, back pain and weakness. The patient's family members states that the patient was exposed to two sick kids at home whom both had fevers and cough.     Notable findings include: Sodium 134, potassium 6.0, GFR 43, glucose 279, COVID-positive, troponin 146, A1c 9.7, hemoglobin 8.2, platelets 105, lactic acid 3.5    Chest x-ray showing,Interstitial and alveolar airspace opacities are not significantly changed compared to prior. Findings may represent interstitial edema or multifocal pneumonia. Underlying chronic fibrotic changes not excluded.  2.  Trace left pleural effusion not excluded.  3.  Atherosclerotic plaque.  4.  Subsegmental right middle lobe atelectasis.  5.  Mild cardiomegaly.    I discussed the plan of care with patient.    Review of Systems  Review of Systems   Constitutional: Positive for malaise/fatigue.   Respiratory: Positive for shortness of breath.    Musculoskeletal: Positive for back pain, falls and myalgias.   Neurological: Positive for dizziness and weakness (generalized).   All other systems  reviewed and are negative.      Past Medical History   has a past medical history of Diabetes (HCC), Diabetes mellitus type II, uncontrolled (HCC) (2020), and Pulmonary fibrosis (HCC).    Surgical History   has a past surgical history that includes other abdominal surgery and other.     Family History  family history includes Diabetes in his brother, father, and mother.   Family history reviewed with patient. There is no family history that is pertinent to the chief complaint.     Social History   reports that he quit smoking about 27 years ago. His smoking use included cigarettes. He has a 80.00 pack-year smoking history. He has never used smokeless tobacco. He reports previous alcohol use. He reports that he does not use drugs.    Allergies  Allergies   Allergen Reactions   • Beta Adrenergic Blockers      Heart block        Medications  Prior to Admission Medications   Prescriptions Last Dose Informant Patient Reported? Taking?   Albuterol Sulfate 108 (90 Base) MCG/ACT AEROSOL POWDER, BREATH ACTIVATED prn at prn  Yes No   Si Puff every 6 hours as needed.   Blood Glucose Meter Kit supply at supply  Patient's Home Pharmacy No No   Sig: Test blood sugar as recommended by provider.blood glucose monitoring kit. BID   Blood Glucose Test Strips supply at supply Patient's Home Pharmacy No No   Sig: One test strip BID   Empagliflozin (JARDIANCE) 10 MG Tab 2022 at am Patient's Home Pharmacy No No   Sig: Take 10 mg by mouth every day.   Home Care Oxygen supply at supply  Yes No   Sig: as needed   Lancets (ONETOUCH DELICA PLUS XTVDOT37F) Misc supply at supply Patient's Home Pharmacy No No   Sig: USE TO TEST TWICE DAILY   SITagliptin (JANUVIA) 25 MG Tab ~1 week at k Patient's Home Pharmacy No No   Sig: Take 1 Tab by mouth every day.   acetaminophen (TYLENOL) 325 MG Tab prn at prn  Yes Yes   Sig: Take 325-650 mg by mouth every four hours as needed for Mild Pain.   amLODIPine (NORVASC) 5 MG Tab 2022 at am   Yes No   Sig: Take 5 mg by mouth every day.   atorvastatin (LIPITOR) 40 MG Tab 1/11/2022 at pm Patient's Home Pharmacy No No   Sig: Take 1 tablet by mouth every evening.   budesonide-formoterol (SYMBICORT) 160-4.5 MCG/ACT Aerosol 1/12/2022 at am Patient's Home Pharmacy Yes No   Sig: Inhale 2 Puffs 2 Times a Day.   clopidogrel (PLAVIX) 75 MG Tab ~1 week at [facility]  Yes No   Sig: Take 75 mg by mouth every day.   losartan (COZAAR) 25 MG Tab 1/12/2022 at am Patient's Home Pharmacy No No   Sig: Take 1 tablet by mouth every day.   metformin (GLUCOPHAGE) 1000 MG tablet 1/12/2022 at am Patient's Home Pharmacy Yes No   Sig: Take 1,000 mg by mouth 2 Times a Day.   tiotropium (SPIRIVA HANDIHALER) 18 MCG Cap 1/12/2022 at am Patient's Home Pharmacy No No   Sig: Inhale 1 Cap by mouth every day.      Facility-Administered Medications: None       Physical Exam  Temp:  [36.8 °C (98.3 °F)] 36.8 °C (98.3 °F)  Pulse:  [60-91] 76  Resp:  [14-22] 15  BP: (111-134)/(56-70) 134/60  SpO2:  [84 %-100 %] 84 %  Blood Pressure : 111/56   Temperature: 36.8 °C (98.3 °F)   Pulse: 62   Respiration: 19   Pulse Oximetry: 98 %       Physical Exam     Constitutional: acute respiratory distress,  son is bedside  HENT: Normocephalic, no obvious evidence of acute trauma.  Eyes: No scleral icterus. Normal conjunctiva   Neck: Comfortable movement without any obvious restriction in the range of motion.  Cardiovascular: Upon ascultation I appreciate a regular heart rhythm and a normal rate with 3/6 pansystolic murmur with closing snap, no rubs or gallops  Thorax & Lungs: acute respiratory distress. Coarse breath sounds throughout.  there is no obvious chest wall tenderness. I appreciate normal air movement throughout.   Abdomen: The abdomen is not visibly distended. Upon palpation, I find it to be without tenderness.  No mass appreciated.  Skin: The exposed portions of skin reveal no obvious rash or other abnormalities.  Extremities/Musculoskeletal: no lower  extremity edema with no asymmetry.  Neurologic: Alert & oriented. No focal deficits observed.   Psychiatric: Normal affect appropriate for the clinical situation.    Laboratory:  Recent Labs     01/12/22  1839   WBC 6.7   RBC 2.68*   HEMOGLOBIN 8.2*   HEMATOCRIT 26.2*   MCV 97.8   MCH 30.6   MCHC 31.3*   RDW 51.6*   PLATELETCT 105*   MPV 13.3*     Recent Labs     01/12/22  1839   SODIUM 134*   POTASSIUM 6.0*   CHLORIDE 99   CO2 21   GLUCOSE 278*   BUN 42*   CREATININE 1.60*   CALCIUM 9.0     Recent Labs     01/12/22  1839   ALTSGPT 19   ASTSGOT 18   ALKPHOSPHAT 87   TBILIRUBIN 0.2   GLUCOSE 278*     Recent Labs     01/12/22  1839   INR 1.15*     No results for input(s): NTPROBNP in the last 72 hours.      Recent Labs     01/12/22  1839   TROPONINT 146*       Imaging:  DX-CHEST-PORTABLE (1 VIEW)   Final Result      1.  Interstitial and alveolar airspace opacities are not significantly changed compared to prior. Findings may represent interstitial edema or multifocal pneumonia. Underlying chronic fibrotic changes not excluded.   2.  Trace left pleural effusion not excluded.   3.  Atherosclerotic plaque.   4.  Subsegmental right middle lobe atelectasis.   5.  Mild cardiomegaly.            Assessment/Plan:  I anticipate this patient will require at least two midnights for appropriate medical management, necessitating inpatient admission.    COVID- (present on admission)  Assessment & Plan  - Conservative use of IVF  - Duonebs, O2 therapy, and proning as needed  Lasix as needed to remove excess fluid.  - Decadron 6mg qd  - Holding abx, f/u w/ Procal  - f/u Blood Cultures, Sputum Cx  - Check D-dimer, CRP, Lactic Acid  - Lovenox held due to thrombocytopenia  - Consider ID consult for Remdesivir//Tocolizumab    Acute on chronic respiratory failure (HCC)  Assessment & Plan  On 3L oxygen at home baseline oxygen 2/2 pulmonary fibrosis  Requiring 5L oxygen on admission  Oxygen per protocol  RT      Hyperkalemia- (present on  admission)  Assessment & Plan  Lasix stat ordered on admission  K 6.0 on admission  Continue to monitor    Anemia- (present on admission)  Assessment & Plan  Hgb 8.2 on admission  Iron panel ordered to assess  Continue to monitor H & H    DM2 (diabetes mellitus, type 2) (HCC)  Assessment & Plan  ISS  Accuchecks  A1c 9.7     Elevated troponin- (present on admission)  Assessment & Plan  Troponin 146 on admission  Repeat troponin and EKG's ordered         VTE prophylaxis: SCDs/TEDs

## 2022-01-13 NOTE — ED NOTES
Med rec completed per pt's son at bedside. Per pt's son, pt has gurpreet without Plavix/Januvia for about a week.   Allergies reviewed.

## 2022-01-13 NOTE — ED PROVIDER NOTES
ED Provider Note    Scribed for Chyna Hernandez M.D. by Luisito Polk. 1/12/2022, 5:37 PM.    Primary care provider: Rashmi Cedeno P.A.-C.  Means of arrival: EMS  History obtained from: Patient and family member  History limited by: None    CHIEF COMPLAINT  Chief Complaint   Patient presents with   • Dizziness     Pt BIB EMS, report that pt has had episodes of feeing dizzy. pt found by fire dept to be hypotensive 76/47 on scene, pt is V-paced/LBBB.  PTA, pt given 200mL NS PTA, BP now 124/70       HPI  Chase Harris is a 71 y.o. male who presents to the Emergency Department for evaluation of dizziness onset yesterday. The patient was brought in by EMS and was found hypotensive at 76/47 on scene. The patient admits to associated symptoms of subjective fever, mild loss of appetite, head pain, back pain and weakness, but denies dizziness. No alleviating factors were reported. The patient's family members states that the patient was exposed to two sick kids at home whom both had fevers and cough. The patient is currently on Metformin. Per the family member, the paitent fell yesterday but did not hit his head.    Patient reports that he does have a history of recurrent dizziness and was recently hospitalized and had a pacemaker and endarterectomy which significantly helped his chronic dizziness.    REVIEW OF SYSTEMS  Review of Systems   Constitutional: Positive for fever (  subjective).   Respiratory: Negative for shortness of breath.    Cardiovascular: Negative for chest pain.   Gastrointestinal: Negative for abdominal pain, nausea and vomiting.   Neurological: Positive for dizziness and weakness.   All other systems reviewed and are negative.      PAST MEDICAL HISTORY   has a past medical history of Diabetes (HCC), Diabetes mellitus type II, uncontrolled (HCC) (5/23/2020), and Pulmonary fibrosis (HCC).    SURGICAL HISTORY   has a past surgical history that includes other abdominal surgery and  "other.    SOCIAL HISTORY  Social History     Tobacco Use   • Smoking status: Former Smoker     Packs/day: 2.00     Years: 40.00     Pack years: 80.00     Types: Cigarettes     Quit date:      Years since quittin.0   • Smokeless tobacco: Never Used   Vaping Use   • Vaping Use: Never used   Substance Use Topics   • Alcohol use: Not Currently   • Drug use: Never      Social History     Substance and Sexual Activity   Drug Use Never       FAMILY HISTORY  Family History   Problem Relation Age of Onset   • Diabetes Mother    • Diabetes Father    • Diabetes Brother        CURRENT MEDICATIONS  See medication list  ALLERGIES  Allergies   Allergen Reactions   • Beta Adrenergic Blockers      Heart block        PHYSICAL EXAM  VITAL SIGNS: /70   Pulse 91   Temp 36.8 °C (98.3 °F) (Temporal)   Resp 14   Ht 1.575 m (5' 2\")   Wt 63.5 kg (140 lb)   SpO2 99%   BMI 25.61 kg/m²   Vitals reviewed by myself.  Physical Exam   Nursing note and vitals reviewed.  Constitutional: Well-developed and well-nourished.  Ill-appearing  HENT: Head is normocephalic and atraumatic.  Eyes: extra-ocular movements intact  Cardiovascular: Normal rate and regular rhythm. No murmur heard.  Pulmonary/Chest: Coarse rhonchi in all lung fields  Abdominal: Soft and non-tender. No distention.    Musculoskeletal: Extremities exhibit normal range of motion without edema or tenderness.   Neurological: Awake and alert  Skin: Skin is warm and dry. No rash.     DIAGNOSTIC STUDIES /  LABS  Labs Reviewed   CBC WITH DIFFERENTIAL - Abnormal; Notable for the following components:       Result Value    RBC 2.68 (*)     Hemoglobin 8.2 (*)     Hematocrit 26.2 (*)     MCHC 31.3 (*)     RDW 51.6 (*)     Platelet Count 105 (*)     MPV 13.3 (*)     Neutrophils-Polys 86.10 (*)     Lymphocytes 5.20 (*)     Lymphs (Absolute) 0.35 (*)     All other components within normal limits    Narrative:     Collected By:   COMP METABOLIC PANEL - Abnormal; Notable for the " following components:    Sodium 134 (*)     Potassium 6.0 (*)     Glucose 278 (*)     Bun 42 (*)     Creatinine 1.60 (*)     Globulin 3.7 (*)     All other components within normal limits    Narrative:     Collected By:   LACTIC ACID - Abnormal; Notable for the following components:    Lactic Acid 3.5 (*)     All other components within normal limits    Narrative:     Collected By:   VENOUS BLOOD GAS - Abnormal; Notable for the following components:    Venous Bg Po2 12.3 (*)     All other components within normal limits    Narrative:     Collected By:   COV-2, FLU A/B, AND RSV BY PCR - Abnormal; Notable for the following components:    SARS-CoV-2 by PCR DETECTED (*)     All other components within normal limits    Narrative:     Collected By:  Have you been in close contact with a person who is suspected  or known to be positive for COVID-19 within the last 30 days  (e.g. last seen that person < 30 days ago)->No   HEMOGLOBIN A1C - Abnormal; Notable for the following components:    Glycohemoglobin 9.7 (*)     All other components within normal limits    Narrative:     Collected By:   TROPONIN - Abnormal; Notable for the following components:    Troponin T 146 (*)     All other components within normal limits    Narrative:     Collected By:   ESTIMATED GFR - Abnormal; Notable for the following components:    GFR If  52 (*)     GFR If Non  43 (*)     All other components within normal limits    Narrative:     Collected By:   PROTHROMBIN TIME - Abnormal; Notable for the following components:    INR 1.15 (*)     All other components within normal limits    Narrative:     Enhanced Droplet, Contact, and Eye Protection  Collected By:  If not done within the last 4 hours  Indicate which anticoagulants the patient is on:->UNKNOWN   D-DIMER - Abnormal; Notable for the following components:    D-Dimer Screen 1.63 (*)     All other components within normal limits    Narrative:     Enhanced  "Droplet, Contact, and Eye Protection  Collected By:  If not done within the last 4 hours  Indicate which anticoagulants the patient is on:->UNKNOWN   POCT GLUCOSE DEVICE RESULTS - Abnormal; Notable for the following components:    Glucose - Accu-Ck 234 (*)     All other components within normal limits   POCT GLUCOSE DEVICE RESULTS - Abnormal; Notable for the following components:    Glucose - Accu-Ck 279 (*)     All other components within normal limits   BETA-HYDROXYBUTYRIC ACID    Narrative:     Collected By:   BLOOD CULTURE    Narrative:     Collected By:  Per Hospital Policy: Only change Specimen Src: to \"Line\" if  specified by physician order.   BLOOD CULTURE    Narrative:     Collected By:  Per Hospital Policy: Only change Specimen Src: to \"Line\" if  specified by physician order.   PROCALCITONIN    Narrative:     Collected By:   DIFFERENTIAL MANUAL    Narrative:     Collected By:   PERIPHERAL SMEAR REVIEW    Narrative:     Collected By:   PLATELET ESTIMATE    Narrative:     Collected By:   MORPHOLOGY    Narrative:     Collected By:   URINALYSIS   URINE CULTURE(NEW)   BASIC METABOLIC PANEL   LACTIC ACID   LACTIC ACID   BLOOD CULTURE   BLOOD CULTURE   PROCALCITONIN   CRP QUANTITIVE (NON-CARDIAC)   MAGNESIUM   POCT GLUCOSE   POCT GLUCOSE   POCT GLUCOSE   POCT GLUCOSE       All labs reviewed by me.    EKG Interpretation:  Interpreted by myself    12 Lead EKG interpreted by me to show:  EKG at 5:26 PM: Atrial paced rhythm, heart rate 67, left axis deviation, intervals notable for prolonged QRS of 146 consistent with paced rhythm, , QTc 461, no acute ST-T segment changes, no dynamic changes when compared to prior EKG  My impression of this EKG: Does not indicate ischemia or arrhythmia at this time.    RADIOLOGY  DX-CHEST-PORTABLE (1 VIEW)   Final Result      1.  Interstitial and alveolar airspace opacities are not significantly changed compared to prior. Findings may represent interstitial edema or multifocal " pneumonia. Underlying chronic fibrotic changes not excluded.   2.  Trace left pleural effusion not excluded.   3.  Atherosclerotic plaque.   4.  Subsegmental right middle lobe atelectasis.   5.  Mild cardiomegaly.        The radiologist's interpretation of all radiological studies have been reviewed by me.        REASSESSMENT  5:37 PM - Patient was evaluated at bedside. Patient was treated for his symptoms and discussed with the patient that we will order labs and imaging to further evaluate his symptoms. Patient verbalized understanding of the plan.    HYDRATION: Based on the patient's presentation of Hypotension the patient was given IV fluids. IV Hydration was used because oral hydration was not adequate alone. Upon recheck following hydration, the patient was improved.        COURSE & MEDICAL DECISION MAKING  Nursing notes, VS, PMSFHx reviewed in chart.    Patient is a 71-year-old male who comes in for evaluation of dizziness.  Differential diagnosis includes dehydration, electrolyte disturbance, viral syndrome, pneumonia, arrhythmia.  Diagnostic work-up includes labs, viral swab, chest x-ray.    Patient's initial vitals are within normal limits on arrival.  Given his hypotension in the field and clinical appearance of dehydration he is treated with IV fluids.  EKG returns and demonstrates paced rhythm without acute findings.  Labs returned and patient is noted to be anemic with a hemoglobin of 8.2, has chronic anemia with a baseline hemoglobin of 9, denies any acute bleeding.Patient's troponin is elevated at 146, unclear if this is related to recent pacemaker placement, patient is not having any cardiac symptoms at this time.  Labs are also notable for acute kidney injury with a creatinine of 1.6 and BUN of 42, consistent with dehydration patient treated with IV fluids.  Patient also noted to be hyperkalemic which is treated with IV fluids, insulin, dextrose and albuterol.  Patient will be hospitalized for  acute kidney injury and hyperkalemia with associated dizziness, this is all likely secondary to dehydration.  Of note his COVID swab did return and is positive which is likely contributing to his symptoms.  Discussed case with hospitalist Dr. Avendano who has accepted patient for hospitalization.  Patient is in guarded condition.      FINAL IMPRESSION  1. Dizziness    2. COVID-19    3. MIRIAM (acute kidney injury) (HCC)    4. Dehydration    5. Hyperkalemia          Luisito FRENANDEZ (Scribe), am scribing for, and in the presence of, Chyna Hernandez M.D..    Electronically signed by: Luisito Polk (Scribe), 1/12/2022    IChyna M.D. personally performed the services described in this documentation, as scribed by Luisito Polk in my presence, and it is both accurate and complete.    The note accurately reflects work and decisions made by me.  Chyna Hernandez M.D.  1/12/2022  10:55 PM

## 2022-01-13 NOTE — ASSESSMENT & PLAN NOTE
Acute on chronic respiratory failure due to COVID   RT protocol  Needs to be on IV fluid due to acute on chronic CKD and lactic acidosis

## 2022-01-13 NOTE — ASSESSMENT & PLAN NOTE
On 3L oxygen at home baseline oxygen 2/2 pulmonary fibrosis  Requiring 5-6L oxygen on admission  Oxygen per protocol  RT  Decadron due to COvid  + rales all over   procal is normal . No need for antibiotic   Worsening patient's respiratory status.  Currently on high flow nasal cannula.  We will start the patient on Lasix 40 mg IV twice daily.

## 2022-01-13 NOTE — PROGRESS NOTES
Nocturnist cross cover progress note:    I was contacted by pharmacy or concerns for ordered ARB given MIRIAM.    Is a 71-year-old male with pulmonary fibrosis on 3 L home O2 at baseline, diabetes mellitus type 2, coronary artery disease with prior MI, labile blood pressures brought in by EMS with complaints of feeling dizzy, found to be hypotensive systolic blood pressure in the 70s on scene improved with small fluid bolus. He was found to be positive for COVID 19. Does have an MIRIAM with serum creatinine 1.69.    Plan: I have discontinued ARB given MIRIAM. DVT prophylaxis initially held given, cytopenia however platelet count is 105, will order for DVT prophylaxis and monitor platelets, DVT prophylaxis should be discontinued if platelet level drops down to less than 50. Due to reduced creatinine clearance of 30 I have ordered for heparin DVT prophylaxis. Due to episode of hypotension on scene and documented in ED and placed on hold parameter for antihypertensives for SBP less than 110. Monitor and adjust as indicated.    Alireza Rodriguez M.D.

## 2022-01-13 NOTE — ED NOTES
Patient BP 83/49 and lactic acid 4.7. Dr. Avendano updated. Per MD hang 1L bolus and run at 250cc/hr.

## 2022-01-13 NOTE — ED NOTES
Pt medicated per MAR, NAD noted. Resting comfortably in [facility].

## 2022-01-13 NOTE — ASSESSMENT & PLAN NOTE
Hgb 8.2 on admission  Platelets WBC slightly low  Possible related to COVID??  Mixed iron deficiency anemia and anemia of chronic inflammation  Occult stool studies to follow since patient reported intermittent black stools at home  Omeprazole started

## 2022-01-13 NOTE — HOSPITAL COURSE
71-year-old male English-speaking past medical history of recent endarterectomy and pacemaker placement last month,diabetes pulmonary fibrosis, chronic hypoxia 3 L oxygen his baseline, dyslipidemia and nonobstructive CAD noted on angiogram in November 2020 was brought by EMS while he was found to be hypotensive 76/47 on the scene.  Patient was V paced/left bundle branch block. he also did report shortness of breath, loss of appetite, headache, back pain, weakness, subjective fevers, dry cough.  On arrival potassium 6.0, GFR 43.  He was tested positive for COVID.  Chest x-ray interstitial and alveolar opacities not significantly changed.  Trace left pleural effusions.  Procalcitonin normal.  Lactic acid 3.5 increased to 6.3 after a dose of Lasix was given.  Hyperkalemia protocol was started.  Blood cultures no growth to date.  D-dimer slightly elevated.  DVT study is negative.

## 2022-01-13 NOTE — ED NOTES
Lab called with critical result of troponin of 146 at 1946. Critical lab result read back to lab.   Dr. Hernandez notified of critical lab result at 1946.  Critical lab result read back by Dr. hernandez.

## 2022-01-13 NOTE — ASSESSMENT & PLAN NOTE
Pt does not look quite septic    Metformin induced lactic acidosis with MIRIAM  Continue IVF   nephrology consult   resolved

## 2022-01-13 NOTE — PROGRESS NOTES
Hospital Medicine Daily Progress Note    Date of Service  1/13/2022    Chief Complaint  Chase Harris is a 71 y.o. male admitted 1/12/2022 with dizziness, hypotensive    Hospital Course  71-year-old male Azerbaijani-speaking past medical history of recent endarterectomy and pacemaker placement last month,diabetes pulmonary fibrosis, chronic hypoxia 3 L oxygen his baseline, dyslipidemia and nonobstructive CAD noted on angiogram in November 2020 was brought by EMS while he was found to be hypotensive 76/47 on the scene.  Patient was V paced/left bundle branch block. he also did report shortness of breath, loss of appetite, headache, back pain, weakness, subjective fevers, dry cough.  On arrival potassium 6.0, GFR 43.  He was tested positive for COVID.  Chest x-ray interstitial and alveolar opacities not significantly changed.  Trace left pleural effusions.  Procalcitonin normal.  Lactic acid 3.5 increased to 6.3 after a dose of Lasix was given.  Hyperkalemia protocol was started.  Blood cultures no growth to date.  D-dimer slightly elevated.  DVT study is negative.      Interval Problem Update  He is feeling great. He is tangential with few complains as back pain from a fall 3 years ago. Slight sob, but states that is ok. Afebrile. Hyperkalemia protocol used twice, kayexalate. Lactic acid again elevated. He also report dark black stool intermittently. No active bleeding.  used. Hold on lasix and started on IVF. If still hyperkalemic, will consult nephrology for further help. Continue to monitor lactic acid. No problem with urination     I have personally seen and examined the patient at bedside. I discussed the plan of care with patient, bedside RN and none.    Consultants/Specialty  none    Code Status  Full Code    Disposition  Patient is not medically cleared for discharge.   Anticipate discharge to to home with close outpatient follow-up.  I have placed the appropriate orders for post-discharge  needs.    Review of Systems  Review of Systems   Constitutional: Positive for chills and malaise/fatigue. Negative for fever.   HENT: Negative for congestion and sore throat.    Respiratory: Positive for cough and shortness of breath. Negative for sputum production and wheezing.    Cardiovascular: Negative for chest pain, palpitations and leg swelling.   Gastrointestinal: Negative for abdominal pain, diarrhea, heartburn, nausea and vomiting.   Genitourinary: Negative for dysuria and urgency.   Musculoskeletal: Positive for back pain. Negative for joint pain.   Skin: Negative for rash.   Neurological: Negative for dizziness, sensory change and headaches.   Psychiatric/Behavioral: The patient is nervous/anxious.         Physical Exam  Temp:  [36.5 °C (97.7 °F)-36.8 °C (98.3 °F)] 36.5 °C (97.7 °F)  Pulse:  [59-91] 85  Resp:  [12-24] 20  BP: ()/(48-95) 119/79  SpO2:  [84 %-100 %] 93 %    Physical Exam  Vitals and nursing note reviewed.   Constitutional:       Appearance: Normal appearance. He is ill-appearing.      Comments: frail   HENT:      Head: Normocephalic and atraumatic.      Mouth/Throat:      Mouth: Mucous membranes are dry.   Eyes:      General: No scleral icterus.  Cardiovascular:      Rate and Rhythm: Normal rate.      Heart sounds: No murmur heard.      Pulmonary:      Effort: Pulmonary effort is normal.      Breath sounds: Rales present. No wheezing.   Abdominal:      General: Bowel sounds are normal. There is no distension.      Palpations: Abdomen is soft.      Tenderness: There is no abdominal tenderness. There is no guarding.   Musculoskeletal:         General: No swelling. Normal range of motion.   Skin:     Capillary Refill: Capillary refill takes more than 3 seconds.   Neurological:      Mental Status: He is oriented to person, place, and time.   Psychiatric:         Mood and Affect: Mood normal.         Behavior: Behavior normal.         Thought Content: Thought content normal.          Judgment: Judgment normal.         Fluids    Intake/Output Summary (Last 24 hours) at 1/13/2022 1448  Last data filed at 1/13/2022 0507  Gross per 24 hour   Intake 1000 ml   Output --   Net 1000 ml       Laboratory  Recent Labs     01/12/22  1839 01/13/22  0324   WBC 6.7 4.5*   RBC 2.68* 2.48*   HEMOGLOBIN 8.2* 7.6*   HEMATOCRIT 26.2* 24.7*   MCV 97.8 99.6*   MCH 30.6 30.6   MCHC 31.3* 30.8*   RDW 51.6* 52.4*   PLATELETCT 105* 89*   MPV 13.3* 13.9*     Recent Labs     01/12/22  2342 01/12/22  2342 01/13/22  0324 01/13/22  0751 01/13/22  1113   SODIUM 138  --  137 136  --    POTASSIUM 4.7   < > 5.8* 6.5* 6.2*   CHLORIDE 105  --  105 105  --    CO2 18*  --  17* 14*  --    GLUCOSE 267*  --  302* 349*  --    BUN 39*  --  40* 43*  --    CREATININE 1.69*  --  1.70* 1.80*  --    CALCIUM 8.3*  --  8.0* 8.1*  --     < > = values in this interval not displayed.     Recent Labs     01/12/22 1839   INR 1.15*         Recent Labs     01/13/22 0324   TRIGLYCERIDE 125   HDL 20*   LDL 10       Imaging  US-EXTREMITY VENOUS LOWER BILAT         DX-CHEST-PORTABLE (1 VIEW)   Final Result      1.  Interstitial and alveolar airspace opacities are not significantly changed compared to prior. Findings may represent interstitial edema or multifocal pneumonia. Underlying chronic fibrotic changes not excluded.   2.  Trace left pleural effusion not excluded.   3.  Atherosclerotic plaque.   4.  Subsegmental right middle lobe atelectasis.   5.  Mild cardiomegaly.      US-RENAL    (Results Pending)        Assessment/Plan  * Acute respiratory failure due to COVID-19 (HCC)- (present on admission)  Assessment & Plan  Acute on chronic respiratory failure due to COVID   RT protocol  Needs to be on IV fluid due to acute on chronic CKD and lactic acidosis    Lactic acidosis- (present on admission)  Assessment & Plan  Pt does not look quite septic   Due to hypoxia ?? Due to Covid ??   Metformin induced lactic acidosis with MIRIAM ?? Even that is usually  seen when GFR is < 30 (not the case) ??  Continue IVF  Will consider nephrology consult     Hyperkalemia- (present on admission)  Assessment & Plan  K 6.0 on admission --- then 4.7--5.8---6.5  Hyperkalemia protocol twice  Bicarbonate   Kayexalate  Continue to monitor. if still persistent will call nephrology    COVID- (present on admission)  Assessment & Plan  - Duonebs, O2 therapy, and proning as needed  - Decadron 6mg qd  - Holding abx, procalcitonin is normal  - f/u Blood Cultures, Sputum Cx no growth to date  - Check D-dimer slightly elevated.  DVT study is negative  , CRP 7.14  Lactic Acid significantly elevated.  Not sure if related to COVID or MIRIAM or even lactic acidosis from metformin??  -Heparin for DVT prophylaxis    Pancytopenia (HCC)- (present on admission)  Assessment & Plan  Hgb 8.2 on admission  Platelets WBC slightly low  Possible related to COVID??  Mixed iron deficiency anemia and anemia of chronic inflammation  Occult stool studies to follow since patient reported intermittent black stools at home  Omeprazole started    Acute on chronic respiratory failure (HCC)- (present on admission)  Assessment & Plan  On 3L oxygen at home baseline oxygen 2/2 pulmonary fibrosis  Requiring 5-6L oxygen on admission  Oxygen per protocol  RT  Decadron due to COvid  + rales all over   procal is normal . No need for antibiotic     DM2 (diabetes mellitus, type 2) ([facility])  Assessment & Plan  ISS  Accuchecks  A1c 9.7   Hold home meds. He is on metformin at home.       Elevated troponin- (present on admission)  Assessment & Plan  Troponin 146 on admission  No chest pain   ekg just pace. No EKG changes   Monitor for now       MIRIAM (acute kidney injury) (HCC)- (present on admission)  Assessment & Plan  Possible related to COVID versus dehydration  Avoid nephrotoxic medications  Avoid metformin  Continue IV fluid  Renal ultrasound to follow if no improvement       VTE prophylaxis: heparin ppx    I have performed a physical  exam and reviewed and updated ROS and Plan today (1/13/2022). In review of yesterday's note (1/12/2022), there are no changes except as documented above.

## 2022-01-13 NOTE — ED NOTES
Reinaldo from Lab called with critical result of lactic 4.6 at 0347. Critical lab result read back to Reinaldo.   Dr. Avendano notified of critical lab result at 0348.  Critical lab result read back by Dr. Avendano.

## 2022-01-13 NOTE — ASSESSMENT & PLAN NOTE
K 6.0 on admission --- then 4.7--5.8---6.5  Hyperkalemia protocol twice  Bicarbonate   Kayexalate  Nephrology following  resolved

## 2022-01-14 PROBLEM — R59.1 LYMPHADENOPATHY: Status: ACTIVE | Noted: 2022-01-01

## 2022-01-14 NOTE — PROGRESS NOTES
Received bedside report from RN, pt care assumed, VSS, pt assessment complete. Pt AAOx4, with no c/o of pain at this time. No signs of acute distress noted at this time. Plan of care discussed with pt and verbalizes no questions. Pt denies any additional needs at this time. Bed locked/in lowest position, bed alarm on, pt educated on fall risk and verbalized understanding, call light within reach, hourly rounding initiated. '

## 2022-01-14 NOTE — PROGRESS NOTES
Received report and assumed care of patient from ER. Patient is alert and oriented x4 Kazakh speaking, denies pain at this time. Patient is in no signs of distress at this time. Is SOB with exertion able to stand on scale and sit back into bed self. Tele monitor on. Patient was updated on the plan of care for the day. Call light within reach, bed in low position, 2 side rails up. Educated on fall risk, verbalizes understanding. Will continue to monitor.

## 2022-01-14 NOTE — PROGRESS NOTES
Brief ID note:    -Baricitinib approval requested by  -Chart reviewed.  Patient admitted with COVID-19 pneumonia with progressive oxygen requirements, currently on HFNC  -Patient is already on dexamethasone, prophylactic Lovenox  -Monitor daily CMP  -Check quantiferon gold and acute hepatitis panel  -Discontinue baricitinib if:  ALT >250  AST >225  eGFR <15  ALC < 200   ANC < 1000  Hg < 8  -OK to discontinue barticinib if patient cleared for discharge  -Please provide [facility] fact sheet regarding baricitinib  -Continue management per hospitalist teams. Please call us back if formal consult requested

## 2022-01-14 NOTE — ASSESSMENT & PLAN NOTE
Adenopathy seen on CT chest  Would benefit from CT abd-pelvis looking for lymphoma once respiratory status improves.

## 2022-01-14 NOTE — CONSULTS
"Nephrology Consultation    Date of Service  1/13/2022    Referring Physician  Ismael Jurado M.D.    Consulting Physician  Brian English M.D.    Reason for Consultation  MIRIAM, hyperkalemia    History of Presenting Illness  71 y.o. male with DM2, pacemaker, non-obstructive CAD who presented 1/12/2022 with weakness.    Patient's son, Juan Jose, interpreted. The patient declined telephone or iPad .     The patient presented with a few days of feeling unwell and weak. He denies chest pain, abdominal pain, LUTS symptoms. He denies history of retinopathy from his diabetes, but has had diabetes for 40 years. He denies recent weight loss or night sweats. He continued to feel weak at home, and was brought into the hospital. He was found to have MIRIAM and lactic acidosis. He takes metformin 1000mg PO BID at home. He previously had normal kidney function in 12/2021. Since admission, he was diagnosed with covid. He says he has been vaccinated. I discussed his MIRIAM and hyperkalemia and the possible need for HD if high K cannot be managed medically.       Review of Systems  Review of Systems   Constitutional: Negative for fever.   Respiratory: Positive for shortness of breath (mild).    Cardiovascular: Negative for chest pain.   Gastrointestinal: Negative for abdominal pain.   All other systems reviewed and are negative.      Past Medical History  Past Medical History:   Diagnosis Date   • Diabetes (HCC)    • Diabetes mellitus type II, uncontrolled (HCC) 5/23/2020   • Pulmonary fibrosis (HCC)     wears 2.5L O2 at home 24/7       Surgical History  Past Surgical History:   Procedure Laterality Date   • OTHER      Kidney stones   • OTHER ABDOMINAL SURGERY      \"from an ulcer that burst\"       Family History  Family History   Problem Relation Age of Onset   • Diabetes Mother    • Diabetes Father    • Diabetes Brother        Social History  Social History     Socioeconomic History   • Marital status:      Spouse name: Not on " file   • Number of children: Not on file   • Years of education: Not on file   • Highest education level: Not on file   Occupational History   • Not on file   Tobacco Use   • Smoking status: Former Smoker     Packs/day: 2.00     Years: 40.00     Pack years: 80.00     Types: Cigarettes     Quit date:      Years since quittin.0   • Smokeless tobacco: Never Used   Vaping Use   • Vaping Use: Never used   Substance and Sexual Activity   • Alcohol use: Not Currently   • Drug use: Never   • Sexual activity: Not on file   Other Topics Concern   • Not on file   Social History Narrative   • Not on file     Social Determinants of Health     Financial Resource Strain: Medium Risk   • Difficulty of Paying Living Expenses: Somewhat hard   Food Insecurity: Food Insecurity Present   • Worried About Running Out of Food in the Last Year: Sometimes true   • Ran Out of Food in the Last Year: Never true   Transportation Needs: Unmet Transportation Needs   • Lack of Transportation (Medical): Yes   • Lack of Transportation (Non-Medical): No   Physical Activity:    • Days of Exercise per Week: Not on file   • Minutes of Exercise per Session: Not on file   Stress:    • Feeling of Stress : Not on file   Social Connections:    • Frequency of Communication with Friends and Family: Not on file   • Frequency of Social Gatherings with Friends and Family: Not on file   • Attends [employer] Services: Not on file   • Active Member of Clubs or Organizations: Not on file   • Attends Club or Organization Meetings: Not on file   • Marital Status: Not on file   Intimate Partner Violence:    • Fear of Current or Ex-Partner: Not on file   • Emotionally Abused: Not on file   • Physically Abused: Not on file   • Sexually Abused: Not on file   Housing Stability:    • Unable to Pay for Housing in the Last Year: Not on file   • Number of Places Lived in the Last Year: Not on file   • Unstable Housing in the Last Year: Not on file        Medications  Current Facility-Administered Medications   Medication Dose Route Frequency Provider Last Rate Last Admin   • heparin injection 5,000 Units  5,000 Units Subcutaneous Q8HRS Alireza Rodriguez M.D.   5,000 Units at 01/13/22 1438   • Respiratory Therapy Consult   Nebulization Continuous RT Ismael Jurado M.D.       • omeprazole (PRILOSEC) capsule 20 mg  20 mg Oral DAILY Ismael Jurado M.D.   20 mg at 01/13/22 1111   • [START ON 1/14/2022] dexamethasone (DECADRON) tablet 6 mg  6 mg Oral DAILY Ismael Jurado M.D.       • sodium bicarbonate 75 mEq in 1/2 NS 1,000 mL infusion   Intravenous Continuous Ismael Jurado M.D. 250 mL/hr at 01/13/22 1623 New Bag at 01/13/22 1623   • atorvastatin (LIPITOR) tablet 40 mg  40 mg Oral Q EVENING Gracie Avendano M.D.   40 mg at 01/13/22 1641   • budesonide-formoterol (SYMBICORT) 160-4.5 MCG/ACT inhaler 2 Puff  2 Puff Inhalation BID Gracie Avendano M.D.   2 Puff at 01/13/22 1640   • tiotropium (Spiriva Respimat) 2.5 mcg/Act inhalation spray 5 mcg  5 mcg Inhalation DAILY Gracie Avendano M.D.   5 mcg at 01/13/22 0610   • senna-docusate (PERICOLACE or SENOKOT S) 8.6-50 MG per tablet 2 Tablet  2 Tablet Oral BID Gracie Avendano M.D.   2 Tablet at 01/13/22 1641    And   • polyethylene glycol/lytes (MIRALAX) PACKET 1 Packet  1 Packet Oral QDAY PRN Gracie Avendano M.D.        And   • magnesium hydroxide (MILK OF MAGNESIA) suspension 30 mL  30 mL Oral QDAY PRN Gracie Avendano M.D.        And   • bisacodyl (DULCOLAX) suppository 10 mg  10 mg Rectal QDAY PRN Gracie Avendano M.D.       • acetaminophen (Tylenol) tablet 650 mg  650 mg Oral Q6HRS PRN Gracie Avendano M.D.   650 mg at 01/13/22 0839   • guaiFENesin ER (MUCINEX) tablet 600 mg  600 mg Oral BID PRN Gracie Avendano M.D.       • ondansetron (ZOFRAN) syringe/vial injection 4 mg  4 mg Intravenous Q4HRS PRN Gracie Avendano M.D.       • ondansetron (ZOFRAN ODT) dispertab 4 mg  4 mg Oral Q4HRS PRN Gracie Avendano M.D.        • labetalol (NORMODYNE/TRANDATE) injection 10 mg  10 mg Intravenous Q4HRS PRN Gracie Avendano M.D.       • guaiFENesin dextromethorphan (ROBITUSSIN DM) 100-10 MG/5ML syrup 10 mL  10 mL Oral Q6HRS PRN Gracie Avendano M.D.       • insulin regular (HumuLIN R,NovoLIN R) injection  2-9 Units Subcutaneous 4X/DAY ACHS Gracie Avendano M.D.   6 Units at 01/13/22 1807    And   • dextrose 50% (D50W) injection 50 mL  50 mL Intravenous Q15 MIN PRN Gracie Avendano M.D.           Allergies  Allergies   Allergen Reactions   • Beta Adrenergic Blockers      Heart block        Physical Exam  Temp:  [36.5 °C (97.7 °F)-36.8 °C (98.2 °F)] 36.8 °C (98.2 °F)  Pulse:  [59-85] 68  Resp:  [12-24] 20  BP: ()/(48-95) 160/73  SpO2:  [84 %-100 %] 91 %    Physical Exam  Constitutional:       General: He is not in acute distress.     Appearance: He is well-developed. He is not diaphoretic.   HENT:      Head: Normocephalic and atraumatic.      Mouth/Throat:      Mouth: Mucous membranes are moist.      Pharynx: Oropharynx is clear. No oropharyngeal exudate.   Eyes:      General: No scleral icterus.     Extraocular Movements: Extraocular movements intact.   Neck:      Trachea: No tracheal deviation.   Cardiovascular:      Rate and Rhythm: Normal rate.      Heart sounds: Normal heart sounds. No murmur heard.      Pulmonary:      Effort: Pulmonary effort is normal.      Breath sounds: No stridor. Rales present.   Abdominal:      General: There is no distension.      Palpations: Abdomen is soft.      Tenderness: There is no abdominal tenderness.   Musculoskeletal:         General: Normal range of motion.      Right lower leg: Edema (1+) present.      Left lower leg: Edema (1+) present.   Skin:     General: Skin is warm and dry.   Neurological:      General: No focal deficit present.      Mental Status: He is alert and oriented to person, place, and time.   Psychiatric:         Mood and Affect: Mood normal.         Behavior: Behavior  normal.         Fluids  Date 01/13/22 0700 - 01/14/22 0659   Shift 8598-3364 6734-3899 7314-7153 24 Hour Total   INTAKE   P.O.  0  0   Shift Total  0  0   OUTPUT   Urine  180  180   Shift Total  180  180   Weight (kg) 63.5 63.5 63.5 63.5       Laboratory  Labs reviewed, pertinent labs below.  Recent Labs     01/12/22  1839 01/13/22  0324   WBC 6.7 4.5*   RBC 2.68* 2.48*   HEMOGLOBIN 8.2* 7.6*   HEMATOCRIT 26.2* 24.7*   MCV 97.8 99.6*   MCH 30.6 30.6   MCHC 31.3* 30.8*   RDW 51.6* 52.4*   PLATELETCT 105* 89*   MPV 13.3* 13.9*     Recent Labs     01/13/22  0324 01/13/22  0324 01/13/22  0751 01/13/22  1113 01/13/22  1351   SODIUM 137  --  136  --  139   POTASSIUM 5.8*   < > 6.5* 6.2* 6.8*   CHLORIDE 105  --  105  --  107   CO2 17*  --  14*  --  17*   GLUCOSE 302*  --  349*  --  378*   BUN 40*  --  43*  --  44*   CREATININE 1.70*  --  1.80*  --  1.80*   CALCIUM 8.0*  --  8.1*  --  9.5    < > = values in this interval not displayed.     Recent Labs     01/12/22  1839   INR 1.15*            URINALYSIS:  No results found for: COLORURINE, CLARITY, SPECGRAVITY, PHURINE, KETONES, PROTEINURIN, BILIRUBINUR, UROBILU, NITRITE, LEUKESTERAS, OCCULTBLOOD  UPC  No results found for: TOTPROTUR No results found for: CREATININEU    Imaging interpreted by radiologist. Imaging reports reviewed with pertinent findings below  US-EXTREMITY VENOUS LOWER BILAT   Final Result      DX-CHEST-PORTABLE (1 VIEW)   Final Result      1.  Interstitial and alveolar airspace opacities are not significantly changed compared to prior. Findings may represent interstitial edema or multifocal pneumonia. Underlying chronic fibrotic changes not excluded.   2.  Trace left pleural effusion not excluded.   3.  Atherosclerotic plaque.   4.  Subsegmental right middle lobe atelectasis.   5.  Mild cardiomegaly.      US-RENAL    (Results Pending)         Assessment/Plan  71 y.o. male with DM2, pacemaker, non-obstructive CAD who presented 1/12/2022 with weakness, found  to have COVID pneumonia, and MRIIAM.     1. MIRIAM, non-oliguric, worsening. Unclear etiology. Check UA, UPCR, UACR. No acute need for Dialysis unless hyperkalemia cannot be managed. Avoid nephrotoxins. Check labs daily.     2. Hyperkalemia. Unclear etiology. Recommend lasix 80mg IV BID. Consider campbell catheter if patient is obstructed.     3. Lactic acidosis, persistent, in the presence of normotension. Concern for type B lactic acidosis. Consider CT imaging to assess for lymphoma. Agree with bicarb gtt, increase to 150 mEq in sterile water at a rate of 125ml / hr.     4. Anemia, worsening. Unclear etiology. Check CBC daily.     5. Thrombocytopenia, worsening. Unclear etiology. Check CBC daily.    6. Leukopenia, worsening, unclear etiology. Possible concern for lymphoma as above.    Discussed with Dr. PARISH English MD  Nephrology

## 2022-01-14 NOTE — PROGRESS NOTES
4 Eyes Skin Assessment Completed by DORA Bustamante and DORA Alonso.    Head WDL  Ears Blanching and Discoloration  Nose WDL  Mouth WDL  Neck WDL  Breast/Chest WDL  Shoulder Blades WDL  Spine WDL  (R) Arm/Elbow/Hand WDL  (L) Arm/Elbow/Hand WDL  Abdomen WDL  Groin WDL  Scrotum/Coccyx/Buttocks WDL  (R) Leg WDL  (L) Leg WDL  (R) Heel/Foot/Toe WDL  (L) Heel/Foot/Toe WDL          Devices In Places Tele Box, Blood Pressure Cuff, Pulse Ox and Nasal Cannula      Interventions In Place Pillows    Possible Skin Injury No    Pictures Uploaded Into Epic N/A  Wound Consult Placed N/A  RN Wound Prevention Protocol Ordered No

## 2022-01-14 NOTE — PROGRESS NOTES
Received report and assumed care of patient. Patient is alert and oriented x4 Martiniquais speaking. Patient is in no signs of distress, does have SOB with dyspnea. 10L oxymask. Patient was updated on the plan of care for the day. Call light within reach, bed in low position, 2 side rails up. All fall precautions in place. Will continue to monitor.

## 2022-01-14 NOTE — THERAPY
Physical Therapy Contact Note     Patient Name: Chase Harris  Age:  71 y.o., Sex:  male  Medical Record #: [id number]  Today's Date: 1/14/2022 01/14/22 1156   Interdisciplinary Plan of Care Collaboration   Collaboration Comments PT consult received.  Pt currently w/ increasing O2 demands, desats on 10LPM via oxymask at rest requiring increase to 15LPM via nonrebreather at this time. Discussed w/ RN, will monitor and follow up as O2 demands decrease for most accurate assessment.       Avery MCCLAIN, PT, DPT [id number]

## 2022-01-14 NOTE — PROGRESS NOTES
Sánchez from Lab called with critical result of Lactic acid at 5.3 . Critical lab result read back to Sánchez.   Dr. Rodriguez notified of critical lab result of lactic at 5.3.  Critical lab result read back by Dr. Rodriguez.

## 2022-01-14 NOTE — PROGRESS NOTES
Hospital Medicine Daily Progress Note    Date of Service  1/14/2022    Chief Complaint  Chase Harris is a 71 y.o. male admitted 1/12/2022 with dizziness, hypotensive    Hospital Course  71-year-old male [place]e-speaking past medical history of recent endarterectomy and pacemaker placement last month,diabetes pulmonary fibrosis, chronic hypoxia 3 L oxygen his baseline, dyslipidemia and nonobstructive CAD noted on angiogram in November 2020 was brought by EMS while he was found to be hypotensive 76/47 on the scene.  Patient was V paced/left bundle branch block. he also did report shortness of breath, loss of appetite, headache, back pain, weakness, subjective fevers, dry cough.  On arrival potassium 6.0, GFR 43.  He was tested positive for COVID.  Chest x-ray interstitial and alveolar opacities not significantly changed.  Trace left pleural effusions.  Procalcitonin normal.  Lactic acid 3.5 increased to 6.3 after a dose of Lasix was given.  Hyperkalemia protocol was started.  Blood cultures no growth to date.  D-dimer slightly elevated.  DVT study is negative.      Interval Problem Update  He is feeling great. He is tangential with few complains as back pain from a fall 3 years ago. Slight sob, but states that is ok. Afebrile. Hyperkalemia protocol used twice, kayexalate. Lactic acid again elevated. He also report dark black stool intermittently. No active bleeding.  used. Hold on lasix and started on IVF. If still hyperkalemic, will consult nephrology for further help. Continue to monitor lactic acid. No problem with urination    1/14: Overnight patient became increasingly short of breath.  He is now currently on high flow nasal cannula.  We will diurese the patient with Lasix 40 mg IV twice daily.  Patient does endorse some chest pain with coughing.  The pain is reproducible with palpation.    I have personally seen and examined the patient at bedside. I discussed the plan of care with patient,  bedside RN and none.    Consultants/Specialty  nephrology    Code Status  Full Code    Disposition  Patient is not medically cleared for discharge.   Anticipate discharge to to home with close outpatient follow-up.  I have placed the appropriate orders for post-discharge needs.    Review of Systems  Review of Systems   Constitutional: Positive for chills and malaise/fatigue. Negative for fever.   HENT: Negative for congestion and sore throat.    Respiratory: Positive for cough and shortness of breath. Negative for sputum production and wheezing.    Cardiovascular: Positive for chest pain (with coughing). Negative for palpitations and leg swelling.   Gastrointestinal: Negative for abdominal pain, diarrhea, nausea and vomiting.   Genitourinary: Negative for dysuria and urgency.   Musculoskeletal: Positive for back pain. Negative for joint pain.   Skin: Negative for rash.   Neurological: Negative for dizziness, sensory change and headaches.   Psychiatric/Behavioral: The patient is nervous/anxious.         Physical Exam  Temp:  [36.6 °C (97.8 °F)-37.1 °C (98.7 °F)] 36.7 °C (98.1 °F)  Pulse:  [61-74] 74  Resp:  [16-20] 18  BP: (134-160)/(59-73) 146/70  SpO2:  [88 %-97 %] 94 %    Physical Exam  Vitals and nursing note reviewed.   Constitutional:       General: He is not in acute distress.     Appearance: Normal appearance. He is ill-appearing.      Comments: frail   HENT:      Head: Normocephalic and atraumatic.      Mouth/Throat:      Mouth: Mucous membranes are dry.   Eyes:      General: No scleral icterus.  Cardiovascular:      Rate and Rhythm: Normal rate and regular rhythm.      Heart sounds: No murmur heard.      Pulmonary:      Effort: Pulmonary effort is normal.      Breath sounds: Rales present. No wheezing.      Comments: HFNC  Chest:      Chest wall: Tenderness present.   Abdominal:      General: Bowel sounds are normal. There is no distension.      Palpations: Abdomen is soft.      Tenderness: There is no  abdominal tenderness. There is no guarding.   Musculoskeletal:         General: No swelling. Normal range of motion.   Skin:     General: Skin is warm and dry.      Capillary Refill: Capillary refill takes more than 3 seconds.      Coloration: Skin is not jaundiced.   Neurological:      Mental Status: He is alert and oriented to person, place, and time. Mental status is at baseline.   Psychiatric:         Mood and Affect: Mood normal.         Behavior: Behavior normal.         Thought Content: Thought content normal.         Judgment: Judgment normal.         Fluids    Intake/Output Summary (Last 24 hours) at 1/14/2022 1319  Last data filed at 1/14/2022 1115  Gross per 24 hour   Intake 600 ml   Output 2005 ml   Net -1405 ml       Laboratory  Recent Labs     01/12/22  1839 01/13/22  0324 01/14/22  0440   WBC 6.7 4.5* 7.3   RBC 2.68* 2.48* 2.62*   HEMOGLOBIN 8.2* 7.6* 7.8*   HEMATOCRIT 26.2* 24.7* 25.2*   MCV 97.8 99.6* 96.2   MCH 30.6 30.6 29.8   MCHC 31.3* 30.8* 31.0*   RDW 51.6* 52.4* 50.9*   PLATELETCT 105* 89* 118*   MPV 13.3* 13.9* 14.0*     Recent Labs     01/13/22  1814 01/13/22  2238 01/14/22  0440   SODIUM 137 138 135   POTASSIUM 5.4 5.2 4.9   CHLORIDE 104 102 100   CO2 16* 18* 22   GLUCOSE 323* 125* 106*   BUN 43* 42* 44*   CREATININE 1.69* 1.56* 1.38   CALCIUM 9.7 9.4 8.8     Recent Labs     01/12/22  1839   INR 1.15*         Recent Labs     01/13/22  0324   TRIGLYCERIDE 125   HDL 20*   LDL 10       Imaging  US-RENAL   Final Result      1.  Mildly increased corticomedullary differentiation which can be seen in the setting of medical renal disease.   2.  Trace ascites of uncertain etiology and significance.         INTERPRETING LOCATION: [address] [place] [address], [id number]      US-EXTREMITY VENOUS LOWER BILAT   Final Result      DX-CHEST-PORTABLE (1 VIEW)   Final Result      1.  Interstitial and alveolar airspace opacities are not significantly changed compared to prior. Findings may represent interstitial edema  or multifocal pneumonia. Underlying chronic fibrotic changes not excluded.   2.  Trace left pleural effusion not excluded.   3.  Atherosclerotic plaque.   4.  Subsegmental right middle lobe atelectasis.   5.  Mild cardiomegaly.      DX-CHEST-PORTABLE (1 VIEW)    (Results Pending)        Assessment/Plan  * Acute respiratory failure due to COVID-19 (HCC)- (present on admission)  Assessment & Plan  Acute on chronic respiratory failure due to COVID   RT protocol  Needs to be on IV fluid due to acute on chronic CKD and lactic acidosis    Lymphadenopathy  Assessment & Plan  Adenopathy seen on CT chest  Would benefit from CT abd-pelvis looking for lymphoma once respiratory status improves.    Lactic acidosis- (present on admission)  Assessment & Plan  Pt does not look quite septic    Metformin induced lactic acidosis with MIRIAM  Continue IVF  Will consider nephrology consult     Hyperkalemia- (present on admission)  Assessment & Plan  K 6.0 on admission --- then 4.7--5.8---6.5  Hyperkalemia protocol twice  Bicarbonate   Kayexalate  Continue to monitor. if still persistent will call nephrology    COVID- (present on admission)  Assessment & Plan  - Duonebs, O2 therapy, and proning as needed  - Decadron 6mg qd  - Holding abx, procalcitonin is normal  - f/u Blood Cultures, Sputum Cx no growth to date  - Check D-dimer slightly elevated.  DVT study is negative  , CRP 7.14  Lactic Acid significantly elevated.  Not sure if related to COVID or MIRIAM or even lactic acidosis from metformin??  -Heparin for DVT prophylaxis    Pancytopenia (HCC)- (present on admission)  Assessment & Plan  Hgb 8.2 on admission  Platelets WBC slightly low  Possible related to COVID??  Mixed iron deficiency anemia and anemia of chronic inflammation  Occult stool studies to follow since patient reported intermittent black stools at home  Omeprazole started    Acute on chronic respiratory failure (HCC)- (present on admission)  Assessment & Plan  On 3L oxygen at  home baseline oxygen 2/2 pulmonary fibrosis  Requiring 5-6L oxygen on admission  Oxygen per protocol  RT  Decadron due to COvid  + rales all over   procal is normal . No need for antibiotic   Worsening patient's respiratory status.  Currently on high flow nasal cannula.  We will start the patient on Lasix 40 mg IV twice daily.    DM2 (diabetes mellitus, type 2) ([facility])  Assessment & Plan  ISS  Accuchecks  A1c 9.7   Metformin held  Diabetic diet      Elevated troponin- (present on admission)  Assessment & Plan  Troponin 146 on admission  No chest pain   ekg just pace. No EKG changes   Monitor for now       MIRIAM (acute kidney injury) ([facility])- (present on admission)  Assessment & Plan  Possible related to COVID versus dehydration  Avoid nephrotoxic medications  Avoid metformin  Continue IV fluid  Renal ultrasound to follow if no improvement       VTE prophylaxis: heparin ppx    I have performed a physical exam and reviewed and updated ROS and Plan today (1/14/2022). In review of yesterday's note (1/13/2022), there are no changes except as documented above.    Patient is critically ill.   The patient continues to have: Acute respiratory failure  The vital organ system that is affected is the: Pulmonary  If untreated there is a high chance of deterioration into: Respiratory arrest  And eventually death.   The critical care that I am providing today is: High flow nasal cannula, diuresis with Lasix, frequent vital checks  The critical that has been undertaken is medically complex.   There has been no overlap in critical care time.   Critical Care Time not including procedures: 35 minutes

## 2022-01-14 NOTE — THERAPY
Occupational Therapy     Patient Name: Chase Harris  Age:  71 y.o., Sex:  male  Medical Record #: [id number]  Today's Date: 1/14/2022 01/14/22 1227   Interdisciplinary Plan of Care Collaboration   Collaboration Comments OT order received. Pt currently on 15L non-rebreather. Pt not approrpiate for OT eval at this time. Will continue to follow and initiate eval as appropriate.

## 2022-01-15 NOTE — PROGRESS NOTES
Pt called me into room to show me that he had coughed up thick, dark red secretions. States that it is the second time that  It has happened while he has been here. No acute changes in O2 needs or vital signs at this time. On call MD notified. GUDELIA.

## 2022-01-15 NOTE — PROGRESS NOTES
Received bedside report from RN, pt care assumed, VSS, pt assessment complete. Pt AAOx4, with no c/o of pain at this time. No signs of acute distress noted at this time. Plan of care discussed with pt and verbalizes no questions. Pt denies any additional needs at this time. Bed locked/in lowest position, bed alarm on, pt educated on fall risk and verbalized understanding, call light within reach, hourly rounding initiated.

## 2022-01-15 NOTE — PROGRESS NOTES
Nephrology Daily Progress Note    Date of Service  1/14/2022    Chief Complaint  71 y.o. male with DM2, pacemaker, non-obstructive CAD who presented 1/12/2022 with weakness, found to have COVID pneumonia, and MIRIAM.     Interval Problem Update  1/14 -patient urinated 1.6 L in the last 24 hours.  Patient denies shortness of breath, complains of some chest pain.  Patient having worsening hypoxia requiring oxime mask, possible transition to high flow nasal cannula.    Review of Systems  Review of Systems   Constitutional: Negative for fever.   Respiratory: Negative for shortness of breath.    Cardiovascular: Positive for chest pain.   Gastrointestinal: Negative for abdominal pain.   All other systems reviewed and are negative.       Physical Exam  Temp:  [36.6 °C (97.8 °F)-37.1 °C (98.7 °F)] 36.6 °C (97.9 °F)  Pulse:  [61-74] 65  Resp:  [16-18] 18  BP: (134-160)/(59-73) 145/64  SpO2:  [88 %-98 %] 94 %    Physical Exam  Constitutional:       General: He is not in acute distress.     Appearance: He is well-developed. He is not diaphoretic.   HENT:      Head: Normocephalic and atraumatic.      Mouth/Throat:      Mouth: Mucous membranes are moist.      Pharynx: Oropharynx is clear. No oropharyngeal exudate.   Eyes:      General: No scleral icterus.     Extraocular Movements: Extraocular movements intact.   Neck:      Trachea: No tracheal deviation.   Cardiovascular:      Rate and Rhythm: Normal rate.      Heart sounds: Normal heart sounds. No murmur heard.      Pulmonary:      Effort: Pulmonary effort is normal.      Breath sounds: No stridor. Rales present.   Abdominal:      General: There is no distension.      Palpations: Abdomen is soft.      Tenderness: There is no abdominal tenderness.   Musculoskeletal:         General: Normal range of motion.      Right lower leg: No edema.      Left lower leg: No edema.   Skin:     General: Skin is warm and dry.   Neurological:      General: No focal deficit present.      Mental  Status: He is alert and oriented to person, place, and time.   Psychiatric:         Mood and Affect: Mood normal.         Behavior: Behavior normal.         Fluids    Intake/Output Summary (Last 24 hours) at 1/14/2022 1651  Last data filed at 1/14/2022 1645  Gross per 24 hour   Intake 600 ml   Output 2755 ml   Net -2155 ml       Laboratory  Labs reviewed, pertinent labs below.  Recent Labs     01/12/22  1839 01/13/22  0324 01/14/22  0440   WBC 6.7 4.5* 7.3   RBC 2.68* 2.48* 2.62*   HEMOGLOBIN 8.2* 7.6* 7.8*   HEMATOCRIT 26.2* 24.7* 25.2*   MCV 97.8 99.6* 96.2   MCH 30.6 30.6 29.8   MCHC 31.3* 30.8* 31.0*   RDW 51.6* 52.4* 50.9*   PLATELETCT 105* 89* 118*   MPV 13.3* 13.9* 14.0*     Recent Labs     01/13/22  1814 01/13/22  2238 01/14/22  0440   SODIUM 137 138 135   POTASSIUM 5.4 5.2 4.9   CHLORIDE 104 102 100   CO2 16* 18* 22   GLUCOSE 323* 125* 106*   BUN 43* 42* 44*   CREATININE 1.69* 1.56* 1.38   CALCIUM 9.7 9.4 8.8     Recent Labs     01/12/22  1839   INR 1.15*           URINALYSIS:  Lab Results   Component Value Date/Time    COLORURINE Yellow 01/13/2022 1654    CLARITY Clear 01/13/2022 1654    SPECGRAVITY 1.023 01/13/2022 1654    PHURINE 5.5 01/13/2022 1654    KETONES Negative 01/13/2022 1654    PROTEINURIN 30 (A) 01/13/2022 1654    BILIRUBINUR Negative 01/13/2022 1654    UROBILU 0.2 01/13/2022 1654    NITRITE Negative 01/13/2022 1654    LEUKESTERAS Negative 01/13/2022 1654    OCCULTBLOOD Negative 01/13/2022 1654     UPC  Lab Results   Component Value Date/Time    TOTPROTUR 32.0 (H) 01/13/2022 1654      Lab Results   Component Value Date/Time    CREATININEU 90.95 01/13/2022 1654         Imaging interpreted by radiologist. Imaging reports reviewed with pertinent findings below  DX-CHEST-PORTABLE (1 VIEW)   Final Result      Stable appearance of the chest.      US-RENAL   Final Result      1.  Mildly increased corticomedullary differentiation which can be seen in the setting of medical renal disease.   2.  Trace  ascites of uncertain etiology and significance.         INTERPRETING LOCATION: [address] [facility], [place], [address]      US-EXTREMITY VENOUS LOWER BILAT   Final Result      DX-CHEST-PORTABLE (1 VIEW)   Final Result      1.  Interstitial and alveolar airspace opacities are not significantly changed compared to prior. Findings may represent interstitial edema or multifocal pneumonia. Underlying chronic fibrotic changes not excluded.   2.  Trace left pleural effusion not excluded.   3.  Atherosclerotic plaque.   4.  Subsegmental right middle lobe atelectasis.   5.  Mild cardiomegaly.            Current Facility-Administered Medications   Medication Dose Route Frequency Provider Last Rate Last Admin   • thiamine (B-1) IVPB 100 mg in 100 mL D5W (premix)  100 mg Intravenous DAILY Alireza Rodriguez M.D.   Stopped at 01/14/22 0257   • albuterol inhaler 2 Puff  2 Puff Inhalation Q4H PRN (RT) Ismael Jurado M.D.       • furosemide (LASIX) injection 40 mg  40 mg Intravenous DAILY Reinaldo Hinton DJoanaOJoana   40 mg at 01/14/22 1330   • baricitinib (Olumiant) tablet 4 mg  4 mg Oral DAILY AT 1800 Evangelina Kidd M.D.   4 mg at 01/14/22 1641   • heparin injection 5,000 Units  5,000 Units Subcutaneous Q8HRS Alireza Rodriguez M.D.   5,000 Units at 01/14/22 1330   • Respiratory Therapy Consult   Nebulization Continuous RT Ismael Jurado M.D.       • omeprazole (PRILOSEC) capsule 20 mg  20 mg Oral DAILY Ismael Jurado M.D.   20 mg at 01/14/22 0532   • dexamethasone (DECADRON) tablet 6 mg  6 mg Oral DAILY Ismael Jurado M.D.   6 mg at 01/14/22 0532   • sodium bicarbonate 8.4 % 150 mEq in sterile water 1,000 mL infusion   Intravenous Continuous Brian English M.D. 125 mL/hr at 01/13/22 2039 New Bag at 01/13/22 2039   • atorvastatin (LIPITOR) tablet 40 mg  40 mg Oral Q EVENING Gracie Avendano M.D.   40 mg at 01/14/22 1641   • budesonide-formoterol (SYMBICORT) 160-4.5 MCG/ACT inhaler 2 Puff  2 Puff Inhalation BID Gracie Avendano M.D.   2 Puff at 01/14/22 2688    • tiotropium (Spiriva Respimat) 2.5 mcg/Act inhalation spray 5 mcg  5 mcg Inhalation DAILY Gracie Avendano M.D.   5 mcg at 01/14/22 0539   • senna-docusate (PERICOLACE or SENOKOT S) 8.6-50 MG per tablet 2 Tablet  2 Tablet Oral BID Gracie Avendano M.D.   2 Tablet at 01/13/22 1641    And   • polyethylene glycol/lytes (MIRALAX) PACKET 1 Packet  1 Packet Oral QDAY PRN Gracie Avendano M.D.        And   • magnesium hydroxide (MILK OF MAGNESIA) suspension 30 mL  30 mL Oral QDAY PRN Gracie Avendano M.D.        And   • bisacodyl (DULCOLAX) suppository 10 mg  10 mg Rectal QDAY PRN Gracie Avendano M.D.       • acetaminophen (Tylenol) tablet 650 mg  650 mg Oral Q6HRS PRN Gracie Avendano M.D.   650 mg at 01/14/22 1104   • guaiFENesin ER (MUCINEX) tablet 600 mg  600 mg Oral BID PRN Gracie Avendano M.D.       • ondansetron (ZOFRAN) syringe/vial injection 4 mg  4 mg Intravenous Q4HRS PRN Gracie Avendano M.D.       • ondansetron (ZOFRAN ODT) dispertab 4 mg  4 mg Oral Q4HRS PRN Gracie Avendano M.D.       • labetalol (NORMODYNE/TRANDATE) injection 10 mg  10 mg Intravenous Q4HRS PRN Gracie Avendano M.D.       • guaiFENesin dextromethorphan (ROBITUSSIN DM) 100-10 MG/5ML syrup 10 mL  10 mL Oral Q6HRS PRN Gracie Avendano M.D.       • insulin regular (HumuLIN R,NovoLIN R) injection  2-9 Units Subcutaneous 4X/DAY ACHPARISH Avendano M.D.   6 Units at 01/14/22 1641    And   • dextrose 50% (D50W) injection 50 mL  50 mL Intravenous Q15 MIN PRN Gracie Avendano M.D.             Assessment/Plan  71 y.o. male with DM2, pacemaker, non-obstructive CAD who presented 1/12/2022 with weakness, found to have COVID pneumonia, and MIRIAM.      1. MIRIAM, non-oliguric, improved.  Patient likely has background CKD stage IIIa from diabetic nephropathy.  No need for dialysis.  Avoid nephrotoxins.  Check labs daily while inpatient.     2. Hyperkalemia.  Much improved with Lasix and bicarbonate infusion.  Recommend reduce Lasix to 40 mg  p.o. twice daily.  Check labs daily.      3. Lactic acidosis, persistent in the presence of normotension.  Concern for type B lactic acidosis.  Most likely from metformin.  However, thorough work-up would include CT imaging of chest abdomen and pelvis to assess for lymphoma.  Okay to discontinue bicarbonate infusion.    4. Anemia, persistent.  Unclear etiology.  Check CBC daily.     5. Thrombocytopenia, persistent.  Unclear etiology.  Check CBC daily.     6. Leukopenia, resolved.  Check CBC daily.    7.  Acute hypoxic respiratory failure from COVID-19 pneumonia.  Defer management to primary team.    As the patient is nonoliguric and kidney function has improved, nephrology will sign off.  Please call back with further questions or concerns.    Brian English MD  Nephrology

## 2022-01-15 NOTE — PROGRESS NOTES
Hospital Medicine Daily Progress Note    Date of Service  1/15/2022    Chief Complaint  Chase Harris is a 71 y.o. male admitted 1/12/2022 with dizziness, hypotensive    Hospital Course  71-year-old male Indonesian-speaking past medical history of recent endarterectomy and pacemaker placement last month,diabetes pulmonary fibrosis, chronic hypoxia 3 L oxygen his baseline, dyslipidemia and nonobstructive CAD noted on angiogram in November 2020 was brought by EMS while he was found to be hypotensive 76/47 on the scene.  Patient was V paced/left bundle branch block. he also did report shortness of breath, loss of appetite, headache, back pain, weakness, subjective fevers, dry cough.  On arrival potassium 6.0, GFR 43.  He was tested positive for COVID.  Chest x-ray interstitial and alveolar opacities not significantly changed.  Trace left pleural effusions.  Procalcitonin normal.  Lactic acid 3.5 increased to 6.3 after a dose of Lasix was given.  Hyperkalemia protocol was started.  Blood cultures no growth to date.  D-dimer slightly elevated.  DVT study is negative.      Interval Problem Update  He is feeling great. He is tangential with few complains as back pain from a fall 3 years ago. Slight sob, but states that is ok. Afebrile. Hyperkalemia protocol used twice, kayexalate. Lactic acid again elevated. He also report dark black stool intermittently. No active bleeding.  used. Hold on lasix and started on IVF. If still hyperkalemic, will consult nephrology for further help. Continue to monitor lactic acid. No problem with urination    1/14: Overnight patient became increasingly short of breath.  He is now currently on high flow nasal cannula.  We will diurese the patient with Lasix 40 mg IV twice daily.  Patient does endorse some chest pain with coughing.  The pain is reproducible with palpation.    1/15: Increase in patient's creatinine will decrease the Lasix from 40 mg IV to p.o.  Continues to require  high flow nasal cannula.  Continuing steroids.  ID was consulted yesterday started on baricitinib.  Patient reports a single episode of coughing up phlegm that he reports was black. Glucose 146 continue current management.    I have personally seen and examined the patient at bedside. I discussed the plan of care with patient, bedside RN and none.    Consultants/Specialty  nephrology    Code Status  Full Code    Disposition  Patient is not medically cleared for discharge.   Anticipate discharge to to home with close outpatient follow-up.  I have placed the appropriate orders for post-discharge needs.    Review of Systems  Review of Systems   Constitutional: Positive for chills and malaise/fatigue. Negative for fever.   HENT: Negative for congestion and sore throat.    Respiratory: Positive for cough, hemoptysis and shortness of breath. Negative for sputum production and wheezing.    Cardiovascular: Positive for chest pain (with coughing). Negative for palpitations and leg swelling.   Gastrointestinal: Negative for abdominal pain, constipation, diarrhea, nausea and vomiting.   Genitourinary: Negative for dysuria and urgency.   Musculoskeletal: Positive for back pain. Negative for joint pain.   Skin: Negative for rash.   Neurological: Negative for dizziness, sensory change and headaches.   Psychiatric/Behavioral: The patient is nervous/anxious.    All other systems reviewed and are negative.       Physical Exam  Temp:  [36.6 °C (97.8 °F)-36.9 °C (98.4 °F)] 36.6 °C (97.9 °F)  Pulse:  [55-88] 88  Resp:  [18-20] 20  BP: (128-157)/(62-75) 128/62  SpO2:  [82 %-98 %] 90 %    Physical Exam  Vitals and nursing note reviewed.   Constitutional:       General: He is not in acute distress.     Appearance: Normal appearance. He is ill-appearing.      Comments: frail   HENT:      Head: Normocephalic and atraumatic.      Mouth/Throat:      Mouth: Mucous membranes are dry.   Eyes:      General: No scleral icterus.  Cardiovascular:       Rate and Rhythm: Normal rate and regular rhythm.      Heart sounds: No murmur heard.      Pulmonary:      Effort: Pulmonary effort is normal.      Breath sounds: Rales present. No wheezing.      Comments: HFNC  Chest:      Chest wall: Tenderness present.   Abdominal:      General: Bowel sounds are normal. There is no distension.      Palpations: Abdomen is soft.      Tenderness: There is no abdominal tenderness. There is no guarding.   Musculoskeletal:         General: No swelling. Normal range of motion.   Skin:     General: Skin is warm and dry.      Capillary Refill: Capillary refill takes more than 3 seconds.      Coloration: Skin is not jaundiced.   Neurological:      Mental Status: He is alert and oriented to person, place, and time. Mental status is at baseline.   Psychiatric:         Mood and Affect: Mood normal.         Behavior: Behavior normal.         Thought Content: Thought content normal.         Judgment: Judgment normal.         Fluids    Intake/Output Summary (Last 24 hours) at 1/15/2022 1118  Last data filed at 1/15/2022 1000  Gross per 24 hour   Intake 480 ml   Output 2490 ml   Net -2010 ml       Laboratory  Recent Labs     01/13/22 0324 01/14/22  0440 01/15/22  0228   WBC 4.5* 7.3 9.7   RBC 2.48* 2.62* 2.70*   HEMOGLOBIN 7.6* 7.8* 8.3*   HEMATOCRIT 24.7* 25.2* 24.9*   MCV 99.6* 96.2 92.2   MCH 30.6 29.8 30.7   MCHC 30.8* 31.0* 33.3*   RDW 52.4* 50.9* 49.1   PLATELETCT 89* 118* 127*   MPV 13.9* 14.0* 14.2*     Recent Labs     01/13/22 2238 01/14/22  0440 01/15/22  0228   SODIUM 138 135 137   POTASSIUM 5.2 4.9 4.3   CHLORIDE 102 100 98   CO2 18* 22 25   GLUCOSE 125* 106* 175*   BUN 42* 44* 48*   CREATININE 1.56* 1.38 1.60*   CALCIUM 9.4 8.8 8.8     Recent Labs     01/12/22  1839   INR 1.15*         Recent Labs     01/13/22 0324   TRIGLYCERIDE 125   HDL 20*   LDL 10       Imaging  DX-CHEST-PORTABLE (1 VIEW)   Final Result      Stable appearance of the chest.      US-RENAL   Final Result       1.  Mildly increased corticomedullary differentiation which can be seen in the setting of medical renal disease.   2.  Trace ascites of uncertain etiology and significance.         INTERPRETING LOCATION: [address], [place], [address]      US-EXTREMITY VENOUS LOWER BILAT   Final Result      DX-CHEST-PORTABLE (1 VIEW)   Final Result      1.  Interstitial and alveolar airspace opacities are not significantly changed compared to prior. Findings may represent interstitial edema or multifocal pneumonia. Underlying chronic fibrotic changes not excluded.   2.  Trace left pleural effusion not excluded.   3.  Atherosclerotic plaque.   4.  Subsegmental right middle lobe atelectasis.   5.  Mild cardiomegaly.           Assessment/Plan  * Acute respiratory failure due to COVID-19 (HCC)- (present on admission)  Assessment & Plan  Acute on chronic respiratory failure due to COVID   RT protocol  Needs to be on IV fluid due to acute on chronic CKD and lactic acidosis    Lymphadenopathy  Assessment & Plan  Adenopathy seen on CT chest  Would benefit from CT abd-pelvis looking for lymphoma once respiratory status improves.    Lactic acidosis- (present on admission)  Assessment & Plan  Pt does not look quite septic    Metformin induced lactic acidosis with MIRIAM  Continue IVF   nephrology consult   resolved    Hyperkalemia- (present on admission)  Assessment & Plan  K 6.0 on admission --- then 4.7--5.8---6.5  Hyperkalemia protocol twice  Bicarbonate   Kayexalate  Nephrology following  resolved    COVID- (present on admission)  Assessment & Plan  - Duonebs, O2 therapy, and proning as needed  - Decadron 6mg qd  - Holding abx, procalcitonin is normal  - f/u Blood Cultures, Sputum Cx no growth to date  - Check D-dimer slightly elevated.  DVT study is negative  , CRP 7.14  Lactic Acid significantly elevated.  Not sure if related to COVID or MIRIAM or even lactic acidosis from metformin??  -Heparin for DVT prophylaxis    Pancytopenia (HCC)-  (present on admission)  Assessment & Plan  Hgb 8.2 on admission  Platelets WBC slightly low  Possible related to COVID??  Mixed iron deficiency anemia and anemia of chronic inflammation  Occult stool studies to follow since patient reported intermittent black stools at home  Omeprazole started    Acute on chronic respiratory failure (HCC)- (present on admission)  Assessment & Plan  On 3L oxygen at home baseline oxygen 2/2 pulmonary fibrosis  Requiring 5-6L oxygen on admission  Oxygen per protocol  RT  Decadron due to COvid  + rales all over   procal is normal . No need for antibiotic   Worsening patient's respiratory status.  Currently on high flow nasal cannula.  We will start the patient on Lasix 40 mg IV twice daily.    DM2 (diabetes mellitus, type 2) ([facility])  Assessment & Plan  ISS  Accuchecks  A1c 9.7   Metformin held  Diabetic diet      Elevated troponin- (present on admission)  Assessment & Plan  Troponin 146 on admission  Possibly due to underlying infection      MIRIAM (acute kidney injury) ([facility])- (present on admission)  Assessment & Plan  Possible related to COVID versus dehydration  Avoid nephrotoxic medications  Avoid metformin  Continue IV fluid  Renal ultrasound to follow if no improvement  1/15: Cr 1.6, decrease lasix from IV to PO        VTE prophylaxis: heparin ppx    I have performed a physical exam and reviewed and updated ROS and Plan today (1/15/2022). In review of yesterday's note (1/14/2022), there are no changes except as documented above.    Patient is critically ill.   The patient continues to have: Acute respiratory failure  The vital organ system that is affected is the: Pulmonary  If untreated there is a high chance of deterioration into: Respiratory arrest  And eventually death.   The critical care that I am providing today is: High flow nasal cannula, diuresis with Lasix, frequent vital checks  The critical that has been undertaken is medically complex.   There has been no overlap in  critical care time.   Critical Care Time not including procedures: 37 minutes

## 2022-01-15 NOTE — CARE PLAN
The patient is Watcher - Medium risk of patient condition declining or worsening    Shift Goals  Clinical Goals: Monitor VS  Patient Goals: Comfort/rest  Family Goals: N/A    Progress made toward(s) clinical / shift goals:        Problem: Knowledge Deficit - Standard  Goal: Patient and family/care givers will demonstrate understanding of plan of care, disease process/condition, diagnostic tests and medications  Outcome: Progressing     Problem: Fluid Volume  Goal: Fluid volume balance will be maintained  Outcome: Progressing       Patient is not progressing towards the following goals:

## 2022-01-15 NOTE — CARE PLAN
Problem: Knowledge Deficit - Standard  Goal: Patient and family/care givers will demonstrate understanding of plan of care, disease process/condition, diagnostic tests and medications  Note: Patient updated on plan of care for the day, patient verbalized understanding. All questions and concerns addressed at this time.      Problem: Urinary - Renal Perfusion  Goal: Ability to achieve and maintain adequate renal perfusion and functioning will improve  Note: Post void residual earlier in day was 164mL. No concern to MD.     Problem: Respiratory  Goal: Patient will achieve/maintain optimum respiratory ventilation and gas exchange  Note: Patient transitioned to hiflow NC 40L 60% oxygen saturation 97%.

## 2022-01-16 NOTE — PROGRESS NOTES
Jaelyn cross cover progress note:    I responded to CODE BLUE called at 0027, patient was lying on the floor with HFNC in place and chest compressions being performed.     This is a 71-year-old male with recent history of endarterectomy and pacemaker placement, diabetes, pulmonary fibrosis on 3 L O2 at baseline, dyslipidemia, nonobstructive coronary artery disease, admitted January 12, 2022 with dizziness and hypotension found to be COVID-positive with MIRIAM, pancytopenia, acute on chronic respiratory failure being treated with Decadron, high flow nasal cannula, DuoNebs, DVT prophylaxis.    It was reported to me patient's CNA went into the room when bed alarm went off.  Patient has been sitting up to the edge of bed and urinating during his stay and appeared that is what he was doing.  When CNA walked into the room the patient was standing up and she saw him begin to collapse when she was able to catch him and help him to the ground.  She immediately called for help during this and RN staff immediately to bedside, patient was pulseless and compressions immediately started and CODE BLUE activated.    I arrived to bedside 0028, compressions were ongoing.  RT immediately arrived and began using Ambu bag to oxygenate the patient, subsequently placed LMA.  1 mg epinephrine given as well as an amp of sodium bicarb. Pulse check performed and he was pulseless, ventricular pacing noted on monitor, compressions resumed. He received an additional two dose of epinephrine while CPR continued, he remained pulseless throughout.  During chest compressions patient started having significant hemoptysis that was able to be suctioned away. Intensivist arrived during code as well to assist. Family was notified by SEJAL during code and were coming to to the hospital immediately. 15 minutes of high quality compressions were performed and at 0041 the code was ended after a final pulse check with no pulses palpated. Family arrived to bedside  at the same time. I informed the patient's son of what happened and that his father had passed away. Condolences provided and SW and Rn staff available to help.     Alireza Rodriguez M.D.

## 2022-01-16 NOTE — DISCHARGE PLANNING
Medical Social Work     Code Blue    SEJAL responded to a code blue. The medical team performed CPR on the pt. SEJAL called the pt son Marty Fabian at [phone], and was able to make contact with him and advise him of the medical team doing CPR and the pt being in critical condition. Marty stated [facility]     The MD called TOD. The pt son arrived and the MD provided him with an update on the pt. SEJAL and the medical team provided emotional support. More family showed up and has been provided support.     SEJAL asked if they would like a dani and the family would like a dani for a prayer. SEJAL called dani Avila who is on call and advised him that the family is requesting spiritual services. Dani Avila stated he is on his way to [facility] to provided spiritual support.     SEJAL provided the pt family with the difficult times packet and the ER SW contact number in case they have questions at a later time.     SEJAL will remain available for pt support.

## 2022-01-16 NOTE — DISCHARGE SUMMARY
Death Summary    Cause of Death  Cardiac arrest due to respiratory arrest due to COVID 19 due to unknown etiology.    Comorbid Conditions at the Time of Death  Principal Problem:    Acute respiratory failure due to COVID-19 (HCC) POA: Yes  Active Problems:    MIRIAM (acute kidney injury) (HCC) POA: Yes    Elevated troponin POA: Yes    DM2 (diabetes mellitus, type 2) (HCC) POA: Unknown    Acute on chronic respiratory failure (HCC) POA: Yes    Pancytopenia (HCC) POA: Yes    COVID POA: Yes    Hyperkalemia POA: Yes    Lactic acidosis POA: Yes    Lymphadenopathy POA: Unknown  Resolved Problems:    * No resolved hospital problems. *      History of Presenting Illness and Hospital Course  71-year-old male Greenlandic-speaking past medical history of recent endarterectomy and pacemaker placement last month,diabetes pulmonary fibrosis, chronic hypoxia 3 L oxygen his baseline, dyslipidemia and nonobstructive CAD noted on angiogram in November 2020 was brought by EMS while he was found to be hypotensive 76/47 on the scene.  Patient was V paced/left bundle branch block. he also did report shortness of breath, loss of appetite, headache, back pain, weakness, subjective fevers, dry cough.  On arrival potassium 6.0, GFR 43.  He was tested positive for COVID.  Chest x-ray interstitial and alveolar opacities not significantly changed.  Trace left pleural effusions.  Procalcitonin normal.  Lactic acid 3.5 increased to 6.3 after a dose of Lasix was given.  Hyperkalemia protocol was started.  Blood cultures no growth to date.  D-dimer slightly elevated.  DVT study is negative.    He is feeling great. He is tangential with few complains as back pain from a fall 3 years ago. Slight sob, but states that is ok. Afebrile. Hyperkalemia protocol used twice, kayexalate. Lactic acid again elevated. He also report dark black stool intermittently. No active bleeding.  used. Hold on lasix and started on IVF. If still hyperkalemic, will consult  nephrology for further help. Continue to monitor lactic acid. No problem with urination     1/14: Overnight patient became increasingly short of breath.  He is now currently on high flow nasal cannula.  We will diurese the patient with Lasix 40 mg IV twice daily.  Patient does endorse some chest pain with coughing.  The pain is reproducible with palpation.     1/15: Increase in patient's creatinine will decrease the Lasix from 40 mg IV to p.o.  Continues to require high flow nasal cannula.  Continuing steroids.  ID was consulted yesterday started on baricitinib.  Patient reports a single episode of coughing up phlegm that he reports was black. Glucose 146 continue current management.    1/16: CODE BLUE was called at 0027.  Reportedly the bed alarm had gone off and the CNA was responding.  Patient had been sitting out of bed urinating when he had stood up at which time the CNA saw the patient standing and subsequently collapse.  She was able to catch the patient and helped him to the ground.  She immediately called for help and RN staff arrived at bedside patient was found to be be pulseless and chest compressions were initiated.  CODE BLUE was activated.  Night hospitalist presented and a LMA was placed.  1mg of epinephrine and 1 amp of bicarb given.  Pulse less activity was again noted on repeat pulse check with ventricular pacing noted on the monitor and compressions were continued.  During the chest compressions patient was noted to have significant hemoptysis.  15 minutes of high-quality chest compressions were performed and at 0041 patient was pronounced.  The night hospitalist was able to speak with the family.    Death Date: 01/16/22   Death Time: 0042      Pronounced By (MD): Dr. Alireza Rodriguez        Total time spent: 35 mins

## 2022-01-16 NOTE — PROGRESS NOTES
Received Bedside report. Assumed care at 1900. This pt is AOx4, voiding adequately, 0/10 pain. Patient and RN discussed plan of care: questions answered. Labs noted, assessment complete, patient tolerating consistent CHO (Diabetic) diet. Tele box in place. Pt is on 50 L at 70% of O2 via HFNC. Call light in place, fall precautions in place, patient educated on importance of calling for assistance. No additional needs at this time. VSS

## 2022-01-16 NOTE — PROGRESS NOTES
Monitor Summary per monitor room   100 % Paced 49-78  (O) PVC, PAC  Tachy up to 120    Patient  at 0041

## 2022-01-16 NOTE — PROGRESS NOTES
Spiritual Care Note      Patient Information     Patient's Name: Chase Harris   MRN: [id number]    YOB: 1950   Age and Gender: 71 y.o. male   Unit/Service Area: [facility]   Room (and Bed): T724/02   Ethnicity or Nationality:    Tenriism/Spiritual preference: Bahai   Place of Residence: [place], [place]   Family/Friends/Others Present: 6 family members   Medical Diagnosis(-es)/Procedure(s): acute respiratory failure due to COVID-19  lymphadenopathy  lactic acidosis  hyperkalemia  COVID  pancytopenia   acute on chronic respiratory failure   diabetes mellitus, type 2  elevated troponin  acute kidney injury   Code Status: Full Code    Date of Admission: 1/12/2022   Length of Stay: 4 days        Nature of the Visit:     Initial, Call Back    Crisis Visit:     Death of patient    Referral from/Origin of Request:     Verbal, Staff, by Phone    Visited with:     Family    Observations/Symptoms:     Patient's body laying in bed.  Family bedside, tearful.    Interaction/Conversation:     Family requested prayer.    Assessment:     Need, Distress    Need:     Seeking Spiritual Assistance and Support    Distress:     Grief/Loss/Bereavement    Tenriism & Ritual Needs:     EOL Ritual -- Final Prayer of Commendation    Intended Effects:     Demonstrate Caring and Concern, Journeying With Someone in Grief Process, Helping Someone Feel Comforted, Latrice Affirmation    Interventions:     Compassionate Presence, Emotional Support, Prayer    Outcomes:     Emotional Release, Progress with Grief, Spiritual Comfort    Total Time:     10 minutes      Spiritual Care Provider Information  Chaplain YANN Valdes  [phone]   [email or web address]

## 2022-01-16 NOTE — PROGRESS NOTES
Change in patient condition due to: Respiratory and Cardiac  Denise Espinal called at: 0027    Rapid Response Team and Dr. Alireza Rodriguez bedside at 0028.    See Code Blue timeline for rapid response events. Patient did not recover.

## 2022-01-16 NOTE — RESPIRATORY CARE
Code Blue called at 0027    Pt in Respiratory and Cardiac arrests .    Patient did not recover from that.

## 2022-01-17 LAB
BACTERIA BLD CULT: NORMAL
BACTERIA BLD CULT: NORMAL
SIGNIFICANT IND 70042: NORMAL
SIGNIFICANT IND 70042: NORMAL
SITE SITE: NORMAL
SITE SITE: NORMAL
SOURCE SOURCE: NORMAL
SOURCE SOURCE: NORMAL

## 2022-01-18 LAB
BACTERIA BLD CULT: NORMAL
SIGNIFICANT IND 70042: NORMAL
SITE SITE: NORMAL
SOURCE SOURCE: NORMAL

## 2023-03-16 NOTE — PROGRESS NOTES
1/29: CHW Darwin reached out to pt via TC and text message to f/u after d/c. Pt did not answer, three attempts made. CHW will give pt one week to respond. If no response after one week, CCM will no longer follow.     2/5: CHW has not heard from pt and will no longer follow due to lack of engagement.    Response received confirming plan to hold x 2; plan sent to pharmacy.  Thank you Dianne.

## 2024-05-09 NOTE — PROGRESS NOTES
Patient was found this morning at approximately without his nasal canula on and oxygen saturation was in the mid 50s. Oxy mask @ 10 liters was put on the patient. Ipad  was called to discuss with patient is orientation status and educate him on his oxygen needs. Patient verbalized understanding.     Shortly after, the patient was found again with his oxy mask off eating his breakfast. This RN spoke with the patient's son over the phone and asked him to re-educate the patient on his oxygen needs and that he cannot take off his oxygen mask. Patient verbalized understanding.     The patient was again found with his oxy mask off eating his breakfast and talking on the phone. DORA Baig is a certified  on the floor. She educated the patient in his native language that he needs to keep his oxygen on. Patient verbalized understanding.     Currently, he continues to eat, drink, and talk on the phone and takes his oxygen off to do so. [employer] team paged to notify.    Oriented - self; Oriented - place; Oriented - time

## 2024-09-19 NOTE — DISCHARGE INSTRUCTIONS
Discharge Instructions    Discharged to home by car with relative. Discharged via wheelchair, hospital escort: Yes.  Special equipment needed: Not Applicable    Be sure to schedule a follow-up appointment with your primary care doctor or any specialists as instructed.     Discharge Plan:   Diet Plan: Discussed  Activity Level: Discussed  Confirmed Follow up Appointment: Patient to Call and Schedule Appointment  Confirmed Symptoms Management: Discussed  Medication Reconciliation Updated: Yes  Influenza Vaccine Indication: Patient Refuses    I understand that a diet low in cholesterol, fat, and sodium is recommended for good health. Unless I have been given specific instructions below for another diet, I accept this instruction as my diet prescription.   Other diet:     Special Instructions: None    · Is patient discharged on Warfarin / Coumadin?   No   Prevención de caídas de adultos en hospitales  Fall Prevention in Hospitals, Adult  Estar internado en un hospital lo pone en riesgo de sufrir shane caída. Las caídas pueden provocar lesiones y daños graves, paul pueden prevenirse. Es importante que comprenda qué lo pone en riesgo de caerse y qué pueden hacer usted y el equipo médico para evitar que usted se caiga. Si usted o un ser querido se caen en el hospital, es importante informar al personal del hospital.  ¿Qué aumenta el riesgo de caídas?  Ciertas afecciones y tratamientos pueden aumentar el riesgo de caídas en el hospital. Estos incluyen los siguientes:  · Estar en un ambiente que no le es familiar, especialmente cuando usa el baño a la noche.  · Someterse a shane cirugía.  · Hacer reposo en cama.  · Maxx muchos medicamentos o determinados tipos de medicamentos, lita píldoras para dormir.  · Tener colocados tubos, lita vías intravenosas o catéteres.  Otros factores de riesgo de caídas en un hospital incluyen los siguientes:  · Tener dificultad para oír o troy.  · Tener un cambio en el pensamiento o la conducta,  lita confusión.  · Tener depresión.  · Problemas de equilibrio.  · Ser varón.  · Sensación de mareo.  · Uso del baño con frecuencia.  · Haberse caído en los últimos jose roberto meses.  · Tener presión arterial baja.  Algunas estrategias para prevención de caídas  Si usted o un ser querido están en el hospital:  · Pregunte qué estrategias de prevención de caídas se implementarán. Si nota que el plan de prevención de caídas ha cambiado, no dude en decirlo.  · Pida ayuda para trasladarse, en especial después de shane cirugía o si no se siente dominique.  · Si se le ha indicado que pida ayuda para levantarse, no se levante solo. Pedir ayuda para levantarse es necesario para parrish seguridad y el personal está allí para ayudarlo.  · Use calzado antideslizante.  · Levántese lentamente y siéntese en un lado de la cama por algunos minutos antes de pararse.  · Mantenga cerca las cosas que necesita, lita el botón para llamar al enfermero o el teléfono, de modo que no tenga que estirarse para alcanzarlos.  · Use anteojos o audífonos si los tiene.  · Pídale a alguien que se quede en el hospital con usted o con parrish ser querido.  · Pregunte si las píldoras para dormir u otros medicamentos que pueden provocar confusión son necesarios.  ¿Qué hace el personal del hospital para ayudar a prevenir las caídas?  Los hospitales implementan sistemas para prevenir caídas y accidentes. Estos pueden incluir lo siguiente:  · Analizar los riesgos de caída y realizar un plan de prevención de caídas personalizado.  · Controlarlo regularmente para troy si necesita ayuda.  · Colocarle un brazalete en la jose o un cartel cerca de parrish habitación para alertar al personal acerca de radha necesidades.  · Usar shane alarma en parrish cama del hospital. Se trata de shane alarma que suena si usted se levanta de la cama y olvida llamar para pedir ayuda.  · Mantener la cama en shane posición baja y trabada.  · Mantener el área que rodea la cama y el baño dominique meseret y diane de  obstáculos.  · Mantener la habitación tranquila de modo que usted pueda dormir y estar dominique descansado.  · Usar equipos de seguridad, tales lita:  ? Un cinturón alrededor de la cintura.  ? Andadores, muletas y otros dispositivos de apoyo.  ? [place] de seguridad, lita quiana bajas o almohadones en el suelo, al lado de la cama.  · Que un miembro del personal se quede con usted (observación kristen a kristen), incluso cuando esté usando el baño. Sula se hace por parrish seguridad.  · Usar control por video. Sula le permite al miembro del personal ir a ayudarlo si usted lo necesita.  ¿Qué otras medidas puedo tristen para bajar mi riesgo de caídas?  · Visite con regularidad al médico o farmacéutico para revisar todos los medicamentos que franko.  · Asegúrese de tener un programa de ejercicios regulares para mantenerse en forma. Sula lo ayudará a mantener el equilibrio.  · Hable con un fisioterapeuta o un entrenador si se lo recomienda el médico. Estos pueden ayudarlo a mejorar parrish fuerza, equilibrio y resistencia.  · Si tiene más de 65 años:  ? Pregúntele al médico si necesita un suplemento de calcio o de vitamina D.  ? Contrólese la vista y la audición todos los años.  ? Contrólese los pies todos los años.  Dónde encontrar más información  Puede encontrar más información acerca de la prevención de caídas en los Centros para el control y la prevención de enfermedades (Centers for Disease Control and Prevention): www.cdc.gov/steadi  Resumen  · Estar en un ambiente que no le es familiar, lita un hospital, aumenta parrish riesgo de caerse.  · Si se le swenson indicado que pida ayuda para levantarse, no se levante solo. Pedir ayuda para levantarse es necesario para parrish seguridad y el personal está allí para ayudarlo.  · Pregunte qué estrategias de prevención de caídas se implementarán. Si nota que el plan de prevención de caídas ha cambiado, no dude en decirlo.  · Si usted o parrish ser querido se , comuníqueselo al personal del hospital. Sula es  importante.  Esta información no tiene lita fin reemplazar el consejo del médico. Asegúrese de hacerle al médico cualquier pregunta que tenga.  Document Released: 12/18/2006 Document Revised: 10/15/2018 Document Reviewed: 10/15/2018  Elsevier Patient Education © 2020 [employer]      Depression / Suicide Risk    As you are discharged from this [facility] facility, it is important to learn how to keep safe from harming yourself.    Recognize the warning signs:  · Abrupt changes in personality, positive or negative- including increase in energy   · Giving away possessions  · Change in eating patterns- significant weight changes-  positive or negative  · Change in sleeping patterns- unable to sleep or sleeping all the time   · Unwillingness or inability to communicate  · Depression  · Unusual sadness, discouragement and loneliness  · Talk of wanting to die  · Neglect of personal appearance   · Rebelliousness- reckless behavior  · Withdrawal from people/activities they love  · Confusion- inability to concentrate     If you or a loved one observes any of these behaviors or has concerns about self-harm, here's what you can do:  · Talk about it- your feelings and reasons for harming yourself  · Remove any means that you might use to hurt yourself (examples: pills, rope, extension cords, firearm)  · Get professional help from the community (Mental Health, Substance Abuse, psychological counseling)  · Do not be alone:Call your Safe Contact- someone whom you trust who will be there for you.  · Call your local CRISIS HOTLINE 622-6641 or 927-369-9544  · Call your local Children's Mobile Crisis Response Team [place] (991) 579-8218 or www.[employer]  · Call the toll free National Suicide Prevention Hotlines   · National Suicide Prevention Lifeline 230-794-TQXU (7162)  · National Hope Line Network 800-SUICIDE (094-9168)    FOLLOW UP ITEMS POST DISCHARGE  Please call [phone] to schedule PCP appointment for  patient.    Required specialty appointments include:       Discharge Instructions per [employer]  -Follow-up with PCP  -Follow-up with cardiology      DIET: Cardiac    ACTIVITY: As tolerated    DIAGNOSIS: syncope    Return to ER if symptoms persist, chest pain, shortness of breath, palpitation, numbness, tingling, weakness, and HIGH fever.     none

## 2025-01-26 NOTE — CARE PLAN
Problem: Respiratory  Goal: Patient will achieve/maintain optimum respiratory ventilation and gas exchange  Outcome: Not Progressing  Note: Pt now on high flow nasal cannula. Pt sats good while at rest but drops immediately when standing. Today pt reported thick, bloody secretions x1 into napkin.      Problem: Knowledge Deficit - Standard  Goal: Patient and family/care givers will demonstrate understanding of plan of care, disease process/condition, diagnostic tests and medications  Outcome: Progressing     Problem: Fall Risk  Goal: Patient will remain free from falls  Outcome: Progressing   The patient is Watcher - Medium risk of patient condition declining or worsening    Shift Goals  Clinical Goals: Monitor labs, safety,   Patient Goals: Rest, comfort   Family Goals: N/A     Progress made toward(s) clinical / shift goals:  pt remains safe, calling appropriately. Now on HFNC, significant desats when standing.     Patient is not progressing towards the following goals:      Problem: Respiratory  Goal: Patient will achieve/maintain optimum respiratory ventilation and gas exchange  Outcome: Not Progressing  Note: Pt now on high flow nasal cannula. Pt sats good while at rest but drops immediately when standing. Today pt reported thick, bloody secretions x1 into napkin.       Patient

## 2025-07-30 NOTE — PROGRESS NOTES
Please Schedule for Follow up to discuss and evaluate.  If BP >170 with symptoms advise to go to ER    Pt moved to 836-1 per charge. Report given to DORA Rinaldi.